# Patient Record
Sex: MALE | Race: BLACK OR AFRICAN AMERICAN | NOT HISPANIC OR LATINO | Employment: OTHER | URBAN - METROPOLITAN AREA
[De-identification: names, ages, dates, MRNs, and addresses within clinical notes are randomized per-mention and may not be internally consistent; named-entity substitution may affect disease eponyms.]

---

## 2017-05-24 ENCOUNTER — OFFICE VISIT (OUTPATIENT)
Dept: INTERNAL MEDICINE | Facility: CLINIC | Age: 32
End: 2017-05-24
Payer: MEDICARE

## 2017-05-24 ENCOUNTER — LAB VISIT (OUTPATIENT)
Dept: LAB | Facility: HOSPITAL | Age: 32
End: 2017-05-24
Attending: FAMILY MEDICINE
Payer: MEDICARE

## 2017-05-24 VITALS — DIASTOLIC BLOOD PRESSURE: 80 MMHG | SYSTOLIC BLOOD PRESSURE: 122 MMHG | HEART RATE: 70 BPM

## 2017-05-24 DIAGNOSIS — Z74.09 MOBILITY IMPAIRED: ICD-10-CM

## 2017-05-24 DIAGNOSIS — I10 HTN (HYPERTENSION), BENIGN: Primary | ICD-10-CM

## 2017-05-24 DIAGNOSIS — E78.5 DYSLIPIDEMIA: ICD-10-CM

## 2017-05-24 DIAGNOSIS — Q05.9 SPINA BIFIDA, UNSPECIFIED HYDROCEPHALUS PRESENCE, UNSPECIFIED SPINAL REGION: ICD-10-CM

## 2017-05-24 DIAGNOSIS — Z00.00 PREVENTATIVE HEALTH CARE: ICD-10-CM

## 2017-05-24 DIAGNOSIS — N32.81 OVERACTIVE BLADDER: ICD-10-CM

## 2017-05-24 LAB
ALBUMIN SERPL BCP-MCNC: 3.8 G/DL
ALP SERPL-CCNC: 108 U/L
ALT SERPL W/O P-5'-P-CCNC: 20 U/L
ANION GAP SERPL CALC-SCNC: 10 MMOL/L
AST SERPL-CCNC: 17 U/L
BASOPHILS # BLD AUTO: 0.02 K/UL
BASOPHILS NFR BLD: 0.3 %
BILIRUB SERPL-MCNC: 0.5 MG/DL
BUN SERPL-MCNC: 17 MG/DL
CALCIUM SERPL-MCNC: 9.2 MG/DL
CHLORIDE SERPL-SCNC: 104 MMOL/L
CHOLEST/HDLC SERPL: 5.2 {RATIO}
CO2 SERPL-SCNC: 25 MMOL/L
CREAT SERPL-MCNC: 0.9 MG/DL
DIFFERENTIAL METHOD: ABNORMAL
EOSINOPHIL # BLD AUTO: 0.2 K/UL
EOSINOPHIL NFR BLD: 4.2 %
ERYTHROCYTE [DISTWIDTH] IN BLOOD BY AUTOMATED COUNT: 15.1 %
EST. GFR  (AFRICAN AMERICAN): >60 ML/MIN/1.73 M^2
EST. GFR  (NON AFRICAN AMERICAN): >60 ML/MIN/1.73 M^2
GLUCOSE SERPL-MCNC: 83 MG/DL
HCT VFR BLD AUTO: 45.8 %
HDL/CHOLESTEROL RATIO: 19.1 %
HDLC SERPL-MCNC: 178 MG/DL
HDLC SERPL-MCNC: 34 MG/DL
HGB BLD-MCNC: 15.1 G/DL
LDLC SERPL CALC-MCNC: 130.2 MG/DL
LYMPHOCYTES # BLD AUTO: 2.2 K/UL
LYMPHOCYTES NFR BLD: 38.1 %
MCH RBC QN AUTO: 26 PG
MCHC RBC AUTO-ENTMCNC: 33 %
MCV RBC AUTO: 79 FL
MONOCYTES # BLD AUTO: 0.5 K/UL
MONOCYTES NFR BLD: 8.3 %
NEUTROPHILS # BLD AUTO: 2.8 K/UL
NEUTROPHILS NFR BLD: 48.9 %
NONHDLC SERPL-MCNC: 144 MG/DL
PLATELET # BLD AUTO: 243 K/UL
PMV BLD AUTO: 10.3 FL
POTASSIUM SERPL-SCNC: 3.9 MMOL/L
PROT SERPL-MCNC: 7.8 G/DL
RBC # BLD AUTO: 5.81 M/UL
SODIUM SERPL-SCNC: 139 MMOL/L
TRIGL SERPL-MCNC: 69 MG/DL
WBC # BLD AUTO: 5.77 K/UL

## 2017-05-24 PROCEDURE — 80061 LIPID PANEL: CPT

## 2017-05-24 PROCEDURE — 85025 COMPLETE CBC W/AUTO DIFF WBC: CPT

## 2017-05-24 PROCEDURE — 36415 COLL VENOUS BLD VENIPUNCTURE: CPT

## 2017-05-24 PROCEDURE — 99395 PREV VISIT EST AGE 18-39: CPT | Mod: S$PBB,,, | Performed by: FAMILY MEDICINE

## 2017-05-24 PROCEDURE — 80053 COMPREHEN METABOLIC PANEL: CPT

## 2017-05-24 PROCEDURE — 83036 HEMOGLOBIN GLYCOSYLATED A1C: CPT

## 2017-05-24 PROCEDURE — 99999 PR PBB SHADOW E&M-EST. PATIENT-LVL II: CPT | Mod: PBBFAC,,, | Performed by: FAMILY MEDICINE

## 2017-05-24 NOTE — PROGRESS NOTES
Subjective:       Patient ID: Dale Pantoja is a 32 y.o. male.    Chief Complaint: No chief complaint on file.  Dale Pantoja 32 y.o. male is here for office visit to review care and physical exam, reports doing well in general.  Does labs at [facility] most every day, will be playing league basketball.      HPI  Review of Systems   Constitutional: Negative for activity change, appetite change, fatigue, fever and unexpected weight change.   HENT: Negative for congestion, hearing loss, postnasal drip and rhinorrhea.    Eyes: Negative for pain, discharge and visual disturbance.   Respiratory: Negative for cough, choking and shortness of breath.    Cardiovascular: Negative for chest pain, palpitations and leg swelling.   Gastrointestinal: Negative for abdominal pain, diarrhea and vomiting.   Genitourinary: Negative for dysuria, flank pain, hematuria and urgency.   Musculoskeletal: Negative for arthralgias, back pain, joint swelling and neck pain.   Skin: Negative for color change and rash.   Neurological: Negative for dizziness, tremors, syncope, weakness, numbness and headaches.   Psychiatric/Behavioral: Negative for agitation and confusion. The patient is not hyperactive.        Objective:      Physical Exam   Constitutional: He is oriented to person, place, and time. He appears well-developed and well-nourished.   HENT:   Head: Normocephalic.   Eyes: EOM are normal. Pupils are equal, round, and reactive to light.   Neck: Normal range of motion. Neck supple. No thyromegaly present.   Cardiovascular: Normal rate.  Exam reveals no gallop and no friction rub.    No murmur heard.  Pulmonary/Chest: Effort normal. No respiratory distress. He has no wheezes.   Abdominal: Soft. Bowel sounds are normal. He exhibits no mass. There is no tenderness.   Musculoskeletal: He exhibits no edema or tenderness.   Lymphadenopathy:     He has no cervical adenopathy.   Neurological: He is alert and oriented to person, place, and time.  He has normal reflexes. No cranial nerve deficit.   Skin: Skin is warm. No rash noted.   Psychiatric: He has a normal mood and affect. His behavior is normal.       Assessment:       1. HTN (hypertension), benign    2. Dyslipidemia    3. Mobility impaired    4. Overactive bladder    5. Spina bifida, unspecified hydrocephalus presence, unspecified spinal region    6. Preventative health care        Plan:       associated with    Diagnoses and all orders for this visit:    HTN (hypertension), benign  -   Dyslipidemia  - review. Diet discussed  Mobility impaired  - discussed  Overactive bladder  - no issues  Spina bifida, unspecified hydrocephalus presence, unspecified spinal region  - Chart reviewed  Preventative health care  -     Comprehensive metabolic panel; Future  -     Lipid panel; Future  -     CBC auto differential; Future  -     Hemoglobin A1c; Future

## 2017-05-24 NOTE — PROGRESS NOTES
Subjective:       Patient ID: Dale Pantoja is a 32 y.o. male.    Chief Complaint: No chief complaint on file.  Dale Pantoja 32 y.o. male is here for office visit to review care and physical exam,  HPI  Review of Systems   Constitutional: Negative for activity change, appetite change, fatigue, fever and unexpected weight change.   HENT: Negative for congestion, hearing loss, postnasal drip and rhinorrhea.    Eyes: Negative for pain, discharge and visual disturbance.   Respiratory: Negative for cough, choking and shortness of breath.    Cardiovascular: Negative for chest pain, palpitations and leg swelling.   Gastrointestinal: Negative for abdominal pain, diarrhea and vomiting.   Genitourinary: Negative for dysuria, flank pain, hematuria and urgency.   Musculoskeletal: Negative for arthralgias, back pain, joint swelling and neck pain.   Skin: Negative for color change and rash.   Neurological: Negative for dizziness, tremors, syncope, weakness, numbness and headaches.   Psychiatric/Behavioral: Negative for agitation and confusion. The patient is not hyperactive.        Objective:      Physical Exam   Constitutional: He is oriented to person, place, and time. He appears well-developed and well-nourished.   HENT:   Head: Normocephalic.   Eyes: EOM are normal. Pupils are equal, round, and reactive to light.   Neck: Normal range of motion. Neck supple. No thyromegaly present.   Cardiovascular: Normal rate.  Exam reveals no gallop and no friction rub.    No murmur heard.  Pulmonary/Chest: Effort normal. No respiratory distress. He has no wheezes.   Abdominal: Soft. Bowel sounds are normal. He exhibits no mass. There is no tenderness.   Musculoskeletal: He exhibits no edema or tenderness.   Lymphadenopathy:     He has no cervical adenopathy.   Neurological: He is alert and oriented to person, place, and time. He has normal reflexes. No cranial nerve deficit.   Skin: Skin is warm. No rash noted.   Psychiatric: He has a  normal mood and affect. His behavior is normal.       Assessment:       No diagnosis found.    Plan:

## 2017-05-25 LAB
ESTIMATED AVG GLUCOSE: 120 MG/DL
HBA1C MFR BLD HPLC: 5.8 %

## 2017-06-09 ENCOUNTER — OFFICE VISIT (OUTPATIENT)
Dept: UROLOGY | Facility: CLINIC | Age: 32
End: 2017-06-09
Payer: MEDICARE

## 2017-06-09 VITALS
BODY MASS INDEX: 38.27 KG/M2 | HEART RATE: 80 BPM | SYSTOLIC BLOOD PRESSURE: 132 MMHG | HEIGHT: 63 IN | WEIGHT: 216 LBS | DIASTOLIC BLOOD PRESSURE: 74 MMHG

## 2017-06-09 DIAGNOSIS — N31.9 NEUROGENIC BLADDER: Primary | ICD-10-CM

## 2017-06-09 PROCEDURE — 99213 OFFICE O/P EST LOW 20 MIN: CPT | Mod: PBBFAC | Performed by: UROLOGY

## 2017-06-09 PROCEDURE — 99999 PR PBB SHADOW E&M-EST. PATIENT-LVL III: CPT | Mod: PBBFAC,,, | Performed by: UROLOGY

## 2017-06-09 PROCEDURE — 99213 OFFICE O/P EST LOW 20 MIN: CPT | Mod: S$PBB,,, | Performed by: UROLOGY

## 2017-06-09 NOTE — PROGRESS NOTES
Subjective:       Patient ID: Dale Pantoja is a 32 y.o. male.    Chief Complaint:  Follow-up      History of Present Illness  HPI  Patient is a 32 y.o. male who is established to our clinic and was initially referred by their PCP, Dr. Logan for evaluation of neurogenic bladder.  He was seen by Dr. Small last year for urodynamics.  He recommended ditropan XL daily.  Patient doing well.  No complaints.  cic performed 4-5x/day.  No leakage between catheterizations. No UTI's.  No stones.            Review of Systems  Review of Systems  All other systems reviewed and negative except pertinent positives noted in HPI.         Objective:     Physical Exam   Constitutional: He is oriented to person, place, and time. He appears well-developed and well-nourished. No distress.   HENT:   Head: Normocephalic and atraumatic.   Eyes: No scleral icterus.   Neck: No tracheal deviation present.   Pulmonary/Chest: Effort normal. No respiratory distress.   Neurological: He is alert and oriented to person, place, and time.   Psychiatric: He has a normal mood and affect. His behavior is normal. Judgment and thought content normal.          Lab Review  Lab Results   Component Value Date    COLORU LT. YELLOW 01/27/2016    SPECGRAV 1.020 01/27/2016    PHUR 5.0 01/27/2016    WBCUR 10 01/27/2016    NITRITE POS 01/27/2016    PROTEINUR 10 01/27/2016    GLUCOSEUR NEG 01/27/2016    KETONESU NEG 01/27/2016    UROBILINOGEN NORM 01/27/2016    BILIRUBINUR NEG 01/27/2016    RBCUR NEG 01/27/2016         Assessment:        1. Neurogenic bladder            Plan:     Neurogenic bladder  -     US Retroperitoneal Complete (Kidney and; Future; Expected date: 06/09/2017    f/u with renal us in 1 year.  Will call or message via patient portal with renal US results this year.   I spent 15 minutes with the patient of which more than half was spent in direct consultation with the patient in regards to our treatment and plan.

## 2017-06-14 ENCOUNTER — HOSPITAL ENCOUNTER (OUTPATIENT)
Dept: RADIOLOGY | Facility: HOSPITAL | Age: 32
Discharge: HOME | End: 2017-06-14
Attending: UROLOGY
Payer: MEDICARE

## 2017-06-14 DIAGNOSIS — N31.9 NEUROGENIC BLADDER: ICD-10-CM

## 2017-06-14 PROCEDURE — 76770 US EXAM ABDO BACK WALL COMP: CPT | Mod: 26,GC,, | Performed by: RADIOLOGY

## 2017-06-14 PROCEDURE — 76770 US EXAM ABDO BACK WALL COMP: CPT | Mod: TC

## 2017-10-04 ENCOUNTER — OFFICE VISIT (OUTPATIENT)
Dept: INTERNAL MEDICINE | Facility: CLINIC | Age: 32
End: 2017-10-04
Payer: MEDICARE

## 2017-10-04 VITALS — SYSTOLIC BLOOD PRESSURE: 128 MMHG | DIASTOLIC BLOOD PRESSURE: 80 MMHG | HEART RATE: 72 BPM

## 2017-10-04 DIAGNOSIS — G82.20 PARAPARESIS: ICD-10-CM

## 2017-10-04 DIAGNOSIS — Z74.09 MOBILITY IMPAIRED: ICD-10-CM

## 2017-10-04 DIAGNOSIS — E78.5 DYSLIPIDEMIA: ICD-10-CM

## 2017-10-04 DIAGNOSIS — Q05.9 SPINA BIFIDA, UNSPECIFIED HYDROCEPHALUS PRESENCE, UNSPECIFIED SPINAL REGION: ICD-10-CM

## 2017-10-04 DIAGNOSIS — N31.9 NEUROGENIC BLADDER: ICD-10-CM

## 2017-10-04 DIAGNOSIS — I10 HTN (HYPERTENSION), BENIGN: Primary | ICD-10-CM

## 2017-10-04 PROCEDURE — 99212 OFFICE O/P EST SF 10 MIN: CPT | Mod: PBBFAC | Performed by: FAMILY MEDICINE

## 2017-10-04 PROCEDURE — 99215 OFFICE O/P EST HI 40 MIN: CPT | Mod: S$PBB,,, | Performed by: FAMILY MEDICINE

## 2017-10-04 PROCEDURE — 99999 PR PBB SHADOW E&M-EST. PATIENT-LVL II: CPT | Mod: PBBFAC,,, | Performed by: FAMILY MEDICINE

## 2017-10-04 NOTE — PROGRESS NOTES
Subjective:       Patient ID: Dale Pantoja is a 32 y.o. male.    Chief Complaint: No chief complaint on file.  Dale Pantoja 32 y.o. male is here for office visit to review care and physical exam, here for regular care.  Has 90-L to fill-out.  Is very active.      HPI  Review of Systems   Constitutional: Negative for activity change, appetite change, fatigue, fever and unexpected weight change.   HENT: Negative for congestion, hearing loss, postnasal drip and rhinorrhea.    Eyes: Negative for pain, discharge and visual disturbance.   Respiratory: Negative for cough, choking and shortness of breath.    Cardiovascular: Negative for chest pain, palpitations and leg swelling.   Gastrointestinal: Negative for abdominal pain, diarrhea and vomiting.   Genitourinary: Negative for dysuria, flank pain, hematuria and urgency.   Musculoskeletal: Negative for arthralgias, back pain, joint swelling and neck pain.   Skin: Negative for color change and rash.   Neurological: Negative for dizziness, tremors, syncope, weakness, numbness and headaches.   Psychiatric/Behavioral: Negative for agitation and confusion. The patient is not hyperactive.        Objective:      Physical Exam   Constitutional: He is oriented to person, place, and time. He appears well-developed and well-nourished.   HENT:   Head: Normocephalic.   Eyes: EOM are normal. Pupils are equal, round, and reactive to light.   Neck: Normal range of motion. Neck supple. No thyromegaly present.   Cardiovascular: Normal rate.  Exam reveals no gallop and no friction rub.    No murmur heard.  Pulmonary/Chest: Effort normal. No respiratory distress. He has no wheezes.   Abdominal: Soft. Bowel sounds are normal. He exhibits no mass. There is no tenderness.   Musculoskeletal: He exhibits no edema or tenderness.   Lymphadenopathy:     He has no cervical adenopathy.   Neurological: He is alert and oriented to person, place, and time. He has normal reflexes. No cranial nerve  deficit.   Skin: Skin is warm. No rash noted.   Psychiatric: He has a normal mood and affect. His behavior is normal.       Assessment:       No diagnosis found.    Plan:       Diagnoses and all orders for this visit:    HTN (hypertension), benign  - Controled  Dyslipidemia  - Controled  Mobility impaired  - Discussed  Neurogenic bladder  - Chart reviewed  Paraparesis  - Chart reviewed  Spina bifida, unspecified hydrocephalus presence, unspecified spinal region  - Chart reviewed

## 2018-04-18 ENCOUNTER — LAB VISIT (OUTPATIENT)
Dept: LAB | Facility: HOSPITAL | Age: 33
End: 2018-04-18
Attending: FAMILY MEDICINE
Payer: MEDICARE

## 2018-04-18 ENCOUNTER — OFFICE VISIT (OUTPATIENT)
Dept: INTERNAL MEDICINE | Facility: CLINIC | Age: 33
End: 2018-04-18
Payer: MEDICARE

## 2018-04-18 DIAGNOSIS — Z00.00 PREVENTATIVE HEALTH CARE: ICD-10-CM

## 2018-04-18 DIAGNOSIS — I10 HTN (HYPERTENSION), BENIGN: Primary | ICD-10-CM

## 2018-04-18 DIAGNOSIS — E78.5 DYSLIPIDEMIA: ICD-10-CM

## 2018-04-18 DIAGNOSIS — Z83.3 FAMILY HISTORY OF DIABETES MELLITUS: ICD-10-CM

## 2018-04-18 DIAGNOSIS — I10 HTN (HYPERTENSION), BENIGN: ICD-10-CM

## 2018-04-18 LAB
ALBUMIN SERPL BCP-MCNC: 3.8 G/DL
ALP SERPL-CCNC: 111 U/L
ALT SERPL W/O P-5'-P-CCNC: 35 U/L
ANION GAP SERPL CALC-SCNC: 10 MMOL/L
AST SERPL-CCNC: 18 U/L
BASOPHILS # BLD AUTO: 0.03 K/UL
BASOPHILS NFR BLD: 0.5 %
BILIRUB SERPL-MCNC: 0.6 MG/DL
BUN SERPL-MCNC: 10 MG/DL
CALCIUM SERPL-MCNC: 9.2 MG/DL
CHLORIDE SERPL-SCNC: 105 MMOL/L
CHOLEST SERPL-MCNC: 186 MG/DL
CHOLEST/HDLC SERPL: 5 {RATIO}
CO2 SERPL-SCNC: 27 MMOL/L
CREAT SERPL-MCNC: 0.8 MG/DL
DIFFERENTIAL METHOD: ABNORMAL
EOSINOPHIL # BLD AUTO: 0.3 K/UL
EOSINOPHIL NFR BLD: 4.6 %
ERYTHROCYTE [DISTWIDTH] IN BLOOD BY AUTOMATED COUNT: 15.6 %
EST. GFR  (AFRICAN AMERICAN): >60 ML/MIN/1.73 M^2
EST. GFR  (NON AFRICAN AMERICAN): >60 ML/MIN/1.73 M^2
ESTIMATED AVG GLUCOSE: 140 MG/DL
GLUCOSE SERPL-MCNC: 109 MG/DL
HBA1C MFR BLD HPLC: 6.5 %
HCT VFR BLD AUTO: 48.4 %
HDLC SERPL-MCNC: 37 MG/DL
HDLC SERPL: 19.9 %
HGB BLD-MCNC: 15.7 G/DL
LDLC SERPL CALC-MCNC: 134.4 MG/DL
LYMPHOCYTES # BLD AUTO: 2.1 K/UL
LYMPHOCYTES NFR BLD: 33.8 %
MCH RBC QN AUTO: 25.7 PG
MCHC RBC AUTO-ENTMCNC: 32.4 G/DL
MCV RBC AUTO: 79 FL
MONOCYTES # BLD AUTO: 0.6 K/UL
MONOCYTES NFR BLD: 9.5 %
NEUTROPHILS # BLD AUTO: 3.2 K/UL
NEUTROPHILS NFR BLD: 51.4 %
NONHDLC SERPL-MCNC: 149 MG/DL
PLATELET # BLD AUTO: 258 K/UL
PMV BLD AUTO: 10.2 FL
POTASSIUM SERPL-SCNC: 3.9 MMOL/L
PROT SERPL-MCNC: 8 G/DL
RBC # BLD AUTO: 6.1 M/UL
SODIUM SERPL-SCNC: 142 MMOL/L
TRIGL SERPL-MCNC: 73 MG/DL
WBC # BLD AUTO: 6.13 K/UL

## 2018-04-18 PROCEDURE — 99211 OFF/OP EST MAY X REQ PHY/QHP: CPT | Mod: PBBFAC | Performed by: FAMILY MEDICINE

## 2018-04-18 PROCEDURE — 99999 PR PBB SHADOW E&M-EST. PATIENT-LVL I: CPT | Mod: PBBFAC,,, | Performed by: FAMILY MEDICINE

## 2018-04-18 PROCEDURE — 83036 HEMOGLOBIN GLYCOSYLATED A1C: CPT

## 2018-04-18 PROCEDURE — 80053 COMPREHEN METABOLIC PANEL: CPT

## 2018-04-18 PROCEDURE — 36415 COLL VENOUS BLD VENIPUNCTURE: CPT

## 2018-04-18 PROCEDURE — 99213 OFFICE O/P EST LOW 20 MIN: CPT | Mod: S$PBB,ICN,, | Performed by: FAMILY MEDICINE

## 2018-04-18 PROCEDURE — 85025 COMPLETE CBC W/AUTO DIFF WBC: CPT

## 2018-04-18 PROCEDURE — 80061 LIPID PANEL: CPT

## 2018-04-18 NOTE — PROGRESS NOTES
Subjective:       Patient ID: Dale Pantoja is a 33 y.o. male.    Chief Complaint: No chief complaint on file.  Dale Pantoja 33 y.o. male is here for office visit to review care and physical exam, reports feeling well in general  Some weight gain noted?  Tries to watch diet.  Was playing basketball but not lately, has new part time job.  ROS unremarkable.  HPI  Review of Systems   Constitutional: Negative for activity change, appetite change, fatigue, fever and unexpected weight change.   HENT: Negative for congestion, hearing loss, postnasal drip and rhinorrhea.    Eyes: Negative for pain, discharge and visual disturbance.   Respiratory: Negative for cough, choking and shortness of breath.    Cardiovascular: Negative for chest pain, palpitations and leg swelling.   Gastrointestinal: Negative for abdominal pain, diarrhea and vomiting.   Genitourinary: Negative for dysuria, flank pain, hematuria and urgency.   Musculoskeletal: Negative for arthralgias, back pain, joint swelling and neck pain.   Skin: Negative for color change and rash.   Neurological: Negative for dizziness, tremors, syncope, weakness, numbness and headaches.   Psychiatric/Behavioral: Negative for agitation and confusion. The patient is not hyperactive.        Objective:      Physical Exam   Constitutional: He is oriented to person, place, and time. He appears well-developed and well-nourished.   HENT:   Head: Normocephalic.   Eyes: EOM are normal. Pupils are equal, round, and reactive to light.   Neck: Normal range of motion. Neck supple. No thyromegaly present.   Cardiovascular: Normal rate.  Exam reveals no gallop and no friction rub.    No murmur heard.  Pulmonary/Chest: Effort normal. No respiratory distress. He has no wheezes.   Abdominal: Soft. Bowel sounds are normal. He exhibits no mass. There is no tenderness.   Musculoskeletal: He exhibits no edema or tenderness.   Lymphadenopathy:     He has no cervical adenopathy.   Neurological: He is  alert and oriented to person, place, and time. He has normal reflexes. No cranial nerve deficit.   Skin: Skin is warm. No rash noted.   Psychiatric: He has a normal mood and affect. His behavior is normal.       Assessment:       1. HTN (hypertension), benign    2. Dyslipidemia    3. Family history of diabetes mellitus    4. Preventative health care        Plan:       Diagnoses and all orders for this visit:    HTN (hypertension), benign  -     Comprehensive metabolic panel; Future    Dyslipidemia  -     Comprehensive metabolic panel; Future  -     Lipid panel; Future, diet discussed    Family history of diabetes mellitus  -     Hemoglobin A1c; Future    Preventative health care  -     Comprehensive metabolic panel; Future  -     CBC auto differential; Future  -     Lipid panel; Future  -     Hemoglobin A1c; Future

## 2018-04-19 DIAGNOSIS — N31.9 NEUROGENIC BLADDER: ICD-10-CM

## 2018-04-19 DIAGNOSIS — N32.81 OVERACTIVE BLADDER: ICD-10-CM

## 2018-04-19 RX ORDER — OXYBUTYNIN CHLORIDE 15 MG/1
15 TABLET, EXTENDED RELEASE ORAL DAILY
Qty: 30 TABLET | Refills: 2 | Status: SHIPPED | OUTPATIENT
Start: 2018-04-19 | End: 2018-11-29 | Stop reason: SDUPTHER

## 2018-04-22 DIAGNOSIS — N31.9 NEUROGENIC BLADDER: ICD-10-CM

## 2018-04-22 DIAGNOSIS — N32.81 OVERACTIVE BLADDER: ICD-10-CM

## 2018-04-23 RX ORDER — OXYBUTYNIN CHLORIDE 15 MG/1
15 TABLET, EXTENDED RELEASE ORAL DAILY
Qty: 30 TABLET | Refills: 0 | Status: SHIPPED | OUTPATIENT
Start: 2018-04-23 | End: 2018-10-23 | Stop reason: SDUPTHER

## 2018-05-03 ENCOUNTER — PATIENT MESSAGE (OUTPATIENT)
Dept: UROLOGY | Facility: CLINIC | Age: 33
End: 2018-05-03

## 2018-05-07 ENCOUNTER — TELEPHONE (OUTPATIENT)
Dept: INTERNAL MEDICINE | Facility: CLINIC | Age: 33
End: 2018-05-07

## 2018-05-07 NOTE — TELEPHONE ENCOUNTER
----- Message from Yana Palomino sent at 5/4/2018  4:42 PM CDT -----  Contact: Mother [phone]   Patient is currently in the hospital at [facility].     Please call and advise.    Thank You

## 2018-06-15 ENCOUNTER — LAB VISIT (OUTPATIENT)
Dept: LAB | Facility: HOSPITAL | Age: 33
End: 2018-06-15
Attending: FAMILY MEDICINE
Payer: MEDICARE

## 2018-06-15 ENCOUNTER — OFFICE VISIT (OUTPATIENT)
Dept: INTERNAL MEDICINE | Facility: CLINIC | Age: 33
End: 2018-06-15
Payer: MEDICARE

## 2018-06-15 VITALS — DIASTOLIC BLOOD PRESSURE: 82 MMHG | SYSTOLIC BLOOD PRESSURE: 128 MMHG | HEART RATE: 88 BPM | OXYGEN SATURATION: 98 %

## 2018-06-15 DIAGNOSIS — R73.01 IMPAIRED FASTING GLUCOSE: ICD-10-CM

## 2018-06-15 DIAGNOSIS — I10 HTN (HYPERTENSION), BENIGN: Primary | ICD-10-CM

## 2018-06-15 DIAGNOSIS — G82.20 PARAPLEGIA: ICD-10-CM

## 2018-06-15 DIAGNOSIS — Z74.09 MOBILITY IMPAIRED: ICD-10-CM

## 2018-06-15 DIAGNOSIS — E78.5 DYSLIPIDEMIA: ICD-10-CM

## 2018-06-15 LAB
ESTIMATED AVG GLUCOSE: 108 MG/DL
HBA1C MFR BLD HPLC: 5.4 %

## 2018-06-15 PROCEDURE — 99999 PR PBB SHADOW E&M-EST. PATIENT-LVL III: CPT | Mod: PBBFAC,,, | Performed by: FAMILY MEDICINE

## 2018-06-15 PROCEDURE — 36415 COLL VENOUS BLD VENIPUNCTURE: CPT

## 2018-06-15 PROCEDURE — 99213 OFFICE O/P EST LOW 20 MIN: CPT | Mod: PBBFAC | Performed by: FAMILY MEDICINE

## 2018-06-15 PROCEDURE — 99214 OFFICE O/P EST MOD 30 MIN: CPT | Mod: S$PBB,,, | Performed by: FAMILY MEDICINE

## 2018-06-15 PROCEDURE — 83036 HEMOGLOBIN GLYCOSYLATED A1C: CPT

## 2018-06-15 NOTE — PROGRESS NOTES
Subjective:       Patient ID: Dale Pantoja is a 33 y.o. male.    Chief Complaint: Follow-up  Dale Pantoja 33 y.o. male is here for office visit to review care and physical exam, reports doing well, watches diet.  Does exercise, both distance and resistance.  ROS unremarkable.  Does need physical foe drivers license.    HPI  Review of Systems   Constitutional: Negative for activity change, appetite change, fatigue, fever and unexpected weight change.   HENT: Negative for congestion, hearing loss, postnasal drip and rhinorrhea.    Eyes: Negative for pain, discharge and visual disturbance.   Respiratory: Negative for cough, choking and shortness of breath.    Cardiovascular: Negative for chest pain, palpitations and leg swelling.   Gastrointestinal: Negative for abdominal pain, diarrhea and vomiting.   Genitourinary: Negative for dysuria, flank pain, hematuria and urgency.   Musculoskeletal: Negative for arthralgias, back pain, joint swelling and neck pain.   Skin: Negative for color change and rash.   Neurological: Negative for dizziness, tremors, syncope, weakness, numbness and headaches.   Psychiatric/Behavioral: Negative for agitation and confusion. The patient is not hyperactive.        Objective:      Physical Exam   Constitutional: He is oriented to person, place, and time. He appears well-developed and well-nourished.   HENT:   Head: Normocephalic.   Eyes: EOM are normal. Pupils are equal, round, and reactive to light.   Neck: Normal range of motion. Neck supple. No thyromegaly present.   Cardiovascular: Normal rate.  Exam reveals no gallop and no friction rub.    No murmur heard.  Pulmonary/Chest: Effort normal. No respiratory distress. He has no wheezes.   Abdominal: Soft. Bowel sounds are normal. He exhibits no mass. There is no tenderness.   Musculoskeletal: He exhibits no edema or tenderness.   Lymphadenopathy:     He has no cervical adenopathy.   Neurological: He is alert and oriented to person, place,  and time. He has normal reflexes. No cranial nerve deficit.   Skin: Skin is warm. No rash noted.   Psychiatric: He has a normal mood and affect. His behavior is normal.       Assessment:       1. HTN (hypertension), benign    2. Dyslipidemia    3. Mobility impaired    4. Impaired fasting glucose    5. Paraplegia        Plan:       Dale was seen today for follow-up.    Diagnoses and all orders for this visit:    HTN (hypertension), benign  - controled  Dyslipidemia  - reviewed  Mobility impaired  - See [employer]R to assist in documentation for drivers license  Impaired fasting glucose  -     Hemoglobin A1c; Future    Paraplegia    - chart reviewed

## 2018-07-06 ENCOUNTER — TELEPHONE (OUTPATIENT)
Dept: PHYSICAL MEDICINE AND REHAB | Facility: CLINIC | Age: 33
End: 2018-07-06

## 2018-07-06 ENCOUNTER — TELEPHONE (OUTPATIENT)
Dept: INTERNAL MEDICINE | Facility: CLINIC | Age: 33
End: 2018-07-06

## 2018-07-06 NOTE — TELEPHONE ENCOUNTER
Mother is asking if they could be seen sooner than October to have his paperwork filled out.  The mother did state that she called the PCP and they told her Dr. HUSAIN have to fill it out

## 2018-07-06 NOTE — TELEPHONE ENCOUNTER
----- Message from Yanni Solitario sent at 7/6/2018  8:31 AM CDT -----  Contact: Dayana (mother) @ [phone]  Pt needs to have some paper work filled out so that he can get his license renewed and is not able to wait until 10-2-18 to be seen.  Pt was last seen on 4-6-16.  Pls call to discuss options.

## 2018-07-06 NOTE — TELEPHONE ENCOUNTER
----- Message from Zeinab Wing sent at 7/5/2018  4:53 PM CDT -----  Contact: Dr David Nettles/[phone]  Dr Nettles would like to speak with the doctor in regards to paperwork the pt is requesting in behalf of Dr Aleman.

## 2018-10-02 ENCOUNTER — OFFICE VISIT (OUTPATIENT)
Dept: PHYSICAL MEDICINE AND REHAB | Facility: CLINIC | Age: 33
End: 2018-10-02
Payer: MEDICARE

## 2018-10-02 VITALS
DIASTOLIC BLOOD PRESSURE: 87 MMHG | BODY MASS INDEX: 38.27 KG/M2 | HEART RATE: 83 BPM | HEIGHT: 63 IN | WEIGHT: 216 LBS | SYSTOLIC BLOOD PRESSURE: 134 MMHG

## 2018-10-02 DIAGNOSIS — Q05.9 SPINA BIFIDA, UNSPECIFIED HYDROCEPHALUS PRESENCE, UNSPECIFIED SPINAL REGION: ICD-10-CM

## 2018-10-02 DIAGNOSIS — N31.9 NEUROGENIC BLADDER: ICD-10-CM

## 2018-10-02 DIAGNOSIS — Z02.4 ENCOUNTER FOR EXAMINATION FOR DRIVING LICENSE: Primary | ICD-10-CM

## 2018-10-02 DIAGNOSIS — G82.20 PARAPLEGIA: ICD-10-CM

## 2018-10-02 DIAGNOSIS — G82.20 PARAPARESIS: ICD-10-CM

## 2018-10-02 PROCEDURE — 99214 OFFICE O/P EST MOD 30 MIN: CPT | Mod: S$PBB,,, | Performed by: PHYSICAL MEDICINE & REHABILITATION

## 2018-10-02 PROCEDURE — 99213 OFFICE O/P EST LOW 20 MIN: CPT | Mod: PBBFAC | Performed by: PHYSICAL MEDICINE & REHABILITATION

## 2018-10-02 PROCEDURE — 99999 PR PBB SHADOW E&M-EST. PATIENT-LVL III: CPT | Mod: PBBFAC,,, | Performed by: PHYSICAL MEDICINE & REHABILITATION

## 2018-10-02 NOTE — PROGRESS NOTES
Subjective:       Patient ID: Dale Pantoja is a 33 y.o. male.    Chief Complaint: driving eval ( )    HPI     MrDeo Pantoja is 34 y/o male, with spina bifida, who returns to clinic for driving evaluation.  He has been driving since age of 17, and was getting renewal of his driving licence w/o any problem. This year for the first time he was asked to bring   Medical clearance to drive from his doctor.  He appears very upset about this. That took his independence, he is not able to   Go places, to have his part time job, nor he can get to his sports activities.   He is involved in para olimpic, basket ball team, that plays in [place].  He has Spina bifida since birth, and he is one of twin brothers, that is highly functional and helps his twin brother.    He has hand controled vehicle- van and he is driving over the last 16 yrs, w/o any problems. He has paraparesis, but is able to to stand up to 10 muinutes, and walk ~ 15 ft using upper body strength, on crutches, and b/l AFO.   He is able to fold and get his manual WC in back of car, and walk along car to seat, where her operates hand commands van.   He has no seizure dz, nor any other medical condition that could affect his driving abilities.  I saw Dale 2.5 yrs ago, on 4/6/2016, and his functional status is unchanged compare to previous , nor his physical exam changed.   He is able to give all informations about himself, he graduated high school, driving a car, since he was 18 y/o, and is working part time at [employer].  Furthermore, he lives in handicap accessible [place], with his brother, and he is  Modified independent to Independent with all his functional transfers, mobility, and ADLs. Uses upper extremities, and upper body strength for transfers.  He states that he has some leg movement above knee, but no motor/sensory  feelings in legs bellow the knees.   He has some movement in both thighs, that allows him to stand with bended knees, and uses  "forearm cratches For short distance ambulation, can do ~ 10-15 ft. He reports no fall, no injury.  He denies any MSK or neuropathic pain. He is very active young male.  He is playing basketball for paraplegics, and has  "quickie" WC with better performance.  He lives with his twin brother who has spina bifida, in disable assesible house.   He drives a car, since he was 16 y/o.  He takes no medications, he uses no drugs, nor he takes alcohol.   Here for follow up, and medical clearance for driving.    Past Medical History:   Diagnosis Date    Overactive bladder     Spina bifida     Urinary tract infection        Past Surgical History:   Procedure Laterality Date    SPINE SURGERY      VENTRICULOPERITONEAL SHUNT         Family History   Problem Relation Age of Onset    Spina bifida Brother     Thyroid disease Neg Hx     Hypertension Neg Hx     Glaucoma Neg Hx     Prostate cancer Neg Hx     Kidney disease Neg Hx        Social History     Socioeconomic History    Marital status: Single     Spouse name: None    Number of children: None    Years of education: None    Highest education level: None   Social Needs    Financial resource strain: None    Food insecurity - worry: None    Food insecurity - inability: None    Transportation needs - medical: None    Transportation needs - non-medical: None   Occupational History    None   Tobacco Use    Smoking status: Never Smoker    Smokeless tobacco: Never Used   Substance and Sexual Activity    Alcohol use: No    Drug use: No    Sexual activity: No   Other Topics Concern    None   Social History Narrative    None       Current Outpatient Medications   Medication Sig Dispense Refill    oxybutynin (DITROPAN XL) 15 MG TR24 Take 1 tablet (15 mg total) by mouth once daily. 30 tablet 2    oxybutynin (DITROPAN XL) 15 MG TR24 TAKE 1 TABLET (15 MG TOTAL) BY MOUTH ONCE DAILY. 30 tablet 0    lactulose (CHRONULAC) 20 gram/30 mL Soln Take 30 mLs (20 g total) by " mouth every other day. 450 mL 6    psyllium husk, with sugar, 3.4 gram/7 gram Powd Take 1 Dose by mouth once daily. 822 g 11     No current facility-administered medications for this visit.        Review of patient's allergies indicates:   Allergen Reactions    Latex, natural rubber          Review of Systems   Constitutional: Negative for appetite change and fatigue.   Eyes: Negative for visual disturbance.   Respiratory: Negative for shortness of breath.    Cardiovascular: Negative for chest pain.   Gastrointestinal: Negative for constipation and diarrhea.   Genitourinary: Negative for dysuria, frequency and urgency.   Musculoskeletal: Positive for gait problem (able to walk with crutches 15 ft.,). Negative for arthralgias, back pain, joint swelling, myalgias, neck pain and neck stiffness.   Neurological: Negative for dizziness, tremors, weakness (both legs are weak. ), numbness and headaches.   Psychiatric/Behavioral: Negative for dysphoric mood.   All other systems reviewed and are negative.          Objective:      Physical Exam      Physical Exam    CONSTITUTIONAL: sitting in a manual chair, pleasant.  HEENT: Head is normocephalic, atraumatic.   Pupils equal, round and reactive to light and accommodation.   NECK: Supple. No thyroid enlargement.   CARDIOVASCULAR: S1, S2 normal. Regular rhythm and rate.  RESPIRATORY: Clear to auscultation bilaterally. No wheezing, no crackles.   ABDOMEN: Soft, nontender, nondistended. Bowel sounds positive.   EXTREMITIES: no pretibial edema.   MUSCULOSKELETAL:   Full range of motion,in both upper extremities, + some antigravity   movement in proximal thighs bilaterally   he is able to stand up with bended knees.  He has tight posterior hamstrings, b/l.  No movement in lower extremities, bellow knee level.  Right LE knee flex contracture, at 30-35 degrees.  Muscle strength 5/5 in bilateral upper extremities,   Both HF/HE 3-, KF/KE 0, ankle DF/PF 0/5.   NEUROLOGIC EXAM::    Cranial nerves II through XII intact. No visual field deficit, no neglect.  The patient is alert; oriented to person,and time.   Deep tendon reflexes, +2, in left patellar DTR, +1 right patellar DTR.  Bilateral Achilles DTR 0/ 2.   DTR in both upper extremities +2, t/o.   Flaccid muscle tone in both lower extremities.  Sensory is absent to light touch and pinprick bellow L4 dermatome.  SKIN: No skin breakdowns or rash.     Assessment:       1. Encounter for examination for driving license    2. Paraparesis    3. Paraplegia    4. Spina bifida, unspecified hydrocephalus presence, unspecified spinal region    5. Neurogenic bladder        Plan:       Mr. Kit Hunter is 32 y/o male, with Spina bifida,      1. Driving evaluation, and medical clearance for driving .   He is driving since 17 yrs old, hand controlled adjusted vehicle.   he has full strength in his BUE, and cognitively he is intact.  He  has preserved antigravity motor activity in both thighs, that allows his to do short distance walking using  Crutches, and b/l AFO (short ambulation,   ~10-15 ft, and able to stand ~ 10 minutes). That is sufficient to fold his manual WC, and place it to trunk, and come around car, sit and drive using hand controls.  Very active male, participates in paraplegic basketball tournament, in manual WC.  Therefore I believe that Dale, is able to get  licence w/o limitation.   I filled all forms necessary to renew his  licence.     RTC prn.     Total time spent face to face with patient was 25 minutes.   More than 50% of that time was spent in counseling on diagnosis , prognosis and treatment options.   I reviewed Primary care , and other specialty's notes to better coordinate patient's  care.   All questions were answered, and patient voiced understanding.

## 2018-10-02 NOTE — Clinical Note
October 7, 2018      MD Jayro Bansal [facility]  [address]  [facility] [address]  Phone: [phone]          Patient: Dale Pantoja   MR Number: [id number]   YOB: 1985   Date of Visit: 10/2/2018       Dear Dr. Charly Aleman:    Thank you for referring Dale Pantoja to me for evaluation. Attached you will find relevant portions of my assessment and plan of care.    If you have questions, please do not hesitate to call me. I look forward to following Dale Pantoja along with you.    Sincerely,    Samreen Larson MD    Enclosure  CC:  No Recipients    If you would like to receive this communication electronically, please contact [email or web address] or [phone] to request more information on [employer] Link access.    For providers and/or their staff who would like to refer a patient to [facility], please contact us through our one-stop-shop provider referral line, [facility], at [phone].    If you feel you have received this communication in error or would no longer like to receive these types of communications, please e-mail [email or web address]

## 2018-10-08 ENCOUNTER — TELEPHONE (OUTPATIENT)
Dept: PHYSICAL MEDICINE AND REHAB | Facility: CLINIC | Age: 33
End: 2018-10-08

## 2018-10-08 NOTE — TELEPHONE ENCOUNTER
Patient will bring in form with the missing information. Told the patient he will have to leave it and will get a call when it is complete.

## 2018-10-08 NOTE — TELEPHONE ENCOUNTER
----- Message from Eliseo Shelley sent at 10/8/2018  8:18 AM CDT -----  Needs Advice    Reason for call: Pt states paperwork for drivers license is not complete and pt is asking if he can come today to have the doctor complete it        Communication Preference: [phone]    Additional Information:

## 2018-10-23 ENCOUNTER — OFFICE VISIT (OUTPATIENT)
Dept: INTERNAL MEDICINE | Facility: CLINIC | Age: 33
End: 2018-10-23
Payer: MEDICARE

## 2018-10-23 VITALS
BODY MASS INDEX: 38.28 KG/M2 | DIASTOLIC BLOOD PRESSURE: 85 MMHG | HEIGHT: 63 IN | OXYGEN SATURATION: 97 % | HEART RATE: 75 BPM | SYSTOLIC BLOOD PRESSURE: 120 MMHG | WEIGHT: 216.06 LBS

## 2018-10-23 DIAGNOSIS — Z99.3 DEPENDENT ON WHEELCHAIR: ICD-10-CM

## 2018-10-23 DIAGNOSIS — K59.2 NEUROGENIC BOWEL: ICD-10-CM

## 2018-10-23 DIAGNOSIS — Q05.2 SPINA BIFIDA OF LUMBAR REGION WITH HYDROCEPHALUS: Primary | ICD-10-CM

## 2018-10-23 DIAGNOSIS — N31.9 NEUROGENIC BLADDER: ICD-10-CM

## 2018-10-23 PROBLEM — Z02.4 ENCOUNTER FOR EXAMINATION FOR DRIVING LICENSE: Status: RESOLVED | Noted: 2018-10-02 | Resolved: 2018-10-23

## 2018-10-23 PROCEDURE — 99213 OFFICE O/P EST LOW 20 MIN: CPT | Mod: PBBFAC | Performed by: STUDENT IN AN ORGANIZED HEALTH CARE EDUCATION/TRAINING PROGRAM

## 2018-10-23 PROCEDURE — 99999 PR PBB SHADOW E&M-EST. PATIENT-LVL III: CPT | Mod: PBBFAC,GC,, | Performed by: STUDENT IN AN ORGANIZED HEALTH CARE EDUCATION/TRAINING PROGRAM

## 2018-10-23 PROCEDURE — 99213 OFFICE O/P EST LOW 20 MIN: CPT | Mod: S$PBB,GC,, | Performed by: STUDENT IN AN ORGANIZED HEALTH CARE EDUCATION/TRAINING PROGRAM

## 2018-10-23 NOTE — PROGRESS NOTES
I have reviewed and concur with the resident's history, physical, assessment, and plan.  I have personally interviewed and examined the patient  Dale Pantoja at bedside. See below addendum for my evaluation and additional findings.    Pt here to have 90L form completed; no changes from prior.   Had recent driving evaluation with [facility] and was able to get his drivers license renewed.  Need to establish with new PCP -- his mother helps to arrange his appointments, he thought he was scheduled for an appt but none currently listed in Epic; he will check in on this and schedule. Reviewed last labs; he has been continuing to work on healthy diet and regular exercise.     Ulysses Serna MD

## 2018-10-23 NOTE — PROGRESS NOTES
Subjective:       Patient ID: Dale Pantoja is a 33 y.o. male.    Chief Complaint: Paper Work    Dale Pantoja is a 33 y.o. Gentleman with Spina bifida, Neurogenic bladder is here for a regular physical to fill out his 90-L form. He has no new symptoms or changes in his lifestyle.      Review of Systems   Constitutional: Negative for appetite change, fatigue and fever.   HENT: Negative for rhinorrhea and sore throat.    Respiratory: Negative for cough and shortness of breath.    Cardiovascular: Negative for chest pain and palpitations.   Gastrointestinal: Negative for abdominal distention, abdominal pain, constipation, diarrhea, nausea and vomiting.   Genitourinary: Negative for flank pain and hematuria.   Musculoskeletal: Negative for back pain.   Skin: Negative for rash and wound.   Neurological: Negative for dizziness and headaches.        Wheelchair bound   Hematological: Does not bruise/bleed easily.   Psychiatric/Behavioral: Negative for agitation and confusion.       Objective:      Physical Exam   Constitutional: He is oriented to person, place, and time. He appears well-nourished. No distress.   HENT:   Head: Normocephalic and atraumatic.   Eyes: EOM are normal. Pupils are equal, round, and reactive to light.   Neck: Normal range of motion. No JVD present. No thyromegaly present.   Cardiovascular: Normal rate, regular rhythm and normal heart sounds.   No murmur heard.  Pulmonary/Chest: Effort normal and breath sounds normal. He has no wheezes. He has no rales.   Abdominal: Soft. Bowel sounds are normal. He exhibits no distension. There is no tenderness.   Musculoskeletal: He exhibits no edema.   Neurological: He is alert and oriented to person, place, and time. A sensory deficit (Below knees no sensation) is present. No cranial nerve deficit.   Wheelchair bound  Strength UE - 5/5,   He is able to stand for few minutes,  No movement below knee level, able to lift thighs against gravity     Psychiatric: He  has a normal mood and affect. Judgment normal.       Assessment:       1. Spina bifida of lumbar region with hydrocephalus    2. Neurogenic bowel    3. Dependent on wheelchair    4. Neurogenic bladder        Plan:       Spina bifida of lumbar region with hydrocephalus  Neurogenic bowel, Neurogenic bladder  Dependent on wheelchair    Health Maintenance   Topic Date Due    Influenza Vaccine  08/01/2018    Lipid Panel  04/18/2023    TETANUS VACCINE  02/11/2026       He is able to carry many of his activities by himself but does require assistant at home for ADLs and will benefit from Home Aid.    To follow up with his PCP    Case discussed with Dr Daphne Leger  PGY -1   Internal Medicine

## 2018-11-29 ENCOUNTER — OFFICE VISIT (OUTPATIENT)
Dept: INTERNAL MEDICINE | Facility: CLINIC | Age: 33
End: 2018-11-29
Payer: MEDICARE

## 2018-11-29 ENCOUNTER — LAB VISIT (OUTPATIENT)
Dept: LAB | Facility: HOSPITAL | Age: 33
End: 2018-11-29
Attending: INTERNAL MEDICINE
Payer: MEDICARE

## 2018-11-29 ENCOUNTER — TELEPHONE (OUTPATIENT)
Dept: INTERNAL MEDICINE | Facility: CLINIC | Age: 33
End: 2018-11-29

## 2018-11-29 VITALS
BODY MASS INDEX: 38.27 KG/M2 | SYSTOLIC BLOOD PRESSURE: 124 MMHG | HEIGHT: 63 IN | OXYGEN SATURATION: 96 % | HEART RATE: 74 BPM | DIASTOLIC BLOOD PRESSURE: 86 MMHG

## 2018-11-29 DIAGNOSIS — R73.09 ELEVATED GLUCOSE: ICD-10-CM

## 2018-11-29 DIAGNOSIS — N32.81 OVERACTIVE BLADDER: ICD-10-CM

## 2018-11-29 DIAGNOSIS — Q05.2 SPINA BIFIDA OF LUMBAR REGION WITH HYDROCEPHALUS: ICD-10-CM

## 2018-11-29 DIAGNOSIS — Z99.3 DEPENDENT ON WHEELCHAIR: ICD-10-CM

## 2018-11-29 DIAGNOSIS — K59.2 NEUROGENIC BOWEL: ICD-10-CM

## 2018-11-29 DIAGNOSIS — E78.5 DYSLIPIDEMIA: ICD-10-CM

## 2018-11-29 DIAGNOSIS — N31.9 NEUROGENIC BLADDER: Primary | ICD-10-CM

## 2018-11-29 DIAGNOSIS — G82.20 PARAPLEGIA: ICD-10-CM

## 2018-11-29 LAB
ALBUMIN SERPL BCP-MCNC: 3.9 G/DL
ALP SERPL-CCNC: 111 U/L
ALT SERPL W/O P-5'-P-CCNC: 21 U/L
ANION GAP SERPL CALC-SCNC: 9 MMOL/L
AST SERPL-CCNC: 12 U/L
BILIRUB SERPL-MCNC: 0.8 MG/DL
BUN SERPL-MCNC: 13 MG/DL
CALCIUM SERPL-MCNC: 9.1 MG/DL
CHLORIDE SERPL-SCNC: 103 MMOL/L
CHOLEST SERPL-MCNC: 190 MG/DL
CHOLEST/HDLC SERPL: 4.8 {RATIO}
CO2 SERPL-SCNC: 29 MMOL/L
CREAT SERPL-MCNC: 0.9 MG/DL
EST. GFR  (AFRICAN AMERICAN): >60 ML/MIN/1.73 M^2
EST. GFR  (NON AFRICAN AMERICAN): >60 ML/MIN/1.73 M^2
ESTIMATED AVG GLUCOSE: 108 MG/DL
GLUCOSE SERPL-MCNC: 72 MG/DL
HBA1C MFR BLD HPLC: 5.4 %
HDLC SERPL-MCNC: 40 MG/DL
HDLC SERPL: 21.1 %
LDLC SERPL CALC-MCNC: 138.2 MG/DL
NONHDLC SERPL-MCNC: 150 MG/DL
POTASSIUM SERPL-SCNC: 3.4 MMOL/L
PROT SERPL-MCNC: 7.9 G/DL
SODIUM SERPL-SCNC: 141 MMOL/L
TRIGL SERPL-MCNC: 59 MG/DL

## 2018-11-29 PROCEDURE — 80061 LIPID PANEL: CPT

## 2018-11-29 PROCEDURE — 99214 OFFICE O/P EST MOD 30 MIN: CPT | Mod: PBBFAC | Performed by: INTERNAL MEDICINE

## 2018-11-29 PROCEDURE — 80053 COMPREHEN METABOLIC PANEL: CPT

## 2018-11-29 PROCEDURE — 83036 HEMOGLOBIN GLYCOSYLATED A1C: CPT

## 2018-11-29 PROCEDURE — 36415 COLL VENOUS BLD VENIPUNCTURE: CPT

## 2018-11-29 PROCEDURE — 99999 PR PBB SHADOW E&M-EST. PATIENT-LVL IV: CPT | Mod: PBBFAC,,, | Performed by: INTERNAL MEDICINE

## 2018-11-29 PROCEDURE — 99214 OFFICE O/P EST MOD 30 MIN: CPT | Mod: S$PBB,,, | Performed by: INTERNAL MEDICINE

## 2018-11-29 RX ORDER — OXYBUTYNIN CHLORIDE 15 MG/1
15 TABLET, EXTENDED RELEASE ORAL DAILY
Qty: 90 TABLET | Refills: 3 | Status: SHIPPED | OUTPATIENT
Start: 2018-11-29 | End: 2019-10-22 | Stop reason: SDUPTHER

## 2018-11-29 NOTE — TELEPHONE ENCOUNTER
----- Message from Brian Tomlinson sent at 11/29/2018  3:14 PM CST -----  Regarding: urine orders  We have a urine here for Dale Pantoja ([id number]) at  lab with no orders.   -Brian [id number]

## 2018-11-29 NOTE — PROGRESS NOTES
HISTORY OF PRESENT ILLNESS:  Dale Pantoja is a transfer from Dr. Aleman who has   left [facility], comes in today to establish care.  He, like his brother Humberto,   has spina bifida and is paraplegic and wheelchair bound.  He also has   hydrocephalus and had a  shunt placed in the distant past.  Looked at his last   note from Dr. Perea back in October 2015, at that time, they were unsure whether   the  shunt was working, maybe just wanted him to follow up again in 2016 at   [facility], but that was not done.  He has neurogenic bladder, has   catheterization four times a day.  He is on Ditropan for this.  He has   borderline diabetes or insulin resistance, but he has been dieting and working   on doing wheelchair basketball, so he is exercising, stays very active.  He also   has a little borderline hyperlipidemia and   hypertension, he says he has   been on hypertension medicine one time.  Blood pressure today is 124/86.    Currently, he is on a medication of Ditropan.    REVIEW OF SYSTEMS:  No chest pain, shortness of breath, palpitations, nausea,   vomiting, blurriness of vision.  No PND or orthopnea, says he is pretty happy.    PHYSICAL EXAMINATION:  GENERAL:  No acute distress.  He is a well-appearing gentleman.  NECK:  Supple.  He has no JVD.  Thyroid is not enlarged.  CARDIOVASCULAR:  S1 and S2, regular rate and rhythm without murmur, gallop or   rub.  ABDOMEN:  Soft, nontender.  LOWER EXTREMITIES:  Hypertrophy.  He is in a wheelchair today.  Mood seems good.    ASSESSMENT:  Neurogenic bladder, neurogenic bowel, paraplegia, spina bifida with   hydrocephalus, dependent on a wheelchair, dyslipidemia, overactive bladder and   elevated glucose.    PLAN:  We are going to refill his Ditropan.  I am going to check a lipid panel,   chemistry.  We discussed his blood pressure.  We will take blood pressure off   his list as far as medical problems, but we will monitor that.  Also, discussed   his borderline  diabetes.  He   has a paperwork for me, I understand and I told   him that is fine, just give me some time, I will be happy to help him out with   that.  I will follow him up again in six months.  We will see him back to   Neurosurgery.          /[email or web address] [id number] clifford(s)        MAGUI/SLIME  dd: 11/29/2018 13:49:38 (CST)  td: 11/30/2018 02:40:20 (CST)  Doc ID   #[id number]  Job ID #[id number]    CC:

## 2018-12-10 ENCOUNTER — TELEPHONE (OUTPATIENT)
Dept: INTERNAL MEDICINE | Facility: CLINIC | Age: 33
End: 2018-12-10

## 2018-12-10 DIAGNOSIS — Z98.2 S/P VP SHUNT: Primary | ICD-10-CM

## 2018-12-10 NOTE — TELEPHONE ENCOUNTER
----- Message from Jyoti Srinivasan sent at 12/10/2018  2:27 PM CST -----  Contact: Mother Dayana Tineo# [phone]  Patient would like to get a referral.  Does the patient already have the specialty clinic appointment scheduled:  No  If yes, what date is the appointment scheduled:   N/A  Referral to what specialty:  X-Ray  Reason (be specific):  Neuro surgeon office need information.  Did you confirm the insurance currently in Epic is correct (important for a referral):  Yes  Does the patient want the referral with a specific physician:  No  Is the specialist an [facility]:  Patient does not know.

## 2018-12-10 NOTE — TELEPHONE ENCOUNTER
Spoke with pt mother a referral was placed for Neurosurgery, they called and said they needed to have a X-Ray on pt before he comes. Mother says he hasn't seen a neurosurgeon in a while.

## 2018-12-11 ENCOUNTER — OFFICE VISIT (OUTPATIENT)
Dept: NEUROSURGERY | Facility: CLINIC | Age: 33
End: 2018-12-11
Payer: MEDICARE

## 2018-12-11 ENCOUNTER — HOSPITAL ENCOUNTER (OUTPATIENT)
Dept: RADIOLOGY | Facility: HOSPITAL | Age: 33
Discharge: HOME | End: 2018-12-11
Attending: INTERNAL MEDICINE
Payer: MEDICARE

## 2018-12-11 VITALS
TEMPERATURE: 98 F | DIASTOLIC BLOOD PRESSURE: 86 MMHG | BODY MASS INDEX: 38.27 KG/M2 | WEIGHT: 216 LBS | HEIGHT: 63 IN | SYSTOLIC BLOOD PRESSURE: 129 MMHG | HEART RATE: 73 BPM

## 2018-12-11 DIAGNOSIS — G82.20 PARAPLEGIA: ICD-10-CM

## 2018-12-11 DIAGNOSIS — Q05.2 SPINA BIFIDA OF LUMBAR REGION WITH HYDROCEPHALUS: Primary | ICD-10-CM

## 2018-12-11 DIAGNOSIS — Z98.2 S/P VP SHUNT: ICD-10-CM

## 2018-12-11 PROCEDURE — 70450 CT HEAD/BRAIN W/O DYE: CPT | Mod: 26,,, | Performed by: RADIOLOGY

## 2018-12-11 PROCEDURE — 99214 OFFICE O/P EST MOD 30 MIN: CPT | Mod: S$PBB,,, | Performed by: PHYSICIAN ASSISTANT

## 2018-12-11 PROCEDURE — 70450 CT HEAD/BRAIN W/O DYE: CPT | Mod: TC

## 2018-12-11 PROCEDURE — 99213 OFFICE O/P EST LOW 20 MIN: CPT | Mod: PBBFAC,25 | Performed by: PHYSICIAN ASSISTANT

## 2018-12-11 PROCEDURE — 99999 PR PBB SHADOW E&M-EST. PATIENT-LVL III: CPT | Mod: PBBFAC,,, | Performed by: PHYSICIAN ASSISTANT

## 2018-12-11 NOTE — LETTER
December 11, 2018      Juan Antonio Adorno Jr., MD  [address]  [facility] [address]           [facility] 7th Fl  [address]  [facility] [address]  Phone: [phone]          Patient: Dale Pantoja   MR Number: [id number]   YOB: 1985   Date of Visit: 12/11/2018       Dear Dr. Juan Antonio Adorno Jr.:    Thank you for referring Dale Pantoja to me for evaluation. Attached you will find relevant portions of my assessment and plan of care.    If you have questions, please do not hesitate to call me. I look forward to following Dale Pantoja along with you.    Sincerely,    Nadya Roy PA-C    Enclosure  CC:  No Recipients    If you would like to receive this communication electronically, please contact [email or web address] or [phone] to request more information on [employer] Link access.    For providers and/or their staff who would like to refer a patient to [facility], please contact us through our one-stop-shop provider referral line, [facility] , at [phone].    If you feel you have received this communication in error or would no longer like to receive these types of communications, please e-mail [email or web address]

## 2018-12-11 NOTE — PROGRESS NOTES
[facility]  Neurosurgery  History & Physical    Patient Name: Dale Pantoja  MRN: [id number]  Primary Care Provider: Juan Antonio Adorno MD    Patient information was obtained from patient and past medical records.     Subjective:     Chief Complaint/Reason for Admission: 1 year f/u hydrocephalus    History of Present Illness: Pt is 34 y/o male with hx of spina bifida and associated hydrocephalus s/p  shunt placement. Pt is established with Dr. Perea. Previous imaging of  shunt suggest shunt is no longer functioning, but CT head remained stable with no increasing enlargement of the ventricles. Pt presents to clinic today for annual follow up. He was last seen in clinic in January of 2016 and was doing well at that time. Today, pt reports he has been doing well. He denies headache, pain, vision changes, dizziness, or increased weakness.     Review of patient's allergies indicates:   Allergen Reactions    Latex, natural rubber        Past Medical History:   Diagnosis Date    Overactive bladder     Spina bifida     Urinary tract infection      Past Surgical History:   Procedure Laterality Date    SPINE SURGERY      VENTRICULOPERITONEAL SHUNT       Family History     Problem Relation (Age of Onset)    Spina bifida Brother        Tobacco Use    Smoking status: Never Smoker    Smokeless tobacco: Never Used   Substance and Sexual Activity    Alcohol use: No    Drug use: No    Sexual activity: No     Review of Systems   Constitutional: no fever, chills or night sweats. No changes in weight   Eyes: no visual changes   ENT: no nasal congestion or sore throat   Respiratory: no cough or shortness of breath   Cardiovascular: no chest pain or palpitations   Gastrointestinal: no nausea or vomiting   Genitourinary: no hematuria or dysuria   Integument/Breast: no rash or pruritis   Hematologic/Lymphatic: no easy bruising or lymphadenopathy   Musculoskeletal: no arthralgias or myalgias.   Neurological: no  seizures or tremors   Behavioral/Psych: no auditory or visual hallucinations   Endocrine: no heat or cold intolerance     Objective:     Weight: 98 kg (216 lb)  Body mass index is 38.26 kg/m².  Vital Signs (Most Recent):  Temp: 97.7 °F (36.5 °C) (12/11/18 0749)  Pulse: 73 (12/11/18 0749)  BP: 129/86 (12/11/18 0749) Vital Signs (24h Range):  [unfilled]         Neurosurgery Physical Exam  General: well developed, well nourished, no distress.   Head: normocephalic, atraumatic  Neurologic: Alert and oriented. Thought content appropriate.  GCS: Motor: 6/Verbal: 5/Eyes: 4 GCS Total: 15  Mental Status: Awake, Alert, Oriented x 4  Language: No aphasia  Speech: No dysarthria  Cranial nerves: face symmetric, tongue midline, CN II-XII grossly intact.   Eyes: pupils equal, round, reactive to light with accomodation, EOMI. Horizontal nystagmus bilaterally.  Pulmonary: normal respirations, no signs of respiratory distress  Abdomen: soft, non-distended, not tender to palpation  Sensory: intact to light touch throughout  Motor Strength: Wheelchair bound, paraplegia.  Full strength upper extremities. No abnormal movements seen.   Pronator Drift: no drift noted  Finger-to-nose: Intact bilaterally  Mcgraw: absent  Skin: Skin is warm, dry and intact.  Straight leg raise: negative  No ttp of shunt reservoir or catheter tubing      Significant Diagnostics:  CT head 12/11/18:   Impression       Stable course and positioning of right parietal ventriculostomy catheter with mild prominence of the lateral ventricles, stable from prior.       I have reviewed all pertinent imaging results/findings within the past 24 hours.    Assessment/Plan:     Pt is 32 y/o male with hx of spina bifida and associated hydrocephalus s/p  shunt placement who presents for yearly follow up. Shunt is non-functioning, but CTH today is stable with no significant enlargement of ventricles. Pt has been doing well and has no complaints today. Will see patient in 1  year with repeat CTH. Plan was discussed with pt, and he is agreeable to plan. Pt was advised to call our clinic if they have any questions, concerns, or new/worsening symptoms.       Nadya Roy PA-C  Neurosurgery  [facility] - Neurosurgery 7th Fl

## 2019-04-24 ENCOUNTER — OFFICE VISIT (OUTPATIENT)
Dept: INTERNAL MEDICINE | Facility: CLINIC | Age: 34
End: 2019-04-24
Payer: MEDICARE

## 2019-04-24 VITALS — OXYGEN SATURATION: 98 % | DIASTOLIC BLOOD PRESSURE: 86 MMHG | SYSTOLIC BLOOD PRESSURE: 132 MMHG | HEART RATE: 79 BPM

## 2019-04-24 DIAGNOSIS — E78.5 DYSLIPIDEMIA: ICD-10-CM

## 2019-04-24 DIAGNOSIS — Q05.9 SPINA BIFIDA, UNSPECIFIED HYDROCEPHALUS PRESENCE, UNSPECIFIED SPINAL REGION: ICD-10-CM

## 2019-04-24 DIAGNOSIS — R73.01 IMPAIRED FASTING GLUCOSE: ICD-10-CM

## 2019-04-24 DIAGNOSIS — N31.9 NEUROGENIC BLADDER: ICD-10-CM

## 2019-04-24 DIAGNOSIS — K59.2 NEUROGENIC BOWEL: ICD-10-CM

## 2019-04-24 DIAGNOSIS — G82.20 PARAPLEGIA: ICD-10-CM

## 2019-04-24 DIAGNOSIS — Z99.3 DEPENDENT ON WHEELCHAIR: Primary | ICD-10-CM

## 2019-04-24 PROCEDURE — 99213 OFFICE O/P EST LOW 20 MIN: CPT | Mod: PBBFAC | Performed by: INTERNAL MEDICINE

## 2019-04-24 PROCEDURE — 99999 PR PBB SHADOW E&M-EST. PATIENT-LVL III: CPT | Mod: PBBFAC,,, | Performed by: INTERNAL MEDICINE

## 2019-04-24 PROCEDURE — 99214 OFFICE O/P EST MOD 30 MIN: CPT | Mod: S$PBB,,, | Performed by: INTERNAL MEDICINE

## 2019-04-24 PROCEDURE — 99999 PR PBB SHADOW E&M-EST. PATIENT-LVL III: ICD-10-PCS | Mod: PBBFAC,,, | Performed by: INTERNAL MEDICINE

## 2019-04-24 PROCEDURE — 99214 PR OFFICE/OUTPT VISIT, EST, LEVL IV, 30-39 MIN: ICD-10-PCS | Mod: S$PBB,,, | Performed by: INTERNAL MEDICINE

## 2019-04-27 NOTE — PROGRESS NOTES
HISTORY OF PRESENT ILLNESS:  Mr. Pantoja is a 34-year-old gentleman coming in   today to follow up his ongoing medical problems.  I saw him last back in   November 2018.  He has history of spina bifida and he is paraplegic and he is   wheelchair bound.  He also has a history of hydrocephalus, had a  shunt placed   in the past, saw Dr. Perea last in 2015 and at that time, made sure whether the    shunt was working.  He is not having symptoms.  He has a neurogenic bladder,   which he catches up himself four times a day.  He also uses Ditropan.  He has   been having borderline diabetes or insulin resistance.  He has been working on   his diet.  His twin brother, who also has spina bifida and paraplegia, has been   in the hospital recently and he has been working a lot more taking care of him.    He does not have a history of hypertension; however, his blood pressure today   is elevated at 140/94 on first check and 132/86 on second check.  Otherwise, he   says he has been doing well.  He denies any chest pain, shortness of breath,   palpitations, nausea, vomiting, blurriness of vision.  His wheelchair is in good   shape.  He says he has all the supplies that he needs for his catheters in his   wheelchair.  Last labs he had back in November 2018.  At that time, hemoglobin   A1c was 5.4 and we had lipid panel at that time, LDL was elevated at 138.    PHYSICAL EXAMINATION:  GENERAL:  He is a well-appearing 34-year-old gentleman in a wheelchair.  NECK:  Supple.  He has no JVD.  Thyroid is not enlarged.  CARDIOVASCULAR:  S1 and S2, regular rate and rhythm without murmur, gallop or   rub.  ABDOMEN:  Soft, nontender, no hepatosplenomegaly.  EXTREMITIES:  His lower extremities are atrophied.  No wounds are noted.    ASSESSMENT:  Dependent wheelchair secondary to spina bifida, dyslipidemia and   impaired fasting glucose, neurogenic bladder, neurogenic bowel.    PLAN:  Refill his Ditropan.  If he has any trouble with his  supplies of the   catheters or needs any repairs on wheelchair, he is going to let me know.  We   will monitor.  The blood pressure seems little high today.  Also, he has a   little hyperlipidemia.  We will check that again in six months with hemoglobin   A1c.  He is going to try to get back Into playing wheelchair basketball, which   will help also.  If he has any problems before next visit, he can give me a   call.      MAGUI/IN  dd: 04/27/2019 10:25:13 (CDT)  td: 04/28/2019 01:27:32 (CDT)  Doc ID   #[id number]  Job ID #[id number]    CC:

## 2019-05-03 ENCOUNTER — OFFICE VISIT (OUTPATIENT)
Dept: URGENT CARE | Facility: CLINIC | Age: 34
End: 2019-05-03
Payer: MEDICARE

## 2019-05-03 VITALS
HEIGHT: 63 IN | SYSTOLIC BLOOD PRESSURE: 168 MMHG | TEMPERATURE: 98 F | RESPIRATION RATE: 16 BRPM | OXYGEN SATURATION: 99 % | DIASTOLIC BLOOD PRESSURE: 107 MMHG | BODY MASS INDEX: 38.27 KG/M2 | WEIGHT: 216 LBS | HEART RATE: 106 BPM

## 2019-05-03 DIAGNOSIS — R73.9 HYPERGLYCEMIA, UNSPECIFIED: ICD-10-CM

## 2019-05-03 DIAGNOSIS — I10 HYPERTENSION, UNSPECIFIED TYPE: ICD-10-CM

## 2019-05-03 DIAGNOSIS — E86.0 DEHYDRATION: Primary | ICD-10-CM

## 2019-05-03 LAB
GLUCOSE SERPL-MCNC: 209 MG/DL (ref 70–110)
GLUCOSE SERPL-MCNC: 293 MG/DL (ref 70–110)
POC ANION GAP: 18 MMOL/L (ref 10–20)
POC BUN: 13 MMOL/L (ref 8–26)
POC CHLORIDE: 94 MMOL/L (ref 98–109)
POC CREATININE: 0.9 MG/DL (ref 0.6–1.3)
POC HEMATOCRIT: 48 %PCV (ref 42–52)
POC HEMOGLOBIN: 16.3 G/DL (ref 13.5–18)
POC ICA: 1.11 MMOL/L (ref 1.12–1.32)
POC POTASSIUM: 3.5 MMOL/L (ref 3.5–4.9)
POC SODIUM: 135 MMOL/L (ref 138–146)
POC TCO2: 28 MMOL/L (ref 24–29)

## 2019-05-03 PROCEDURE — 99214 OFFICE O/P EST MOD 30 MIN: CPT | Mod: S$GLB,,, | Performed by: PHYSICIAN ASSISTANT

## 2019-05-03 PROCEDURE — 80047 BASIC METABLC PNL IONIZED CA: CPT | Mod: QW,S$GLB,, | Performed by: PHYSICIAN ASSISTANT

## 2019-05-03 PROCEDURE — 80047 POCT CHEMISTRY PANEL: ICD-10-PCS | Mod: QW,S$GLB,, | Performed by: PHYSICIAN ASSISTANT

## 2019-05-03 PROCEDURE — 99214 PR OFFICE/OUTPT VISIT, EST, LEVL IV, 30-39 MIN: ICD-10-PCS | Mod: S$GLB,,, | Performed by: PHYSICIAN ASSISTANT

## 2019-05-03 PROCEDURE — 82962 POCT GLUCOSE, HAND-HELD DEVICE: ICD-10-PCS | Mod: S$GLB,,, | Performed by: PHYSICIAN ASSISTANT

## 2019-05-03 PROCEDURE — 82962 GLUCOSE BLOOD TEST: CPT | Mod: S$GLB,,, | Performed by: PHYSICIAN ASSISTANT

## 2019-05-03 RX ORDER — SODIUM CHLORIDE 9 MG/ML
INJECTION, SOLUTION INTRAVENOUS
Status: DISCONTINUED | OUTPATIENT
Start: 2019-05-03 | End: 2020-05-04

## 2019-05-03 NOTE — PROGRESS NOTES
"Subjective:       Patient ID: Dale Pantoja is a 34 y.o. male.    Vitals:  height is 5' 3" (1.6 m) and weight is 98 kg (216 lb). His temperature is 98.1 °F (36.7 °C). His blood pressure is 168/107 (abnormal) and his pulse is 106. His respiration is 16 and oxygen saturation is 99%.     Chief Complaint: Diarrhea and Fatigue    Pt states feeling weak with sudden onset of diarrhea and bladder incontinence this morning in the car. States that he had to go home and put on a depends diaper. Patient does have a history of neurogenic bladder and bowel but states that this time he feels more dehydrated and weak. He states that he's been trying to rehydrate by drinking lots of water but doesn't feel like it was enough. Grandmother brought him in today because he was shaking earlier and felt like his heart was beating fast. Currently feels like it's slowed down back to normal. He states that the last time he ate was last night and he just had some french fries. Denies any changes in diet, HA, dizziness, N/V, chest pain/palpitations, SOB, abd pain.    Diarrhea    This is a new problem. The current episode started today. The problem occurs less than 2 times per day. Pertinent negatives include no chills, fever or vomiting. He has tried nothing for the symptoms.   Fatigue   This is a new problem. The current episode started today. Associated symptoms include fatigue. Pertinent negatives include no chest pain, chills, diaphoresis, fever, nausea, vertigo or vomiting. He has tried nothing for the symptoms.       Constitution: Positive for fatigue. Negative for appetite change, chills, sweating and fever.   Cardiovascular: Negative for chest pain, palpitations, sob on exertion and passing out.   Respiratory: Negative for chest tightness and shortness of breath.    Gastrointestinal: Positive for diarrhea. Negative for abdominal trauma, abdominal bloating, history of abdominal surgery, nausea, vomiting, constipation, dark colored stools " and heartburn.   Genitourinary: Positive for bladder incontinence. Negative for dysuria and pelvic pain.   Musculoskeletal: Negative for back pain.   Neurological: Positive for tremors. Negative for dizziness, history of vertigo, light-headedness, disorientation, altered mental status and loss of consciousness.        History of spina bifida with neurogenic bladder and bowel   Psychiatric/Behavioral: Negative for altered mental status and disorientation.       Objective:      Physical Exam   Constitutional: He is oriented to person, place, and time. He appears well-developed and well-nourished. He is cooperative.  Non-toxic appearance. He does not appear ill. No distress.   HENT:   Head: Normocephalic and atraumatic.   Right Ear: Hearing, tympanic membrane, external ear and ear canal normal.   Left Ear: Hearing, tympanic membrane, external ear and ear canal normal.   Nose: Nose normal. No mucosal edema, rhinorrhea or nasal deformity. No epistaxis. Right sinus exhibits no maxillary sinus tenderness and no frontal sinus tenderness. Left sinus exhibits no maxillary sinus tenderness and no frontal sinus tenderness.   Mouth/Throat: Uvula is midline and oropharynx is clear and moist. Mucous membranes are dry. No trismus in the jaw. Normal dentition. No uvula swelling. No posterior oropharyngeal erythema.   Eyes: Conjunctivae and lids are normal. Right eye exhibits no discharge. Left eye exhibits no discharge. No scleral icterus.   Sclera clear bilat   Neck: Trachea normal, normal range of motion, full passive range of motion without pain and phonation normal. Neck supple.   Cardiovascular: Regular rhythm, S1 normal, S2 normal, normal heart sounds, intact distal pulses and normal pulses. Tachycardia present.   Pulmonary/Chest: Effort normal and breath sounds normal. No respiratory distress. He has no decreased breath sounds. He has no wheezes. He has no rhonchi.   Abdominal: Soft. Normal appearance and bowel sounds are  normal. He exhibits no distension, no pulsatile midline mass and no mass. There is no tenderness. There is no rigidity, no rebound and no guarding.   Musculoskeletal: Normal range of motion. He exhibits no edema or deformity.   Neurological: He is alert and oriented to person, place, and time.   Paraplegic secondary to spina bifida dependent on wheelchair.   Skin: Skin is warm, dry and intact. Capillary refill takes 2 to 3 seconds. He is not diaphoretic. No pallor.   Psychiatric: He has a normal mood and affect. His speech is normal and behavior is normal. Judgment and thought content normal. Cognition and memory are normal.   Nursing note and vitals reviewed.       Recent Results (from the past 48 hour(s))   POCT Chemistry Panel    Collection Time: 05/03/19 11:03 AM   Result Value Ref Range    POC Sodium 135 (A) 138 - 146 MMOL/L    POC Potassium 3.5 3.5 - 4.9 MMOL/L    POC Chloride 94 (A) 98 - 109 MMOL/L    POC BUN 13 (A) 8 - 26 MMOL/L    POC Glucose 293 (A) 70 - 110 MG/DL    POC Creatinine 0.9 0.6 - 1.3 mg/dL    POC iCA 1.11 (A) 1.12 - 1.32 MMOL/L    POC TCO2 28 24 - 29 MMOL/L    POC Hematocrit 48 42 - 52 %PCV    POC Hemoglobin 16.3 13.5 - 18 g/dL    POC Anion Gap 18 10.0 - 20 MMOL/L   POCT Glucose, Hand-Held Device    Collection Time: 05/03/19 12:23 PM   Result Value Ref Range    POC Glucose 209 (A) 70 - 110 MG/DL   ]    Assessment:       1. Dehydration    2. Hyperglycemia, unspecified    3. Hypertension, unspecified type        Plan:         Dehydration  -     POCT Chemistry Panel  -     0.9%  NaCl infusion    Hyperglycemia, unspecified  -     POCT Glucose, Hand-Held Device    Hypertension, unspecified type    Patient with a history of spina bifida dependent on wheelchair and neurogenic bladder and bowel presenting with feelings of dehydration and fatigue. Patient states that he had an episode of bladder and bowel incontinence this morning. States that the problem is not new but feels like he hasn't been able to  keep up with rehydration.    Patient with no history of HTN with a BP of 166/85 and pulse 121bpm on arrival. Patient given water to drink.  23G IV placed in the right hand and pt received 1L 0.9 NaCl. Vitals rechecked after fluid administration. /107 and pulse 107. Patient asymptomatic at this time and states that he is feeling a lot better. The diarrhea has since stopped and he is voluntarily urinating.     Patient with no history of diabetes with a blood glucose of 293 and normal anion gap (18). Denies any LOC, lightheadedness/dizziness, N/V, abdominal pain, chest pain, SOB, changes in urine color or odor. Blood glucose rechecked after fluid administration was 209.    Patient Instructions       General Discharge Instructions   Please follow-up with your PCP in 1 week to recheck vitals, blood glucose levels, and hydration status.    You must understand that you've received an Urgent Care treatment only and that you may be released before all your medical problems are known or treated. You, the patient, will arrange for follow up care as instructed.  Follow up with your PCP or specialty clinic as directed in the next 1-2 weeks if not improved or as needed.  You can call [phone] to schedule an appointment with the appropriate provider.  If your condition worsens we recommend that you receive another evaluation at the emergency room immediately or contact your primary medical clinics after hours call service to discuss your concerns.  Please return here or go to the Emergency Department for any concerns or worsening of condition.      Dehydration (Adult)  Dehydration occurs when your body loses too much fluid. This may be the result of prolonged vomiting or diarrhea, excessive sweating, or a high fever. It may also happen if you dont drink enough fluid when youre sick or out in the heat. Misuse of diuretics (water pills) can also be a cause.  Symptoms include thirst and decreased urine output. You may  also feel dizzy, weak, fatigued, or very drowsy. The diet described below is usually enough to treat dehydration. In some cases, you may need medicine.  Home care  · Drink at least 12 8-ounce glasses of fluid every day to resolve the dehydration. Fluid may include water; orange juice; lemonade; apple, grape, or cranberry juice; clear fruit drinks; electrolyte replacement and sports drinks; and teas and coffee without caffeine. If you have been diagnosed with a kidney disease, ask your doctor how much and what types of fluids you should drink to prevent dehydration. If you have kidney disease, fluid can build up in the body. This can be dangerous to your health.  · If you have a fever, muscle aches, or a headache as a result of a cold or flu, you may take acetaminophen or ibuprofen, unless another medicine was prescribed. If you have chronic liver or kidney disease, or have ever had a stomach ulcer or gastrointestinal bleeding, talk with your health care provider before using these medicines. Don't take aspirin if you are younger than 18 and have a fever. Aspirin raises the chance for severe liver injury.  Follow-up care  Follow up with your health care provider, or as advised.  When to seek medical advice  Call your health care provider right away if any of these occur:  · Continued vomiting  · Frequent diarrhea (more than 5 times a day); blood (red or black color) or mucus in diarrhea  · Blood in vomit or stool  · Swollen abdomen or increasing abdominal pain  · Weakness, dizziness, or fainting  · Unusual drowsiness or confusion  · Reduced urine output or extreme thirst  · Fever of 100.4°F (34°C) or higher  Date Last Reviewed: 5/31/2015  © 5878-2972 [employer]. [address] [place] [address]. All rights reserved. This information is not intended as a substitute for professional medical care. Always follow your healthcare professional's instructions.      High Blood Sugar (Hyperglycemia)      When you have hyperglycemia, drink plenty of water or other sugar-free, caffeine-free liquids.   Too much glucose (sugar) in your blood is called hyperglycemia or high blood sugar. High blood sugar can lead to a dangerous condition called ketoacidosis. In severe cases, it can lead to dehydration and coma.  Possible causes of hyperglycemia  · Inadequate treatment plan for diabetes   · Being sick  · Being under stress  · Taking certain medicines, such as steroids  · Eating too much food, especially carbohydrates  · Being less active than usual  · Not taking enough diabetes medicine  Symptoms of hyperglycemia  Hyperglycemia may not cause symptoms. If you do have symptoms, they may include:  · Thirst  · Frequent need to urinate  · Feeling tired  · Nausea and vomiting  · Itchy, dry skin  · Blurry vision  · Fast breathing and breath that smells fruity   · Weakness  · Dizziness  · Wounds or skin infections that dont heal  · Unexplained weight loss if hyperglycemia lasts for more than a few days   What you should do  Make sure you do the following:  · Check your blood sugar.  · Drink plenty of sugar-free, caffeine-free liquids such as water. Dont drink fruit juice.  · Check your blood sugar again every 4 hours. If you take insulin or diabetes medicines, follow your sick-day plan for taking medicine. Call your healthcare provider if you are not able to eat.  · Check your blood or urine for ketones as directed.  · Call your healthcare provider if your blood sugar and ketones do not return to your target range.  Preventing high blood sugar  To help keep your blood sugar from getting too high:  · Control stress.  · When you're ill, follow your sick-day plan.   · Follow your meal plan. Eat only the amount of food on your meal plan  · Follow your exercise plan.  · Take your insulin or diabetes medicines as directed by your healthcare team. Also test your blood sugar as directed. If the plan is not working for you, discuss it  with your healthcare provider.  Other things to do  · Carry a medical ID card, a compact USB drive, or wear a medical alert bracelet or necklace. It should say that you have diabetes. It should also say what to do in case you pass out or go into a coma.  · Make sure family, friends, and coworkers know the signs of high blood sugar. Tell them what to do if your blood sugar gets very high and you cant help yourself.  · Talk to your healthcare team about other things you can do to prevent high blood sugar.   Special note: Drink plenty of sugar-free and caffeine-free liquids when you feel symptoms of hyperglycemia. Call your healthcare provider if you keep having episodes of hyperglycemia.   Date Last Reviewed: 5/1/2016 © 2000-2017 [employer]. [address] [place] [address], [place] [address]. All rights reserved. This information is not intended as a substitute for professional medical care. Always follow your healthcare professional's instructions.

## 2019-05-03 NOTE — PATIENT INSTRUCTIONS
General Discharge Instructions   Please follow-up with your PCP in 1 week to recheck vitals, blood glucose levels, and hydration status.    You must understand that you've received an Urgent Care treatment only and that you may be released before all your medical problems are known or treated. You, the patient, will arrange for follow up care as instructed.  Follow up with your PCP or specialty clinic as directed in the next 1-2 weeks if not improved or as needed.  You can call [phone] to schedule an appointment with the appropriate provider.  If your condition worsens we recommend that you receive another evaluation at the emergency room immediately or contact your primary medical clinics after hours call service to discuss your concerns.  Please return here or go to the Emergency Department for any concerns or worsening of condition.      Dehydration (Adult)  Dehydration occurs when your body loses too much fluid. This may be the result of prolonged vomiting or diarrhea, excessive sweating, or a high fever. It may also happen if you dont drink enough fluid when youre sick or out in the heat. Misuse of diuretics (water pills) can also be a cause.  Symptoms include thirst and decreased urine output. You may also feel dizzy, weak, fatigued, or very drowsy. The diet described below is usually enough to treat dehydration. In some cases, you may need medicine.  Home care  · Drink at least 12 8-ounce glasses of fluid every day to resolve the dehydration. Fluid may include water; orange juice; lemonade; apple, grape, or cranberry juice; clear fruit drinks; electrolyte replacement and sports drinks; and teas and coffee without caffeine. If you have been diagnosed with a kidney disease, ask your doctor how much and what types of fluids you should drink to prevent dehydration. If you have kidney disease, fluid can build up in the body. This can be dangerous to your health.  · If you have a fever, muscle aches, or  a headache as a result of a cold or flu, you may take acetaminophen or ibuprofen, unless another medicine was prescribed. If you have chronic liver or kidney disease, or have ever had a stomach ulcer or gastrointestinal bleeding, talk with your health care provider before using these medicines. Don't take aspirin if you are younger than 18 and have a fever. Aspirin raises the chance for severe liver injury.  Follow-up care  Follow up with your health care provider, or as advised.  When to seek medical advice  Call your health care provider right away if any of these occur:  · Continued vomiting  · Frequent diarrhea (more than 5 times a day); blood (red or black color) or mucus in diarrhea  · Blood in vomit or stool  · Swollen abdomen or increasing abdominal pain  · Weakness, dizziness, or fainting  · Unusual drowsiness or confusion  · Reduced urine output or extreme thirst  · Fever of 100.4°F (34°C) or higher  Date Last Reviewed: 5/31/2015 © 2000-2017 [employer]. [address] [place] [address], [place] [address]. All rights reserved. This information is not intended as a substitute for professional medical care. Always follow your healthcare professional's instructions.      High Blood Sugar (Hyperglycemia)     When you have hyperglycemia, drink plenty of water or other sugar-free, caffeine-free liquids.   Too much glucose (sugar) in your blood is called hyperglycemia or high blood sugar. High blood sugar can lead to a dangerous condition called ketoacidosis. In severe cases, it can lead to dehydration and coma.  Possible causes of hyperglycemia  · Inadequate treatment plan for diabetes   · Being sick  · Being under stress  · Taking certain medicines, such as steroids  · Eating too much food, especially carbohydrates  · Being less active than usual  · Not taking enough diabetes medicine  Symptoms of hyperglycemia  Hyperglycemia may not cause symptoms. If you do have symptoms, they may  include:  · Thirst  · Frequent need to urinate  · Feeling tired  · Nausea and vomiting  · Itchy, dry skin  · Blurry vision  · Fast breathing and breath that smells fruity   · Weakness  · Dizziness  · Wounds or skin infections that dont heal  · Unexplained weight loss if hyperglycemia lasts for more than a few days   What you should do  Make sure you do the following:  · Check your blood sugar.  · Drink plenty of sugar-free, caffeine-free liquids such as water. Dont drink fruit juice.  · Check your blood sugar again every 4 hours. If you take insulin or diabetes medicines, follow your sick-day plan for taking medicine. Call your healthcare provider if you are not able to eat.  · Check your blood or urine for ketones as directed.  · Call your healthcare provider if your blood sugar and ketones do not return to your target range.  Preventing high blood sugar  To help keep your blood sugar from getting too high:  · Control stress.  · When you're ill, follow your sick-day plan.   · Follow your meal plan. Eat only the amount of food on your meal plan  · Follow your exercise plan.  · Take your insulin or diabetes medicines as directed by your healthcare team. Also test your blood sugar as directed. If the plan is not working for you, discuss it with your healthcare provider.  Other things to do  · Carry a medical ID card, a compact USB drive, or wear a medical alert bracelet or necklace. It should say that you have diabetes. It should also say what to do in case you pass out or go into a coma.  · Make sure family, friends, and coworkers know the signs of high blood sugar. Tell them what to do if your blood sugar gets very high and you cant help yourself.  · Talk to your healthcare team about other things you can do to prevent high blood sugar.   Special note: Drink plenty of sugar-free and caffeine-free liquids when you feel symptoms of hyperglycemia. Call your healthcare provider if you keep having episodes of  hyperglycemia.   Date Last Reviewed: 5/1/2016  © 7704-0194 [employer], [employer]. [address] [place] [address], [place] [address]. All rights reserved. This information is not intended as a substitute for professional medical care. Always follow your healthcare professional's instructions.

## 2019-09-26 ENCOUNTER — PATIENT OUTREACH (OUTPATIENT)
Dept: ADMINISTRATIVE | Facility: HOSPITAL | Age: 34
End: 2019-09-26

## 2019-09-27 ENCOUNTER — IMMUNIZATION (OUTPATIENT)
Dept: INTERNAL MEDICINE | Facility: CLINIC | Age: 34
End: 2019-09-27
Payer: MEDICARE

## 2019-09-27 ENCOUNTER — OFFICE VISIT (OUTPATIENT)
Dept: INTERNAL MEDICINE | Facility: CLINIC | Age: 34
End: 2019-09-27
Attending: INTERNAL MEDICINE
Payer: MEDICARE

## 2019-09-27 VITALS — SYSTOLIC BLOOD PRESSURE: 136 MMHG | DIASTOLIC BLOOD PRESSURE: 82 MMHG | OXYGEN SATURATION: 98 % | HEART RATE: 74 BPM

## 2019-09-27 DIAGNOSIS — K59.2 NEUROGENIC BOWEL: Primary | ICD-10-CM

## 2019-09-27 DIAGNOSIS — G82.20 PARAPLEGIA: ICD-10-CM

## 2019-09-27 DIAGNOSIS — G82.20 PARAPARESIS: ICD-10-CM

## 2019-09-27 DIAGNOSIS — R73.01 IMPAIRED FASTING GLUCOSE: ICD-10-CM

## 2019-09-27 DIAGNOSIS — E78.5 DYSLIPIDEMIA: ICD-10-CM

## 2019-09-27 PROCEDURE — 90686 IIV4 VACC NO PRSV 0.5 ML IM: CPT | Mod: PBBFAC

## 2019-09-27 PROCEDURE — 99999 PR PBB SHADOW E&M-EST. PATIENT-LVL I: ICD-10-PCS | Mod: PBBFAC,,,

## 2019-09-27 PROCEDURE — 99214 OFFICE O/P EST MOD 30 MIN: CPT | Mod: S$PBB,,, | Performed by: INTERNAL MEDICINE

## 2019-09-27 PROCEDURE — 99999 PR PBB SHADOW E&M-EST. PATIENT-LVL III: CPT | Mod: PBBFAC,,, | Performed by: INTERNAL MEDICINE

## 2019-09-27 PROCEDURE — 99213 OFFICE O/P EST LOW 20 MIN: CPT | Mod: PBBFAC,25 | Performed by: INTERNAL MEDICINE

## 2019-09-27 PROCEDURE — 99999 PR PBB SHADOW E&M-EST. PATIENT-LVL III: ICD-10-PCS | Mod: PBBFAC,,, | Performed by: INTERNAL MEDICINE

## 2019-09-27 PROCEDURE — 99999 PR PBB SHADOW E&M-EST. PATIENT-LVL I: CPT | Mod: PBBFAC,,,

## 2019-09-27 PROCEDURE — 99211 OFF/OP EST MAY X REQ PHY/QHP: CPT | Mod: PBBFAC,25,27

## 2019-09-27 PROCEDURE — 99214 PR OFFICE/OUTPT VISIT, EST, LEVL IV, 30-39 MIN: ICD-10-PCS | Mod: S$PBB,,, | Performed by: INTERNAL MEDICINE

## 2019-09-27 NOTE — PROGRESS NOTES
HISTORY OF PRESENT ILLNESS:  Mr. Pantoja is a 34-year-old gentleman coming in   today to follow up his ongoing medical problems.  I saw him last back in   April 2019.  He has history of spina bifida and he is paraplegic and he is   wheelchair bound.  He is traveling to [place] to try out for [employer] basketball soon.     He also has a history of hydrocephalus, had a  shunt placed   in the past, saw Dr. Perea last in 2015 and at that time, made sure whether the    shunt was working.  He is not having symptoms.      He has a neurogenic bladder,   which he catches up himself four times a day. He is not having any trouble getting his catheters.  He also uses Ditropan.        He has been having borderline diabetes or insulin resistance.  He has been working on   his diet.      His twin brother, who also has spina bifida and paraplegia, has been   in the hospital recently and he has been working a lot more taking care of him.    He does not have a history of hypertension;  his blood pressure today   is elevated at 136/82 - was higher last visit.    ROS : Gen - no fatigue or significant weight change  Eyes - no eye pain or visual changes  ENT - no hoarseness or sore throat  CV - No chest pain or SOB.  NO palpitations.  Pulm - no cough or wheezing  GI - no N/V/D   no dysuria or incontinence  MS - no joint pain or muscle pain  Skin - no rash, or c/o of skin lesions  Neuro - no HA, dizziness--- memory is doing well.   Heme - no abnormal bleeding or bruising  Endo - no polydipsia, or temperature changes  Psych - no anxiety or depression       PHYSICAL EXAMINATION: /82 (BP Location: Left arm, Patient Position: Sitting, BP Method: Large (Manual))   Pulse 74   SpO2 98%     GENERAL:  He is a well-appearing 34-year-old gentleman in a wheelchair.  NECK:  Supple.  He has no JVD.  Thyroid is not enlarged.  CARDIOVASCULAR:  S1 and S2, regular rate and rhythm without murmur, gallop or   Rubs.  CHest: clear bilaterally.     ABDOMEN:  Soft, nontender, no hepatosplenomegaly.  EXTREMITIES:  His lower extremities are atrophied.  No wounds are noted.     ASSESSMENT:  Dependent wheelchair secondary to spina bifida, dyslipidemia and   impaired fasting glucose, neurogenic bladder, neurogenic bowel. Diet controled htn -      PLAN:  Refill his Ditropan. RX for wheelchair repair done--  .  We will monitor.  The blood pressure monitor    Will check labs-    Good luck on basketball tryout.

## 2019-10-22 ENCOUNTER — OFFICE VISIT (OUTPATIENT)
Dept: UROLOGY | Facility: CLINIC | Age: 34
End: 2019-10-22
Payer: MEDICARE

## 2019-10-22 ENCOUNTER — TELEPHONE (OUTPATIENT)
Dept: UROLOGY | Facility: CLINIC | Age: 34
End: 2019-10-22

## 2019-10-22 VITALS — DIASTOLIC BLOOD PRESSURE: 99 MMHG | SYSTOLIC BLOOD PRESSURE: 153 MMHG | HEART RATE: 81 BPM

## 2019-10-22 DIAGNOSIS — N32.81 OVERACTIVE BLADDER: ICD-10-CM

## 2019-10-22 DIAGNOSIS — N31.9 NEUROGENIC BLADDER: Primary | ICD-10-CM

## 2019-10-22 PROCEDURE — 99999 PR PBB SHADOW E&M-EST. PATIENT-LVL III: ICD-10-PCS | Mod: PBBFAC,,, | Performed by: UROLOGY

## 2019-10-22 PROCEDURE — 99213 PR OFFICE/OUTPT VISIT, EST, LEVL III, 20-29 MIN: ICD-10-PCS | Mod: S$PBB,,, | Performed by: UROLOGY

## 2019-10-22 PROCEDURE — 99213 OFFICE O/P EST LOW 20 MIN: CPT | Mod: PBBFAC | Performed by: UROLOGY

## 2019-10-22 PROCEDURE — 99213 OFFICE O/P EST LOW 20 MIN: CPT | Mod: S$PBB,,, | Performed by: UROLOGY

## 2019-10-22 PROCEDURE — 99999 PR PBB SHADOW E&M-EST. PATIENT-LVL III: CPT | Mod: PBBFAC,,, | Performed by: UROLOGY

## 2019-10-22 RX ORDER — POLYETHYLENE GLYCOL 3350 17 G/17G
17 POWDER, FOR SOLUTION ORAL DAILY
Qty: 30 PACKET | Refills: 12 | Status: SHIPPED | OUTPATIENT
Start: 2019-10-22 | End: 2020-05-04 | Stop reason: SDUPTHER

## 2019-10-22 RX ORDER — OXYBUTYNIN CHLORIDE 15 MG/1
15 TABLET, EXTENDED RELEASE ORAL DAILY
Qty: 90 TABLET | Refills: 3 | Status: SHIPPED | OUTPATIENT
Start: 2019-10-22 | End: 2019-10-25

## 2019-10-22 NOTE — PROGRESS NOTES
Subjective:       Patient ID: Dale Pantoja is a 34 y.o. male.    Chief Complaint:  F/u neurogenic bladder      History of Present Illness  HPI  Patient is a 34 y.o. male who is established to our clinic and was initially referred by their PCP, Dr. Logan for evaluation of neurogenic bladder.  He was seen by Dr. Small last year for urodynamics.  He recommended ditropan XL daily---which he is taking (15mg daily).  Patient doing well.  No complaints.  cic performed 4x/day.  No leakage between catheterizations. No UTI's.  No stones.     Performs enemas 2x/week and uses miralax for neurogenic bowel.           Review of Systems  Review of Systems  All other systems reviewed and negative except pertinent positives noted in HPI.         Objective:     Physical Exam   Constitutional: He is oriented to person, place, and time. He appears well-developed and well-nourished. No distress.   HENT:   Head: Normocephalic and atraumatic.   Eyes: No scleral icterus.   Neck: No tracheal deviation present.   Pulmonary/Chest: Effort normal. No respiratory distress.   Neurological: He is alert and oriented to person, place, and time.   Psychiatric: He has a normal mood and affect. His behavior is normal. Judgment and thought content normal.          Lab Review  Lab Results   Component Value Date    COLORU LT. YELLOW 01/27/2016    SPECGRAV 1.020 01/27/2016    PHUR 5.0 01/27/2016    WBCUR 10 01/27/2016    NITRITE POS 01/27/2016    PROTEINUR 10 01/27/2016    GLUCOSEUR NEG 01/27/2016    KETONESU NEG 01/27/2016    UROBILINOGEN NORM 01/27/2016    BILIRUBINUR NEG 01/27/2016    RBCUR NEG 01/27/2016         Assessment:        1. Neurogenic bladder    2. Overactive bladder            Plan:     Neurogenic bladder    Overactive bladder         -renal US for upper tract surveillance  -miralax and ditropan sent to pharmacy.  -refer to Dr. Abad for evaluation/continued management of neurogenic bladder.  I explained to the patient that  typically I do not do urodynamics and the optimal strategy for surveillance of the bladder is outside of my typical practice/specialty.  That said, the patient appears relatively stable from a bladder standpoint.  He is not leaking.  He is managed with anticholinergics.  He did have relatively recent urodynamics.  Will defer to some extent the urologic f/u schedule to Dr. Abad.     I spent 15 minutes with the patient of which more than half was spent in direct consultation with the patient in regards to our treatment and plan.

## 2019-10-22 NOTE — TELEPHONE ENCOUNTER
----- Message from Jens Abad MD sent at 10/22/2019 11:23 AM CDT -----  I agree, he looks very stable. Probably just needs annual follow up with renal U/S but I will see him back in 6 months just to make sure he is doing well.     With no incontinence or UTI and stable upper tract hopefully I just need to make sure he has an updated catheter order and antimuscarinic Rx.     Thank you for sending him over.     Jens Gonzalez,  Can you please schedule him to see me in 6 months for continued follow-up.       ----- Message -----  From: Garrison Samano MD  Sent: 10/22/2019  11:11 AM CDT  To: Jens Abad MD

## 2019-10-22 NOTE — LETTER
October 22, 2019      Juan Antonio Adorno Jr., MD  [address]  [place] [address]           [facility]  [address]  [place] [address]  Phone: [phone]          Patient: Dale Pantoja   MR Number: [id number]   YOB: 1985   Date of Visit: 10/22/2019       Dear Dr. Juan Antonio Adorno Jr.:    Thank you for referring Dale Pantoja to me for evaluation. Attached you will find relevant portions of my assessment and plan of care.    If you have questions, please do not hesitate to call me. I look forward to following Dale Pantoja along with you.    Sincerely,    Garrison Samano MD    Enclosure  CC:  No Recipients    If you would like to receive this communication electronically, please contact [email or web address] or [phone] to request more information on [facility] access.    For providers and/or their staff who would like to refer a patient to [facility], please contact us through our one-stop-shop provider referral line, [facility], at [phone].    If you feel you have received this communication in error or would no longer like to receive these types of communications, please e-mail [email or web address]          yes

## 2019-10-23 ENCOUNTER — TELEPHONE (OUTPATIENT)
Dept: UROLOGY | Facility: CLINIC | Age: 34
End: 2019-10-23

## 2019-10-25 ENCOUNTER — TELEPHONE (OUTPATIENT)
Dept: UROLOGY | Facility: CLINIC | Age: 34
End: 2019-10-25

## 2019-10-25 ENCOUNTER — TELEPHONE (OUTPATIENT)
Dept: UROLOGY | Facility: HOSPITAL | Age: 34
End: 2019-10-25

## 2019-10-25 ENCOUNTER — HOSPITAL ENCOUNTER (OUTPATIENT)
Dept: RADIOLOGY | Facility: HOSPITAL | Age: 34
Discharge: HOME | End: 2019-10-25
Attending: UROLOGY
Payer: MEDICARE

## 2019-10-25 DIAGNOSIS — N31.9 NEUROGENIC BLADDER: ICD-10-CM

## 2019-10-25 PROCEDURE — 76770 US EXAM ABDO BACK WALL COMP: CPT | Mod: 26,,, | Performed by: RADIOLOGY

## 2019-10-25 PROCEDURE — 76770 US EXAM ABDO BACK WALL COMP: CPT | Mod: TC

## 2019-10-25 PROCEDURE — 76770 US RETROPERITONEAL COMPLETE: ICD-10-PCS | Mod: 26,,, | Performed by: RADIOLOGY

## 2019-10-25 RX ORDER — OXYBUTYNIN CHLORIDE 5 MG/1
5 TABLET ORAL 3 TIMES DAILY
Qty: 90 TABLET | Refills: 12 | Status: SHIPPED | OUTPATIENT
Start: 2019-10-25 | End: 2020-08-31

## 2019-10-25 NOTE — TELEPHONE ENCOUNTER
----- Message from Kevin Vail LPN sent at 10/25/2019  2:23 PM CDT -----  Contact: pt       ----- Message -----  From: Ban Vo  Sent: 10/25/2019   2:13 PM CDT  To: Kevin Vail LPN    Pt is calling because the medicine that Dr. Samano prescribe his insurance [employer] not cover it.  Needs to know if  can prescribe something different.

## 2020-03-11 ENCOUNTER — PATIENT MESSAGE (OUTPATIENT)
Dept: INTERNAL MEDICINE | Facility: CLINIC | Age: 35
End: 2020-03-11

## 2020-03-11 ENCOUNTER — PATIENT MESSAGE (OUTPATIENT)
Dept: UROLOGY | Facility: CLINIC | Age: 35
End: 2020-03-11

## 2020-05-04 ENCOUNTER — LAB VISIT (OUTPATIENT)
Dept: LAB | Facility: HOSPITAL | Age: 35
End: 2020-05-04
Attending: INTERNAL MEDICINE
Payer: MEDICARE

## 2020-05-04 ENCOUNTER — OFFICE VISIT (OUTPATIENT)
Dept: INTERNAL MEDICINE | Facility: CLINIC | Age: 35
End: 2020-05-04
Payer: MEDICARE

## 2020-05-04 VITALS — HEART RATE: 81 BPM | SYSTOLIC BLOOD PRESSURE: 150 MMHG | DIASTOLIC BLOOD PRESSURE: 100 MMHG | OXYGEN SATURATION: 97 %

## 2020-05-04 DIAGNOSIS — I10 ESSENTIAL HYPERTENSION: ICD-10-CM

## 2020-05-04 DIAGNOSIS — R73.01 IMPAIRED FASTING GLUCOSE: ICD-10-CM

## 2020-05-04 DIAGNOSIS — G82.20 PARAPLEGIA: ICD-10-CM

## 2020-05-04 DIAGNOSIS — E78.5 DYSLIPIDEMIA: ICD-10-CM

## 2020-05-04 DIAGNOSIS — Z11.4 ENCOUNTER FOR SCREENING FOR HIV: ICD-10-CM

## 2020-05-04 DIAGNOSIS — Z00.00 ANNUAL PHYSICAL EXAM: ICD-10-CM

## 2020-05-04 DIAGNOSIS — Q05.9 SPINA BIFIDA, UNSPECIFIED HYDROCEPHALUS PRESENCE, UNSPECIFIED SPINAL REGION: ICD-10-CM

## 2020-05-04 DIAGNOSIS — Z00.00 ANNUAL PHYSICAL EXAM: Primary | ICD-10-CM

## 2020-05-04 DIAGNOSIS — G82.20 PARAPARESIS: ICD-10-CM

## 2020-05-04 DIAGNOSIS — I10 HTN (HYPERTENSION), BENIGN: ICD-10-CM

## 2020-05-04 DIAGNOSIS — K59.01 SLOW TRANSIT CONSTIPATION: ICD-10-CM

## 2020-05-04 LAB
ALBUMIN SERPL BCP-MCNC: 4 G/DL (ref 3.5–5.2)
ALP SERPL-CCNC: 119 U/L (ref 55–135)
ALT SERPL W/O P-5'-P-CCNC: 44 U/L (ref 10–44)
ANION GAP SERPL CALC-SCNC: 10 MMOL/L (ref 8–16)
AST SERPL-CCNC: 31 U/L (ref 10–40)
BILIRUB SERPL-MCNC: 0.6 MG/DL (ref 0.1–1)
BUN SERPL-MCNC: 10 MG/DL (ref 6–20)
CALCIUM SERPL-MCNC: 8.7 MG/DL (ref 8.7–10.5)
CHLORIDE SERPL-SCNC: 103 MMOL/L (ref 95–110)
CHOLEST SERPL-MCNC: 193 MG/DL (ref 120–199)
CHOLEST/HDLC SERPL: 4.5 {RATIO} (ref 2–5)
CO2 SERPL-SCNC: 27 MMOL/L (ref 23–29)
CREAT SERPL-MCNC: 0.8 MG/DL (ref 0.5–1.4)
EST. GFR  (AFRICAN AMERICAN): >60 ML/MIN/1.73 M^2
EST. GFR  (NON AFRICAN AMERICAN): >60 ML/MIN/1.73 M^2
ESTIMATED AVG GLUCOSE: 126 MG/DL (ref 68–131)
GLUCOSE SERPL-MCNC: 83 MG/DL (ref 70–110)
HBA1C MFR BLD HPLC: 6 % (ref 4–5.6)
HDLC SERPL-MCNC: 43 MG/DL (ref 40–75)
HDLC SERPL: 22.3 % (ref 20–50)
LDLC SERPL CALC-MCNC: 138.2 MG/DL (ref 63–159)
NONHDLC SERPL-MCNC: 150 MG/DL
POTASSIUM SERPL-SCNC: 3.7 MMOL/L (ref 3.5–5.1)
PROT SERPL-MCNC: 7.9 G/DL (ref 6–8.4)
SODIUM SERPL-SCNC: 140 MMOL/L (ref 136–145)
TRIGL SERPL-MCNC: 59 MG/DL (ref 30–150)

## 2020-05-04 PROCEDURE — 99999 PR PBB SHADOW E&M-EST. PATIENT-LVL III: ICD-10-PCS | Mod: PBBFAC,,, | Performed by: INTERNAL MEDICINE

## 2020-05-04 PROCEDURE — 80053 COMPREHEN METABOLIC PANEL: CPT

## 2020-05-04 PROCEDURE — 99213 OFFICE O/P EST LOW 20 MIN: CPT | Mod: PBBFAC | Performed by: INTERNAL MEDICINE

## 2020-05-04 PROCEDURE — 86703 HIV-1/HIV-2 1 RESULT ANTBDY: CPT

## 2020-05-04 PROCEDURE — 99395 PR PREVENTIVE VISIT,EST,18-39: ICD-10-PCS | Mod: S$PBB,,, | Performed by: INTERNAL MEDICINE

## 2020-05-04 PROCEDURE — 99395 PREV VISIT EST AGE 18-39: CPT | Mod: S$PBB,,, | Performed by: INTERNAL MEDICINE

## 2020-05-04 PROCEDURE — 36415 COLL VENOUS BLD VENIPUNCTURE: CPT

## 2020-05-04 PROCEDURE — 83036 HEMOGLOBIN GLYCOSYLATED A1C: CPT

## 2020-05-04 PROCEDURE — 99999 PR PBB SHADOW E&M-EST. PATIENT-LVL III: CPT | Mod: PBBFAC,,, | Performed by: INTERNAL MEDICINE

## 2020-05-04 PROCEDURE — 80061 LIPID PANEL: CPT

## 2020-05-04 RX ORDER — AMLODIPINE BESYLATE 5 MG/1
5 TABLET ORAL DAILY
Qty: 30 TABLET | Refills: 11 | Status: SHIPPED | OUTPATIENT
Start: 2020-05-04 | End: 2020-06-04

## 2020-05-04 RX ORDER — POLYETHYLENE GLYCOL 3350 17 G/17G
17 POWDER, FOR SOLUTION ORAL DAILY
Qty: 30 PACKET | Refills: 12 | Status: SHIPPED | OUTPATIENT
Start: 2020-05-04 | End: 2021-09-17 | Stop reason: SDUPTHER

## 2020-05-04 NOTE — PROGRESS NOTES
I have personally taken the history and examined this patient and agree with the resident's note as stated above with the following thoughts:  BP (!) 150/100 (BP Location: Left arm, BP Method: Large (Manual))   Pulse 81   SpO2 97%     We are going to set him up for the digital hypertension clinic.  Will also since some amlodipine 5 mg a day inform.  If he continues check his blood pressure remains elevated he can start that.  Also I would like to have him come back again in a month to recheck his blood pressure.  We discussed low-salt diet and I asked him start exercising again.  He has been relatively sedentary since the COVID pandemic.

## 2020-05-04 NOTE — PROGRESS NOTES
Subjective:       Patient ID: Dale Pantoja is a 35 y.o. male.    Chief Complaint: Follow-up    Mr. Pantoja is a 35 year old male with spina bifida c/b paraplegia (wheelchair-bound) and neurogenic bladder, HLD, impaired fasting glucose that presents for annual exam.    Spina bifida - wheelchair-bound  On oxybutynin for neurogenic bladder  Takes miralax for slow-transit constipation  Able to lift weights but has not been exercising outdoors (usually wheels around neighborhood) 2/2 COVID    HLD - previously elevated LDL  Not currently on medication  Most recent lipid panel WNL    Previously elevated BPs in past  Not currently on medications  BP elevated today    Health Maintenance:  - Diet = only cooked food with frequent vegetables  - Exercise = weights, typically wheels around neighborhood but limited 2/2 COVID  - Flu vaccine = UTD  - Tetanus vaccine = UTD  - Lipid panel = UTD  - HIV screening = ordered today    Review of Systems   Constitutional: Negative for chills and fever.   HENT: Negative for congestion, sore throat and trouble swallowing.    Eyes: Negative for visual disturbance.   Respiratory: Negative for cough, shortness of breath and wheezing.    Cardiovascular: Negative for chest pain, palpitations and leg swelling.   Gastrointestinal: Negative for abdominal pain, blood in stool, constipation, diarrhea, nausea and vomiting.   Genitourinary: Negative for dysuria and hematuria.   Musculoskeletal: Positive for gait problem (Wheel-chair bound). Negative for arthralgias and myalgias.   Skin: Negative for rash.   Neurological: Negative for dizziness, light-headedness and headaches.        Paraplegic   Psychiatric/Behavioral: Negative for agitation and confusion.       Objective:      Physical Exam   Constitutional: He is oriented to person, place, and time. He appears well-developed and well-nourished. No distress.   HENT:   Head: Normocephalic and atraumatic.   Mouth/Throat: Oropharynx is clear and moist. No  oropharyngeal exudate.   Eyes: Pupils are equal, round, and reactive to light. Conjunctivae and EOM are normal.   Neck: Normal range of motion. Neck supple.   Cardiovascular: Normal rate, regular rhythm and normal heart sounds.   No murmur heard.  Pulmonary/Chest: Effort normal and breath sounds normal. No respiratory distress. He has no wheezes. He has no rales.   Abdominal: Soft. Bowel sounds are normal. He exhibits no distension. There is no tenderness. There is no guarding.   Musculoskeletal: He exhibits no edema or tenderness.   Neurological: He is alert and oriented to person, place, and time. He displays normal reflexes. He exhibits normal muscle tone.   Paraplegic, in wheelchair   Skin: Skin is warm and dry. No rash noted.   Psychiatric: He has a normal mood and affect. His behavior is normal.       Assessment/Plan:       Annual physical exam  Labs today  Encouraged to exercise early morning/late evening when neighborhood was not crowded    Slow transit constipation  Continue miralax    Impaired fasting glucose  HbA1c today    Dyslipidemia  Lipid panel today    Encounter for screening for HIV  HIV 1/2 Ag/Ab (4th Gen) today    Essential hypertension  HTN (hypertension), benign  Enrolled in Digital HTN program  F/u in 1 month for BP check  Rx amlodipine 5mg, instructed patient to  Rx if BP elevated at home    Paraparesis  Paraplegia  Spina bifida, unspecified hydrocephalus presence, unspecified spinal region  Stable, no acute issues    Follow-up in 1 month for BP check    Binh Yun MD  Medical Resident  [facility]  Pager: [phone]

## 2020-05-05 LAB — HIV 1+2 AB+HIV1 P24 AG SERPL QL IA: NEGATIVE

## 2020-05-11 ENCOUNTER — TELEPHONE (OUTPATIENT)
Dept: INTERNAL MEDICINE | Facility: CLINIC | Age: 35
End: 2020-05-11

## 2020-05-12 NOTE — TELEPHONE ENCOUNTER
Called and spoke with pt's mother Dayana. Explained   that pt labs better and Covid antibody test was negative.   Pt mother verbalized understanding.      Nicolasa

## 2020-05-28 ENCOUNTER — OFFICE VISIT (OUTPATIENT)
Dept: UROLOGY | Facility: CLINIC | Age: 35
End: 2020-05-28
Payer: MEDICARE

## 2020-05-28 VITALS — SYSTOLIC BLOOD PRESSURE: 145 MMHG | HEART RATE: 94 BPM | DIASTOLIC BLOOD PRESSURE: 89 MMHG

## 2020-05-28 DIAGNOSIS — N31.9 NEUROGENIC BLADDER: Primary | ICD-10-CM

## 2020-05-28 PROCEDURE — 99214 PR OFFICE/OUTPT VISIT, EST, LEVL IV, 30-39 MIN: ICD-10-PCS | Mod: S$PBB,,, | Performed by: UROLOGY

## 2020-05-28 PROCEDURE — 99999 PR PBB SHADOW E&M-EST. PATIENT-LVL III: CPT | Mod: PBBFAC,,, | Performed by: UROLOGY

## 2020-05-28 PROCEDURE — 99999 PR PBB SHADOW E&M-EST. PATIENT-LVL III: ICD-10-PCS | Mod: PBBFAC,,, | Performed by: UROLOGY

## 2020-05-28 PROCEDURE — 99214 OFFICE O/P EST MOD 30 MIN: CPT | Mod: S$PBB,,, | Performed by: UROLOGY

## 2020-05-28 PROCEDURE — 99213 OFFICE O/P EST LOW 20 MIN: CPT | Mod: PBBFAC | Performed by: UROLOGY

## 2020-05-28 NOTE — PROGRESS NOTES
[facility]      New Neurogenic Bladder Note    5/28/2020    Referred by:  No ref. provider found    HPI: Dale Pantoja is a very pleasant 35 y.o. male referred for evaluation of neurogenic bladder with chronic urinary retention attributable to spina bifida. [facility] within the last 3 years.  He currently performs clean intermittent catheterization 4-5x per day.  He reports no incontinence between catheterizations since beginning Ditropan XL 15 mg daily in 2016. He had urodynamic evaluation by Dr. Small at that time which was reviewed today. Low compliance was noted, though final filling pressure was not. He reports no frequent lower urinary tract infections. His most recent U/S in October 2019 demonstrated no hydronephrosis on independent review today.     A review of 10+ systems was conducted with pertinent positive and negative findings documented in HPI with all other systems reviewed and negative.    Past medical, family, surgical and social history reviewed as documented in chart with pertinent positive medical, family, surgical and social history detailed in HPI.    Exam Findings:    Const: no acute distress, conversant and alert  Eyes: anicteric, extraocular muscles intact  ENMT: normocephalic, Nl oral membranes  Cardio: no cyanosis, nl cap refill  Pulm: no tachypnea; no resp distress  Abd: soft, no tenderness  Musc: no laceration, no tenderness  Neuro: alert; oriented x 3  Skin: warm, dry; no petichiae  Psych: no anxiety; normal speech          Assessment/Plan:    Neurogenic Bladder with Chronic Urinary Retention (new, addt'l workup): The patient has evidence of chronic urinary retention requiring catheterization.  He appears stable over the last 4 years since his last UDS study with no infections, frequent catheterizations, incontinence or abnormal renal studies.  The need for catheterization was assessed and the patient is  likely to benefit from a regimen of clean intermittent catheterization. The patient has previously performed intermittent catheterization in the past and is comfortable performing this alone. The etiology for urinary retention in this patient is thought to be neurogenic bladder (spina bifida). The anticipated duration of need is estimated to be indefinite. The patient will not require insertion supplies. The patient will not require a coude-tip catheter. The patient will need to catheterize 5x daily and will require a total of 150 catheters per month. .    1. Repeat Renal U/S in October   2. Return to Clinic in October for review of U/S       Clinical tests reviewed/ordered today: urinalysis (05/28/2020)   Radiology ordered/reviewed: renal U/S   Medical tests ordered/reviewed: BMP; Urodynamic evaluation   Radiology independently reviewed: renal U/S (date Octobar 2019)   Previous records: reviewed today and showing, in summary - Patient with history of stable neurogenic bladder as summarized in HPI

## 2020-06-04 ENCOUNTER — OFFICE VISIT (OUTPATIENT)
Dept: INTERNAL MEDICINE | Facility: CLINIC | Age: 35
End: 2020-06-04
Payer: MEDICARE

## 2020-06-04 VITALS — SYSTOLIC BLOOD PRESSURE: 150 MMHG | OXYGEN SATURATION: 97 % | HEART RATE: 75 BPM | DIASTOLIC BLOOD PRESSURE: 98 MMHG

## 2020-06-04 DIAGNOSIS — I10 HTN (HYPERTENSION), BENIGN: Primary | ICD-10-CM

## 2020-06-04 DIAGNOSIS — I10 ESSENTIAL HYPERTENSION: ICD-10-CM

## 2020-06-04 PROCEDURE — 99213 OFFICE O/P EST LOW 20 MIN: CPT | Mod: S$PBB,,, | Performed by: INTERNAL MEDICINE

## 2020-06-04 PROCEDURE — 99999 PR PBB SHADOW E&M-EST. PATIENT-LVL III: CPT | Mod: PBBFAC,,, | Performed by: INTERNAL MEDICINE

## 2020-06-04 PROCEDURE — 99213 OFFICE O/P EST LOW 20 MIN: CPT | Mod: PBBFAC | Performed by: INTERNAL MEDICINE

## 2020-06-04 PROCEDURE — 99999 PR PBB SHADOW E&M-EST. PATIENT-LVL III: ICD-10-PCS | Mod: PBBFAC,,, | Performed by: INTERNAL MEDICINE

## 2020-06-04 PROCEDURE — 99213 PR OFFICE/OUTPT VISIT, EST, LEVL III, 20-29 MIN: ICD-10-PCS | Mod: S$PBB,,, | Performed by: INTERNAL MEDICINE

## 2020-06-04 RX ORDER — AMLODIPINE BESYLATE 10 MG/1
10 TABLET ORAL DAILY
Qty: 90 TABLET | Refills: 2 | Status: SHIPPED | OUTPATIENT
Start: 2020-06-04 | End: 2021-09-17 | Stop reason: SDUPTHER

## 2020-06-04 NOTE — PROGRESS NOTES
He is a 35-year-old gentleman coming to see me today for hypertension follow-up.  Last time of some we started him on amlodipine 5 mg a day.  Blood pressure today is 158/98.  Is a little bit lower at home but still remaining elevated.  He is tolerating it well.  He started exercising is using his wheelchair to exercise regularly including trying to work his way up to 10 miles of going around the neighborhood.  He has been having can chest pain or shortness of breath.  He has past medical history paraplegia, spina bifida, neurogenic bowel and bladder, hypertension, dyslipidemia and wheel chair dependence    No chest pain, shortness of breath, nausea, or vomiting.  His wheelchair supplies a holding up well.    BP (!) 150/98 (BP Location: Right arm, Patient Position: Sitting, BP Method: Large (Manual))   Pulse 75   SpO2 97%   He is a well-appearing 35-year-old  gentleman.  His appearance is similar to his twin brother who I also see.  He acute distress.  Seems to be in a good mood.  Neck is supple.  Chest is clear.  Abdomen soft.    Assessment plan:  Hypertension.  Still not well try controlled.  Were going to increase his amlodipine to 10 mg a day and follow him up again in another month.

## 2020-06-29 ENCOUNTER — PATIENT OUTREACH (OUTPATIENT)
Dept: ADMINISTRATIVE | Facility: HOSPITAL | Age: 35
End: 2020-06-29

## 2020-07-13 ENCOUNTER — OFFICE VISIT (OUTPATIENT)
Dept: INTERNAL MEDICINE | Facility: CLINIC | Age: 35
End: 2020-07-13
Payer: MEDICARE

## 2020-07-13 VITALS — HEART RATE: 92 BPM | SYSTOLIC BLOOD PRESSURE: 132 MMHG | DIASTOLIC BLOOD PRESSURE: 70 MMHG | OXYGEN SATURATION: 98 %

## 2020-07-13 DIAGNOSIS — R73.09 ELEVATED GLUCOSE: ICD-10-CM

## 2020-07-13 DIAGNOSIS — E78.5 DYSLIPIDEMIA: Primary | ICD-10-CM

## 2020-07-13 DIAGNOSIS — I10 HTN (HYPERTENSION), BENIGN: ICD-10-CM

## 2020-07-13 PROCEDURE — 99999 PR PBB SHADOW E&M-EST. PATIENT-LVL III: ICD-10-PCS | Mod: PBBFAC,,, | Performed by: INTERNAL MEDICINE

## 2020-07-13 PROCEDURE — 99213 OFFICE O/P EST LOW 20 MIN: CPT | Mod: S$PBB,,, | Performed by: INTERNAL MEDICINE

## 2020-07-13 PROCEDURE — 99999 PR PBB SHADOW E&M-EST. PATIENT-LVL III: CPT | Mod: PBBFAC,,, | Performed by: INTERNAL MEDICINE

## 2020-07-13 PROCEDURE — 99213 PR OFFICE/OUTPT VISIT, EST, LEVL III, 20-29 MIN: ICD-10-PCS | Mod: S$PBB,,, | Performed by: INTERNAL MEDICINE

## 2020-07-13 PROCEDURE — 99213 OFFICE O/P EST LOW 20 MIN: CPT | Mod: PBBFAC | Performed by: INTERNAL MEDICINE

## 2020-07-13 NOTE — PROGRESS NOTES
He is a 35-year-old gentleman coming to see me today for hypertension follow-up.   him about a month ago.  We crease his amlodipine from 5 mg to 10 mg a day.    Blood pressure today is 132/70.    pressures at home have but also been controlled.   He is tolerating it well.  He started exercising is using his wheelchair but that is slow down little bit due to the excessive heat we been having.  He has past medical history paraplegia, spina bifida, neurogenic bowel and bladder, hypertension, dyslipidemia and wheel chair dependence     No chest pain, shortness of breath, nausea, or vomiting.  His wheelchair supplies a holding up well.   /70   Pulse 92   SpO2 98%     He is a well-appearing 35-year-old  gentleman.  His appearance is similar to his twin brother who I also see.  He acute distress.  Seems to be in a good mood.  Neck is supple.  Chest is clear.  Abdomen soft.  He has atrophy of lower extremities.  He sitting in a wheelchair today.     Assessment plan:  Hypertension.   much better controlled.  Continue the amlodipine 10 mg a day.  In low-salt diet I will follow him up again in 6 months.

## 2020-07-15 DIAGNOSIS — Z71.89 COMPLEX CARE COORDINATION: ICD-10-CM

## 2020-08-31 ENCOUNTER — OFFICE VISIT (OUTPATIENT)
Dept: INTERNAL MEDICINE | Facility: CLINIC | Age: 35
End: 2020-08-31
Payer: MEDICARE

## 2020-08-31 VITALS
HEIGHT: 63 IN | OXYGEN SATURATION: 99 % | DIASTOLIC BLOOD PRESSURE: 80 MMHG | BODY MASS INDEX: 38.27 KG/M2 | HEART RATE: 77 BPM | SYSTOLIC BLOOD PRESSURE: 134 MMHG | WEIGHT: 216 LBS

## 2020-08-31 DIAGNOSIS — I10 HTN (HYPERTENSION), BENIGN: Primary | ICD-10-CM

## 2020-08-31 PROCEDURE — 99212 OFFICE O/P EST SF 10 MIN: CPT | Mod: S$PBB,,, | Performed by: INTERNAL MEDICINE

## 2020-08-31 PROCEDURE — 99212 PR OFFICE/OUTPT VISIT, EST, LEVL II, 10-19 MIN: ICD-10-PCS | Mod: S$PBB,,, | Performed by: INTERNAL MEDICINE

## 2020-08-31 PROCEDURE — 99999 PR PBB SHADOW E&M-EST. PATIENT-LVL III: ICD-10-PCS | Mod: PBBFAC,,, | Performed by: INTERNAL MEDICINE

## 2020-08-31 PROCEDURE — 99213 OFFICE O/P EST LOW 20 MIN: CPT | Mod: PBBFAC | Performed by: INTERNAL MEDICINE

## 2020-08-31 PROCEDURE — 99999 PR PBB SHADOW E&M-EST. PATIENT-LVL III: CPT | Mod: PBBFAC,,, | Performed by: INTERNAL MEDICINE

## 2020-08-31 NOTE — PROGRESS NOTES
"     He is a 35-year-old gentleman coming to see me today for hypertension follow-up.  I last saw him about a month ago..  We in crease his amlodipineto 10 mg a day.    Blood pressure today is 140/ 90 on the 1st check.  His    pressures at home have but also been controlled.   He is tolerating it well.  He started exercising is using his wheelchair but that is slow down little bit due to the excessive heat we been having.  he is not having any CT chest pain or shortness of breath.  He no PND orthopnea.  He has past medical history paraplegia, spina bifida, neurogenic bowel and bladder, hypertension, dyslipidemia and wheel chair dependence.  Recheck his blood pressure comes and 134/80.     /80   Pulse 77   Ht 5' 3" (1.6 m)   Wt 98 kg (216 lb)   SpO2 99%   BMI 38.26 kg/m²        He is a well-appearing 35-year-old  gentleman.  His appearance is similar to his twin brother who I also see.  He acute distress.  Seems to be in a good mood.  Neck is supple.  Chest is clear.  Abdomen soft.  He has atrophy of lower extremities.  He sitting in a wheelchair today.     Assessment plan:  Hypertension.   much better controlled.  Continue the amlodipine 10 mg a day.  In low-salt diet I will follow him up again in 6 months.  "

## 2020-09-29 ENCOUNTER — PATIENT MESSAGE (OUTPATIENT)
Dept: OTHER | Facility: OTHER | Age: 35
End: 2020-09-29

## 2020-10-12 ENCOUNTER — HOSPITAL ENCOUNTER (OUTPATIENT)
Dept: RADIOLOGY | Facility: HOSPITAL | Age: 35
Discharge: HOME | End: 2020-10-12
Attending: UROLOGY
Payer: MEDICARE

## 2020-10-12 DIAGNOSIS — N31.9 NEUROGENIC BLADDER: ICD-10-CM

## 2020-10-12 PROCEDURE — 76770 US EXAM ABDO BACK WALL COMP: CPT | Mod: 26,,, | Performed by: RADIOLOGY

## 2020-10-12 PROCEDURE — 76770 US EXAM ABDO BACK WALL COMP: CPT | Mod: TC

## 2020-10-12 PROCEDURE — 76770 US RETROPERITONEAL COMPLETE: ICD-10-PCS | Mod: 26,,, | Performed by: RADIOLOGY

## 2020-10-17 ENCOUNTER — PATIENT OUTREACH (OUTPATIENT)
Dept: ADMINISTRATIVE | Facility: OTHER | Age: 35
End: 2020-10-17

## 2020-10-19 ENCOUNTER — OFFICE VISIT (OUTPATIENT)
Dept: UROLOGY | Facility: CLINIC | Age: 35
End: 2020-10-19
Payer: MEDICARE

## 2020-10-19 VITALS — DIASTOLIC BLOOD PRESSURE: 97 MMHG | SYSTOLIC BLOOD PRESSURE: 179 MMHG | HEART RATE: 90 BPM

## 2020-10-19 DIAGNOSIS — N31.9 NEUROGENIC BLADDER: Primary | ICD-10-CM

## 2020-10-19 PROCEDURE — 99213 PR OFFICE/OUTPT VISIT, EST, LEVL III, 20-29 MIN: ICD-10-PCS | Mod: S$PBB,,, | Performed by: UROLOGY

## 2020-10-19 PROCEDURE — 99999 PR PBB SHADOW E&M-EST. PATIENT-LVL III: CPT | Mod: PBBFAC,,, | Performed by: UROLOGY

## 2020-10-19 PROCEDURE — 99213 OFFICE O/P EST LOW 20 MIN: CPT | Mod: S$PBB,,, | Performed by: UROLOGY

## 2020-10-19 PROCEDURE — 99999 PR PBB SHADOW E&M-EST. PATIENT-LVL III: ICD-10-PCS | Mod: PBBFAC,,, | Performed by: UROLOGY

## 2020-10-19 PROCEDURE — 99213 OFFICE O/P EST LOW 20 MIN: CPT | Mod: PBBFAC | Performed by: UROLOGY

## 2020-10-19 RX ORDER — OXYBUTYNIN CHLORIDE 15 MG/1
15 TABLET, EXTENDED RELEASE ORAL DAILY
Qty: 30 TABLET | Refills: 11 | Status: SHIPPED | OUTPATIENT
Start: 2020-10-19 | End: 2021-09-17 | Stop reason: SDUPTHER

## 2020-10-19 NOTE — PROGRESS NOTES
[facility]         Neurogenic Bladder Return Note     10/19/20     Referred by:  No ref. provider found     HPI: Dale Pantoja is a very pleasant 35 y.o. male referred for evaluation of neurogenic bladder with chronic urinary retention attributable to spina bifida. He currently performs clean intermittent catheterization 4-5x per day.  He reports no incontinence between catheterizations since beginning Ditropan XL 15 mg daily in 2016. He had urodynamic evaluation by Dr. Small at that time which was reviewed today. Low compliance was noted, though final filling pressure was not. He reports no frequent lower urinary tract infections. His most recent U/S in October 2020 demonstrated no hydronephrosis on independent review today.      A review of 10+ systems was conducted with pertinent positive and negative findings documented in HPI with all other systems reviewed and negative.     Past medical, family, surgical and social history reviewed as documented in chart with pertinent positive medical, family, surgical and social history detailed in HPI.     Exam Findings:     Const: no acute distress, conversant and alert  Eyes: anicteric, extraocular muscles intact  ENMT: normocephalic, Nl oral membranes  Cardio: no cyanosis, nl cap refill  Pulm: no tachypnea; no resp distress  Abd: soft, no tenderness  Musc: no laceration, no tenderness  Neuro: alert; oriented x 3  Skin: warm, dry; no petichiae  Psych: no anxiety; normal speech           Assessment/Plan:     Neurogenic Bladder with Chronic Urinary Retention (established,stable): The patient has evidence of chronic urinary retention requiring catheterization.  He appears stable over the last 5 years since his last UDS study with no infections, frequent catheterizations, incontinence or abnormal renal studies.  The need for catheterization was assessed and the patient is likely to benefit from a regimen of clean intermittent catheterization. The patient has  previously performed intermittent catheterization in the past and is comfortable performing this alone. The etiology for urinary retention in this patient is thought to be neurogenic bladder (spina bifida). The anticipated duration of need is estimated to be indefinite. The patient will not require insertion supplies. The patient will not require a coude-tip catheter. The patient will need to catheterize 5x daily and will require a total of 150 catheters per month. .     1. Repeat Renal U/S in October   2. Return to Clinic in October for review of U/S

## 2020-12-11 ENCOUNTER — PATIENT MESSAGE (OUTPATIENT)
Dept: OTHER | Facility: OTHER | Age: 35
End: 2020-12-11

## 2020-12-28 ENCOUNTER — TELEPHONE (OUTPATIENT)
Dept: INTERNAL MEDICINE | Facility: CLINIC | Age: 35
End: 2020-12-28

## 2020-12-28 NOTE — TELEPHONE ENCOUNTER
----- Message from Karrie Robles sent at 12/28/2020 12:37 PM CST -----  Contact: PT mom [phone]--  Needs Advice    Reason for call:--Forms for driving--         Additional Information:Mom calling to speak with the nurse to see if the forms that she drop off on 12/21 are ready for  because pt needs them by before 1/1/21. Please call to advise.

## 2021-01-11 ENCOUNTER — TELEPHONE (OUTPATIENT)
Dept: INTERNAL MEDICINE | Facility: CLINIC | Age: 36
End: 2021-01-11

## 2021-02-09 ENCOUNTER — PES CALL (OUTPATIENT)
Dept: ADMINISTRATIVE | Facility: CLINIC | Age: 36
End: 2021-02-09

## 2021-03-03 NOTE — TELEPHONE ENCOUNTER
Spoke to mother will relay message to change medication as prescribed.  rocky cohen lpn  
mild impairment

## 2021-03-04 ENCOUNTER — TELEPHONE (OUTPATIENT)
Dept: UROLOGY | Facility: CLINIC | Age: 36
End: 2021-03-04

## 2021-03-05 ENCOUNTER — PATIENT MESSAGE (OUTPATIENT)
Dept: INTERNAL MEDICINE | Facility: CLINIC | Age: 36
End: 2021-03-05

## 2021-03-22 ENCOUNTER — IMMUNIZATION (OUTPATIENT)
Dept: OBSTETRICS AND GYNECOLOGY | Facility: CLINIC | Age: 36
End: 2021-03-22
Payer: MEDICARE

## 2021-03-22 DIAGNOSIS — Z23 NEED FOR VACCINATION: Primary | ICD-10-CM

## 2021-03-22 PROCEDURE — 91300 COVID-19, MRNA, LNP-S, PF, 30 MCG/0.3 ML DOSE VACCINE: CPT | Mod: PBBFAC | Performed by: FAMILY MEDICINE

## 2021-03-31 ENCOUNTER — EXTERNAL CHRONIC CARE MANAGEMENT (OUTPATIENT)
Dept: PRIMARY CARE CLINIC | Facility: CLINIC | Age: 36
End: 2021-03-31
Payer: MEDICARE

## 2021-03-31 PROCEDURE — 99490 CHRNC CARE MGMT STAFF 1ST 20: CPT | Mod: PBBFAC | Performed by: INTERNAL MEDICINE

## 2021-03-31 PROCEDURE — 99439 CHRNC CARE MGMT STAF EA ADDL: CPT | Mod: PBBFAC | Performed by: INTERNAL MEDICINE

## 2021-03-31 PROCEDURE — 99490 CHRNC CARE MGMT STAFF 1ST 20: CPT | Mod: S$PBB,,, | Performed by: INTERNAL MEDICINE

## 2021-03-31 PROCEDURE — 99439 PR CHRONIC CARE MGMT, EA ADDTL 20 MIN: ICD-10-PCS | Mod: S$PBB,,, | Performed by: INTERNAL MEDICINE

## 2021-03-31 PROCEDURE — 99490 PR CHRONIC CARE MGMT, 1ST 20 MIN: ICD-10-PCS | Mod: S$PBB,,, | Performed by: INTERNAL MEDICINE

## 2021-03-31 PROCEDURE — 99439 CHRNC CARE MGMT STAF EA ADDL: CPT | Mod: S$PBB,,, | Performed by: INTERNAL MEDICINE

## 2021-04-12 ENCOUNTER — IMMUNIZATION (OUTPATIENT)
Dept: OBSTETRICS AND GYNECOLOGY | Facility: CLINIC | Age: 36
End: 2021-04-12

## 2021-04-12 DIAGNOSIS — Z23 NEED FOR VACCINATION: Primary | ICD-10-CM

## 2021-04-12 PROCEDURE — 91300 COVID-19, MRNA, LNP-S, PF, 30 MCG/0.3 ML DOSE VACCINE: CPT | Mod: S$GLB,,, | Performed by: FAMILY MEDICINE

## 2021-04-12 PROCEDURE — 91300 COVID-19, MRNA, LNP-S, PF, 30 MCG/0.3 ML DOSE VACCINE: ICD-10-PCS | Mod: S$GLB,,, | Performed by: FAMILY MEDICINE

## 2021-04-12 PROCEDURE — 0002A COVID-19, MRNA, LNP-S, PF, 30 MCG/0.3 ML DOSE VACCINE: ICD-10-PCS | Mod: CV19,S$GLB,, | Performed by: FAMILY MEDICINE

## 2021-04-12 PROCEDURE — 0002A COVID-19, MRNA, LNP-S, PF, 30 MCG/0.3 ML DOSE VACCINE: CPT | Mod: CV19,S$GLB,, | Performed by: FAMILY MEDICINE

## 2021-04-30 ENCOUNTER — EXTERNAL CHRONIC CARE MANAGEMENT (OUTPATIENT)
Dept: PRIMARY CARE CLINIC | Facility: CLINIC | Age: 36
End: 2021-04-30
Payer: MEDICARE

## 2021-04-30 PROCEDURE — 99490 CHRNC CARE MGMT STAFF 1ST 20: CPT | Mod: PBBFAC | Performed by: INTERNAL MEDICINE

## 2021-04-30 PROCEDURE — 99490 CHRNC CARE MGMT STAFF 1ST 20: CPT | Mod: S$PBB,,, | Performed by: INTERNAL MEDICINE

## 2021-04-30 PROCEDURE — 99490 PR CHRONIC CARE MGMT, 1ST 20 MIN: ICD-10-PCS | Mod: S$PBB,,, | Performed by: INTERNAL MEDICINE

## 2021-05-31 ENCOUNTER — EXTERNAL CHRONIC CARE MANAGEMENT (OUTPATIENT)
Dept: PRIMARY CARE CLINIC | Facility: CLINIC | Age: 36
End: 2021-05-31
Payer: MEDICARE

## 2021-05-31 PROCEDURE — 99490 PR CHRONIC CARE MGMT, 1ST 20 MIN: ICD-10-PCS | Mod: S$PBB,,, | Performed by: INTERNAL MEDICINE

## 2021-05-31 PROCEDURE — 99490 CHRNC CARE MGMT STAFF 1ST 20: CPT | Mod: S$PBB,,, | Performed by: INTERNAL MEDICINE

## 2021-05-31 PROCEDURE — 99490 CHRNC CARE MGMT STAFF 1ST 20: CPT | Mod: PBBFAC | Performed by: INTERNAL MEDICINE

## 2021-06-01 ENCOUNTER — PES CALL (OUTPATIENT)
Dept: ADMINISTRATIVE | Facility: CLINIC | Age: 36
End: 2021-06-01

## 2021-06-30 ENCOUNTER — EXTERNAL CHRONIC CARE MANAGEMENT (OUTPATIENT)
Dept: PRIMARY CARE CLINIC | Facility: CLINIC | Age: 36
End: 2021-06-30
Payer: MEDICARE

## 2021-06-30 PROCEDURE — 99490 CHRNC CARE MGMT STAFF 1ST 20: CPT | Mod: S$PBB,,, | Performed by: INTERNAL MEDICINE

## 2021-06-30 PROCEDURE — 99490 CHRNC CARE MGMT STAFF 1ST 20: CPT | Mod: PBBFAC | Performed by: INTERNAL MEDICINE

## 2021-06-30 PROCEDURE — 99490 PR CHRONIC CARE MGMT, 1ST 20 MIN: ICD-10-PCS | Mod: S$PBB,,, | Performed by: INTERNAL MEDICINE

## 2021-07-31 ENCOUNTER — EXTERNAL CHRONIC CARE MANAGEMENT (OUTPATIENT)
Dept: PRIMARY CARE CLINIC | Facility: CLINIC | Age: 36
End: 2021-07-31
Payer: MEDICARE

## 2021-07-31 PROCEDURE — 99490 CHRNC CARE MGMT STAFF 1ST 20: CPT | Mod: S$PBB,,, | Performed by: INTERNAL MEDICINE

## 2021-07-31 PROCEDURE — 99490 PR CHRONIC CARE MGMT, 1ST 20 MIN: ICD-10-PCS | Mod: S$PBB,,, | Performed by: INTERNAL MEDICINE

## 2021-07-31 PROCEDURE — 99490 CHRNC CARE MGMT STAFF 1ST 20: CPT | Mod: PBBFAC | Performed by: INTERNAL MEDICINE

## 2021-08-31 ENCOUNTER — EXTERNAL CHRONIC CARE MANAGEMENT (OUTPATIENT)
Dept: PRIMARY CARE CLINIC | Facility: CLINIC | Age: 36
End: 2021-08-31
Payer: MEDICARE

## 2021-08-31 PROCEDURE — 99490 CHRNC CARE MGMT STAFF 1ST 20: CPT | Mod: S$PBB,,, | Performed by: INTERNAL MEDICINE

## 2021-08-31 PROCEDURE — 99490 CHRNC CARE MGMT STAFF 1ST 20: CPT | Mod: PBBFAC | Performed by: INTERNAL MEDICINE

## 2021-08-31 PROCEDURE — 99490 PR CHRONIC CARE MGMT, 1ST 20 MIN: ICD-10-PCS | Mod: S$PBB,,, | Performed by: INTERNAL MEDICINE

## 2021-09-17 ENCOUNTER — OFFICE VISIT (OUTPATIENT)
Dept: INTERNAL MEDICINE | Facility: CLINIC | Age: 36
End: 2021-09-17
Payer: MEDICARE

## 2021-09-17 ENCOUNTER — PATIENT MESSAGE (OUTPATIENT)
Dept: ADMINISTRATIVE | Facility: OTHER | Age: 36
End: 2021-09-17

## 2021-09-17 ENCOUNTER — LAB VISIT (OUTPATIENT)
Dept: LAB | Facility: HOSPITAL | Age: 36
End: 2021-09-17
Attending: INTERNAL MEDICINE
Payer: MEDICARE

## 2021-09-17 VITALS — HEART RATE: 96 BPM | OXYGEN SATURATION: 98 % | SYSTOLIC BLOOD PRESSURE: 138 MMHG | DIASTOLIC BLOOD PRESSURE: 89 MMHG

## 2021-09-17 DIAGNOSIS — I10 ESSENTIAL HYPERTENSION: ICD-10-CM

## 2021-09-17 DIAGNOSIS — R73.09 ELEVATED GLUCOSE: ICD-10-CM

## 2021-09-17 DIAGNOSIS — I10 HTN (HYPERTENSION), BENIGN: ICD-10-CM

## 2021-09-17 DIAGNOSIS — G82.20 PARAPARESIS: ICD-10-CM

## 2021-09-17 DIAGNOSIS — Q05.9 SPINA BIFIDA, UNSPECIFIED HYDROCEPHALUS PRESENCE, UNSPECIFIED SPINAL REGION: ICD-10-CM

## 2021-09-17 DIAGNOSIS — N31.9 NEUROGENIC BLADDER: ICD-10-CM

## 2021-09-17 DIAGNOSIS — G82.20 PARAPLEGIA: ICD-10-CM

## 2021-09-17 DIAGNOSIS — K59.2 NEUROGENIC BOWEL: Primary | ICD-10-CM

## 2021-09-17 DIAGNOSIS — K59.01 SLOW TRANSIT CONSTIPATION: ICD-10-CM

## 2021-09-17 DIAGNOSIS — Z99.3 DEPENDENT ON WHEELCHAIR: ICD-10-CM

## 2021-09-17 LAB
ALBUMIN SERPL BCP-MCNC: 3.7 G/DL (ref 3.5–5.2)
ALP SERPL-CCNC: 109 U/L (ref 55–135)
ALT SERPL W/O P-5'-P-CCNC: 26 U/L (ref 10–44)
ANION GAP SERPL CALC-SCNC: 7 MMOL/L (ref 8–16)
AST SERPL-CCNC: 16 U/L (ref 10–40)
BILIRUB SERPL-MCNC: 0.4 MG/DL (ref 0.1–1)
BUN SERPL-MCNC: 9 MG/DL (ref 6–20)
CALCIUM SERPL-MCNC: 9.1 MG/DL (ref 8.7–10.5)
CHLORIDE SERPL-SCNC: 99 MMOL/L (ref 95–110)
CHOLEST SERPL-MCNC: 178 MG/DL (ref 120–199)
CHOLEST/HDLC SERPL: 5.2 {RATIO} (ref 2–5)
CO2 SERPL-SCNC: 28 MMOL/L (ref 23–29)
CREAT SERPL-MCNC: 0.9 MG/DL (ref 0.5–1.4)
EST. GFR  (AFRICAN AMERICAN): >60 ML/MIN/1.73 M^2
EST. GFR  (NON AFRICAN AMERICAN): >60 ML/MIN/1.73 M^2
GLUCOSE SERPL-MCNC: 230 MG/DL (ref 70–110)
HDLC SERPL-MCNC: 34 MG/DL (ref 40–75)
HDLC SERPL: 19.1 % (ref 20–50)
LDLC SERPL CALC-MCNC: 128.6 MG/DL (ref 63–159)
NONHDLC SERPL-MCNC: 144 MG/DL
POTASSIUM SERPL-SCNC: 3.9 MMOL/L (ref 3.5–5.1)
PROT SERPL-MCNC: 8 G/DL (ref 6–8.4)
SODIUM SERPL-SCNC: 134 MMOL/L (ref 136–145)
TRIGL SERPL-MCNC: 77 MG/DL (ref 30–150)

## 2021-09-17 PROCEDURE — 83036 HEMOGLOBIN GLYCOSYLATED A1C: CPT | Performed by: INTERNAL MEDICINE

## 2021-09-17 PROCEDURE — 99214 OFFICE O/P EST MOD 30 MIN: CPT | Mod: S$PBB,,, | Performed by: INTERNAL MEDICINE

## 2021-09-17 PROCEDURE — 99213 OFFICE O/P EST LOW 20 MIN: CPT | Mod: PBBFAC | Performed by: INTERNAL MEDICINE

## 2021-09-17 PROCEDURE — 80053 COMPREHEN METABOLIC PANEL: CPT | Performed by: INTERNAL MEDICINE

## 2021-09-17 PROCEDURE — 80061 LIPID PANEL: CPT | Performed by: INTERNAL MEDICINE

## 2021-09-17 PROCEDURE — 36415 COLL VENOUS BLD VENIPUNCTURE: CPT | Performed by: INTERNAL MEDICINE

## 2021-09-17 PROCEDURE — 99214 PR OFFICE/OUTPT VISIT, EST, LEVL IV, 30-39 MIN: ICD-10-PCS | Mod: S$PBB,,, | Performed by: INTERNAL MEDICINE

## 2021-09-17 PROCEDURE — 99999 PR PBB SHADOW E&M-EST. PATIENT-LVL III: ICD-10-PCS | Mod: PBBFAC,,, | Performed by: INTERNAL MEDICINE

## 2021-09-17 PROCEDURE — 99999 PR PBB SHADOW E&M-EST. PATIENT-LVL III: CPT | Mod: PBBFAC,,, | Performed by: INTERNAL MEDICINE

## 2021-09-17 RX ORDER — OXYBUTYNIN CHLORIDE 15 MG/1
15 TABLET, EXTENDED RELEASE ORAL DAILY
Qty: 90 TABLET | Refills: 3 | Status: SHIPPED | OUTPATIENT
Start: 2021-09-17 | End: 2021-09-17 | Stop reason: SDUPTHER

## 2021-09-17 RX ORDER — OXYBUTYNIN CHLORIDE 15 MG/1
15 TABLET, EXTENDED RELEASE ORAL DAILY
Qty: 90 TABLET | Refills: 3 | Status: SHIPPED | OUTPATIENT
Start: 2021-09-17 | End: 2021-10-14 | Stop reason: SDUPTHER

## 2021-09-17 RX ORDER — POLYETHYLENE GLYCOL 3350 17 G/17G
17 POWDER, FOR SOLUTION ORAL DAILY
Qty: 90 PACKET | Refills: 3 | Status: SHIPPED | OUTPATIENT
Start: 2021-09-17

## 2021-09-17 RX ORDER — AMLODIPINE BESYLATE 10 MG/1
10 TABLET ORAL DAILY
Qty: 90 TABLET | Refills: 2 | Status: SHIPPED | OUTPATIENT
Start: 2021-09-17 | End: 2021-09-17 | Stop reason: SDUPTHER

## 2021-09-17 RX ORDER — AMLODIPINE BESYLATE 10 MG/1
10 TABLET ORAL DAILY
Qty: 90 TABLET | Refills: 2 | Status: SHIPPED | OUTPATIENT
Start: 2021-09-17 | End: 2022-02-14 | Stop reason: SDUPTHER

## 2021-09-17 RX ORDER — POLYETHYLENE GLYCOL 3350 17 G/17G
17 POWDER, FOR SOLUTION ORAL DAILY
Qty: 90 PACKET | Refills: 3 | Status: SHIPPED | OUTPATIENT
Start: 2021-09-17 | End: 2021-09-17 | Stop reason: SDUPTHER

## 2021-09-18 LAB
ESTIMATED AVG GLUCOSE: 303 MG/DL (ref 68–131)
HBA1C MFR BLD: 12.2 % (ref 4–5.6)

## 2021-09-20 ENCOUNTER — TELEPHONE (OUTPATIENT)
Dept: PHARMACY | Facility: CLINIC | Age: 36
End: 2021-09-20

## 2021-09-20 ENCOUNTER — TELEPHONE (OUTPATIENT)
Dept: DIABETES | Facility: CLINIC | Age: 36
End: 2021-09-20

## 2021-09-20 ENCOUNTER — TELEPHONE (OUTPATIENT)
Dept: INTERNAL MEDICINE | Facility: CLINIC | Age: 36
End: 2021-09-20

## 2021-09-20 DIAGNOSIS — E11.9 DIABETES MELLITUS WITHOUT COMPLICATION: Primary | ICD-10-CM

## 2021-09-20 RX ORDER — METFORMIN HYDROCHLORIDE 500 MG/1
500 TABLET ORAL 2 TIMES DAILY WITH MEALS
Qty: 180 TABLET | Refills: 3 | Status: SHIPPED | OUTPATIENT
Start: 2021-09-20 | End: 2021-10-18 | Stop reason: SDUPTHER

## 2021-09-22 ENCOUNTER — TELEPHONE (OUTPATIENT)
Dept: INTERNAL MEDICINE | Facility: CLINIC | Age: 36
End: 2021-09-22

## 2021-09-23 ENCOUNTER — PATIENT MESSAGE (OUTPATIENT)
Dept: INTERNAL MEDICINE | Facility: CLINIC | Age: 36
End: 2021-09-23

## 2021-09-24 DIAGNOSIS — Q07.01 ARNOLD-CHIARI SYNDROME WITH SPINA BIFIDA: Primary | ICD-10-CM

## 2021-09-30 ENCOUNTER — EXTERNAL CHRONIC CARE MANAGEMENT (OUTPATIENT)
Dept: PRIMARY CARE CLINIC | Facility: CLINIC | Age: 36
End: 2021-09-30
Payer: MEDICARE

## 2021-09-30 PROCEDURE — 99490 PR CHRONIC CARE MGMT, 1ST 20 MIN: ICD-10-PCS | Mod: S$PBB,,, | Performed by: INTERNAL MEDICINE

## 2021-09-30 PROCEDURE — 99490 CHRNC CARE MGMT STAFF 1ST 20: CPT | Mod: S$PBB,,, | Performed by: INTERNAL MEDICINE

## 2021-09-30 PROCEDURE — 99490 CHRNC CARE MGMT STAFF 1ST 20: CPT | Mod: PBBFAC | Performed by: INTERNAL MEDICINE

## 2021-10-07 ENCOUNTER — HOSPITAL ENCOUNTER (OUTPATIENT)
Dept: RADIOLOGY | Facility: HOSPITAL | Age: 36
Discharge: HOME | End: 2021-10-07
Attending: UROLOGY
Payer: MEDICARE

## 2021-10-07 DIAGNOSIS — N31.9 NEUROGENIC BLADDER: ICD-10-CM

## 2021-10-07 PROCEDURE — 76770 US RETROPERITONEAL COMPLETE: ICD-10-PCS | Mod: 26,,, | Performed by: RADIOLOGY

## 2021-10-07 PROCEDURE — 76770 US EXAM ABDO BACK WALL COMP: CPT | Mod: 26,,, | Performed by: RADIOLOGY

## 2021-10-07 PROCEDURE — 76770 US EXAM ABDO BACK WALL COMP: CPT | Mod: TC

## 2021-10-12 ENCOUNTER — TELEPHONE (OUTPATIENT)
Dept: ADMINISTRATIVE | Facility: HOSPITAL | Age: 36
End: 2021-10-12

## 2021-10-14 ENCOUNTER — OFFICE VISIT (OUTPATIENT)
Dept: UROLOGY | Facility: CLINIC | Age: 36
End: 2021-10-14
Payer: MEDICARE

## 2021-10-14 ENCOUNTER — LAB VISIT (OUTPATIENT)
Dept: LAB | Facility: HOSPITAL | Age: 36
End: 2021-10-14
Attending: INTERNAL MEDICINE
Payer: MEDICARE

## 2021-10-14 DIAGNOSIS — N31.9 NEUROGENIC BLADDER: Primary | ICD-10-CM

## 2021-10-14 DIAGNOSIS — E11.9 DIABETES MELLITUS WITHOUT COMPLICATION: ICD-10-CM

## 2021-10-14 LAB
ANION GAP SERPL CALC-SCNC: 11 MMOL/L (ref 8–16)
BUN SERPL-MCNC: 15 MG/DL (ref 6–20)
CALCIUM SERPL-MCNC: 9 MG/DL (ref 8.7–10.5)
CHLORIDE SERPL-SCNC: 99 MMOL/L (ref 95–110)
CO2 SERPL-SCNC: 26 MMOL/L (ref 23–29)
CREAT SERPL-MCNC: 0.8 MG/DL (ref 0.5–1.4)
EST. GFR  (AFRICAN AMERICAN): >60 ML/MIN/1.73 M^2
EST. GFR  (NON AFRICAN AMERICAN): >60 ML/MIN/1.73 M^2
ESTIMATED AVG GLUCOSE: 223 MG/DL (ref 68–131)
GLUCOSE SERPL-MCNC: 108 MG/DL (ref 70–110)
HBA1C MFR BLD: 9.4 % (ref 4–5.6)
POTASSIUM SERPL-SCNC: 3.2 MMOL/L (ref 3.5–5.1)
SODIUM SERPL-SCNC: 136 MMOL/L (ref 136–145)

## 2021-10-14 PROCEDURE — 99213 OFFICE O/P EST LOW 20 MIN: CPT | Mod: PBBFAC | Performed by: UROLOGY

## 2021-10-14 PROCEDURE — 36415 COLL VENOUS BLD VENIPUNCTURE: CPT | Performed by: INTERNAL MEDICINE

## 2021-10-14 PROCEDURE — 99214 PR OFFICE/OUTPT VISIT, EST, LEVL IV, 30-39 MIN: ICD-10-PCS | Mod: S$PBB,,, | Performed by: UROLOGY

## 2021-10-14 PROCEDURE — 99214 OFFICE O/P EST MOD 30 MIN: CPT | Mod: S$PBB,,, | Performed by: UROLOGY

## 2021-10-14 PROCEDURE — 99999 PR PBB SHADOW E&M-EST. PATIENT-LVL III: CPT | Mod: PBBFAC,,, | Performed by: UROLOGY

## 2021-10-14 PROCEDURE — 99999 PR PBB SHADOW E&M-EST. PATIENT-LVL III: ICD-10-PCS | Mod: PBBFAC,,, | Performed by: UROLOGY

## 2021-10-14 PROCEDURE — 83036 HEMOGLOBIN GLYCOSYLATED A1C: CPT | Performed by: INTERNAL MEDICINE

## 2021-10-14 PROCEDURE — 80048 BASIC METABOLIC PNL TOTAL CA: CPT | Performed by: INTERNAL MEDICINE

## 2021-10-14 RX ORDER — OXYBUTYNIN CHLORIDE 15 MG/1
15 TABLET, EXTENDED RELEASE ORAL DAILY
Qty: 90 TABLET | Refills: 3 | Status: SHIPPED | OUTPATIENT
Start: 2021-10-14 | End: 2022-02-14 | Stop reason: SDUPTHER

## 2021-10-15 ENCOUNTER — CLINICAL SUPPORT (OUTPATIENT)
Dept: REHABILITATION | Facility: HOSPITAL | Age: 36
End: 2021-10-15
Attending: INTERNAL MEDICINE
Payer: MEDICARE

## 2021-10-15 DIAGNOSIS — Q05.2 SPINA BIFIDA OF LUMBAR REGION WITH HYDROCEPHALUS: ICD-10-CM

## 2021-10-15 DIAGNOSIS — G82.20 PARAPLEGIA: Primary | ICD-10-CM

## 2021-10-15 PROCEDURE — 97161 PT EVAL LOW COMPLEX 20 MIN: CPT | Mod: PN | Performed by: PHYSICAL THERAPIST

## 2021-10-18 ENCOUNTER — OFFICE VISIT (OUTPATIENT)
Dept: INTERNAL MEDICINE | Facility: CLINIC | Age: 36
End: 2021-10-18
Payer: MEDICARE

## 2021-10-18 VITALS
DIASTOLIC BLOOD PRESSURE: 90 MMHG | HEART RATE: 85 BPM | SYSTOLIC BLOOD PRESSURE: 141 MMHG | BODY MASS INDEX: 38.26 KG/M2 | HEIGHT: 63 IN | OXYGEN SATURATION: 98 %

## 2021-10-18 DIAGNOSIS — Z11.59 ENCOUNTER FOR HEPATITIS C SCREENING TEST FOR LOW RISK PATIENT: ICD-10-CM

## 2021-10-18 DIAGNOSIS — I10 HTN (HYPERTENSION), BENIGN: Primary | ICD-10-CM

## 2021-10-18 DIAGNOSIS — E11.9 DIABETES MELLITUS WITHOUT COMPLICATION: ICD-10-CM

## 2021-10-18 PROCEDURE — 99999 PR PBB SHADOW E&M-EST. PATIENT-LVL III: CPT | Mod: PBBFAC,,, | Performed by: INTERNAL MEDICINE

## 2021-10-18 PROCEDURE — 99999 PR PBB SHADOW E&M-EST. PATIENT-LVL III: ICD-10-PCS | Mod: PBBFAC,,, | Performed by: INTERNAL MEDICINE

## 2021-10-18 PROCEDURE — 99214 OFFICE O/P EST MOD 30 MIN: CPT | Mod: S$PBB,,, | Performed by: INTERNAL MEDICINE

## 2021-10-18 PROCEDURE — 99214 PR OFFICE/OUTPT VISIT, EST, LEVL IV, 30-39 MIN: ICD-10-PCS | Mod: S$PBB,,, | Performed by: INTERNAL MEDICINE

## 2021-10-18 PROCEDURE — 99213 OFFICE O/P EST LOW 20 MIN: CPT | Mod: PBBFAC | Performed by: INTERNAL MEDICINE

## 2021-10-18 RX ORDER — VALSARTAN 80 MG/1
80 TABLET ORAL DAILY
Qty: 90 TABLET | Refills: 3 | Status: SHIPPED | OUTPATIENT
Start: 2021-10-18 | End: 2022-02-14 | Stop reason: SDUPTHER

## 2021-10-18 RX ORDER — METFORMIN HYDROCHLORIDE 500 MG/1
500 TABLET ORAL 2 TIMES DAILY WITH MEALS
Qty: 180 TABLET | Refills: 3 | Status: SHIPPED | OUTPATIENT
Start: 2021-10-18 | End: 2021-10-22

## 2021-10-20 DIAGNOSIS — E11.9 TYPE 2 DIABETES MELLITUS WITHOUT COMPLICATION, UNSPECIFIED WHETHER LONG TERM INSULIN USE: ICD-10-CM

## 2021-10-21 PROBLEM — E11.9 TYPE 2 DIABETES MELLITUS, WITHOUT LONG-TERM CURRENT USE OF INSULIN: Status: ACTIVE | Noted: 2021-10-21

## 2021-10-22 ENCOUNTER — OFFICE VISIT (OUTPATIENT)
Dept: INTERNAL MEDICINE | Facility: CLINIC | Age: 36
End: 2021-10-22
Payer: MEDICARE

## 2021-10-22 VITALS
OXYGEN SATURATION: 100 % | BODY MASS INDEX: 38.28 KG/M2 | HEIGHT: 63 IN | DIASTOLIC BLOOD PRESSURE: 85 MMHG | HEART RATE: 93 BPM | WEIGHT: 216.06 LBS | SYSTOLIC BLOOD PRESSURE: 131 MMHG

## 2021-10-22 DIAGNOSIS — G82.20 PARAPLEGIA: ICD-10-CM

## 2021-10-22 DIAGNOSIS — I10 HTN (HYPERTENSION), BENIGN: ICD-10-CM

## 2021-10-22 DIAGNOSIS — E78.5 DYSLIPIDEMIA: ICD-10-CM

## 2021-10-22 DIAGNOSIS — H55.01 CONGENITAL NYSTAGMUS: ICD-10-CM

## 2021-10-22 DIAGNOSIS — E11.65 TYPE 2 DIABETES MELLITUS WITH HYPERGLYCEMIA, WITHOUT LONG-TERM CURRENT USE OF INSULIN: Primary | ICD-10-CM

## 2021-10-22 DIAGNOSIS — Z99.3 DEPENDENT ON WHEELCHAIR: ICD-10-CM

## 2021-10-22 PROCEDURE — 99214 OFFICE O/P EST MOD 30 MIN: CPT | Mod: S$PBB,,, | Performed by: NURSE PRACTITIONER

## 2021-10-22 PROCEDURE — 99213 OFFICE O/P EST LOW 20 MIN: CPT | Mod: PBBFAC | Performed by: NURSE PRACTITIONER

## 2021-10-22 PROCEDURE — 99999 PR PBB SHADOW E&M-EST. PATIENT-LVL III: CPT | Mod: PBBFAC,,, | Performed by: NURSE PRACTITIONER

## 2021-10-22 PROCEDURE — 99214 PR OFFICE/OUTPT VISIT, EST, LEVL IV, 30-39 MIN: ICD-10-PCS | Mod: S$PBB,,, | Performed by: NURSE PRACTITIONER

## 2021-10-22 PROCEDURE — 99999 PR PBB SHADOW E&M-EST. PATIENT-LVL III: ICD-10-PCS | Mod: PBBFAC,,, | Performed by: NURSE PRACTITIONER

## 2021-10-22 RX ORDER — METFORMIN HYDROCHLORIDE 500 MG/1
500 TABLET ORAL
Qty: 180 TABLET | Refills: 3
Start: 2021-10-22 | End: 2021-10-22

## 2021-10-22 RX ORDER — METFORMIN HYDROCHLORIDE 500 MG/1
500 TABLET ORAL
Qty: 270 TABLET | Refills: 3
Start: 2021-10-22 | End: 2022-02-14 | Stop reason: SDUPTHER

## 2021-10-25 ENCOUNTER — TELEPHONE (OUTPATIENT)
Dept: DIABETES | Facility: CLINIC | Age: 36
End: 2021-10-25
Payer: MEDICARE

## 2021-10-31 ENCOUNTER — EXTERNAL CHRONIC CARE MANAGEMENT (OUTPATIENT)
Dept: PRIMARY CARE CLINIC | Facility: CLINIC | Age: 36
End: 2021-10-31
Payer: MEDICARE

## 2021-10-31 PROCEDURE — 99490 CHRNC CARE MGMT STAFF 1ST 20: CPT | Mod: S$PBB,,, | Performed by: INTERNAL MEDICINE

## 2021-10-31 PROCEDURE — 99490 CHRNC CARE MGMT STAFF 1ST 20: CPT | Mod: PBBFAC | Performed by: INTERNAL MEDICINE

## 2021-10-31 PROCEDURE — 99490 PR CHRONIC CARE MGMT, 1ST 20 MIN: ICD-10-PCS | Mod: S$PBB,,, | Performed by: INTERNAL MEDICINE

## 2021-11-10 ENCOUNTER — TELEPHONE (OUTPATIENT)
Dept: ADMINISTRATIVE | Facility: HOSPITAL | Age: 36
End: 2021-11-10
Payer: MEDICARE

## 2021-11-30 ENCOUNTER — EXTERNAL CHRONIC CARE MANAGEMENT (OUTPATIENT)
Dept: PRIMARY CARE CLINIC | Facility: CLINIC | Age: 36
End: 2021-11-30
Payer: MEDICARE

## 2021-11-30 PROCEDURE — 99490 CHRNC CARE MGMT STAFF 1ST 20: CPT | Mod: PBBFAC | Performed by: INTERNAL MEDICINE

## 2021-11-30 PROCEDURE — 99490 PR CHRONIC CARE MGMT, 1ST 20 MIN: ICD-10-PCS | Mod: S$PBB,,, | Performed by: INTERNAL MEDICINE

## 2021-11-30 PROCEDURE — 99490 CHRNC CARE MGMT STAFF 1ST 20: CPT | Mod: S$PBB,,, | Performed by: INTERNAL MEDICINE

## 2021-12-03 ENCOUNTER — LAB VISIT (OUTPATIENT)
Dept: LAB | Facility: HOSPITAL | Age: 36
End: 2021-12-03
Payer: MEDICARE

## 2021-12-03 DIAGNOSIS — E11.65 TYPE 2 DIABETES MELLITUS WITH HYPERGLYCEMIA, WITHOUT LONG-TERM CURRENT USE OF INSULIN: ICD-10-CM

## 2021-12-03 LAB
ESTIMATED AVG GLUCOSE: 194 MG/DL (ref 68–131)
HBA1C MFR BLD: 8.4 % (ref 4–5.6)

## 2021-12-03 PROCEDURE — 36415 COLL VENOUS BLD VENIPUNCTURE: CPT | Performed by: NURSE PRACTITIONER

## 2021-12-03 PROCEDURE — 83036 HEMOGLOBIN GLYCOSYLATED A1C: CPT | Performed by: NURSE PRACTITIONER

## 2021-12-09 ENCOUNTER — PATIENT OUTREACH (OUTPATIENT)
Dept: ADMINISTRATIVE | Facility: HOSPITAL | Age: 36
End: 2021-12-09
Payer: MEDICARE

## 2021-12-22 DIAGNOSIS — E11.9 TYPE 2 DIABETES MELLITUS WITHOUT COMPLICATION: ICD-10-CM

## 2021-12-31 ENCOUNTER — EXTERNAL CHRONIC CARE MANAGEMENT (OUTPATIENT)
Dept: PRIMARY CARE CLINIC | Facility: CLINIC | Age: 36
End: 2021-12-31
Payer: MEDICARE

## 2021-12-31 PROCEDURE — 99490 CHRNC CARE MGMT STAFF 1ST 20: CPT | Mod: PBBFAC | Performed by: INTERNAL MEDICINE

## 2021-12-31 PROCEDURE — 99490 PR CHRONIC CARE MGMT, 1ST 20 MIN: ICD-10-PCS | Mod: S$PBB,,, | Performed by: INTERNAL MEDICINE

## 2021-12-31 PROCEDURE — 99490 CHRNC CARE MGMT STAFF 1ST 20: CPT | Mod: S$PBB,,, | Performed by: INTERNAL MEDICINE

## 2022-01-03 ENCOUNTER — HOSPITAL ENCOUNTER (EMERGENCY)
Facility: HOSPITAL | Age: 37
Discharge: HOME | End: 2022-01-03
Attending: EMERGENCY MEDICINE
Payer: MEDICARE

## 2022-01-03 VITALS
HEART RATE: 87 BPM | OXYGEN SATURATION: 100 % | WEIGHT: 165 LBS | DIASTOLIC BLOOD PRESSURE: 99 MMHG | TEMPERATURE: 99 F | RESPIRATION RATE: 18 BRPM | BODY MASS INDEX: 29.23 KG/M2 | SYSTOLIC BLOOD PRESSURE: 164 MMHG

## 2022-01-03 DIAGNOSIS — S92.901A FOOT FRACTURE, RIGHT, CLOSED, INITIAL ENCOUNTER: ICD-10-CM

## 2022-01-03 DIAGNOSIS — S99.921A INJURY OF RIGHT TOE: ICD-10-CM

## 2022-01-03 DIAGNOSIS — I10 HYPERTENSION, UNSPECIFIED TYPE: Primary | ICD-10-CM

## 2022-01-03 PROCEDURE — 99283 EMERGENCY DEPT VISIT LOW MDM: CPT | Mod: 25,ER

## 2022-01-03 PROCEDURE — 12001 RPR S/N/AX/GEN/TRNK 2.5CM/<: CPT | Mod: RT,ER

## 2022-01-03 RX ORDER — AMOXICILLIN AND CLAVULANATE POTASSIUM 875; 125 MG/1; MG/1
1 TABLET, FILM COATED ORAL 2 TIMES DAILY
Qty: 20 TABLET | Refills: 0 | Status: SHIPPED | OUTPATIENT
Start: 2022-01-03 | End: 2022-02-14

## 2022-01-03 NOTE — FIRST PROVIDER EVALUATION
Emergency Department TeleTriage Encounter Note      CHIEF COMPLAINT    Chief Complaint   Patient presents with    Toe Injury     STATES HE RAN HIS TOE INTO THE WALL AT 1500 TODAY; NOW WITH BLEEDING TO HIS RIGHT 5TH TOES; UNABLE TO SEE IN TRIAGE DUE TO DRESSING        VITAL SIGNS   Initial Vitals [01/03/22 1605]   BP Pulse Resp Temp SpO2   (S) (!) 176/115 103 18 98.8 °F (37.1 °C) 100 %      MAP       --            ALLERGIES    Review of patient's allergies indicates:   Allergen Reactions    Latex, natural rubber        PROVIDER TRIAGE NOTE   Patient presents to the ER with complaint of right 5th toe pain with injury at 3 pm today.    Will order xray pending ED provider evaluation.      Initial orders will be placed and care will be transferred to an alternate provider when patient is roomed for a full evaluation. Any additional orders and the final disposition will be determined by that provider.         ORDERS  Labs Reviewed - No data to display    ED Orders (720h ago, onward)    None            Virtual Visit Note: The provider triage portion of this emergency department evaluation and documentation was performed via [employer], a HIPAA-compliant telemedicine application, in concert with a tele-presenter in the room. A face to face patient evaluation with one of my colleagues will occur once the patient is placed in an emergency department room.      DISCLAIMER: This note was prepared with [employer] voice recognition transcription software. Garbled syntax, mangled pronouns, and other bizarre constructions may be attributed to that software system.

## 2022-01-04 NOTE — DISCHARGE INSTRUCTIONS
Thank you for coming to our Emergency Department today. It is important to remember that some problems are difficult to diagnose and may not be found during your Emergency Department visit. Be sure to follow up with your primary care doctor and review all labs/imaging/tests that were performed during this visit with them. Some labs/tests may be outside of the normal range and require non-emergent follow-up and further investigation to help diagnose/exclude/prevent complications or other medical conditions.    If you do not have a primary care doctor, you may contact the one listed on your discharge paperwork or you may also call the [facility] Appointment Desk at [phone] to schedule an appointment and establish care with one. It is important to your health that you have a primary care doctor.    Please take all medications as directed. All medications may potentially have side-effects and it is impossible to predict which medications may give you side-effects or what side-effects (if any) they will give you.. If you feel that you are having a negative effect or side-effect of any medication you should immediately stop taking them and seek medical attention. If you feel that you are having a life-threatening reaction call 911.    Return to the ER with any questions/concerns, new/concerning symptoms, worsening or failure to improve.     Do not drive, swim, climb to height, take a bath or make any important decisions for 24 hours if you have received any pain medications, sedatives or mood altering drugs during your ER visit.        BELOW THIS LINE ONLY APPLIES IF YOU HAVE A COVID TEST PENDING OR IF YOU HAVE BEEN DIAGNOSED WITH COVID:  Please access [facility] to review the results of your test. Until the results of your COVID test return, you should isolate yourself so as not to potentially spread illness to others.   If your COVID test returns positive, you should isolate yourself so as not to spread  illness to others. After five full days, if you are feeling better and you have not had fever for 24 hours, you can return to your typical daily activities, but you must wear a mask around others for an additional 5 days.   If your COVID test returns negative and you are either unvaccinated or more than six months out from your two-dose vaccine and are not yet boosted, you should still quarantine for 5 full days followed by strict mask use for an additional 5 full days.   If your COVID test returns negative and you have received your 2-dose initial vaccine as well as a booster, you should continue strict mask use for 10 full days after the exposure.  For all those exposed, best practice includes a test at day 5 after the exposure. This can be a home test or a test through one of the many testing centers throughout our community.   Masking is always advised to limit the spread of COVID. Cdc.gov is an excellent site to obtain the latest up to date recommendations regarding COVID and COVID testing.     CDC Testing and Quarantine Guidelines for patients with exposure to a known-positive COVID-19 person:  A close exposure is defined as anyone who has had an exposure (masked or unmasked) to a known COVID -19 positive person within 6 feet of someone for a cumulative total of 15 minutes or more over a 24-hour period.   Vaccinated and/or if you recently had a positive covid test within 90 days do NOT need to quarantine after contact with someone who had COVID-19 unless you develop symptoms.   Fully vaccinated people who have not had a positive test within 90 days, should get tested 3-5 days after their exposure, even if they don't have symptoms and wear a mask indoors in public for 14 days following exposure or until their test result is negative.      Unvaccinated and/or NOT had a positive test within 90 days and meet close exposure  You are required by CDC guidelines to quarantine for at least 5 days from time of  exposure followed by 5 days of strict masking. It is recommended, but not required to test after 5 days, unless you develop symptoms, in which case you should test at that time.  If you get tested after 5 days and your test is positive, your 5 day period of isolation starts the day of the positive test.    If your exposure does not meet the above definition, you can return to your normal daily activities to include social distancing, wearing a mask and frequent handwashing.      Here is a link to guidance from the CDC:  https://www.cdc.gov/media/releases/2021/s1227-isolation-quarantine-guidance.html      [facility]  Health Testing Sites:  https://[place]h.[place].gov/page/3934      [facility] website with testing locations and guidance:  [email or web address]

## 2022-01-04 NOTE — ED PROVIDER NOTES
Encounter Date: 1/3/2022       History     Chief Complaint   Patient presents with    Toe Injury     STATES HE RAN HIS TOE INTO THE WALL AT 1500 TODAY; NOW WITH BLEEDING TO HIS RIGHT 5TH TOES; UNABLE TO SEE IN TRIAGE DUE TO DRESSING      36 y.o. male Past Medical History:  No date: Overactive bladder  No date: Spina bifida  10/21/2021: Type 2 diabetes mellitus, without long-term current use   of insulin  No date: Urinary tract infection     Presents for evaluation of R foot wound, pt notes that he has been paralyzed for years with spina bifida, notes that he accidentally hit his R foot against something earlier today, but did not feel it due to his underlying LE paralysis. Tetanus up to date.        Review of patient's allergies indicates:   Allergen Reactions    Latex, natural rubber      Past Medical History:   Diagnosis Date    Overactive bladder     Spina bifida     Type 2 diabetes mellitus, without long-term current use of insulin 10/21/2021    Urinary tract infection      Past Surgical History:   Procedure Laterality Date    SPINE SURGERY      VENTRICULOPERITONEAL SHUNT       Family History   Problem Relation Age of Onset    Spina bifida Brother     Thyroid disease Neg Hx     Hypertension Neg Hx     Glaucoma Neg Hx     Prostate cancer Neg Hx     Kidney disease Neg Hx      Social History     Tobacco Use    Smoking status: Never Smoker    Smokeless tobacco: Never Used   Substance Use Topics    Alcohol use: No    Drug use: No     Review of Systems   Constitutional: Negative for fever.   HENT: Negative for sore throat.    Respiratory: Negative for shortness of breath.    Cardiovascular: Negative for chest pain.   Gastrointestinal: Negative for nausea.   Genitourinary: Negative for dysuria.   Musculoskeletal: Negative for back pain.   Skin: Negative for rash.   Neurological: Negative for weakness.   Hematological: Does not bruise/bleed easily.   All other systems reviewed and are  negative.      Physical Exam     Initial Vitals [01/03/22 1605]   BP Pulse Resp Temp SpO2   (S) (!) 176/115 103 18 98.8 °F (37.1 °C) 100 %      MAP       --         Physical Exam    Nursing note and vitals reviewed.  Constitutional: He appears well-developed and well-nourished.   HENT:   Head: Normocephalic and atraumatic.   Eyes: EOM are normal. Pupils are equal, round, and reactive to light.   Cardiovascular: Normal rate and regular rhythm.   Pulmonary/Chest: Effort normal.   Abdominal: He exhibits no distension.   Musculoskeletal:         General: No tenderness or edema.     Neurological: He is alert and oriented to person, place, and time.   Skin: Skin is warm and dry.   Psychiatric: He has a normal mood and affect.     0.5 cm lac to lateral inferior aspect of R 5th toe  Does not probe to bone  ED Course   Lac Repair    Date/Time: 1/3/2022 6:09 PM  Performed by: Opal Babin MD  Authorized by: Opal Babin MD     Consent:     Consent obtained:  Verbal    Consent given by:  Patient    Risks, benefits, and alternatives were discussed: yes      Risks discussed:  Infection, pain and poor cosmetic result  Universal protocol:     Procedure explained and questions answered to patient or proxy's satisfaction: yes      Imaging studies available: yes      Site/side marked: yes      Immediately prior to procedure, a time out was called: yes      Patient identity confirmed:  Verbally with patient  Anesthesia:     Anesthesia method:  None  Laceration details:     Location:  Foot    Foot location:  Sole of R foot    Length (cm):  0.5    Depth (mm):  3  Pre-procedure details:     Preparation:  Patient was prepped and draped in usual sterile fashion  Exploration:     Limited defect created (wound extended): no      Hemostasis achieved with:  Direct pressure    Imaging outcome: foreign body not noted      Wound exploration: wound explored through full range of motion and entire depth of wound visualized       Contaminated: no    Treatment:     Area cleansed with:  Chlorhexidine    Amount of cleaning:  Standard    Debridement:  None    Undermining:  None  Skin repair:     Repair method:  Tissue adhesive  Approximation:     Approximation:  Close  Repair type:     Repair type:  Simple  Post-procedure details:     Dressing:  Non-adherent dressing    Procedure completion:  Tolerated well, no immediate complications      Labs Reviewed - No data to display       Imaging Results          X-Ray Foot Complete Right (Final result)  Result time 01/03/22 16:59:49    Final result by Aldo Schreiber MD (01/03/22 16:59:49)                 Impression:      Right foot fractures as described.      Electronically signed by: Aldo Schreiber  Date:    01/03/2022  Time:    16:59             Narrative:    EXAMINATION:  XR FOOT COMPLETE 3 VIEW RIGHT    CLINICAL HISTORY:  . Unspecified injury of right foot, initial encounter    TECHNIQUE:  AP, lateral, and oblique views of the right foot were performed.    COMPARISON:  None    FINDINGS:  Essentially nondisplaced fractures noted involving the distal diametaphyseal regions of the 2nd, 4th and 5th metatarsals to the fibula side, the proximal diametaphyseal regions of the proximal phalanges of the 3rd, 4th and 5th toes, and the distal diametaphyseal regions of the proximal phalanges of the 3rd and 4th toes.  No other acute fractures.  Hammertoe deformities 3rd, 4th, 5th, and less so 2nd toes.  Soft tissue swelling dorsally at the level of the metatarsals, less so midfoot and MTP regions.                                 Medications - No data to display                   multiple fractures in foot in paralyzed man.   I have repaired superficial lac which does not probe to bone. Will refer to ortho. Flat sole shoe.    Given hx of diabetes with open wound have started on abx empirically.  Clinical Impression:   Final diagnoses:  [S99.921A] Injury of right toe  [I10] Hypertension, unspecified type  (Primary)  [O09.933M] Foot fracture, right, closed, initial encounter          ED Disposition Condition    Discharge Stable        ED Prescriptions     None        Follow-up Information     Follow up With Specialties Details Why Contact Info    Juan Antonio Adorno Jr., MD Internal Medicine   [address] [place] [id number]  [phone]             Opal Babin MD  01/03/22 1806       Opal Babin MD  01/03/22 1808       Opal Babin MD  01/03/22 181

## 2022-01-07 ENCOUNTER — OFFICE VISIT (OUTPATIENT)
Dept: ORTHOPEDICS | Facility: CLINIC | Age: 37
End: 2022-01-07
Payer: MEDICARE

## 2022-01-07 VITALS — BODY MASS INDEX: 29.21 KG/M2 | WEIGHT: 164.88 LBS | HEIGHT: 63 IN

## 2022-01-07 DIAGNOSIS — S92.901A FOOT FRACTURE, RIGHT, CLOSED, INITIAL ENCOUNTER: ICD-10-CM

## 2022-01-07 PROCEDURE — 99203 OFFICE O/P NEW LOW 30 MIN: CPT | Mod: S$PBB,,, | Performed by: ORTHOPAEDIC SURGERY

## 2022-01-07 PROCEDURE — 99999 PR PBB SHADOW E&M-EST. PATIENT-LVL III: ICD-10-PCS | Mod: PBBFAC,,, | Performed by: ORTHOPAEDIC SURGERY

## 2022-01-07 PROCEDURE — 99213 OFFICE O/P EST LOW 20 MIN: CPT | Mod: PBBFAC | Performed by: ORTHOPAEDIC SURGERY

## 2022-01-07 PROCEDURE — 99999 PR PBB SHADOW E&M-EST. PATIENT-LVL III: CPT | Mod: PBBFAC,,, | Performed by: ORTHOPAEDIC SURGERY

## 2022-01-07 PROCEDURE — 99203 PR OFFICE/OUTPT VISIT, NEW, LEVL III, 30-44 MIN: ICD-10-PCS | Mod: S$PBB,,, | Performed by: ORTHOPAEDIC SURGERY

## 2022-01-07 NOTE — PROGRESS NOTES
DATE: 1/7/2022  PATIENT: Dale Pantoja    CHIEF COMPLAINT: R foot fractures    HISTORY:  Dale Pantoja is a 37 y.o. male with spina bifida (paraplegic) here for initial evaluation of R foot and toe fractures after colliding with a wall while playing wheelchair basketball. 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 Pt has insensate lower extremities, so he has not had any pain. He presented to the ED after the injury and was found to have a small wound to the 5th toe, which was repaired. No other complaints.      PAST MEDICAL/SURGICAL HISTORY:  Past Medical History:   Diagnosis Date    Overactive bladder     Spina bifida     Type 2 diabetes mellitus, without long-term current use of insulin 10/21/2021    Urinary tract infection      Past Surgical History:   Procedure Laterality Date    SPINE SURGERY      VENTRICULOPERITONEAL SHUNT         Current Medications:   Current Outpatient Medications:     amLODIPine (NORVASC) 10 MG tablet, Take 1 tablet (10 mg total) by mouth once daily., Disp: 90 tablet, Rfl: 2    amoxicillin-clavulanate 875-125mg (AUGMENTIN) 875-125 mg per tablet, Take 1 tablet by mouth 2 (two) times daily., Disp: 20 tablet, Rfl: 0    metFORMIN (GLUCOPHAGE) 500 MG tablet, Take 1 tablet (500 mg total) by mouth 3 (three) times daily with meals., Disp: 270 tablet, Rfl: 3    oxybutynin (DITROPAN XL) 15 MG TR24, Take 1 tablet (15 mg total) by mouth once daily., Disp: 90 tablet, Rfl: 3    polyethylene glycol (GLYCOLAX) 17 gram PwPk, Take 17 g by mouth once daily., Disp: 90 packet, Rfl: 3    valsartan (DIOVAN) 80 MG tablet, Take 1 tablet (80 mg total) by mouth once daily., Disp: 90 tablet, Rfl: 3    Social History:   Social History     Socioeconomic History    Marital status: Single   Tobacco Use    Smoking status: Never Smoker    Smokeless tobacco: Never Used   Substance and Sexual Activity    Alcohol use: No    Drug use: No    Sexual activity: Never       REVIEW OF  "SYSTEMS:  Constitution: Negative. Negative for chills, fever and night sweats.   Cardiovascular: Negative for chest pain and syncope.   Respiratory: Negative for cough and shortness of breath.   Gastrointestinal: See HPI. Negative for nausea/vomiting. Negative for abdominal pain.  Genitourinary: Negative for discoloration or dysuria.  Skin: Negative for dry skin, itching and rash.   Hematologic/Lymphatic: Negative for bleeding problem. Does not bruise/bleed easily.   Musculoskeletal: Negative for falls and muscle weakness.   Neurological: See HPI. No seizures.   Endocrine: Negative for polydipsia, polyphagia and polyuria.   Allergic/Immunologic: Negative for hives and persistent infections.    PHYSICAL EXAMINATION:    Ht 5' 3" (1.6 m)   Wt 74.8 kg (164 lb 14.5 oz)   BMI 29.21 kg/m²     General: The patient is a paraplegic 37 y.o. male in no apparent distress, the patient is orientatied to person, place and time.   Psych: Normal mood and affect  HEENT:  NCAT, sclera nonicteric  Lungs:  Respirations are equal and unlabored.  CV:  2+ bilateral upper and lower extremity pulses.  Skin:  Intact throughout.  Musculoskeletal: Insensate BLE. No motor function BLE.    BLE:  Dressing c/d/i to R foot  Wound to 4th/5th web space of R foot, possibly chronic appearing  Insensate  No motor function  WWP extremities        IMAGING:     Radiographs of the R foot were personally reviewed with the patient today.  Minimally displaced fractures noted involving the distal 2nd, 4th and 5th metatarsals, proximal aspect of proximal phalanges of the 3rd, 4th and 5th toes, and distal aspect of the proximal phalanges of the 3rd and 4th toes.      ASSESSMENT/PLAN:    Dale Pantoja is a very pleasant 37 y.o. male with spina bifida (paraplegic) here for initial evaluation of R foot and toe fractures after colliding with a wall while playing wheelchair basketball. He has minimally displaced fractures involving the distal 2nd, 4th and 5th " metatarsals and 3rd, 4th and 5th proximal phalanges. He does not have sensation to BLE, so he is not in pain. Small wound to R 5th toe, possibly chronic. Discussed diagnosis and management with patient. Given that he is NWB and these fractures are well aligned, no need for surgical fixation as these will heal with conservative management. Pt given instructions to ask for assistance with keeping clean dressing on R 5th toe to prevent wound infection. Pt understands and agrees with plan. Follow up in 6 weeks for repeat xrays.      Dale was seen today for pain.    Diagnoses and all orders for this visit:    Foot fracture, right, closed, initial encounter  -     Ambulatory referral/consult to Orthopedics      I have personally taken the history and examined this patient and agree with the residents note as stated above.  Multiple nondisplaced fractures in an insensate foot which do not require any intervention.  Should heal with time.    Follow-up in 6 weeks with repeat x-ray nonweightbearing right foot

## 2022-01-18 ENCOUNTER — PATIENT MESSAGE (OUTPATIENT)
Dept: ADMINISTRATIVE | Facility: HOSPITAL | Age: 37
End: 2022-01-18
Payer: MEDICARE

## 2022-01-31 ENCOUNTER — EXTERNAL CHRONIC CARE MANAGEMENT (OUTPATIENT)
Dept: PRIMARY CARE CLINIC | Facility: CLINIC | Age: 37
End: 2022-01-31
Payer: MEDICARE

## 2022-01-31 PROCEDURE — 99490 CHRNC CARE MGMT STAFF 1ST 20: CPT | Mod: PBBFAC | Performed by: INTERNAL MEDICINE

## 2022-01-31 PROCEDURE — 99490 CHRNC CARE MGMT STAFF 1ST 20: CPT | Mod: S$PBB,,, | Performed by: INTERNAL MEDICINE

## 2022-01-31 PROCEDURE — 99490 PR CHRONIC CARE MGMT, 1ST 20 MIN: ICD-10-PCS | Mod: S$PBB,,, | Performed by: INTERNAL MEDICINE

## 2022-02-10 ENCOUNTER — PES CALL (OUTPATIENT)
Dept: ADMINISTRATIVE | Facility: CLINIC | Age: 37
End: 2022-02-10
Payer: MEDICARE

## 2022-02-14 ENCOUNTER — OFFICE VISIT (OUTPATIENT)
Dept: INTERNAL MEDICINE | Facility: CLINIC | Age: 37
End: 2022-02-14
Payer: MEDICARE

## 2022-02-14 ENCOUNTER — LAB VISIT (OUTPATIENT)
Dept: LAB | Facility: HOSPITAL | Age: 37
End: 2022-02-14
Attending: INTERNAL MEDICINE
Payer: MEDICARE

## 2022-02-14 VITALS
HEART RATE: 105 BPM | SYSTOLIC BLOOD PRESSURE: 139 MMHG | HEIGHT: 63 IN | OXYGEN SATURATION: 99 % | BODY MASS INDEX: 29.21 KG/M2 | DIASTOLIC BLOOD PRESSURE: 88 MMHG

## 2022-02-14 DIAGNOSIS — I10 ESSENTIAL HYPERTENSION: ICD-10-CM

## 2022-02-14 DIAGNOSIS — Q05.2 SPINA BIFIDA OF LUMBAR REGION WITH HYDROCEPHALUS: ICD-10-CM

## 2022-02-14 DIAGNOSIS — Z74.09 MOBILITY IMPAIRED: ICD-10-CM

## 2022-02-14 DIAGNOSIS — E11.9 DIABETES MELLITUS WITHOUT COMPLICATION: ICD-10-CM

## 2022-02-14 DIAGNOSIS — E78.5 DYSLIPIDEMIA: ICD-10-CM

## 2022-02-14 DIAGNOSIS — N31.9 NEUROGENIC BLADDER: ICD-10-CM

## 2022-02-14 DIAGNOSIS — I10 HTN (HYPERTENSION), BENIGN: ICD-10-CM

## 2022-02-14 DIAGNOSIS — G82.20 PARAPLEGIA: ICD-10-CM

## 2022-02-14 DIAGNOSIS — E11.65 TYPE 2 DIABETES MELLITUS WITH HYPERGLYCEMIA, WITHOUT LONG-TERM CURRENT USE OF INSULIN: ICD-10-CM

## 2022-02-14 DIAGNOSIS — E11.65 TYPE 2 DIABETES MELLITUS WITH HYPERGLYCEMIA, WITHOUT LONG-TERM CURRENT USE OF INSULIN: Primary | ICD-10-CM

## 2022-02-14 DIAGNOSIS — G82.20 PARAPARESIS: ICD-10-CM

## 2022-02-14 DIAGNOSIS — Z11.59 ENCOUNTER FOR HEPATITIS C SCREENING TEST FOR LOW RISK PATIENT: ICD-10-CM

## 2022-02-14 LAB
ALBUMIN SERPL BCP-MCNC: 3.7 G/DL (ref 3.5–5.2)
ALP SERPL-CCNC: 140 U/L (ref 55–135)
ALT SERPL W/O P-5'-P-CCNC: 20 U/L (ref 10–44)
ANION GAP SERPL CALC-SCNC: 10 MMOL/L (ref 8–16)
ANION GAP SERPL CALC-SCNC: 10 MMOL/L (ref 8–16)
AST SERPL-CCNC: 12 U/L (ref 10–40)
BILIRUB SERPL-MCNC: 0.7 MG/DL (ref 0.1–1)
BUN SERPL-MCNC: 12 MG/DL (ref 6–20)
BUN SERPL-MCNC: 12 MG/DL (ref 6–20)
CALCIUM SERPL-MCNC: 9.2 MG/DL (ref 8.7–10.5)
CALCIUM SERPL-MCNC: 9.2 MG/DL (ref 8.7–10.5)
CHLORIDE SERPL-SCNC: 96 MMOL/L (ref 95–110)
CHLORIDE SERPL-SCNC: 96 MMOL/L (ref 95–110)
CHOLEST SERPL-MCNC: 184 MG/DL (ref 120–199)
CHOLEST/HDLC SERPL: 5.3 {RATIO} (ref 2–5)
CO2 SERPL-SCNC: 29 MMOL/L (ref 23–29)
CO2 SERPL-SCNC: 29 MMOL/L (ref 23–29)
CREAT SERPL-MCNC: 0.9 MG/DL (ref 0.5–1.4)
CREAT SERPL-MCNC: 0.9 MG/DL (ref 0.5–1.4)
EST. GFR  (AFRICAN AMERICAN): >60 ML/MIN/1.73 M^2
EST. GFR  (AFRICAN AMERICAN): >60 ML/MIN/1.73 M^2
EST. GFR  (NON AFRICAN AMERICAN): >60 ML/MIN/1.73 M^2
EST. GFR  (NON AFRICAN AMERICAN): >60 ML/MIN/1.73 M^2
ESTIMATED AVG GLUCOSE: 263 MG/DL (ref 68–131)
ESTIMATED AVG GLUCOSE: 263 MG/DL (ref 68–131)
GLUCOSE SERPL-MCNC: 256 MG/DL (ref 70–110)
GLUCOSE SERPL-MCNC: 256 MG/DL (ref 70–110)
HBA1C MFR BLD: 10.8 % (ref 4–5.6)
HBA1C MFR BLD: 10.8 % (ref 4–5.6)
HDLC SERPL-MCNC: 35 MG/DL (ref 40–75)
HDLC SERPL: 19 % (ref 20–50)
LDLC SERPL CALC-MCNC: 132.8 MG/DL (ref 63–159)
NONHDLC SERPL-MCNC: 149 MG/DL
POTASSIUM SERPL-SCNC: 3.4 MMOL/L (ref 3.5–5.1)
POTASSIUM SERPL-SCNC: 3.4 MMOL/L (ref 3.5–5.1)
PROT SERPL-MCNC: 8.8 G/DL (ref 6–8.4)
SODIUM SERPL-SCNC: 135 MMOL/L (ref 136–145)
SODIUM SERPL-SCNC: 135 MMOL/L (ref 136–145)
TRIGL SERPL-MCNC: 81 MG/DL (ref 30–150)

## 2022-02-14 PROCEDURE — 36415 COLL VENOUS BLD VENIPUNCTURE: CPT | Performed by: INTERNAL MEDICINE

## 2022-02-14 PROCEDURE — 99999 PR PBB SHADOW E&M-EST. PATIENT-LVL III: CPT | Mod: PBBFAC,,, | Performed by: INTERNAL MEDICINE

## 2022-02-14 PROCEDURE — 80061 LIPID PANEL: CPT | Performed by: INTERNAL MEDICINE

## 2022-02-14 PROCEDURE — 99213 OFFICE O/P EST LOW 20 MIN: CPT | Mod: PBBFAC | Performed by: INTERNAL MEDICINE

## 2022-02-14 PROCEDURE — 99999 PR PBB SHADOW E&M-EST. PATIENT-LVL III: ICD-10-PCS | Mod: PBBFAC,,, | Performed by: INTERNAL MEDICINE

## 2022-02-14 PROCEDURE — 99214 OFFICE O/P EST MOD 30 MIN: CPT | Mod: S$PBB,,, | Performed by: INTERNAL MEDICINE

## 2022-02-14 PROCEDURE — 83036 HEMOGLOBIN GLYCOSYLATED A1C: CPT | Performed by: INTERNAL MEDICINE

## 2022-02-14 PROCEDURE — 80053 COMPREHEN METABOLIC PANEL: CPT | Performed by: INTERNAL MEDICINE

## 2022-02-14 PROCEDURE — 99214 PR OFFICE/OUTPT VISIT, EST, LEVL IV, 30-39 MIN: ICD-10-PCS | Mod: S$PBB,,, | Performed by: INTERNAL MEDICINE

## 2022-02-14 PROCEDURE — 86803 HEPATITIS C AB TEST: CPT | Performed by: INTERNAL MEDICINE

## 2022-02-14 RX ORDER — METFORMIN HYDROCHLORIDE 500 MG/1
500 TABLET ORAL
Qty: 270 TABLET | Refills: 3
Start: 2022-02-14 | End: 2022-08-18 | Stop reason: SDUPTHER

## 2022-02-14 RX ORDER — AMLODIPINE BESYLATE 10 MG/1
10 TABLET ORAL DAILY
Qty: 90 TABLET | Refills: 2 | Status: SHIPPED | OUTPATIENT
Start: 2022-02-14 | End: 2022-08-18 | Stop reason: SDUPTHER

## 2022-02-14 RX ORDER — ATORVASTATIN CALCIUM 40 MG/1
40 TABLET, FILM COATED ORAL DAILY
Qty: 90 TABLET | Refills: 3 | Status: SHIPPED | OUTPATIENT
Start: 2022-02-14 | End: 2022-08-18 | Stop reason: SDUPTHER

## 2022-02-14 RX ORDER — VALSARTAN 80 MG/1
80 TABLET ORAL DAILY
Qty: 90 TABLET | Refills: 3 | Status: SHIPPED | OUTPATIENT
Start: 2022-02-14 | End: 2022-08-18 | Stop reason: SDUPTHER

## 2022-02-14 RX ORDER — OXYBUTYNIN CHLORIDE 15 MG/1
15 TABLET, EXTENDED RELEASE ORAL DAILY
Qty: 90 TABLET | Refills: 3 | Status: SHIPPED | OUTPATIENT
Start: 2022-02-14 | End: 2023-07-18

## 2022-02-14 NOTE — PROGRESS NOTES
Subjective:       Patient ID: Dale Pantoja is a 37  y.o. male. Who comes in to follow up.  Last seen 10/18/2021.  SInce last visit he has fracture his right foot and is following up with orthopedics.       Chief Complaint: Follow-up     Dale Pantoja is a 37 y.o. male with HTN, paraplegia, and neurogenic bladder who presents to clinic for follow-up for his recently diagnosed T2DM. He has ordered his new wheelchair and received his new home BP monitor.     HTN: BP slightly elevated in clinic today 139/88. Patient reports normal average readings at home. Remains on amlodipine.  Last visit he was started on valsartan but is ourt due to his pharmacy closing.       DM: Pt reports that he has  begun taking Metformin, but has increased his physical activity and improved his diet from the last visit. His A1c is 9.4, down from 12.2previously.   He has labs tomorrow and follow up with Mrs Herman next week.       Neurogenic bladder: Saw urology 2 weeks ago for routine bladder US. Continuing with intermittent self-catheterizations. No problems with incontinence while on Ditropan.      Review of Systems   Constitutional: Negative for activity change, appetite change, fatigue and fever.   Eyes: Negative for redness and itching.   Respiratory: Negative for cough, chest tightness and shortness of breath.    Cardiovascular: Negative for chest pain, palpitations and leg swelling.   Gastrointestinal: Negative for blood in stool, change in bowel habit, nausea, reflux and change in bowel habit.   Genitourinary: Negative for bladder incontinence, flank pain and urgency.   Musculoskeletal: Negative for arthralgias and myalgias.   Neurological: Negative for dizziness, syncope, speech difficulty and headaches.   Psychiatric/Behavioral: Negative.           Objective:   Physical Exam  Constitutional:       General: He is not in acute distress.     Appearance: Normal appearance.      Comments: Sitting comfortably in wheelchair   HENT:       Head: Normocephalic and atraumatic.   Eyes:      Pupils: Pupils are equal, round, and reactive to light.   Neck:      Comments:  shunt in place on right front neck under skin  Cardiovascular:      Rate and Rhythm: Normal rate and regular rhythm.      Pulses: Normal pulses.      Heart sounds: Normal heart sounds.   Pulmonary:      Effort: Pulmonary effort is normal. No respiratory distress.      Breath sounds: Normal breath sounds. No wheezing or rhonchi.   Abdominal:      General: Abdomen is flat. Bowel sounds are normal.      Palpations: Abdomen is soft.   Skin:     General: Skin is warm and dry.      Capillary Refill: Capillary refill takes less than 2 seconds.   Neurological:      Mental Status: He is alert and oriented to person, place, and time. Mental status is at baseline.   Psychiatric:         Mood and Affect: Mood normal.         Behavior: Behavior normal.   Right foot in boot.           Assessment:     Dale Pantoja is a 37 y.o. male with HTN, paraplegia, and neurogenic bladder who presents to clinic for follow-up for his recently diagnosed T2DM.    Plan:       Type 2 diabetes mellitus with hyperglycemia, without long-term current use of insulin    Spina bifida of lumbar region with hydrocephalus    Paraplegia    Paraparesis    Neurogenic bladder    Mobility impaired    HTN (hypertension), benign    Dyslipidemia    Essential hypertension  -     amLODIPine (NORVASC) 10 MG tablet; Take 1 tablet (10 mg total) by mouth once daily.  Dispense: 90 tablet; Refill: 2    Other orders  -     metFORMIN (GLUCOPHAGE) 500 MG tablet; Take 1 tablet (500 mg total) by mouth 3 (three) times daily with meals.  Dispense: 270 tablet; Refill: 3  -     oxybutynin (DITROPAN XL) 15 MG TR24; Take 1 tablet (15 mg total) by mouth once daily.  Dispense: 90 tablet; Refill: 3  -     valsartan (DIOVAN) 80 MG tablet; Take 1 tablet (80 mg total) by mouth once daily.  Dispense: 90 tablet; Refill: 3       Wheel chair doing ok.  90L  filled out last time.  Will refill meds-- get labs today.  Sees Mrs Tan 2/22/2022-- iof bp is still high- will increase valsartan.  See Optho in May.  Will start stain -- follow upin 4 month.  Monitor other conditions.

## 2022-02-15 LAB — HCV AB SERPL QL IA: NEGATIVE

## 2022-02-18 NOTE — PROGRESS NOTES
CHIEF COMPLAINT: Type 2 Diabetes     HPI: Mr. Dale Pantoja is a 37 y.o. male who was diagnosed with Type 2 DM in 9/17/21, new onset.  DE done in 2021   Has made a turn around with a1c in 1 month, 12.2% to 9.4%  Had some setbacks with fractures to metatarsals of (R) foot.   Not exercising in the past few weeks.  Out of medications for 4-6 weeks.  Recently got clearance to exercise from podiatry  Has special shoe, wrap.   Podiatry f/u q 3-4 months    Previous Exercise:     [id number] [employer]-- 11+ miles    5-10 miles 2-3 times a week  Basketball 1 x a week  Football 1x a week     Being seen by me for the second time.  Hesitant to add more medications  Will like a1c rechecked.  a1c went up from 8.4% to 10.8%       Not doing fingersticks    No h/o pancreatitis or thyroid ca  Lab Results   Component Value Date    HGBA1C 10.8 (H) 02/14/2022    HGBA1C 10.8 (H) 02/14/2022       PREVIOUS DIABETES MEDICATIONS TRIED  Metformin     CURRENT DIABETIC MEDS: metformin 500 mg tid     Dietary habits: varies    More salads, watching portions   Meat (baked)   Breakfast: cereal, banana  Water, cranberry juice (100%)-1-2x a week  Brown rice, wheat bread    Diabetes Management Status    Statin: Not taking  ACE/ARB: Taking    Screening or Prevention Patient's value Goal Complete/Controlled?   HgA1C Testing and Control   Lab Results   Component Value Date    HGBA1C 10.8 (H) 02/14/2022    HGBA1C 10.8 (H) 02/14/2022      Annually/Less than 8% No   Lipid profile : 02/14/2022 Annually Yes   LDL control Lab Results   Component Value Date    LDLCALC 132.8 02/14/2022    Annually/Less than 100 mg/dl  No   Nephropathy screening No results found for: LABMICR  Lab Results   Component Value Date    PROTEINUA 1+ (A) 04/23/2015    Annually No   Blood pressure BP Readings from Last 1 Encounters:   02/14/22 139/88    Less than 140/90 No   Dilated retinal exam : 05/24/2016 Annually Yes   Foot exam   : 10/22/2021 Annually No     REVIEW OF  "SYSTEMS  General: no weakness, fatigue, weight stable.   Eyes: no double or blurred vision, eye pain, or redness  Cardiovascular: no chest pain, palpitations, edema, or murmurs.   Respiratory: no cough or dyspnea.   GI/: no heartburn, nausea, or changes in bowel patterns; good appetite. +neurogenic bladder   Skin: no rashes, dryness, itching, or reactions at insulin injection sites.  Neuro: + numbness, tingling, tremors, or vertigo. +paraplegia   Endocrine: no polyuria, polydipsia, polyphagia, heat or cold intolerance.     Vital Signs  BP (!) 142/80 (BP Location: Right arm, Patient Position: Sitting, BP Method: Large (Manual))   Pulse 100   Temp 98.5 °F (36.9 °C) (Oral)   Ht 5' 3" (1.6 m)   SpO2 98%   BMI 29.21 kg/m²     Hemoglobin A1C   Date Value Ref Range Status   02/14/2022 10.8 (H) 4.0 - 5.6 % Final     Comment:     ADA Screening Guidelines:  5.7-6.4%  Consistent with prediabetes  >or=6.5%  Consistent with diabetes    High levels of fetal hemoglobin interfere with the HbA1C  assay. Heterozygous hemoglobin variants (HbS, HgC, etc)do  not significantly interfere with this assay.   However, presence of multiple variants may affect accuracy.     02/14/2022 10.8 (H) 4.0 - 5.6 % Final     Comment:     ADA Screening Guidelines:  5.7-6.4%  Consistent with prediabetes  >or=6.5%  Consistent with diabetes    High levels of fetal hemoglobin interfere with the HbA1C  assay. Heterozygous hemoglobin variants (HbS, HgC, etc)do  not significantly interfere with this assay.   However, presence of multiple variants may affect accuracy.     12/03/2021 8.4 (H) 4.0 - 5.6 % Final     Comment:     ADA Screening Guidelines:  5.7-6.4%  Consistent with prediabetes  >or=6.5%  Consistent with diabetes    High levels of fetal hemoglobin interfere with the HbA1C  assay. Heterozygous hemoglobin variants (HbS, HgC, etc)do  not significantly interfere with this assay.   However, presence of multiple variants may affect accuracy.          " Chemistry        Component Value Date/Time     (L) 02/14/2022 0909     (L) 02/14/2022 0909    K 3.4 (L) 02/14/2022 0909    K 3.4 (L) 02/14/2022 0909    CL 96 02/14/2022 0909    CL 96 02/14/2022 0909    CO2 29 02/14/2022 0909    CO2 29 02/14/2022 0909    BUN 12 02/14/2022 0909    BUN 12 02/14/2022 0909    CREATININE 0.9 02/14/2022 0909    CREATININE 0.9 02/14/2022 0909     (H) 02/14/2022 0909     (H) 02/14/2022 0909        Component Value Date/Time    CALCIUM 9.2 02/14/2022 0909    CALCIUM 9.2 02/14/2022 0909    ALKPHOS 140 (H) 02/14/2022 0909    AST 12 02/14/2022 0909    ALT 20 02/14/2022 0909    BILITOT 0.7 02/14/2022 0909    ESTGFRAFRICA >60.0 02/14/2022 0909    ESTGFRAFRICA >60.0 02/14/2022 0909    EGFRNONAA >60.0 02/14/2022 0909    EGFRNONAA >60.0 02/14/2022 0909           No results found for: TSH   Lab Results   Component Value Date    CHOL 184 02/14/2022    CHOL 178 09/17/2021    CHOL 193 05/04/2020     Lab Results   Component Value Date    HDL 35 (L) 02/14/2022    HDL 34 (L) 09/17/2021    HDL 43 05/04/2020     Lab Results   Component Value Date    LDLCALC 132.8 02/14/2022    LDLCALC 128.6 09/17/2021    LDLCALC 138.2 05/04/2020     Lab Results   Component Value Date    TRIG 81 02/14/2022    TRIG 77 09/17/2021    TRIG 59 05/04/2020     Lab Results   Component Value Date    CHOLHDL 19.0 (L) 02/14/2022    CHOLHDL 19.1 (L) 09/17/2021    CHOLHDL 22.3 05/04/2020         PHYSICAL EXAMINATION  Constitutional: Appears well, no distress. Reviewed vitals above.  Eyes: conjunctivae & lids intact; PERRLA, EOMs intact; optic discs   Neck: Supple, trachea midline.   Respiratory: No wheezes, even and unlabored  Cardiovascular: RRR;  no edema or varicosities  Lymph: deferred   Skin: warm and dry; no injection site reactions, no acanthosis nigracans observed.  Neuro:patient alert and cooperative, normal affect; steady gait.  Psychiatric: judgement & insight intact, orientation of time, place & person  intact, memory; mood & affect wnl     Diabetes Foot Exam:   Deferred       Assessment/Plan    1. Type 2 diabetes mellitus with hyperglycemia, without long-term current use of insulin  Hemoglobin A1C    Microalbumin/Creatinine Ratio, Urine    Ambulatory referral/consult to Optometry    Hemoglobin A1C   2. HTN (hypertension), benign     3. Dyslipidemia     4. Dependent on wheelchair     5. Neurogenic bladder     6. Paraplegia       1. Follow up in 4 months w/ me   Continue metformin 500 mg tid   a1c urine mac prior (1-7 days before appt)  a1c goal less than 7%   Optometry 2022   Discussed trulicity- will defer until 6 weeks w/ a1c repeat   2. Continue med(s)  Elevated  3.   Lab Results   Component Value Date    LDLCALC 132.8 02/14/2022     Above goal   4. Very active  5. Not a candidate for sglt2i   6. See above    FOLLOW UP  Follow up in about 4 months (around 6/22/2022).

## 2022-02-21 ENCOUNTER — HOSPITAL ENCOUNTER (OUTPATIENT)
Dept: RADIOLOGY | Facility: HOSPITAL | Age: 37
Discharge: HOME | End: 2022-02-21
Attending: ORTHOPAEDIC SURGERY
Payer: MEDICARE

## 2022-02-21 ENCOUNTER — OFFICE VISIT (OUTPATIENT)
Dept: ORTHOPEDICS | Facility: CLINIC | Age: 37
End: 2022-02-21
Payer: MEDICARE

## 2022-02-21 VITALS — HEIGHT: 63 IN | BODY MASS INDEX: 29.21 KG/M2 | WEIGHT: 164.88 LBS

## 2022-02-21 DIAGNOSIS — S92.901D CLOSED FRACTURE OF RIGHT FOOT WITH ROUTINE HEALING, SUBSEQUENT ENCOUNTER: Primary | ICD-10-CM

## 2022-02-21 DIAGNOSIS — S92.901D CLOSED FRACTURE OF RIGHT FOOT WITH ROUTINE HEALING, SUBSEQUENT ENCOUNTER: ICD-10-CM

## 2022-02-21 PROCEDURE — 73630 X-RAY EXAM OF FOOT: CPT | Mod: 26,RT,, | Performed by: RADIOLOGY

## 2022-02-21 PROCEDURE — 99213 OFFICE O/P EST LOW 20 MIN: CPT | Mod: PBBFAC | Performed by: ORTHOPAEDIC SURGERY

## 2022-02-21 PROCEDURE — 99999 PR PBB SHADOW E&M-EST. PATIENT-LVL III: CPT | Mod: PBBFAC,,, | Performed by: ORTHOPAEDIC SURGERY

## 2022-02-21 PROCEDURE — 99212 PR OFFICE/OUTPT VISIT, EST, LEVL II, 10-19 MIN: ICD-10-PCS | Mod: S$PBB,,, | Performed by: ORTHOPAEDIC SURGERY

## 2022-02-21 PROCEDURE — 99212 OFFICE O/P EST SF 10 MIN: CPT | Mod: S$PBB,,, | Performed by: ORTHOPAEDIC SURGERY

## 2022-02-21 PROCEDURE — 73630 X-RAY EXAM OF FOOT: CPT | Mod: TC,RT

## 2022-02-21 PROCEDURE — 99999 PR PBB SHADOW E&M-EST. PATIENT-LVL III: ICD-10-PCS | Mod: PBBFAC,,, | Performed by: ORTHOPAEDIC SURGERY

## 2022-02-21 PROCEDURE — 73630 XR FOOT COMPLETE 3 VIEW RIGHT: ICD-10-PCS | Mod: 26,RT,, | Performed by: RADIOLOGY

## 2022-02-21 NOTE — PROGRESS NOTES
Dale Pantoja  Returns today for follow-up.  This is a 37-year-old male who has paraplegia due to spina bifida and sustained multiple fractures of his right foot playing wheelchair basketball.  He comes in today for follow-up and repeat x-rays.  He reports no pain or swelling.    Examination:  The right foot reveals no significant swelling.    Imaging:  I ordered and reviewed nonweightbearing x-rays of his right foot today.  There has been no displacement of any of the fractures.  There appears to be some bony healing.  .  Impression:  Multiple fractures right foot healing    Recommendation:  I would allow him to return wheelchair basketball.  He states he has a protective footplate on his chair which should protect him.  If he has any unusual swelling or any other concerns he will call let us know.

## 2022-02-22 ENCOUNTER — OFFICE VISIT (OUTPATIENT)
Dept: INTERNAL MEDICINE | Facility: CLINIC | Age: 37
End: 2022-02-22
Payer: MEDICARE

## 2022-02-22 VITALS
HEIGHT: 63 IN | SYSTOLIC BLOOD PRESSURE: 142 MMHG | DIASTOLIC BLOOD PRESSURE: 88 MMHG | OXYGEN SATURATION: 98 % | TEMPERATURE: 99 F | HEART RATE: 100 BPM | BODY MASS INDEX: 29.21 KG/M2

## 2022-02-22 DIAGNOSIS — Z99.3 DEPENDENT ON WHEELCHAIR: ICD-10-CM

## 2022-02-22 DIAGNOSIS — I10 HTN (HYPERTENSION), BENIGN: ICD-10-CM

## 2022-02-22 DIAGNOSIS — E78.5 DYSLIPIDEMIA: ICD-10-CM

## 2022-02-22 DIAGNOSIS — G82.20 PARAPLEGIA: ICD-10-CM

## 2022-02-22 DIAGNOSIS — E11.65 TYPE 2 DIABETES MELLITUS WITH HYPERGLYCEMIA, WITHOUT LONG-TERM CURRENT USE OF INSULIN: Primary | ICD-10-CM

## 2022-02-22 DIAGNOSIS — N31.9 NEUROGENIC BLADDER: ICD-10-CM

## 2022-02-22 PROCEDURE — 99214 OFFICE O/P EST MOD 30 MIN: CPT | Mod: PBBFAC | Performed by: NURSE PRACTITIONER

## 2022-02-22 PROCEDURE — 99214 PR OFFICE/OUTPT VISIT, EST, LEVL IV, 30-39 MIN: ICD-10-PCS | Mod: S$PBB,,, | Performed by: NURSE PRACTITIONER

## 2022-02-22 PROCEDURE — 99214 OFFICE O/P EST MOD 30 MIN: CPT | Mod: S$PBB,,, | Performed by: NURSE PRACTITIONER

## 2022-02-22 PROCEDURE — 99999 PR PBB SHADOW E&M-EST. PATIENT-LVL IV: CPT | Mod: PBBFAC,,, | Performed by: NURSE PRACTITIONER

## 2022-02-22 PROCEDURE — 99999 PR PBB SHADOW E&M-EST. PATIENT-LVL IV: ICD-10-PCS | Mod: PBBFAC,,, | Performed by: NURSE PRACTITIONER

## 2022-02-22 NOTE — PATIENT INSTRUCTIONS
A1c urine mac prior (1-7 days before appt)   A1c today   Follow up in 4 months w/Iritristen     Metformin 500 mg 3 x a day     Lab Results   Component Value Date    HGBA1C 10.8 (H) 02/14/2022    HGBA1C 10.8 (H) 02/14/2022     Repeat in 6 weeks---a1c     Discussed trulicity   [email or web address]     [email or web address]  Eat fit giorgio   [email or web address]  Myfitnesspal giorgio     Goal  no higher than 180

## 2022-02-28 ENCOUNTER — EXTERNAL CHRONIC CARE MANAGEMENT (OUTPATIENT)
Dept: PRIMARY CARE CLINIC | Facility: CLINIC | Age: 37
End: 2022-02-28
Payer: MEDICARE

## 2022-02-28 PROCEDURE — 99490 CHRNC CARE MGMT STAFF 1ST 20: CPT | Mod: PBBFAC | Performed by: INTERNAL MEDICINE

## 2022-02-28 PROCEDURE — 99490 PR CHRONIC CARE MGMT, 1ST 20 MIN: ICD-10-PCS | Mod: S$PBB,,, | Performed by: INTERNAL MEDICINE

## 2022-02-28 PROCEDURE — 99490 CHRNC CARE MGMT STAFF 1ST 20: CPT | Mod: S$PBB,,, | Performed by: INTERNAL MEDICINE

## 2022-03-07 ENCOUNTER — PATIENT OUTREACH (OUTPATIENT)
Dept: ADMINISTRATIVE | Facility: HOSPITAL | Age: 37
End: 2022-03-07
Payer: MEDICARE

## 2022-03-07 NOTE — PROGRESS NOTES
[facility] CMS chart audits. Chart review completed for HM test overdue (mammograms, Colonoscopies, pap smears, DM labs, and/or EYE EXAMs)       Care Everywhere and media, updates requested and reviewed.             No Flu vaccine noted for the 2021 measurement year.

## 2022-03-10 ENCOUNTER — TELEPHONE (OUTPATIENT)
Dept: INTERNAL MEDICINE | Facility: CLINIC | Age: 37
End: 2022-03-10
Payer: MEDICARE

## 2022-03-10 NOTE — TELEPHONE ENCOUNTER
See message below:    MRN:  [id number]     Patient: Dale Pantoja     Phone: [phone]       Good Afternoon;   This pt see's Jakub Tan as PCP.  Pt. mother asked if pt. could get a BG monitor and BP machine for home use.   If needed, you may reach me via [employer] or at the number listed below.   Thank you   Faina Verduzco LPN Care Coordinator   [phone] ext [phone]

## 2022-03-16 ENCOUNTER — PATIENT MESSAGE (OUTPATIENT)
Dept: ADMINISTRATIVE | Facility: HOSPITAL | Age: 37
End: 2022-03-16
Payer: MEDICARE

## 2022-03-22 ENCOUNTER — PATIENT MESSAGE (OUTPATIENT)
Dept: INTERNAL MEDICINE | Facility: CLINIC | Age: 37
End: 2022-03-22
Payer: MEDICARE

## 2022-03-22 ENCOUNTER — LAB VISIT (OUTPATIENT)
Dept: LAB | Facility: HOSPITAL | Age: 37
End: 2022-03-22
Payer: MEDICARE

## 2022-03-22 DIAGNOSIS — E11.65 TYPE 2 DIABETES MELLITUS WITH HYPERGLYCEMIA, WITHOUT LONG-TERM CURRENT USE OF INSULIN: ICD-10-CM

## 2022-03-22 LAB
ESTIMATED AVG GLUCOSE: 321 MG/DL (ref 68–131)
HBA1C MFR BLD: 12.8 % (ref 4–5.6)

## 2022-03-22 PROCEDURE — 83036 HEMOGLOBIN GLYCOSYLATED A1C: CPT | Performed by: NURSE PRACTITIONER

## 2022-03-22 PROCEDURE — 36415 COLL VENOUS BLD VENIPUNCTURE: CPT | Performed by: NURSE PRACTITIONER

## 2022-03-31 ENCOUNTER — EXTERNAL CHRONIC CARE MANAGEMENT (OUTPATIENT)
Dept: PRIMARY CARE CLINIC | Facility: CLINIC | Age: 37
End: 2022-03-31
Payer: MEDICARE

## 2022-03-31 PROCEDURE — 99490 PR CHRONIC CARE MGMT, 1ST 20 MIN: ICD-10-PCS | Mod: S$PBB,,, | Performed by: INTERNAL MEDICINE

## 2022-03-31 PROCEDURE — 99490 CHRNC CARE MGMT STAFF 1ST 20: CPT | Mod: PBBFAC | Performed by: INTERNAL MEDICINE

## 2022-03-31 PROCEDURE — 99490 CHRNC CARE MGMT STAFF 1ST 20: CPT | Mod: S$PBB,,, | Performed by: INTERNAL MEDICINE

## 2022-04-30 ENCOUNTER — EXTERNAL CHRONIC CARE MANAGEMENT (OUTPATIENT)
Dept: PRIMARY CARE CLINIC | Facility: CLINIC | Age: 37
End: 2022-04-30
Payer: MEDICARE

## 2022-04-30 PROCEDURE — 99490 CHRNC CARE MGMT STAFF 1ST 20: CPT | Mod: PBBFAC | Performed by: INTERNAL MEDICINE

## 2022-04-30 PROCEDURE — 99490 PR CHRONIC CARE MGMT, 1ST 20 MIN: ICD-10-PCS | Mod: S$PBB,,, | Performed by: INTERNAL MEDICINE

## 2022-04-30 PROCEDURE — 99490 CHRNC CARE MGMT STAFF 1ST 20: CPT | Mod: S$PBB,,, | Performed by: INTERNAL MEDICINE

## 2022-05-24 ENCOUNTER — OFFICE VISIT (OUTPATIENT)
Dept: OPTOMETRY | Facility: CLINIC | Age: 37
End: 2022-05-24
Payer: MEDICARE

## 2022-05-24 DIAGNOSIS — E11.9 DIABETIC EYE EXAM: ICD-10-CM

## 2022-05-24 DIAGNOSIS — H52.13 MYOPIA OF BOTH EYES WITH REGULAR ASTIGMATISM: ICD-10-CM

## 2022-05-24 DIAGNOSIS — H53.10 SUBJECTIVE VISUAL DISTURBANCE: ICD-10-CM

## 2022-05-24 DIAGNOSIS — E11.65 TYPE 2 DIABETES MELLITUS WITH HYPERGLYCEMIA, WITHOUT LONG-TERM CURRENT USE OF INSULIN: ICD-10-CM

## 2022-05-24 DIAGNOSIS — Z01.00 DIABETIC EYE EXAM: ICD-10-CM

## 2022-05-24 DIAGNOSIS — H52.223 MYOPIA OF BOTH EYES WITH REGULAR ASTIGMATISM: ICD-10-CM

## 2022-05-24 DIAGNOSIS — E11.9 TYPE 2 DIABETES MELLITUS WITHOUT RETINOPATHY: Primary | ICD-10-CM

## 2022-05-24 PROCEDURE — 99213 OFFICE O/P EST LOW 20 MIN: CPT | Mod: PBBFAC | Performed by: OPTOMETRIST

## 2022-05-24 PROCEDURE — 99999 PR PBB SHADOW E&M-EST. PATIENT-LVL III: ICD-10-PCS | Mod: PBBFAC,,, | Performed by: OPTOMETRIST

## 2022-05-24 PROCEDURE — 99999 PR PBB SHADOW E&M-EST. PATIENT-LVL III: CPT | Mod: PBBFAC,,, | Performed by: OPTOMETRIST

## 2022-05-24 PROCEDURE — 92015 PR REFRACTION: ICD-10-PCS | Mod: ,,, | Performed by: OPTOMETRIST

## 2022-05-24 PROCEDURE — 92004 COMPRE OPH EXAM NEW PT 1/>: CPT | Mod: S$PBB,,, | Performed by: OPTOMETRIST

## 2022-05-24 PROCEDURE — 92015 DETERMINE REFRACTIVE STATE: CPT | Mod: ,,, | Performed by: OPTOMETRIST

## 2022-05-24 PROCEDURE — 92004 PR EYE EXAM, NEW PATIENT,COMPREHESV: ICD-10-PCS | Mod: S$PBB,,, | Performed by: OPTOMETRIST

## 2022-05-24 NOTE — PROGRESS NOTES
HPI     Pt is a 37 y.o here today for a diabetic eye exam no complaints or   concerns   Denies pain, headaches, flases, floaters   Sx hx-none   Family hx-none   Gtts-none    Last edited by Eliza Cai on 5/24/2022  1:09 PM. (History)        ROS     Positive for: Endocrine, Cardiovascular    Negative for: Constitutional, Gastrointestinal, Neurological, Skin,   Genitourinary, Musculoskeletal, HENT, Eyes, Respiratory, Psychiatric,   Allergic/Imm, Heme/Lymph    Last edited by Cece Giron, OD on 5/24/2022  1:45 PM. (History)        Assessment /Plan     For exam results, see Encounter Report.    Type 2 diabetes mellitus without retinopathy    Type 2 diabetes mellitus with hyperglycemia, without long-term current use of insulin  -     Ambulatory referral/consult to Optometry    Diabetic eye exam    Myopia of both eyes with regular astigmatism    Subjective visual disturbance      MONITOR. ED PT ON ALL EXAM FINDINGS  RX FINAL SPECS   RTC 1 YR//PRN FOR REE/DFE    TYPE 2 DM W/O RETINOPATHY OU; CONTINUE WITH PCP FOR GLYCEMIC CONTROL

## 2022-05-31 ENCOUNTER — EXTERNAL CHRONIC CARE MANAGEMENT (OUTPATIENT)
Dept: PRIMARY CARE CLINIC | Facility: CLINIC | Age: 37
End: 2022-05-31
Payer: MEDICARE

## 2022-05-31 PROCEDURE — 99490 CHRNC CARE MGMT STAFF 1ST 20: CPT | Mod: S$PBB,,, | Performed by: INTERNAL MEDICINE

## 2022-05-31 PROCEDURE — 99490 CHRNC CARE MGMT STAFF 1ST 20: CPT | Mod: PBBFAC | Performed by: INTERNAL MEDICINE

## 2022-05-31 PROCEDURE — 99490 PR CHRONIC CARE MGMT, 1ST 20 MIN: ICD-10-PCS | Mod: S$PBB,,, | Performed by: INTERNAL MEDICINE

## 2022-06-21 ENCOUNTER — PES CALL (OUTPATIENT)
Dept: ADMINISTRATIVE | Facility: CLINIC | Age: 37
End: 2022-06-21
Payer: MEDICARE

## 2022-06-30 ENCOUNTER — PATIENT OUTREACH (OUTPATIENT)
Dept: ADMINISTRATIVE | Facility: HOSPITAL | Age: 37
End: 2022-06-30
Payer: MEDICARE

## 2022-06-30 NOTE — PROGRESS NOTES
Health Maintenance Due   Topic Date Due    Diabetes Urine Screening  Never done    Pneumococcal Vaccines (Age 0-64) (1 - PCV) Never done    COVID-19 Vaccine (3 - Booster for Pfizer series) 09/12/2021    Hemoglobin A1c  06/22/2022     Triggered LINKS; query failed. Updated Care Everywhere. Checked for outside lab results in [facility]; no results found. Chart review completed.

## 2022-07-12 ENCOUNTER — PATIENT MESSAGE (OUTPATIENT)
Dept: ADMINISTRATIVE | Facility: HOSPITAL | Age: 37
End: 2022-07-12
Payer: MEDICARE

## 2022-07-24 ENCOUNTER — HOSPITAL ENCOUNTER (INPATIENT)
Facility: HOSPITAL | Age: 37
LOS: 5 days | Discharge: HOME HEALTH | DRG: 853 | End: 2022-07-29
Attending: EMERGENCY MEDICINE | Admitting: STUDENT IN AN ORGANIZED HEALTH CARE EDUCATION/TRAINING PROGRAM
Payer: MEDICARE

## 2022-07-24 DIAGNOSIS — T14.8XXA WOUND INFECTION: Primary | ICD-10-CM

## 2022-07-24 DIAGNOSIS — Q05.2 SPINA BIFIDA OF LUMBAR REGION WITH HYDROCEPHALUS: ICD-10-CM

## 2022-07-24 DIAGNOSIS — L08.9 WOUND INFECTION: Primary | ICD-10-CM

## 2022-07-24 DIAGNOSIS — L89.90 DECUBITUS ULCER: ICD-10-CM

## 2022-07-24 DIAGNOSIS — L89.94 PRESSURE INJURY, STAGE 4, WITH INFECTION: ICD-10-CM

## 2022-07-24 DIAGNOSIS — R07.9 CHEST PAIN: ICD-10-CM

## 2022-07-24 DIAGNOSIS — L08.9 PRESSURE INJURY, STAGE 4, WITH INFECTION: ICD-10-CM

## 2022-07-24 PROBLEM — E87.6 HYPOKALEMIA: Status: ACTIVE | Noted: 2022-07-24

## 2022-07-24 LAB
ALBUMIN SERPL-MCNC: 3.2 G/DL (ref 3.3–5.5)
ALP SERPL-CCNC: 110 U/L (ref 42–141)
BILIRUB SERPL-MCNC: 0.6 MG/DL (ref 0.2–1.6)
BILIRUBIN, POC UA: NEGATIVE
BLOOD, POC UA: ABNORMAL
BUN SERPL-MCNC: 13 MG/DL (ref 7–22)
CALCIUM SERPL-MCNC: 9.9 MG/DL (ref 8–10.3)
CHLORIDE SERPL-SCNC: 94 MMOL/L (ref 98–108)
CK SERPL-CCNC: 57 U/L (ref 20–200)
CLARITY, POC UA: CLEAR
COLOR, POC UA: YELLOW
CREAT SERPL-MCNC: 0.7 MG/DL (ref 0.6–1.2)
CRP SERPL-MCNC: 144.6 MG/L (ref 0–8.2)
CTP QC/QA: YES
GLUCOSE SERPL-MCNC: 86 MG/DL (ref 73–118)
GLUCOSE, POC UA: NEGATIVE
KETONES, POC UA: ABNORMAL
LEUKOCYTE EST, POC UA: ABNORMAL
NITRITE, POC UA: NEGATIVE
PH UR STRIP: 5.5 [PH]
POC ALT (SGPT): 18 U/L (ref 10–47)
POC AST (SGOT): 20 U/L (ref 11–38)
POC TCO2: 34 MMOL/L (ref 18–33)
POCT GLUCOSE: 168 MG/DL (ref 70–110)
POCT GLUCOSE: 82 MG/DL (ref 70–110)
POTASSIUM BLD-SCNC: 3.3 MMOL/L (ref 3.6–5.1)
PROTEIN, POC UA: ABNORMAL
PROTEIN, POC: 9.1 G/DL (ref 6.4–8.1)
SARS-COV-2 RDRP RESP QL NAA+PROBE: NEGATIVE
SODIUM BLD-SCNC: 142 MMOL/L (ref 128–145)
SPECIFIC GRAVITY, POC UA: 1.02
UROBILINOGEN, POC UA: 2 E.U./DL

## 2022-07-24 PROCEDURE — 99285 EMERGENCY DEPT VISIT HI MDM: CPT | Mod: 25,ER

## 2022-07-24 PROCEDURE — U0002 COVID-19 LAB TEST NON-CDC: HCPCS | Mod: ER | Performed by: EMERGENCY MEDICINE

## 2022-07-24 PROCEDURE — 96361 HYDRATE IV INFUSION ADD-ON: CPT | Mod: ER

## 2022-07-24 PROCEDURE — 25500020 PHARM REV CODE 255: Mod: ER | Performed by: EMERGENCY MEDICINE

## 2022-07-24 PROCEDURE — 21400001 HC TELEMETRY ROOM

## 2022-07-24 PROCEDURE — 82962 GLUCOSE BLOOD TEST: CPT | Mod: ER

## 2022-07-24 PROCEDURE — 36415 COLL VENOUS BLD VENIPUNCTURE: CPT | Performed by: INTERNAL MEDICINE

## 2022-07-24 PROCEDURE — 85652 RBC SED RATE AUTOMATED: CPT | Performed by: INTERNAL MEDICINE

## 2022-07-24 PROCEDURE — 85025 COMPLETE CBC W/AUTO DIFF WBC: CPT | Mod: ER

## 2022-07-24 PROCEDURE — 85730 THROMBOPLASTIN TIME PARTIAL: CPT | Performed by: INTERNAL MEDICINE

## 2022-07-24 PROCEDURE — 63600175 PHARM REV CODE 636 W HCPCS: Mod: ER | Performed by: EMERGENCY MEDICINE

## 2022-07-24 PROCEDURE — 63600175 PHARM REV CODE 636 W HCPCS: Mod: ER | Performed by: INTERNAL MEDICINE

## 2022-07-24 PROCEDURE — 86140 C-REACTIVE PROTEIN: CPT | Performed by: INTERNAL MEDICINE

## 2022-07-24 PROCEDURE — 83735 ASSAY OF MAGNESIUM: CPT | Performed by: INTERNAL MEDICINE

## 2022-07-24 PROCEDURE — 83036 HEMOGLOBIN GLYCOSYLATED A1C: CPT | Performed by: INTERNAL MEDICINE

## 2022-07-24 PROCEDURE — 87186 SC STD MICRODIL/AGAR DIL: CPT | Performed by: EMERGENCY MEDICINE

## 2022-07-24 PROCEDURE — 87088 URINE BACTERIA CULTURE: CPT | Performed by: EMERGENCY MEDICINE

## 2022-07-24 PROCEDURE — 87086 URINE CULTURE/COLONY COUNT: CPT | Performed by: EMERGENCY MEDICINE

## 2022-07-24 PROCEDURE — 85610 PROTHROMBIN TIME: CPT | Performed by: INTERNAL MEDICINE

## 2022-07-24 PROCEDURE — 87077 CULTURE AEROBIC IDENTIFY: CPT | Mod: 59 | Performed by: EMERGENCY MEDICINE

## 2022-07-24 PROCEDURE — 80053 COMPREHEN METABOLIC PANEL: CPT | Mod: ER

## 2022-07-24 PROCEDURE — 84145 PROCALCITONIN (PCT): CPT | Performed by: INTERNAL MEDICINE

## 2022-07-24 PROCEDURE — 87070 CULTURE OTHR SPECIMN AEROBIC: CPT | Performed by: EMERGENCY MEDICINE

## 2022-07-24 PROCEDURE — 87040 BLOOD CULTURE FOR BACTERIA: CPT | Performed by: EMERGENCY MEDICINE

## 2022-07-24 PROCEDURE — 82550 ASSAY OF CK (CPK): CPT | Performed by: INTERNAL MEDICINE

## 2022-07-24 PROCEDURE — 96360 HYDRATION IV INFUSION INIT: CPT | Mod: ER

## 2022-07-24 RX ORDER — SODIUM CHLORIDE 0.9 % (FLUSH) 0.9 %
10 SYRINGE (ML) INJECTION EVERY 12 HOURS PRN
Status: DISCONTINUED | OUTPATIENT
Start: 2022-07-24 | End: 2022-07-29 | Stop reason: HOSPADM

## 2022-07-24 RX ORDER — MAG HYDROX/ALUMINUM HYD/SIMETH 200-200-20
30 SUSPENSION, ORAL (FINAL DOSE FORM) ORAL 4 TIMES DAILY PRN
Status: DISCONTINUED | OUTPATIENT
Start: 2022-07-24 | End: 2022-07-29 | Stop reason: HOSPADM

## 2022-07-24 RX ORDER — ATORVASTATIN CALCIUM 40 MG/1
40 TABLET, FILM COATED ORAL DAILY
Status: DISCONTINUED | OUTPATIENT
Start: 2022-07-25 | End: 2022-07-29 | Stop reason: HOSPADM

## 2022-07-24 RX ORDER — SODIUM CHLORIDE 9 MG/ML
1000 INJECTION, SOLUTION INTRAVENOUS
Status: COMPLETED | OUTPATIENT
Start: 2022-07-24 | End: 2022-07-24

## 2022-07-24 RX ORDER — OXYCODONE HYDROCHLORIDE 5 MG/1
5 TABLET ORAL EVERY 6 HOURS PRN
Status: DISCONTINUED | OUTPATIENT
Start: 2022-07-24 | End: 2022-07-29 | Stop reason: HOSPADM

## 2022-07-24 RX ORDER — ACETAMINOPHEN 325 MG/1
650 TABLET ORAL EVERY 4 HOURS PRN
Status: DISCONTINUED | OUTPATIENT
Start: 2022-07-24 | End: 2022-07-29 | Stop reason: HOSPADM

## 2022-07-24 RX ORDER — GLUCAGON 1 MG
1 KIT INJECTION
Status: DISCONTINUED | OUTPATIENT
Start: 2022-07-24 | End: 2022-07-29 | Stop reason: HOSPADM

## 2022-07-24 RX ORDER — VANCOMYCIN HYDROCHLORIDE 1.25 G/25ML
1.5 INJECTION, POWDER, LYOPHILIZED, FOR SOLUTION INTRAVENOUS ONCE
Status: COMPLETED | OUTPATIENT
Start: 2022-07-24 | End: 2022-07-24

## 2022-07-24 RX ORDER — ENOXAPARIN SODIUM 100 MG/ML
40 INJECTION SUBCUTANEOUS EVERY 24 HOURS
Status: DISCONTINUED | OUTPATIENT
Start: 2022-07-25 | End: 2022-07-29 | Stop reason: HOSPADM

## 2022-07-24 RX ORDER — POLYETHYLENE GLYCOL 3350 17 G/17G
17 POWDER, FOR SOLUTION ORAL DAILY
Status: DISCONTINUED | OUTPATIENT
Start: 2022-07-25 | End: 2022-07-29 | Stop reason: HOSPADM

## 2022-07-24 RX ORDER — TALC
6 POWDER (GRAM) TOPICAL NIGHTLY PRN
Status: DISCONTINUED | OUTPATIENT
Start: 2022-07-24 | End: 2022-07-29 | Stop reason: HOSPADM

## 2022-07-24 RX ORDER — AMLODIPINE BESYLATE 5 MG/1
10 TABLET ORAL DAILY
Status: DISCONTINUED | OUTPATIENT
Start: 2022-07-25 | End: 2022-07-29 | Stop reason: HOSPADM

## 2022-07-24 RX ORDER — NALOXONE HCL 0.4 MG/ML
0.02 VIAL (ML) INJECTION
Status: DISCONTINUED | OUTPATIENT
Start: 2022-07-24 | End: 2022-07-29 | Stop reason: HOSPADM

## 2022-07-24 RX ORDER — SODIUM CHLORIDE AND POTASSIUM CHLORIDE 150; 900 MG/100ML; MG/100ML
INJECTION, SOLUTION INTRAVENOUS CONTINUOUS
Status: DISCONTINUED | OUTPATIENT
Start: 2022-07-25 | End: 2022-07-29 | Stop reason: HOSPADM

## 2022-07-24 RX ORDER — ONDANSETRON 2 MG/ML
4 INJECTION INTRAMUSCULAR; INTRAVENOUS EVERY 8 HOURS PRN
Status: DISCONTINUED | OUTPATIENT
Start: 2022-07-24 | End: 2022-07-29 | Stop reason: HOSPADM

## 2022-07-24 RX ORDER — VANCOMYCIN HYDROCHLORIDE 1.25 G/25ML
1250 INJECTION, POWDER, LYOPHILIZED, FOR SOLUTION INTRAVENOUS ONCE
Status: DISCONTINUED | OUTPATIENT
Start: 2022-07-24 | End: 2022-07-24

## 2022-07-24 RX ORDER — OXYBUTYNIN CHLORIDE 5 MG/1
15 TABLET, EXTENDED RELEASE ORAL DAILY
Status: DISCONTINUED | OUTPATIENT
Start: 2022-07-25 | End: 2022-07-29 | Stop reason: HOSPADM

## 2022-07-24 RX ORDER — CEFEPIME HYDROCHLORIDE 1 G/50ML
1 INJECTION, SOLUTION INTRAVENOUS
Status: DISCONTINUED | OUTPATIENT
Start: 2022-07-25 | End: 2022-07-28

## 2022-07-24 RX ORDER — AMOXICILLIN 250 MG
1 CAPSULE ORAL 2 TIMES DAILY PRN
Status: DISCONTINUED | OUTPATIENT
Start: 2022-07-24 | End: 2022-07-29 | Stop reason: HOSPADM

## 2022-07-24 RX ORDER — VALSARTAN 80 MG/1
80 TABLET ORAL DAILY
Status: DISCONTINUED | OUTPATIENT
Start: 2022-07-25 | End: 2022-07-29 | Stop reason: HOSPADM

## 2022-07-24 RX ORDER — IPRATROPIUM BROMIDE AND ALBUTEROL SULFATE 2.5; .5 MG/3ML; MG/3ML
3 SOLUTION RESPIRATORY (INHALATION) EVERY 4 HOURS PRN
Status: DISCONTINUED | OUTPATIENT
Start: 2022-07-24 | End: 2022-07-29 | Stop reason: HOSPADM

## 2022-07-24 RX ORDER — SIMETHICONE 80 MG
1 TABLET,CHEWABLE ORAL 4 TIMES DAILY PRN
Status: DISCONTINUED | OUTPATIENT
Start: 2022-07-24 | End: 2022-07-29 | Stop reason: HOSPADM

## 2022-07-24 RX ORDER — PROCHLORPERAZINE EDISYLATE 5 MG/ML
5 INJECTION INTRAMUSCULAR; INTRAVENOUS EVERY 6 HOURS PRN
Status: DISCONTINUED | OUTPATIENT
Start: 2022-07-24 | End: 2022-07-29 | Stop reason: HOSPADM

## 2022-07-24 RX ORDER — MORPHINE SULFATE 4 MG/ML
4 INJECTION, SOLUTION INTRAMUSCULAR; INTRAVENOUS EVERY 4 HOURS PRN
Status: DISCONTINUED | OUTPATIENT
Start: 2022-07-24 | End: 2022-07-29 | Stop reason: HOSPADM

## 2022-07-24 RX ORDER — INSULIN ASPART 100 [IU]/ML
0-5 INJECTION, SOLUTION INTRAVENOUS; SUBCUTANEOUS EVERY 6 HOURS PRN
Status: DISCONTINUED | OUTPATIENT
Start: 2022-07-24 | End: 2022-07-29 | Stop reason: HOSPADM

## 2022-07-24 RX ADMIN — IOHEXOL 100 ML: 350 INJECTION, SOLUTION INTRAVENOUS at 04:07

## 2022-07-24 RX ADMIN — VANCOMYCIN HYDROCHLORIDE 1500 MG: 1.5 INJECTION, POWDER, LYOPHILIZED, FOR SOLUTION INTRAVENOUS at 07:07

## 2022-07-24 RX ADMIN — POTASSIUM BICARBONATE 20 MEQ: 391 TABLET, EFFERVESCENT ORAL at 11:07

## 2022-07-24 RX ADMIN — SODIUM CHLORIDE 1000 ML: 9 INJECTION, SOLUTION INTRAVENOUS at 06:07

## 2022-07-24 RX ADMIN — CEFEPIME 1 G: 1 INJECTION, POWDER, FOR SOLUTION INTRAMUSCULAR; INTRAVENOUS at 11:07

## 2022-07-24 RX ADMIN — CEFTRIAXONE 2 G: 2 INJECTION, SOLUTION INTRAVENOUS at 05:07

## 2022-07-24 RX ADMIN — SODIUM CHLORIDE AND POTASSIUM CHLORIDE: 9; 1.49 INJECTION, SOLUTION INTRAVENOUS at 11:07

## 2022-07-24 NOTE — ED PROVIDER NOTES
Encounter Date: 7/24/2022       History     Chief Complaint   Patient presents with    General Illness     Pt reports having a decubitus, he has not had one before and has not had to go to wound care. Pt is wheelchair bound.      38yo male with history of diabetes, spina bifida, paraplegia presents to emergency department for evaluation of decubitus ulcer to the right buttocks.  Also was found this morning when patient noticed some drainage when he removed his diaper.  He denies fever, myalgias, gluteal pain.  No treatments attempted prior to arrival.  No exacerbating or  relieving factors identified.        Review of patient's allergies indicates:   Allergen Reactions    Latex, natural rubber      Past Medical History:   Diagnosis Date    Overactive bladder     Spina bifida     Type 2 diabetes mellitus, without long-term current use of insulin 10/21/2021    Urinary tract infection      Past Surgical History:   Procedure Laterality Date    SPINE SURGERY      VENTRICULOPERITONEAL SHUNT       Family History   Problem Relation Age of Onset    Spina bifida Brother     Thyroid disease Neg Hx     Hypertension Neg Hx     Glaucoma Neg Hx     Prostate cancer Neg Hx     Kidney disease Neg Hx      Social History     Tobacco Use    Smoking status: Never Smoker    Smokeless tobacco: Never Used   Substance Use Topics    Alcohol use: No    Drug use: No     Review of Systems   Constitutional: Negative for diaphoresis and fever.   HENT: Negative for sore throat.    Eyes: Negative for pain.   Respiratory: Negative for shortness of breath.    Cardiovascular: Negative for chest pain.   Gastrointestinal: Negative for abdominal pain and nausea.   Genitourinary: Negative for dysuria.   Musculoskeletal: Negative for back pain.   Skin: Positive for wound (Right gluteal ulcer). Negative for rash.   Neurological: Negative for weakness.       Physical Exam     Initial Vitals [07/24/22 1117]   BP Pulse Resp Temp SpO2   128/78  100 20 98.7 °F (37.1 °C) 98 %      MAP       --         Physical Exam    Nursing note and vitals reviewed.  Constitutional: He is not diaphoretic. No distress.   HENT:   Head: Normocephalic and atraumatic.   Mouth/Throat: Oropharynx is clear and moist.   Eyes: Conjunctivae and EOM are normal. No scleral icterus.   Neck: Neck supple. No tracheal deviation present.   Normal range of motion.  Cardiovascular: Normal rate, regular rhythm and intact distal pulses.   Pulmonary/Chest: Breath sounds normal. No stridor. No respiratory distress.   Abdominal: Abdomen is soft. He exhibits no distension. There is no abdominal tenderness.   Musculoskeletal:         General: No tenderness or edema.      Cervical back: Normal range of motion and neck supple.      Comments: + right gluteal decubitus ulcer with foul-smelling drainage  No crepitation     Neurological: He is alert. No cranial nerve deficit or sensory deficit. GCS score is 15. GCS eye subscore is 4. GCS verbal subscore is 5. GCS motor subscore is 6.   Paraplegia   Skin: Skin is warm and dry.   Psychiatric: He has a normal mood and affect.                 ED Course   Procedures  Labs Reviewed   POCT CMP - Abnormal; Notable for the following components:       Result Value    POC Chloride 94 (*)     POC Potassium 3.3 (*)     POC TCO2 34 (*)     Protein, POC 9.1 (*)     All other components within normal limits   CULTURE, AEROBIC  (SPECIFY SOURCE)   CULTURE, BLOOD   CULTURE, BLOOD   POCT CBC   SARS-COV-2 RDRP GENE    Narrative:     This test utilizes isothermal nucleic acid amplification   technology to detect the SARS-CoV-2 RdRp nucleic acid segment.   The analytical sensitivity (limit of detection) is 125 genome   equivalents/mL.   A POSITIVE result implies infection with the SARS-CoV-2 virus;   the patient is presumed to be contagious.     A NEGATIVE result means that SARS-CoV-2 nucleic acids are not   present above the limit of detection. A NEGATIVE result should be    treated as presumptive. It does not rule out the possibility of   COVID-19 and should not be the sole basis for treatment decisions.   If COVID-19 is strongly suspected based on clinical and exposure   history, re-testing using an alternate molecular assay should be   considered.   This test is only for use under the Food and Drug   Administration s Emergency Use Authorization (EUA).   Commercial kits are provided by [facility] [employer] [facility].   Performance characteristics of the EUA have been independently   verified by [facility] Department of   Pathology and Laboratory Medicine.   _________________________________________________________________   The authorized Fact Sheet for Healthcare Providers and the authorized Fact   Sheet for Patients of the ID NOW COVID-19 are available on the FDA   website:     https://www.fda.gov/media/521821/download  https://www.fda.gov/media/092113/download          POCT GLUCOSE, HAND-HELD DEVICE   POCT CMP   POCT GLUCOSE          Imaging Results          CT Pelvis With Contrast (Final result)  Result time 07/24/22 16:45:41    Final result by Leo Munson MD (07/24/22 16:45:41)                 Impression:      Decubitus ulcer overlying the right ischial tuberosity.  3.9 cm ill-defined collection of fluid and air in the adjacent soft tissues, concerning for phlegmon or early abscess.  Mild cortical irregularity of the right ischial tuberosity, equivocal for acute osteomyelitis.    Right inguinal and external iliac lymphadenopathy, likely reactive.    Small focus of air within the bladder.  Correlate with history of instrumentation.    Other chronic changes as described.      Electronically signed by: Leo Munson  Date:    07/24/2022  Time:    16:45             Narrative:    EXAMINATION:  CT PELVIS WITH  CONTRAST    CLINICAL HISTORY:  Osteomyelitis suspected, pelvis, xray done;    TECHNIQUE:  CT of the pelvis, without intravenous contrast.    COMPARISON:  CT abdomen pelvis  06/30/2015; pelvic radiographs 07/24/2022    FINDINGS:  Discontinuity of the posterior arch at L5 and in the upper sacrum, which could be congenital or postoperative.  Bilateral L5 pars defects.  Angular deformity at the sacrococcygeal junction.    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  There is subchondral sclerosis, subchondral cystic change, and marginal osteophytes at the pseudoarticulations, compatible with degenerative changes.    No acute fractures.  Mild cortical irregularity of the right ischial tuberosity compared to the left.  No regional osteopenia or periostitis.    Anterosuperior bladder diverticulum, containing a small focus of air.  Right inguinal and external iliac lymphadenopathy measuring up to 1.6 cm.  Partially imaged postoperative changes in the right abdominal wall.    There is a 2.9 cm decubitus ulcer overlying the right ischial tuberosity, with adjacent skin thickening and subcutaneous edema.  There is a 3.9 x 2.5 cm ill-defined collection of fluid and air in the adjacent soft tissues (2:100).  Diffuse muscle atrophy.  No bursal collection.                               X-Ray Pelvis Routine AP (Final result)  Result time 07/24/22 14:49:41    Final result by Duane Sandoval MD (07/24/22 14:49:41)                 Impression:      1. Lucency involving the left aspect of the symphysis, correlation with any focal tenderness recommended, further evaluation recommended.  2. Chronic deformity of the acetabulae and femoral heads/necks, noting progressive femoroacetabular degenerative changes.      Electronically signed by: Duane Sandoval MD  Date:    07/24/2022  Time:    14:49             Narrative:    EXAMINATION:  XR PELVIS ROUTINE AP    CLINICAL HISTORY:  Pressure ulcer of unspecified site, unspecified stage    TECHNIQUE:  AP view of the pelvis was performed.    COMPARISON:  Abdominal radiograph 05/04/2015    FINDINGS:  Single-view  pelvis.    The bilateral sacroiliac joints are intact.  The pubic symphysis is intact.  Chronic deformity noted of the left and right acetabula noting progressive degenerative changes of the femoroacetabular joints.  There is chronic deformity of the bilateral femoral heads and neck.  The pubic symphysis is intact.  There is cortical irregularity involving the left aspect of the symphysis extending to the obturator ring.  There is some degree of cortication, may reflect remote injury although not confirmed.                                 Medications   vancomycin - pharmacy to dose (has no administration in time range)   cefTRIAXone (ROCEPHIN) 2 g/50 mL D5W IVPB (2 g Intravenous New Bag 7/24/22 8756)   vancomycin SolR 1,250 mg (has no administration in time range)   sodium chloride 0.9% bolus 1,000 mL (1,000 mLs Intravenous Bolus from Bag 7/24/22 4287)   iohexoL (OMNIPAQUE 350) injection 100 mL (100 mLs Intravenous Given 7/24/22 1616)     Medical Decision Making:   History:   Old Medical Records: I decided to obtain old medical records.  Differential Diagnosis:   Includes not limited to:  She was ulcer, wound infection, cellulitis, abscess, osteomyelitis  Clinical Tests:   Lab Tests: Ordered and Reviewed  Radiological Study: Ordered and Reviewed  ED Management:  Not acceptable ranges.  CT scan with findings concerning for osteomyelitis as well as developing phlegmon or abscess.  Wound culture and blood culture sent.  Patient started on IV antibiotics.  Patient admitted to Hospital Medicine for further management.  This chart was completed using dictation software, as a result there may be some transcription errors.                       Clinical Impression:   Final diagnoses:  [L89.90] Decubitus ulcer  [T14.8XXA, L08.9] Wound infection (Primary)          ED Disposition Condition    Admit               Eduardo Espinoza MD  07/24/22 2434

## 2022-07-24 NOTE — ED TRIAGE NOTES
Dale Pantoja, a 37 y.o. male presents to the ED via PV with CC of wound to the R buttocks that he noticed about 3 days ago. Pt is parapalegic from the waist down and can not feel it. Pt states ulcer is deep and oozing green fluid that is foul smelling. Pt has a pmhx of spinal bifida and  shunt that mom states has not been working for a while. Pt denies fever, n/v/d

## 2022-07-24 NOTE — SUBJECTIVE & OBJECTIVE
Past Medical History:   Diagnosis Date    Overactive bladder     Spina bifida     Type 2 diabetes mellitus, without long-term current use of insulin 10/21/2021    Urinary tract infection        Past Surgical History:   Procedure Laterality Date    SPINE SURGERY      VENTRICULOPERITONEAL SHUNT         Review of patient's allergies indicates:   Allergen Reactions    Latex, natural rubber        No current facility-administered medications on file prior to encounter.     Current Outpatient Medications on File Prior to Encounter   Medication Sig    amLODIPine (NORVASC) 10 MG tablet Take 1 tablet (10 mg total) by mouth once daily.    atorvastatin (LIPITOR) 40 MG tablet Take 1 tablet (40 mg total) by mouth once daily.    metFORMIN (GLUCOPHAGE) 500 MG tablet Take 1 tablet (500 mg total) by mouth 3 (three) times daily with meals.    oxybutynin (DITROPAN XL) 15 MG TR24 Take 1 tablet (15 mg total) by mouth once daily.    polyethylene glycol (GLYCOLAX) 17 gram PwPk Take 17 g by mouth once daily.    valsartan (DIOVAN) 80 MG tablet Take 1 tablet (80 mg total) by mouth once daily.     Family History       Problem Relation (Age of Onset)    Spina bifida Brother          Tobacco Use    Smoking status: Never Smoker    Smokeless tobacco: Never Used   Substance and Sexual Activity    Alcohol use: No    Drug use: No    Sexual activity: Never     Review of Systems   Constitutional:  Positive for appetite change. Negative for chills and fever.   HENT:  Negative for congestion.    Eyes:  Negative for photophobia.   Respiratory:  Negative for chest tightness and shortness of breath.    Cardiovascular:  Negative for chest pain.   Gastrointestinal:  Positive for constipation. Negative for nausea and vomiting.   Endocrine: Negative for heat intolerance.   Genitourinary:  Positive for difficulty urinating.   Musculoskeletal:  Negative for myalgias.   Skin:  Positive for color change and wound.   Allergic/Immunologic: Negative for  immunocompromised state.   Neurological:  Negative for headaches.   Hematological:  Does not bruise/bleed easily.   Psychiatric/Behavioral:  Negative for dysphoric mood.    All other systems reviewed and are negative.  Objective:     Vital Signs (Most Recent):  Temp: 98.7 °F (37.1 °C) (07/24/22 1117)  Pulse: 107 (07/24/22 1813)  Resp: 20 (07/24/22 1117)  BP: 132/66 (07/24/22 1813)  SpO2: 98 % (07/24/22 1813) Vital Signs (24h Range):  Temp:  [98.7 °F (37.1 °C)] 98.7 °F (37.1 °C)  Pulse:  [100-108] 107  Resp:  [20] 20  SpO2:  [98 %-100 %] 98 %  BP: (128-143)/(66-83) 132/66     Weight: 72.6 kg (160 lb)  Body mass index is 28.34 kg/m².    Physical Exam  Constitutional:       General: He is not in acute distress.     Appearance: Normal appearance. He is not ill-appearing, toxic-appearing or diaphoretic.   HENT:      Head: Normocephalic and atraumatic.      Comments:  shunt  Eyes:      Conjunctiva/sclera:      Right eye: Right conjunctiva is not injected.      Left eye: Left conjunctiva is not injected.   Neck:      Thyroid: No thyromegaly.   Cardiovascular:      Rate and Rhythm: Regular rhythm. Tachycardia present.      Heart sounds: Murmur heard.   Systolic murmur is present with a grade of 2/6.   Pulmonary:      Effort: Pulmonary effort is normal.      Breath sounds: No decreased breath sounds, wheezing, rhonchi or rales.   Chest:      Chest wall: No swelling or tenderness.   Abdominal:      General: Abdomen is protuberant. Bowel sounds are normal. There is no distension.      Palpations: Abdomen is soft.      Tenderness: There is no abdominal tenderness.   Genitourinary:     Comments: No longo in place (he self-caths)  Musculoskeletal:      Cervical back: Neck supple.   Lymphadenopathy:      Comments: No peripheral edema   Skin:     General: Skin is warm and dry.   Neurological:      Mental Status: He is alert and oriented to person, place, and time.      GCS: GCS eye subscore is 4. GCS verbal subscore is 5. GCS  motor subscore is 6.      Comments: Bilateral leg weakness 2/5 strength with decreased sensation.  Muscle atrophy to legs.   Psychiatric:         Attention and Perception: Attention and perception normal.         Mood and Affect: Mood and affect normal.         Behavior: Behavior is cooperative.         Cognition and Memory: Cognition and memory normal.                 Significant Labs: All pertinent labs within the past 24 hours have been reviewed.  Recent Results (from the past 24 hour(s))   POCT glucose    Collection Time: 07/24/22  2:21 PM   Result Value Ref Range    POCT Glucose 82 70 - 110 mg/dL   POCT CMP    Collection Time: 07/24/22  2:52 PM   Result Value Ref Range    Albumin, POC 3.2 3.3 - 5.5 g/dL    Alkaline Phosphatase,  42 - 141 U/L    ALT (SGPT), POC 18 10 - 47 U/L    AST (SGOT), POC 20 11 - 38 U/L    POC BUN 13 7 - 22 mg/dL    Calcium, POC 9.9 8.0 - 10.3 mg/dL    POC Chloride 94 (L) 98 - 108 mmol/L    POC Creatinine 0.7 0.6 - 1.2 mg/dL    POC Glucose 86 73 - 118 mg/dL    POC Potassium 3.3 (L) 3.6 - 5.1 mmol/L    POC Sodium 142 128 - 145 mmol/L    Bilirubin, POC 0.6 0.2 - 1.6 mg/dL    POC TCO2 34 (H) 18 - 33 mmol/L    Protein, POC 9.1 (H) 6.4 - 8.1 g/dL   POCT COVID-19 Rapid Screening    Collection Time: 07/24/22  3:13 PM   Result Value Ref Range    POC Rapid COVID Negative Negative     Acceptable Yes          Significant Imaging: I have reviewed all pertinent imaging results/findings within the past 24 hours.

## 2022-07-24 NOTE — ED NOTES
Report given to CHUY Velez. Awaiting [facility] transport, ETA [phone]

## 2022-07-24 NOTE — HPI
"Mr. Pantoja is a 38yo man with spina bifida, DM2, overactive bladder, and recurrent UTI's.   He self-catheterizes PRN at home due to urinary retention.    He states he now comes to the ED due to a worsening right ischial decubitus ulcer.  He had been keeping its development to himself, hoping it would resolved.  Unfortunately it has deepened, and over the last two days he developed a purulent, foul-smelling drainage.  He denies myalgias, fever, or chills.  He cannot feel the area, so he has no pain.  He has no hot sweats or feelings of systemic infection per him.  He has no N/V/D.    In the ED his VS's were /66   Pulse 107   Temp 98.7 °F (37.1 °C) (Oral)   Resp 20   Ht 5' 3" (1.6 m)   Wt 72.6 kg (160 lb)   SpO2 98% RA  BMI 28.34 kg/m².  Labs showed COVID NEGATIVE, K 3.3, Cr 0.7, otherwise normal.  CBC showed WBC 7.7, Hg 12.8, .    Xray of the pelvis showed a lucency involving the left aspect of the symphysis and a chronic deformity of the acetabulae and femoral heads/necks, noting progressive femoroacetabular degenerative changes.  CT pelvis showed a decubitus ulcer overlying the right ischial tuberosity with a 3.9 cm ill-defined collection of fluid and air in the adjacent soft tissues, concerning for phlegmon or early abscess.  There was mild cortical irregularity of the right ischial tuberosity, equivocal for acute osteomyelitis.  There was right inguinal and external iliac lymphadenopathy, likely reactive.  There was a small focus of air within the bladder.        "

## 2022-07-25 LAB
ALBUMIN SERPL BCP-MCNC: 2.4 G/DL (ref 3.5–5.2)
ALP SERPL-CCNC: 84 U/L (ref 55–135)
ALT SERPL W/O P-5'-P-CCNC: 10 U/L (ref 10–44)
ANION GAP SERPL CALC-SCNC: 10 MMOL/L (ref 8–16)
APTT BLDCRRT: 26.8 SEC (ref 21–32)
AST SERPL-CCNC: 12 U/L (ref 10–40)
BASOPHILS # BLD AUTO: 0.04 K/UL (ref 0–0.2)
BASOPHILS NFR BLD: 0.6 % (ref 0–1.9)
BILIRUB SERPL-MCNC: 0.4 MG/DL (ref 0.1–1)
BUN SERPL-MCNC: 9 MG/DL (ref 6–20)
CALCIUM SERPL-MCNC: 8.4 MG/DL (ref 8.7–10.5)
CHLORIDE SERPL-SCNC: 101 MMOL/L (ref 95–110)
CO2 SERPL-SCNC: 27 MMOL/L (ref 23–29)
CREAT SERPL-MCNC: 0.7 MG/DL (ref 0.5–1.4)
DIFFERENTIAL METHOD: ABNORMAL
EOSINOPHIL # BLD AUTO: 0.2 K/UL (ref 0–0.5)
EOSINOPHIL NFR BLD: 3.6 % (ref 0–8)
ERYTHROCYTE [DISTWIDTH] IN BLOOD BY AUTOMATED COUNT: 14.3 % (ref 11.5–14.5)
ERYTHROCYTE [SEDIMENTATION RATE] IN BLOOD BY WESTERGREN METHOD: 72 MM/HR (ref 0–10)
EST. GFR  (AFRICAN AMERICAN): >60 ML/MIN/1.73 M^2
EST. GFR  (NON AFRICAN AMERICAN): >60 ML/MIN/1.73 M^2
ESTIMATED AVG GLUCOSE: 258 MG/DL (ref 68–131)
GLUCOSE SERPL-MCNC: 122 MG/DL (ref 70–110)
GRAM STN SPEC: NORMAL
HBA1C MFR BLD: 10.6 % (ref 4–5.6)
HCT VFR BLD AUTO: 34.9 % (ref 40–54)
HGB BLD-MCNC: 10.8 G/DL (ref 14–18)
IMM GRANULOCYTES # BLD AUTO: 0.02 K/UL (ref 0–0.04)
IMM GRANULOCYTES NFR BLD AUTO: 0.3 % (ref 0–0.5)
INR PPP: 1.1 (ref 0.8–1.2)
LYMPHOCYTES # BLD AUTO: 1.5 K/UL (ref 1–4.8)
LYMPHOCYTES NFR BLD: 23.6 % (ref 18–48)
MAGNESIUM SERPL-MCNC: 1.8 MG/DL (ref 1.6–2.6)
MAGNESIUM SERPL-MCNC: 1.8 MG/DL (ref 1.6–2.6)
MCH RBC QN AUTO: 22.6 PG (ref 27–31)
MCHC RBC AUTO-ENTMCNC: 30.9 G/DL (ref 32–36)
MCV RBC AUTO: 73 FL (ref 82–98)
MONOCYTES # BLD AUTO: 0.6 K/UL (ref 0.3–1)
MONOCYTES NFR BLD: 9.2 % (ref 4–15)
NEUTROPHILS # BLD AUTO: 4 K/UL (ref 1.8–7.7)
NEUTROPHILS NFR BLD: 62.7 % (ref 38–73)
NRBC BLD-RTO: 0 /100 WBC
PHOSPHATE SERPL-MCNC: 2.6 MG/DL (ref 2.7–4.5)
PLATELET # BLD AUTO: 432 K/UL (ref 150–450)
PMV BLD AUTO: 9.2 FL (ref 9.2–12.9)
POCT GLUCOSE: 111 MG/DL (ref 70–110)
POCT GLUCOSE: 139 MG/DL (ref 70–110)
POCT GLUCOSE: 145 MG/DL (ref 70–110)
POTASSIUM SERPL-SCNC: 3.2 MMOL/L (ref 3.5–5.1)
PROCALCITONIN SERPL IA-MCNC: 0.05 NG/ML
PROT SERPL-MCNC: 7.1 G/DL (ref 6–8.4)
PROTHROMBIN TIME: 11.3 SEC (ref 9–12.5)
RBC # BLD AUTO: 4.77 M/UL (ref 4.6–6.2)
SODIUM SERPL-SCNC: 138 MMOL/L (ref 136–145)
WBC # BLD AUTO: 6.32 K/UL (ref 3.9–12.7)

## 2022-07-25 PROCEDURE — 80053 COMPREHEN METABOLIC PANEL: CPT | Performed by: INTERNAL MEDICINE

## 2022-07-25 PROCEDURE — A9585 GADOBUTROL INJECTION: HCPCS | Performed by: STUDENT IN AN ORGANIZED HEALTH CARE EDUCATION/TRAINING PROGRAM

## 2022-07-25 PROCEDURE — 97161 PT EVAL LOW COMPLEX 20 MIN: CPT

## 2022-07-25 PROCEDURE — 36415 COLL VENOUS BLD VENIPUNCTURE: CPT | Performed by: INTERNAL MEDICINE

## 2022-07-25 PROCEDURE — 84100 ASSAY OF PHOSPHORUS: CPT | Performed by: INTERNAL MEDICINE

## 2022-07-25 PROCEDURE — 63600175 PHARM REV CODE 636 W HCPCS: Performed by: STUDENT IN AN ORGANIZED HEALTH CARE EDUCATION/TRAINING PROGRAM

## 2022-07-25 PROCEDURE — 25500020 PHARM REV CODE 255: Performed by: STUDENT IN AN ORGANIZED HEALTH CARE EDUCATION/TRAINING PROGRAM

## 2022-07-25 PROCEDURE — 97165 OT EVAL LOW COMPLEX 30 MIN: CPT

## 2022-07-25 PROCEDURE — 87070 CULTURE OTHR SPECIMN AEROBIC: CPT | Performed by: STUDENT IN AN ORGANIZED HEALTH CARE EDUCATION/TRAINING PROGRAM

## 2022-07-25 PROCEDURE — 99223 1ST HOSP IP/OBS HIGH 75: CPT | Mod: GC,,, | Performed by: SURGERY

## 2022-07-25 PROCEDURE — 99223 PR INITIAL HOSPITAL CARE,LEVL III: ICD-10-PCS | Mod: GC,,, | Performed by: SURGERY

## 2022-07-25 PROCEDURE — 97110 THERAPEUTIC EXERCISES: CPT

## 2022-07-25 PROCEDURE — 83735 ASSAY OF MAGNESIUM: CPT | Performed by: INTERNAL MEDICINE

## 2022-07-25 PROCEDURE — 87076 CULTURE ANAEROBE IDENT EACH: CPT | Performed by: STUDENT IN AN ORGANIZED HEALTH CARE EDUCATION/TRAINING PROGRAM

## 2022-07-25 PROCEDURE — 87205 SMEAR GRAM STAIN: CPT | Performed by: STUDENT IN AN ORGANIZED HEALTH CARE EDUCATION/TRAINING PROGRAM

## 2022-07-25 PROCEDURE — 85025 COMPLETE CBC W/AUTO DIFF WBC: CPT | Performed by: INTERNAL MEDICINE

## 2022-07-25 PROCEDURE — 25000003 PHARM REV CODE 250: Performed by: STUDENT IN AN ORGANIZED HEALTH CARE EDUCATION/TRAINING PROGRAM

## 2022-07-25 PROCEDURE — 63600175 PHARM REV CODE 636 W HCPCS: Performed by: INTERNAL MEDICINE

## 2022-07-25 PROCEDURE — 87075 CULTR BACTERIA EXCEPT BLOOD: CPT | Performed by: STUDENT IN AN ORGANIZED HEALTH CARE EDUCATION/TRAINING PROGRAM

## 2022-07-25 PROCEDURE — 21400001 HC TELEMETRY ROOM

## 2022-07-25 PROCEDURE — 87077 CULTURE AEROBIC IDENTIFY: CPT | Performed by: STUDENT IN AN ORGANIZED HEALTH CARE EDUCATION/TRAINING PROGRAM

## 2022-07-25 PROCEDURE — 87186 SC STD MICRODIL/AGAR DIL: CPT | Performed by: STUDENT IN AN ORGANIZED HEALTH CARE EDUCATION/TRAINING PROGRAM

## 2022-07-25 PROCEDURE — 25000003 PHARM REV CODE 250: Performed by: INTERNAL MEDICINE

## 2022-07-25 PROCEDURE — 97542 WHEELCHAIR MNGMENT TRAINING: CPT

## 2022-07-25 RX ORDER — GADOBUTROL 604.72 MG/ML
8 INJECTION INTRAVENOUS
Status: COMPLETED | OUTPATIENT
Start: 2022-07-25 | End: 2022-07-25

## 2022-07-25 RX ADMIN — OXYBUTYNIN CHLORIDE 15 MG: 5 TABLET, EXTENDED RELEASE ORAL at 09:07

## 2022-07-25 RX ADMIN — VANCOMYCIN HYDROCHLORIDE 1250 MG: 1.25 INJECTION, POWDER, LYOPHILIZED, FOR SOLUTION INTRAVENOUS at 08:07

## 2022-07-25 RX ADMIN — POLYETHYLENE GLYCOL 3350 17 G: 17 POWDER, FOR SOLUTION ORAL at 09:07

## 2022-07-25 RX ADMIN — VANCOMYCIN HYDROCHLORIDE 1250 MG: 1.25 INJECTION, POWDER, LYOPHILIZED, FOR SOLUTION INTRAVENOUS at 06:07

## 2022-07-25 RX ADMIN — ENOXAPARIN SODIUM 40 MG: 100 INJECTION SUBCUTANEOUS at 06:07

## 2022-07-25 RX ADMIN — AMLODIPINE BESYLATE 10 MG: 5 TABLET ORAL at 09:07

## 2022-07-25 RX ADMIN — CEFEPIME 1 G: 1 INJECTION, POWDER, FOR SOLUTION INTRAMUSCULAR; INTRAVENOUS at 01:07

## 2022-07-25 RX ADMIN — ATORVASTATIN CALCIUM 40 MG: 40 TABLET, FILM COATED ORAL at 09:07

## 2022-07-25 RX ADMIN — VALSARTAN 80 MG: 80 TABLET, FILM COATED ORAL at 09:07

## 2022-07-25 RX ADMIN — CEFEPIME 1 G: 1 INJECTION, POWDER, FOR SOLUTION INTRAMUSCULAR; INTRAVENOUS at 11:07

## 2022-07-25 RX ADMIN — SODIUM CHLORIDE AND POTASSIUM CHLORIDE: 9; 1.49 INJECTION, SOLUTION INTRAVENOUS at 10:07

## 2022-07-25 RX ADMIN — GADOBUTROL 8 ML: 604.72 INJECTION INTRAVENOUS at 05:07

## 2022-07-25 NOTE — ASSESSMENT & PLAN NOTE
Patient's FSGs are controlled on current medication regimen.  Last A1c reviewed-   Lab Results   Component Value Date    HGBA1C 10.6 (H) 07/24/2022     Most recent fingerstick glucose reviewed-   Recent Labs   Lab 07/24/22  1421 07/24/22  2341 07/25/22  0642 07/25/22  1119   POCTGLUCOSE 82 168* 139* 111*     Current correctional scale  Low  Decrease anti-hyperglycemic dose as follows-   Antihyperglycemics (From admission, onward)            Start     Stop Route Frequency Ordered    07/24/22 2004  insulin aspart U-100 pen 0-5 Units         -- SubQ Every 6 hours PRN 07/24/22 1904        Hold Oral hypoglycemics while patient is in the hospital.

## 2022-07-25 NOTE — CONSULTS
[facility]  General Surgery  Consult Note    Patient Name: Dale Pantoja  MRN: [id number]  Code Status: Full Code  Admission Date: 7/24/2022  Hospital Length of Stay: 1 days  Attending Physician: Carlos Eduardo Rodriguez MD  Primary Care Provider: Juan Antonio Adorno MD    Patient information was obtained from patient, past medical records and ER records.     Inpatient consult to General Surgery  Consult performed by: Mara Pringle MD  Consult ordered by: Carlos Eduardo Rodriguez MD        Subjective:     Principal Problem: Pressure injury, stage 4, with infection    History of Present Illness: Dale Pantoja is a 37 y.o. male with a history of HTN, DLD, spina bifida, and paraplegia (from umbilicus down) who presented to the  ED last night due to a worsening right ischial decubitus ulcer. He has only noticed this over the last week and family reports he did not tell them about it until recently after he realized it wasn't improving. He has noticed a foul-smell with purulent drainage. No fevers, chills, nausea, or vomiting. He is wheelchair bound and suffers from recurrent UTI due to neurogenic bladder requiring self catheterization. However, he is overall quite functional and is able to move himself around.    In the ED he was tachycardic with HR in the 100s but was AF and HDS. Labs demonstrated a normal WBC without left shift, but he did have an elevated ESR and CRP. CMP showed some electrolyte abnormalities with mild hypokalemia and hypophosphatemia. Imaging with CT abd/pelvis showed a 2.9 cm decubitus ulcer overlying the right ischial tuberosity with adjacent skin thickening and subcutaneous edema. There was a 3.9 x 2.5 cm ill defined air/fluid collection in the adjacent soft tissue. General surgery was then consulted.       No current facility-administered medications on file prior to encounter.     Current Outpatient Medications on File Prior to Encounter   Medication Sig    amLODIPine (NORVASC) 10 MG tablet Take 1 tablet  (10 mg total) by mouth once daily.    atorvastatin (LIPITOR) 40 MG tablet Take 1 tablet (40 mg total) by mouth once daily.    metFORMIN (GLUCOPHAGE) 500 MG tablet Take 1 tablet (500 mg total) by mouth 3 (three) times daily with meals.    oxybutynin (DITROPAN XL) 15 MG TR24 Take 1 tablet (15 mg total) by mouth once daily.    polyethylene glycol (GLYCOLAX) 17 gram PwPk Take 17 g by mouth once daily.    valsartan (DIOVAN) 80 MG tablet Take 1 tablet (80 mg total) by mouth once daily.       Review of patient's allergies indicates:   Allergen Reactions    Latex, natural rubber        Past Medical History:   Diagnosis Date    Overactive bladder     Spina bifida     Type 2 diabetes mellitus, without long-term current use of insulin 10/21/2021    Urinary tract infection      Past Surgical History:   Procedure Laterality Date    SPINE SURGERY      VENTRICULOPERITONEAL SHUNT       Family History       Problem Relation (Age of Onset)    Spina bifida Brother          Tobacco Use    Smoking status: Never Smoker    Smokeless tobacco: Never Used   Substance and Sexual Activity    Alcohol use: No    Drug use: No    Sexual activity: Never     Review of Systems   Constitutional:  Negative for chills and fever.   HENT:  Negative for rhinorrhea and sore throat.    Eyes:  Negative for redness and visual disturbance.   Respiratory:  Negative for cough and shortness of breath.    Cardiovascular:  Negative for chest pain and palpitations.   Gastrointestinal:  Negative for abdominal pain, diarrhea, nausea and vomiting.   Musculoskeletal:  Positive for gait problem. Negative for joint swelling.   Skin:  Positive for wound. Negative for rash.   Neurological:  Negative for seizures and syncope.   Psychiatric/Behavioral:  Negative for confusion and hallucinations.    Objective:     Vital Signs (Most Recent):  Temp: 97.9 °F (36.6 °C) (07/25/22 0732)  Pulse: 91 (07/25/22 0732)  Resp: 16 (07/25/22 0732)  BP: (!) 152/69 (07/25/22  0732)  SpO2: 100 % (07/25/22 0732)   Vital Signs (24h Range):  Temp:  [97.9 °F (36.6 °C)-98.9 °F (37.2 °C)] 97.9 °F (36.6 °C)  Pulse:  [] 91  Resp:  [14-20] 16  SpO2:  [98 %-100 %] 100 %  BP: (128-154)/(66-97) 152/69     Weight: 71.5 kg (157 lb 10.1 oz)  Body mass index is 27.92 kg/m².    Physical Exam  Vitals and nursing note reviewed.   Constitutional:       General: He is not in acute distress.     Appearance: He is not ill-appearing, toxic-appearing or diaphoretic.   HENT:      Head: Normocephalic and atraumatic.   Eyes:      General: No scleral icterus.     Extraocular Movements: Extraocular movements intact.   Cardiovascular:      Rate and Rhythm: Normal rate and regular rhythm.   Pulmonary:      Effort: Pulmonary effort is normal. No respiratory distress.   Abdominal:      General: There is no distension.      Palpations: Abdomen is soft.      Tenderness: There is no abdominal tenderness.   Genitourinary:     Comments: Scar at the top of the gluteal cleft  Right ischial pressure ulcer with foul smelling purulent drainage  Wound edges appear overall healthy, but does have some fibrinous tissue at the base  Wound tracks deep and medially  Musculoskeletal:         General: Deformity present.   Skin:     General: Skin is warm and dry.      Coloration: Skin is not jaundiced.   Neurological:      Mental Status: He is alert. Mental status is at baseline.      Cranial Nerves: No cranial nerve deficit.      Comments: Paraplegic from the umbilicus down  BUE move appropriately   Psychiatric:         Mood and Affect: Mood normal.         Behavior: Behavior normal.       Significant Labs:  I have reviewed all pertinent lab results within the past 24 hours.  CBC:   Recent Labs   Lab 07/25/22  0351   WBC 6.32   RBC 4.77   HGB 10.8*   HCT 34.9*      MCV 73*   MCH 22.6*   MCHC 30.9*     CMP:   Recent Labs   Lab 07/25/22  0351   *   CALCIUM 8.4*   ALBUMIN 2.4*   PROT 7.1      K 3.2*   CO2 27       BUN 9   CREATININE 0.7   ALKPHOS 84   ALT 10   AST 12   BILITOT 0.4     Microbiology Results (last 7 days)       Procedure Component Value Units Date/Time    Urine culture **CANNOT BE ORDERED STAT** [[id number]]  (Abnormal) Collected: 07/24/22 1849    Order Status: Completed Specimen: Urine, Catheterized Updated: 07/25/22 0806     Urine Culture, Routine GRAM NEGATIVE YURIY, NON-LACTOSE   >100,000 cfu/ml  Identification and susceptibility pending      Narrative:      Indicated criteria for high risk culture:->Other  Other (specify):->self caths    Aerobic culture (Specify Source) **CANNOT BE ORDERED AS STAT** [[id number]] Collected: 07/24/22 1712    Order Status: Completed Specimen: Wound from Buttocks, Right Updated: 07/25/22 0758     Aerobic Bacterial Culture Insufficient incubation, culture in progress    Blood Culture #2 **CANNOT BE ORDERED STAT** [[id number]] Collected: 07/24/22 1729    Order Status: Completed Specimen: Blood from Peripheral, Hand, Left Updated: 07/25/22 0312     Blood Culture, Routine No Growth to date    Blood Culture #1 **CANNOT BE ORDERED STAT** [[id number]] Collected: 07/24/22 1716    Order Status: Completed Specimen: Blood from Peripheral, Upper Arm, Right Updated: 07/25/22 0312     Blood Culture, Routine No Growth to date            Significant Diagnostics:  I have reviewed all pertinent imaging results/findings within the past 24 hours.    CT Abd/Pelvis 7/24/22  Discontinuity of the posterior arch at L5 and in the upper sacrum, which could be congenital or postoperative.  Bilateral L5 pars defects.  Angular deformity at the sacrococcygeal junction.     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  There is subchondral sclerosis, subchondral cystic change, and marginal osteophytes at the pseudoarticulations, compatible with degenerative changes.     No acute fractures.  Mild cortical irregularity of the right ischial tuberosity  compared to the left.  No regional osteopenia or periostitis.     Anterosuperior bladder diverticulum, containing a small focus of air.  Right inguinal and external iliac lymphadenopathy measuring up to 1.6 cm.  Partially imaged postoperative changes in the right abdominal wall.     There is a 2.9 cm decubitus ulcer overlying the right ischial tuberosity, with adjacent skin thickening and subcutaneous edema.  There is a 3.9 x 2.5 cm ill-defined collection of fluid and air in the adjacent soft tissues (2:100).  Diffuse muscle atrophy.  No bursal collection.     Impression:     Decubitus ulcer overlying the right ischial tuberosity.  3.9 cm ill-defined collection of fluid and air in the adjacent soft tissues, concerning for phlegmon or early abscess.  Mild cortical irregularity of the right ischial tuberosity, equivocal for acute osteomyelitis.     Right inguinal and external iliac lymphadenopathy, likely reactive.     Small focus of air within the bladder.  Correlate with history of instrumentation.     Other chronic changes as described.        Assessment/Plan:     * Pressure injury, stage 4, with infection  Dale Pantoja is a 37 y.o. male with a history of spina bifida and paraplegia who presents with a stage 4 pressure injury of the right ischium    - Patient seen and examined. Labs and imaging reviewed.  - The wound requires some exploration and debridement of the fibrinous tissue at the wound base. He is insensate and is minimal risk for bleeding. Will plan to do this at bedside  - Discussed the risks, benefits, and alternatives to debridement including but not limited to bleeding, infection, damage to surrounding structures, and need for further debridement. All questions and concerns were addressed to the patient's satisfaction. Informed consent obtained and placed in the chart.    - Sharp debridement performed at the bedside today, please see procedure note below  - Wound care: pack wound with wet to dry  vashe soaked gauze daily.  - Remainder of care per primary. Please call with questions.         Procedure Note  After obtaining informed consent, the wound was explored and then sharply debrided to the level of the muscle. The wound did not probe to bone. It was debrided to healthy bleeding tissue and all loculations were broken up. In particular it was noted to track inferolateral consistent with the previous imaging. Purulence was expressed from the inferolateral cavity. Cultures of the wound were taken. The wound was then packed with gauze. The patient tolerated the procedure well.       VTE Risk Mitigation (From admission, onward)         Ordered     enoxaparin injection 40 mg  Daily         07/24/22 1907     Place sequential compression device  Until discontinued         07/24/22 1907     Place ELIJAH hose  Until discontinued         07/24/22 1907                Thank you for your consult. I will follow-up with patient. Please contact us if you have any additional questions.    Mara Pringle MD  General Surgery  [facility] [place] [facility]

## 2022-07-25 NOTE — ASSESSMENT & PLAN NOTE
CT concerning for abscess and osteomyelitis: Decubitus ulcer overlying the right ischial tuberosity.  3.9 cm ill-defined collection of fluid and air in the adjacent soft tissues, concerning for phlegmon or early abscess.  Mild cortical irregularity of the right ischial tuberosity, equivocal for acute osteomyelitis.    Consult Ortho and wound care  NPO at midnight  MRI with and without emely of pelvis  Vanco + Cefepime  No SIRS criteria currently         [START ON 7/25/2022] cefepime in dextrose 5 % 1 gram/50 mL IVPB 1 g, 1 g, Intravenous, Q12H      Pharmacy to dose Vancomycin consult, , , Once **AND** vancomycin - pharmacy to dose, , Intravenous, pharmacy to manage frequency     [START ON 7/25/2022] vancomycin 1.25 g in dextrose 5% 250 mL IVPB (ready to mix), 1,250 mg, Intravenous, Q12H

## 2022-07-25 NOTE — PT/OT/SLP EVAL
Physical Therapy Evaluation    Patient Name:  Dale Pantoja   MRN:  [id number]    Recommendations:     Discharge Recommendations:  home   Discharge Equipment Recommendations: none   Barriers to discharge: None    Assessment:     Dale Pantoja is a 37 y.o. male admitted with a medical diagnosis of Pressure injury, stage 4, with infection.  He presents with the following impairments/functional limitations:  impaired skin, impaired functional mobility .    At baseline with mobility however his WC is at home. He will have his mother bring his WC tomorrow. Today he propelled ~ 1 city block in [place]. Pt encouraged to s/w wound care provider regarding specific sitting activity restrictions/recommendations. Reminded pt of importance of performing pressure relief q30 minutes. Strongly advised pt to call his WC vendor Jorge with [employer] regarding his wound, as he may qualfity for specialized Roho cushion now that he has a stage IV R ischial wound.     Rehab Prognosis: Good; patient would benefit from acute skilled PT services to address these deficits and reach maximum level of function.    Recent Surgery: * No surgery found *      Plan:     During this hospitalization, patient to be seen  (f/u on 7/26) to address the identified rehab impairments via therapeutic activities, wheelchair management/training, therapeutic exercises and progress toward the following goals:    · Plan of Care Expires:  08/08/22    Subjective     Chief Complaint: none  Patient/Family Comments/goals: agreed to propel WC in [place]  Pain/Comfort:  · Pain Rating 1: 0/10    Patients cultural, spiritual, Episcopal conflicts given the current situation: no    Living Environment:  Home with brother who also has spinal bifida (twins). Has mother and god mother.   Prior to admission, patients level of function was ind with mobility from WC level.  Equipment used at home: wheelchair.  DME owned (not currently used): none.  Upon discharge, patient will have  assistance from n/a.    Objective:     Communicated with nurse prior to session.  Patient found left sidelying with peripheral IV  upon PT entry to room.    General Precautions: Standard, fall   Orthopedic Precautions:N/A   Braces: N/A  Respiratory Status: Room air    Exams:  · god mother present  · Cognitive Exam:  Patient is oriented to Person, Place, Time and Situation  · Gross Motor Coordination:  WFL  · Skin Integrity/Edema:      · -       Skin integrity: right ischial wound dressing falling off/ reinforced with tape.   · RLE ROM: n/a  · RLE Strength: Deficits: 0/5  · LLE ROM: n/a  · LLE Strength: Deficits: 0/5    Functional Mobility:  · Bed Mobility:     · Supine to Sit: modified independence  · Sit to Supine: modified independence  · Transfers: bed <> WC with supervision  · Balance: good  · Wheelchair Propulsion:  Pt propelled Standard wheelchair x 500 feet on Level tile with  Bilateral upper extremity with Modified Independent.       AM-PAC 6 CLICK MOBILITY  Total Score:17     Patient left left sidelying with call button in reach.    GOALS:   Multidisciplinary Problems     Physical Therapy Goals        Problem: Physical Therapy    Goal Priority Disciplines Outcome Goal Variances Interventions   Physical Therapy Goal     PT, PT/OT Ongoing, Progressing     Description: Goals to be met by:      Patient will increase functional independence with mobility by performin. Bed to and from WC transfer with Modified [place]   2. Wheelchair propulsion x500 feet with Modified [place] using bilateral uppper extremities                     History:     Past Medical History:   Diagnosis Date    Overactive bladder     Spina bifida     Type 2 diabetes mellitus, without long-term current use of insulin 10/21/2021    Urinary tract infection        Past Surgical History:   Procedure Laterality Date    SPINE SURGERY      VENTRICULOPERITONEAL SHUNT         Time Tracking:     PT Received On: 22  PT  Start Time: 1034     PT Stop Time: 1058  PT Total Time (min): 24 min     Billable Minutes: Evaluation 15 and Train/Wheelchair Management 9      07/25/2022

## 2022-07-25 NOTE — PLAN OF CARE
Problem: Adult Inpatient Plan of Care  Goal: Optimal Comfort and Wellbeing  Outcome: Ongoing, Progressing     Problem: Infection  Goal: Absence of Infection Signs and Symptoms  Outcome: Ongoing, Progressing     Problem: Diabetes Comorbidity  Goal: Blood Glucose Level Within Targeted Range  Outcome: Ongoing, Progressing     Problem: Impaired Wound Healing  Goal: Optimal Wound Healing  Outcome: Ongoing, Progressing

## 2022-07-25 NOTE — ASSESSMENT & PLAN NOTE
Continue home meds:    [START ON 7/25/2022] atorvastatin tablet 40 mg, 40 mg, Oral, Daily

## 2022-07-25 NOTE — HPI
Dale Pantoja is a 37 y.o. male with a history of HTN, DLD, spina bifida, and paraplegia (from umbilicus down) who presented to the  ED last night due to a worsening right ischial decubitus ulcer. He has only noticed this over the last week and family reports he did not tell them about it until recently after he realized it wasn't improving. He has noticed a foul-smell with purulent drainage. No fevers, chills, nausea, or vomiting. He is wheelchair bound and suffers from recurrent UTI due to neurogenic bladder requiring self catheterization. However, he is overall quite functional and is able to move himself around.    In the ED he was tachycardic with HR in the 100s but was AF and HDS. Labs demonstrated a normal WBC without left shift, but he did have an elevated ESR and CRP. CMP showed some electrolyte abnormalities with mild hypokalemia and hypophosphatemia. Imaging with CT abd/pelvis showed a 2.9 cm decubitus ulcer overlying the right ischial tuberosity with adjacent skin thickening and subcutaneous edema. There was a 3.9 x 2.5 cm ill defined air/fluid collection in the adjacent soft tissue. General surgery was then consulted.

## 2022-07-25 NOTE — SUBJECTIVE & OBJECTIVE
Interval History: No new issues, doing well. Had bedside debridement and cultures taken.     Review of Systems   Constitutional:  Positive for appetite change. Negative for chills and fever.   HENT:  Negative for congestion.    Eyes:  Negative for photophobia.   Respiratory:  Negative for chest tightness and shortness of breath.    Cardiovascular:  Negative for chest pain.   Gastrointestinal:  Positive for constipation. Negative for nausea and vomiting.   Endocrine: Negative for heat intolerance.   Genitourinary:  Positive for difficulty urinating.   Musculoskeletal:  Negative for myalgias.   Skin:  Positive for color change and wound.   Allergic/Immunologic: Negative for immunocompromised state.   Neurological:  Negative for headaches.   Hematological:  Does not bruise/bleed easily.   Psychiatric/Behavioral:  Negative for dysphoric mood.    All other systems reviewed and are negative.  Objective:     Vital Signs (Most Recent):  Temp: 98.1 °F (36.7 °C) (07/25/22 1121)  Pulse: 89 (07/25/22 1121)  Resp: 18 (07/25/22 1121)  BP: (!) 140/85 (07/25/22 1121)  SpO2: 100 % (07/25/22 1121)   Vital Signs (24h Range):  Temp:  [97.9 °F (36.6 °C)-98.9 °F (37.2 °C)] 98.1 °F (36.7 °C)  Pulse:  [] 89  Resp:  [14-20] 18  SpO2:  [98 %-100 %] 100 %  BP: (131-154)/(66-97) 140/85     Weight: 71.5 kg (157 lb 10.1 oz)  Body mass index is 27.92 kg/m².    Intake/Output Summary (Last 24 hours) at 7/25/2022 1204  Last data filed at 7/25/2022 0603  Gross per 24 hour   Intake 2206.04 ml   Output 1280 ml   Net 926.04 ml      Physical Exam  Constitutional:       General: He is not in acute distress.     Appearance: Normal appearance. He is not ill-appearing, toxic-appearing or diaphoretic.   HENT:      Head: Normocephalic and atraumatic.      Comments:  shunt  Eyes:      Conjunctiva/sclera:      Right eye: Right conjunctiva is not injected.      Left eye: Left conjunctiva is not injected.   Neck:      Thyroid: No thyromegaly.    Cardiovascular:      Rate and Rhythm: Normal rate and regular rhythm.      Heart sounds: Murmur heard.   Systolic murmur is present with a grade of 2/6.   Pulmonary:      Effort: Pulmonary effort is normal.      Breath sounds: No decreased breath sounds, wheezing, rhonchi or rales.   Chest:      Chest wall: No swelling or tenderness.   Abdominal:      General: Abdomen is protuberant. Bowel sounds are normal. There is no distension.      Palpations: Abdomen is soft.      Tenderness: There is no abdominal tenderness.   Genitourinary:     Comments: No longo in place (he self-caths)  Musculoskeletal:      Cervical back: Neck supple.      Comments: Wound was not checked - surgery evaluated previously   Lymphadenopathy:      Comments: No peripheral edema   Skin:     General: Skin is warm and dry.   Neurological:      Mental Status: He is alert and oriented to person, place, and time.      GCS: GCS eye subscore is 4. GCS verbal subscore is 5. GCS motor subscore is 6.      Comments: Bilateral leg weakness 2/5 strength with decreased sensation.  Muscle atrophy to legs. This is patient's baseline   Psychiatric:         Attention and Perception: Attention and perception normal.         Mood and Affect: Mood and affect normal.         Behavior: Behavior is cooperative.         Cognition and Memory: Cognition and memory normal.       Significant Labs: All pertinent labs within the past 24 hours have been reviewed.    Significant Imaging: I have reviewed all pertinent imaging results/findings within the past 24 hours.

## 2022-07-25 NOTE — PROGRESS NOTES
Pharmacokinetic Initial Assessment: IV Vancomycin    Assessment/Plan:    Initiate intravenous vancomycin with loading dose of 1500 mg once followed by a maintenance dose of vancomycin 1250 mg IV every 12 hours  Desired empiric serum trough concentration is 10 to 20 mcg/mL  Draw vancomycin trough level 60 min prior to fourth dose on 7/26/22 at approximately 0630  Pharmacy will continue to follow and monitor vancomycin.      Please contact pharmacy at extension [phone] with any questions regarding this assessment.     Thank you for the consult,   Ravinder Chin       Patient brief summary:  Dale Pantoja is a 37 y.o. male initiated on antimicrobial therapy with IV Vancomycin for treatment of suspected bone/joint infection    Drug Allergies:   Review of patient's allergies indicates:   Allergen Reactions    Latex, natural rubber        Actual Body Weight:   72.6 kg    Renal Function:   CrCl cannot be calculated (Patient's most recent lab result is older than the maximum 7 days allowed.).,     Dialysis Method (if applicable):  N/A    CBC (last 72 hours):  No results for input(s): WHITE BLOOD CELL COUNT, HEMOGLOBIN, HEMATOCRIT, PLATELETS, GRAN%, LYMPH%, MONO%, EOSINOPHIL%, BASOPHIL%, DIFFERENTIAL METHOD in the last 72 hours.    Metabolic Panel (last 72 hours):  No results for input(s): SODIUM, POTASSIUM, CHLORIDE, CO2, GLUCOSE, BUN BLD, CREATININE, ALBUMIN, BILIRUBIN TOTAL, ALK PHOS, AST, ALT, MAGNESIUM, PHOSPHORUS in the last 72 hours.    Drug levels (last 3 results):  No results for input(s): VANCOMYCINRA, VANCORANDOM, VANCOMYCINPE, VANCOPEAK, VANCOMYCINTR, VANCOTROUGH in the last 72 hours.    Microbiologic Results:  Microbiology Results (last 7 days)       Procedure Component Value Units Date/Time    Urine culture **CANNOT BE ORDERED STAT** [[id number]] Collected: 07/24/22 9571    Order Status: Sent Specimen: Urine, Catheterized Updated: 07/24/22 1902    Aerobic culture (Specify Source) **CANNOT BE ORDERED AS  STAT** [[id number]] Collected: 07/24/22 1712    Order Status: Sent Specimen: Wound from Buttocks, Right Updated: 07/24/22 1830    Blood Culture #2 **CANNOT BE ORDERED STAT** [[id number]] Collected: 07/24/22 1729    Order Status: Sent Specimen: Blood from Peripheral, Hand, Left Updated: 07/24/22 1821    Blood Culture #1 **CANNOT BE ORDERED STAT** [[id number]] Collected: 07/24/22 1716    Order Status: Sent Specimen: Blood from Peripheral, Upper Arm, Right Updated: 07/24/22 1821

## 2022-07-25 NOTE — ASSESSMENT & PLAN NOTE
Dale Pantoja is a 37 y.o. male with a history of spina bifida and paraplegia who presents with a stage 4 pressure injury of the right ischium    - Patient seen and examined. Labs and imaging reviewed.  - The wound requires some exploration and debridement of the fibrinous tissue at the wound base. He is insensate and is minimal risk for bleeding. Will plan to do this at bedside  - Discussed the risks, benefits, and alternatives to debridement including but not limited to bleeding, infection, damage to surrounding structures, and need for further debridement. All questions and concerns were addressed to the patient's satisfaction. Informed consent obtained and placed in the chart.    - Sharp debridement performed at the bedside today, please see procedure note below  - Wound care: pack wound with wet to dry vashe soaked gauze daily.  - Remainder of care per primary. Please call with questions.

## 2022-07-25 NOTE — ED NOTES
EMS arrived in ED; report given to ems in sbar format with opportunity for questions; paperwork given to ems included: emtala/transfer form, ed record, and face sheet

## 2022-07-25 NOTE — ASSESSMENT & PLAN NOTE
PRN self-catheterization         [START ON 7/25/2022] oxybutynin 24 hr tablet 15 mg, 15 mg, Oral, Daily

## 2022-07-25 NOTE — PLAN OF CARE
Problem: Skin Injury Risk Increased  Goal: Skin Health and Integrity  Outcome: Ongoing, Progressing     Problem: Adult Inpatient Plan of Care  Goal: Plan of Care Review  Outcome: Ongoing, Progressing  Goal: Patient-Specific Goal (Individualized)  Outcome: Ongoing, Progressing  Goal: Absence of Hospital-Acquired Illness or Injury  Outcome: Ongoing, Progressing  Goal: Optimal Comfort and Wellbeing  Outcome: Ongoing, Progressing  Goal: Readiness for Transition of Care  Outcome: Ongoing, Progressing     Problem: Infection  Goal: Absence of Infection Signs and Symptoms  Outcome: Ongoing, Progressing     Problem: Impaired Wound Healing  Goal: Optimal Wound Healing  Outcome: Ongoing, Progressing    Explained plan of care, verbalized understanding. Remained NPO at midnight  Educated pt .on new medicine . No injury during shift, Side rails up x 2, call light by bedside.

## 2022-07-25 NOTE — SUBJECTIVE & OBJECTIVE
No current facility-administered medications on file prior to encounter.     Current Outpatient Medications on File Prior to Encounter   Medication Sig    amLODIPine (NORVASC) 10 MG tablet Take 1 tablet (10 mg total) by mouth once daily.    atorvastatin (LIPITOR) 40 MG tablet Take 1 tablet (40 mg total) by mouth once daily.    metFORMIN (GLUCOPHAGE) 500 MG tablet Take 1 tablet (500 mg total) by mouth 3 (three) times daily with meals.    oxybutynin (DITROPAN XL) 15 MG TR24 Take 1 tablet (15 mg total) by mouth once daily.    polyethylene glycol (GLYCOLAX) 17 gram PwPk Take 17 g by mouth once daily.    valsartan (DIOVAN) 80 MG tablet Take 1 tablet (80 mg total) by mouth once daily.       Review of patient's allergies indicates:   Allergen Reactions    Latex, natural rubber        Past Medical History:   Diagnosis Date    Overactive bladder     Spina bifida     Type 2 diabetes mellitus, without long-term current use of insulin 10/21/2021    Urinary tract infection      Past Surgical History:   Procedure Laterality Date    SPINE SURGERY      VENTRICULOPERITONEAL SHUNT       Family History       Problem Relation (Age of Onset)    Spina bifida Brother          Tobacco Use    Smoking status: Never Smoker    Smokeless tobacco: Never Used   Substance and Sexual Activity    Alcohol use: No    Drug use: No    Sexual activity: Never     Review of Systems   Constitutional:  Negative for chills and fever.   HENT:  Negative for rhinorrhea and sore throat.    Eyes:  Negative for redness and visual disturbance.   Respiratory:  Negative for cough and shortness of breath.    Cardiovascular:  Negative for chest pain and palpitations.   Gastrointestinal:  Negative for abdominal pain, diarrhea, nausea and vomiting.   Musculoskeletal:  Positive for gait problem. Negative for joint swelling.   Skin:  Positive for wound. Negative for rash.   Neurological:  Negative for seizures and syncope.   Psychiatric/Behavioral:  Negative for confusion  and hallucinations.    Objective:     Vital Signs (Most Recent):  Temp: 97.9 °F (36.6 °C) (07/25/22 0732)  Pulse: 91 (07/25/22 0732)  Resp: 16 (07/25/22 0732)  BP: (!) 152/69 (07/25/22 0732)  SpO2: 100 % (07/25/22 0732)   Vital Signs (24h Range):  Temp:  [97.9 °F (36.6 °C)-98.9 °F (37.2 °C)] 97.9 °F (36.6 °C)  Pulse:  [] 91  Resp:  [14-20] 16  SpO2:  [98 %-100 %] 100 %  BP: (128-154)/(66-97) 152/69     Weight: 71.5 kg (157 lb 10.1 oz)  Body mass index is 27.92 kg/m².    Physical Exam  Vitals and nursing note reviewed.   Constitutional:       General: He is not in acute distress.     Appearance: He is not ill-appearing, toxic-appearing or diaphoretic.   HENT:      Head: Normocephalic and atraumatic.   Eyes:      General: No scleral icterus.     Extraocular Movements: Extraocular movements intact.   Cardiovascular:      Rate and Rhythm: Normal rate and regular rhythm.   Pulmonary:      Effort: Pulmonary effort is normal. No respiratory distress.   Abdominal:      General: There is no distension.      Palpations: Abdomen is soft.      Tenderness: There is no abdominal tenderness.   Genitourinary:     Comments: Scar at the top of the gluteal cleft  Right ischial pressure ulcer with foul smelling purulent drainage  Wound edges appear overall healthy, but does have some fibrinous tissue at the base  Wound tracks deep and medially  Musculoskeletal:         General: Deformity present.   Skin:     General: Skin is warm and dry.      Coloration: Skin is not jaundiced.   Neurological:      Mental Status: He is alert. Mental status is at baseline.      Cranial Nerves: No cranial nerve deficit.      Comments: Paraplegic from the umbilicus down  BUE move appropriately   Psychiatric:         Mood and Affect: Mood normal.         Behavior: Behavior normal.       Significant Labs:  I have reviewed all pertinent lab results within the past 24 hours.  CBC:   Recent Labs   Lab 07/25/22  0351   WBC 6.32   RBC 4.77   HGB 10.8*    HCT 34.9*      MCV 73*   MCH 22.6*   MCHC 30.9*     CMP:   Recent Labs   Lab 07/25/22  0351   *   CALCIUM 8.4*   ALBUMIN 2.4*   PROT 7.1      K 3.2*   CO2 27      BUN 9   CREATININE 0.7   ALKPHOS 84   ALT 10   AST 12   BILITOT 0.4     Microbiology Results (last 7 days)       Procedure Component Value Units Date/Time    Urine culture **CANNOT BE ORDERED STAT** [[id number]]  (Abnormal) Collected: 07/24/22 1849    Order Status: Completed Specimen: Urine, Catheterized Updated: 07/25/22 0806     Urine Culture, Routine GRAM NEGATIVE YURIY, NON-LACTOSE   >100,000 cfu/ml  Identification and susceptibility pending      Narrative:      Indicated criteria for high risk culture:->Other  Other (specify):->self caths    Aerobic culture (Specify Source) **CANNOT BE ORDERED AS STAT** [[id number]] Collected: 07/24/22 1712    Order Status: Completed Specimen: Wound from Buttocks, Right Updated: 07/25/22 0758     Aerobic Bacterial Culture Insufficient incubation, culture in progress    Blood Culture #2 **CANNOT BE ORDERED STAT** [[id number]] Collected: 07/24/22 1729    Order Status: Completed Specimen: Blood from Peripheral, Hand, Left Updated: 07/25/22 0312     Blood Culture, Routine No Growth to date    Blood Culture #1 **CANNOT BE ORDERED STAT** [[id number]] Collected: 07/24/22 1716    Order Status: Completed Specimen: Blood from Peripheral, Upper Arm, Right Updated: 07/25/22 0312     Blood Culture, Routine No Growth to date            Significant Diagnostics:  I have reviewed all pertinent imaging results/findings within the past 24 hours.    CT Abd/Pelvis 7/24/22  Discontinuity of the posterior arch at L5 and in the upper sacrum, which could be congenital or postoperative.  Bilateral L5 pars defects.  Angular deformity at the sacrococcygeal junction.     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  There is subchondral sclerosis, subchondral cystic  change, and marginal osteophytes at the pseudoarticulations, compatible with degenerative changes.     No acute fractures.  Mild cortical irregularity of the right ischial tuberosity compared to the left.  No regional osteopenia or periostitis.     Anterosuperior bladder diverticulum, containing a small focus of air.  Right inguinal and external iliac lymphadenopathy measuring up to 1.6 cm.  Partially imaged postoperative changes in the right abdominal wall.     There is a 2.9 cm decubitus ulcer overlying the right ischial tuberosity, with adjacent skin thickening and subcutaneous edema.  There is a 3.9 x 2.5 cm ill-defined collection of fluid and air in the adjacent soft tissues (2:100).  Diffuse muscle atrophy.  No bursal collection.     Impression:     Decubitus ulcer overlying the right ischial tuberosity.  3.9 cm ill-defined collection of fluid and air in the adjacent soft tissues, concerning for phlegmon or early abscess.  Mild cortical irregularity of the right ischial tuberosity, equivocal for acute osteomyelitis.     Right inguinal and external iliac lymphadenopathy, likely reactive.     Small focus of air within the bladder.  Correlate with history of instrumentation.     Other chronic changes as described.

## 2022-07-25 NOTE — PLAN OF CARE
[facility] - [place] - Telemetry  Initial Discharge Assessment       Primary Care Provider: Juan Antonio Adorno MD    Admission Diagnosis: Decubitus ulcer [L89.90]  Wound infection [T14.8XXA, L08.9]  Chest pain [R07.9]    Admission Date: 7/24/2022  Expected Discharge Date:     Discharge Barriers Identified: None    Payor: MEDICARE / Plan: MEDICARE PART A & B / Product Type: Government /     Extended Emergency Contact Information  Primary Emergency Contact: Dayana Pantoja  Address: [address] [place] [address] [facility]  Home Phone: [phone]  Mobile Phone: [phone]  Relation: Mother    Discharge Plan A: Home with family  Discharge Plan B: Home with family, Other (TBD)      [id number] - [place] - [address]  [address]  [place] [address]  Phone: [phone] Fax: [phone]    [id number] - [place] - [address]  [address]  [place] [address]  Phone: [phone] Fax: [phone]      Initial Assessment (most recent)       Adult Discharge Assessment - 07/25/22 1503          Discharge Assessment    Assessment Type Discharge Planning Assessment     Confirmed/corrected address, phone number and insurance No     Reason Other (see comment)   Patient provided SW with address.    Source of Information patient     If unable to respond/provide information was family/caregiver contacted? No Contact Information Available     When was your last doctors appointment? --   Patient stated his last PCP was 6 months ago.    Communicated ROSS with patient/caregiver Date not available/Unable to determine     Reason For Admission Decubitus ulcer     Lives With sibling(s)     Facility Arrived From: Home     Do you expect to return to your current living situation? Yes     Do you have help at home or someone to help you manage your care at home? Yes     Who are your caregiver(s) and their phone number(s)? [facility]     Prior to hospitilization cognitive  status: Alert/Oriented     Current cognitive status: Alert/Oriented     Equipment Currently Used at Home wheelchair     Readmission within 30 days? No     Patient currently being followed by outpatient case management? No     Do you currently have service(s) that help you manage your care at home? Yes     How Many hours does patient receive services 35     Name and Contact number of agency [facility]     Is the pt/caregiver preference to resume services with current agency Yes     Do you take prescription medications? Yes     Do you have prescription coverage? Yes     Coverage Medicare A/B and Medicaid     Do you have any problems affording any of your prescribed medications? No     Is the patient taking medications as prescribed? yes     Who is going to help you get home at discharge? Dayana Pantoja (Mother)   [phone]     How do you get to doctors appointments? family or friend will provide     Are you on dialysis? No     Do you take coumadin? No     Discharge Plan A Home with family     Discharge Plan B Home with family;Other   TBD    DME Needed Upon Discharge  other (see comments)   TBD    Discharge Plan discussed with: Patient     Discharge Barriers Identified None        Relationship/Environment    Name(s) of Who Lives With Patient Dayana Pantoja (Mother)   [phone]                      SW met with patient at bedside. Patient stated he lives with his twin brother and has a PCA ([facility]) that assist him with ADLs. Patient stated he gets 35 hours a week from PCA agency ([facility]). Patient's address: [address] [place] [id number].

## 2022-07-25 NOTE — ASSESSMENT & PLAN NOTE
CT concerning for abscess and osteomyelitis: Decubitus ulcer overlying the right ischial tuberosity.  3.9 cm ill-defined collection of fluid and air in the adjacent soft tissues, concerning for phlegmon or early abscess.  Mild cortical irregularity of the right ischial tuberosity, equivocal for acute osteomyelitis.  - General surgery consulted  - Pending MRI  - cultures and bedside debridment done - f/u culture data

## 2022-07-25 NOTE — PLAN OF CARE
Problem: Physical Therapy  Goal: Physical Therapy Goal  Description: Goals to be met by:      Patient will increase functional independence with mobility by performin. Bed to and from WC transfer with Modified [place]   2. Wheelchair propulsion x500 feet with Modified [place] using bilateral uppper extremities    Outcome: Ongoing, Progressing

## 2022-07-25 NOTE — PT/OT/SLP EVAL
Occupational Therapy   Evaluation and Discharge Note    Name: Dale Pantoja  MRN: [id number]  Admitting Diagnosis:  Pressure injury, stage 4, with infection   Recent Surgery: * No surgery found *      Recommendations:     Discharge Recommendations: home (with family support)  Discharge Equipment Recommendations:  none  Barriers to discharge:  None    Assessment:     Dale Pantoja is a 37 y.o. male with a medical diagnosis of Pressure injury, stage 4, with infection. Pt functioning MOD I with bed mobility and supervision for squat pivot w/c. OT provided BUE HEP with blue theraband which pt completed x15 reps each.     Plan:     During this hospitalization, patient does not require further acute OT services.  Please re-consult if situation changes.    · Plan of Care Reviewed with: patient (godmother)    Subjective     Chief Complaint: none reported   Patient/Family Comments/goals: agreeable to session; staying sidelying for pressure relief     Occupational Profile:  Living Environment: Pt lives with his twin brother (who also in a w/c d/t spina bifida) in a 2 story townhouse with chair lift and ramp at entry and all of pt's needs on the first floor.   Previous level of function: MOD I with w/c transfers and ADLs.   Roles and Routines: pt plays on basketball league, does sprints and track via w/c   Equipment Used at home:  wheelchair, grab bar (handicap accessible walk-in shower; chair lift to enter home)  Assistance upon Discharge: brother, mother; godmother     Pain/Comfort:  Pain Rating 1: 0/10    Patients cultural, spiritual, Taoist conflicts given the current situation: no    Objective:     Communicated with: nurseOleksandr, prior to session.  Patient found HOB elevated L sidelying  with bed alarm, peripheral IV upon OT entry to room.    General Precautions: Standard, fall   Orthopedic Precautions:N/A   Braces: N/A  Respiratory Status: Room air     Occupational Performance:    Bed Mobility:    · Patient completed  Scooting with modified independence  · Patient completed Supine to Sit with modified independence  · Patient completed Sit to Supine with modified independence    Functional Mobility/Transfers:  · Patient completed Bed <> Chair Transfer using Squat Pivot technique with supervision with wheelchair  · Functional Mobility: pt completed greater than household distance functional mobility via w/c propelling with BUE and navigating through obstacles within the hallway MOD I while seated on green air cushion.    Activities of Daily Living:  · Lower Body Dressing: modified independence doffing/donning sock at bed leve    Cognitive/Visual Perceptual:  Cognitive/Psychosocial Skills:     -       Follows Commands/attention:follows simple commands  -       Communication: clear/fluent  -       Memory: No Deficits noted  -       Safety awareness/insight to disability: intact   -       Mood/Affect/Coping skills/emotional control: Cooperative and Pleasant  Visual/Perceptual:      -Intact  R/L discrimination      Physical Exam:  Balance:    -       seated: SUP   Postural examination/scapula alignment:    -       muscle atrophy to BLE  Skin integrity: wound with packing and reinforced dressing with tape to R buttocks  Edema:  no BUE edema noted  Sensation:    -       Intact  light/touch BUE  Upper Extremity Range of Motion:     -       Right Upper Extremity: WFL  -       Left Upper Extremity: WFL  Upper Extremity Strength:    -       Right Upper Extremity: WFL  -       Left Upper Extremity: WFL   Strength:    -       Right Upper Extremity: WFL  -       Left Upper Extremity: WFL  Fine Motor Coordination:    -       Intact  Left hand, manipulation of objects and Right hand, manipulation of objects  Gross motor coordination:   WFL    AMPAC 6 Click ADL:  AMPAC Total Score: 23    Treatment & Education:  · Pt educated on OT role/end of POC.   · Importance of OOB activity with staff assistance.  · Safety during functional t/f and  mobility   · Pt reports that he completes pressure relief in the bed 3x/day at home and does w/c push ups and other exercises during the day for pressure relief. Encouraged more time for pressure relief during the day- encouraged pt to coordinate with his brother for daily compliance   · Edu on importance of offloading heels from the bed - OT placed pillows to demo  Pt completed 1 x 15 BUE HEP with blue theraband in HOB elevated L sidelying position:  Shoulder horizontal ab/dduction  External rotation  Shoulder flexion/extension with ab/dduction  Elbow extension  Elbow flexion   Scapular protraction/retraction  Handout and theraband placed within reach of pt   Pt encouraged to complete 2-3x10-15 reps a day   · Self-care tasks/functional mobility completed- assistance level noted above   · All questions/concerns answered within OT scope of practice     Education:    Patient left HOB elevated L sidelying with B/L heels offloaded with all lines intact, call button in reach, bed alarm on, nurse, Oleksandr, notified and all needs met/within reach     GOALS:   Multidisciplinary Problems     Occupational Therapy Goals        Problem: Occupational Therapy    Goal Priority Disciplines Outcome Interventions   Occupational Therapy Goal     OT, PT/OT Adequate for Care Transition                    History:     Past Medical History:   Diagnosis Date    Overactive bladder     Spina bifida     Type 2 diabetes mellitus, without long-term current use of insulin 10/21/2021    Urinary tract infection          Past Surgical History:   Procedure Laterality Date    SPINE SURGERY      VENTRICULOPERITONEAL SHUNT         Time Tracking:     OT Date of Treatment: 07/25/22  OT Start Time: 1034  OT Stop Time: 1107  OT Total Time (min): 33 min    Billable Minutes:Evaluation 15 min  Therapeutic Exercise 15 min  Total Time 33 min (co-eval with PT)    7/25/2022

## 2022-07-25 NOTE — HOSPITAL COURSE
Admitted with sepsis secondary to ischial tuberosity ulcer with possible abscess. Started on IVF and broad-spectrum IV antibiotics. General Surgery consulted and did bedside debridement/cultures. MRI concerning for ischial osteomyelitis.  Cultures from the debridement grew Enterococcus faecalis and his urine culture grew pansensitive E coli.  He completed treatment for the E coli in house.  Antibiotic was channeled to Unasyn.  ID was consulted and they recommended 6 weeks of IV Unasyn.  Patient had PICC line placed and he was discharged home on IV antibiotic and advised to follow-up with ID outpatient.

## 2022-07-25 NOTE — ASSESSMENT & PLAN NOTE
Continue home meds:    [START ON 7/25/2022] amLODIPine tablet 10 mg, 10 mg, Oral, Daily     [START ON 7/25/2022] valsartan tablet 80 mg, 80 mg, Oral, Daily

## 2022-07-25 NOTE — ASSESSMENT & PLAN NOTE
Patient's FSGs are controlled on current medication regimen.  Last A1c reviewed-   Lab Results   Component Value Date    HGBA1C 12.8 (H) 03/22/2022     Most recent fingerstick glucose reviewed-   Recent Labs   Lab 07/24/22  1421   POCTGLUCOSE 82     Current correctional scale  Low  Decrease anti-hyperglycemic dose as follows-   Antihyperglycemics (From admission, onward)            Start     Stop Route Frequency Ordered    07/24/22 2004  insulin aspart U-100 pen 0-5 Units         -- SubQ Every 6 hours PRN 07/24/22 1904        Hold Oral hypoglycemics while patient is in the hospital.

## 2022-07-25 NOTE — H&P
"[facility] Medicine  History & Physical    Patient Name: Dale Pantoja  MRN: [id number]  Patient Class: IP- Inpatient  Admission Date: 7/24/2022  Attending Physician: Carlos Eduardo Rodriguez MD   Primary Care Provider: Juan Antonio Adorno MD         Patient information was obtained from patient, spouse/SO, past medical records and ER records.     Subjective:     Principal Problem:Pressure injury, stage 4, with infection    Chief Complaint:   Chief Complaint   Patient presents with    General Illness     Pt reports having a decubitus, he has not had one before and has not had to go to wound care. Pt is wheelchair bound.         HPI: Mr. Pantoja is a 36yo man with spina bifida, DM2, overactive bladder, and recurrent UTI's.   He self-catheterizes PRN at home due to urinary retention.    He states he now comes to the ED due to a worsening right ischial decubitus ulcer.  He had been keeping its development to himself, hoping it would resolved.  Unfortunately it has deepened, and over the last two days he developed a purulent, foul-smelling drainage.  He denies myalgias, fever, or chills.  He cannot feel the area, so he has no pain.  He has no hot sweats or feelings of systemic infection per him.  He has no N/V/D.    In the ED his VS's were /66   Pulse 107   Temp 98.7 °F (37.1 °C) (Oral)   Resp 20   Ht 5' 3" (1.6 m)   Wt 72.6 kg (160 lb)   SpO2 98% RA  BMI 28.34 kg/m².  Labs showed COVID NEGATIVE, K 3.3, Cr 0.7, otherwise normal.  CBC showed WBC 7.7, Hg 12.8, .    Xray of the pelvis showed a lucency involving the left aspect of the symphysis and a chronic deformity of the acetabulae and femoral heads/necks, noting progressive femoroacetabular degenerative changes.  CT pelvis showed a decubitus ulcer overlying the right ischial tuberosity with a 3.9 cm ill-defined collection of fluid and air in the adjacent soft tissues, concerning for phlegmon or early abscess.  There was mild cortical " irregularity of the right ischial tuberosity, equivocal for acute osteomyelitis.  There was right inguinal and external iliac lymphadenopathy, likely reactive.  There was a small focus of air within the bladder.            Past Medical History:   Diagnosis Date    Overactive bladder     Spina bifida     Type 2 diabetes mellitus, without long-term current use of insulin 10/21/2021    Urinary tract infection        Past Surgical History:   Procedure Laterality Date    SPINE SURGERY      VENTRICULOPERITONEAL SHUNT         Review of patient's allergies indicates:   Allergen Reactions    Latex, natural rubber        No current facility-administered medications on file prior to encounter.     Current Outpatient Medications on File Prior to Encounter   Medication Sig    amLODIPine (NORVASC) 10 MG tablet Take 1 tablet (10 mg total) by mouth once daily.    atorvastatin (LIPITOR) 40 MG tablet Take 1 tablet (40 mg total) by mouth once daily.    metFORMIN (GLUCOPHAGE) 500 MG tablet Take 1 tablet (500 mg total) by mouth 3 (three) times daily with meals.    oxybutynin (DITROPAN XL) 15 MG TR24 Take 1 tablet (15 mg total) by mouth once daily.    polyethylene glycol (GLYCOLAX) 17 gram PwPk Take 17 g by mouth once daily.    valsartan (DIOVAN) 80 MG tablet Take 1 tablet (80 mg total) by mouth once daily.     Family History       Problem Relation (Age of Onset)    Spina bifida Brother          Tobacco Use    Smoking status: Never Smoker    Smokeless tobacco: Never Used   Substance and Sexual Activity    Alcohol use: No    Drug use: No    Sexual activity: Never     Review of Systems   Constitutional:  Positive for appetite change. Negative for chills and fever.   HENT:  Negative for congestion.    Eyes:  Negative for photophobia.   Respiratory:  Negative for chest tightness and shortness of breath.    Cardiovascular:  Negative for chest pain.   Gastrointestinal:  Positive for constipation. Negative for nausea and  vomiting.   Endocrine: Negative for heat intolerance.   Genitourinary:  Positive for difficulty urinating.   Musculoskeletal:  Negative for myalgias.   Skin:  Positive for color change and wound.   Allergic/Immunologic: Negative for immunocompromised state.   Neurological:  Negative for headaches.   Hematological:  Does not bruise/bleed easily.   Psychiatric/Behavioral:  Negative for dysphoric mood.    All other systems reviewed and are negative.  Objective:     Vital Signs (Most Recent):  Temp: 98.7 °F (37.1 °C) (07/24/22 1117)  Pulse: 107 (07/24/22 1813)  Resp: 20 (07/24/22 1117)  BP: 132/66 (07/24/22 1813)  SpO2: 98 % (07/24/22 1813) Vital Signs (24h Range):  Temp:  [98.7 °F (37.1 °C)] 98.7 °F (37.1 °C)  Pulse:  [100-108] 107  Resp:  [20] 20  SpO2:  [98 %-100 %] 98 %  BP: (128-143)/(66-83) 132/66     Weight: 72.6 kg (160 lb)  Body mass index is 28.34 kg/m².    Physical Exam  Constitutional:       General: He is not in acute distress.     Appearance: Normal appearance. He is not ill-appearing, toxic-appearing or diaphoretic.   HENT:      Head: Normocephalic and atraumatic.      Comments:  shunt  Eyes:      Conjunctiva/sclera:      Right eye: Right conjunctiva is not injected.      Left eye: Left conjunctiva is not injected.   Neck:      Thyroid: No thyromegaly.   Cardiovascular:      Rate and Rhythm: Regular rhythm. Tachycardia present.      Heart sounds: Murmur heard.   Systolic murmur is present with a grade of 2/6.   Pulmonary:      Effort: Pulmonary effort is normal.      Breath sounds: No decreased breath sounds, wheezing, rhonchi or rales.   Chest:      Chest wall: No swelling or tenderness.   Abdominal:      General: Abdomen is protuberant. Bowel sounds are normal. There is no distension.      Palpations: Abdomen is soft.      Tenderness: There is no abdominal tenderness.   Genitourinary:     Comments: No longo in place (he self-caths)  Musculoskeletal:      Cervical back: Neck supple.   Lymphadenopathy:       Comments: No peripheral edema   Skin:     General: Skin is warm and dry.   Neurological:      Mental Status: He is alert and oriented to person, place, and time.      GCS: GCS eye subscore is 4. GCS verbal subscore is 5. GCS motor subscore is 6.      Comments: Bilateral leg weakness 2/5 strength with decreased sensation.  Muscle atrophy to legs.   Psychiatric:         Attention and Perception: Attention and perception normal.         Mood and Affect: Mood and affect normal.         Behavior: Behavior is cooperative.         Cognition and Memory: Cognition and memory normal.                 Significant Labs: All pertinent labs within the past 24 hours have been reviewed.  Recent Results (from the past 24 hour(s))   POCT glucose    Collection Time: 07/24/22  2:21 PM   Result Value Ref Range    POCT Glucose 82 70 - 110 mg/dL   POCT CMP    Collection Time: 07/24/22  2:52 PM   Result Value Ref Range    Albumin, POC 3.2 3.3 - 5.5 g/dL    Alkaline Phosphatase,  42 - 141 U/L    ALT (SGPT), POC 18 10 - 47 U/L    AST (SGOT), POC 20 11 - 38 U/L    POC BUN 13 7 - 22 mg/dL    Calcium, POC 9.9 8.0 - 10.3 mg/dL    POC Chloride 94 (L) 98 - 108 mmol/L    POC Creatinine 0.7 0.6 - 1.2 mg/dL    POC Glucose 86 73 - 118 mg/dL    POC Potassium 3.3 (L) 3.6 - 5.1 mmol/L    POC Sodium 142 128 - 145 mmol/L    Bilirubin, POC 0.6 0.2 - 1.6 mg/dL    POC TCO2 34 (H) 18 - 33 mmol/L    Protein, POC 9.1 (H) 6.4 - 8.1 g/dL   POCT COVID-19 Rapid Screening    Collection Time: 07/24/22  3:13 PM   Result Value Ref Range    POC Rapid COVID Negative Negative     Acceptable Yes          Significant Imaging: I have reviewed all pertinent imaging results/findings within the past 24 hours.    Assessment/Plan:     * Pressure injury, stage 4, with infection  CT concerning for abscess and osteomyelitis: Decubitus ulcer overlying the right ischial tuberosity.  3.9 cm ill-defined collection of fluid and air in the adjacent soft tissues,  concerning for phlegmon or early abscess.  Mild cortical irregularity of the right ischial tuberosity, equivocal for acute osteomyelitis.    Consult Ortho and wound care  NPO at midnight  MRI with and without emely of pelvis  Vanco + Cefepime  No SIRS criteria currently         [START ON 7/25/2022] cefepime in dextrose 5 % 1 gram/50 mL IVPB 1 g, 1 g, Intravenous, Q12H      Pharmacy to dose Vancomycin consult, , , Once **AND** vancomycin - pharmacy to dose, , Intravenous, pharmacy to manage frequency     [START ON 7/25/2022] vancomycin 1.25 g in dextrose 5% 250 mL IVPB (ready to mix), 1,250 mg, Intravenous, Q12H         Type 2 diabetes mellitus, without long-term current use of insulin  Patient's FSGs are controlled on current medication regimen.  Last A1c reviewed-   Lab Results   Component Value Date    HGBA1C 12.8 (H) 03/22/2022     Most recent fingerstick glucose reviewed-   Recent Labs   Lab 07/24/22  1421   POCTGLUCOSE 82     Current correctional scale  Low  Decrease anti-hyperglycemic dose as follows-   Antihyperglycemics (From admission, onward)            Start     Stop Route Frequency Ordered    07/24/22 2004  insulin aspart U-100 pen 0-5 Units         -- SubQ Every 6 hours PRN 07/24/22 1904        Hold Oral hypoglycemics while patient is in the hospital.    HTN (hypertension), benign  Continue home meds:    [START ON 7/25/2022] amLODIPine tablet 10 mg, 10 mg, Oral, Daily     [START ON 7/25/2022] valsartan tablet 80 mg, 80 mg, Oral, Daily         Spina bifida  DVT prophylaxis  PT and OT consults      Neurogenic bladder  PRN self-catheterization         [START ON 7/25/2022] oxybutynin 24 hr tablet 15 mg, 15 mg, Oral, Daily         Paraparesis  PT and OT  Poor sensation too      Dyslipidemia  Continue home meds:    [START ON 7/25/2022] atorvastatin tablet 40 mg, 40 mg, Oral, Daily            Hypokalemia  K-lyte 20 meq po x 1  NS with 20 meq KCl at 100 cc/hr  Check Mg        VTE Risk Mitigation (From  admission, onward)         Ordered     enoxaparin injection 40 mg  Daily         07/24/22 1907     Place sequential compression device  Until discontinued         07/24/22 1907     Place ELIJAH hose  Until discontinued         07/24/22 1907                   VINCE Turner MD  Department of Hospital Medicine   [facility] - [place] - Telemetry

## 2022-07-25 NOTE — PLAN OF CARE
Problem: Occupational Therapy  Goal: Occupational Therapy Goal  Outcome: Adequate for Care Transition     Initial OT eval completed. Pt functioning MOD I with bed mobility and supervision for squat pivot w/c. OT provided BUE HEP with blue theraband which pt completed x15 reps each. OT rec home with family support. OT to sign off. Thank you.

## 2022-07-25 NOTE — PROGRESS NOTES
"[facility] Medicine  Progress Note    Patient Name: Dale Pantoja  MRN: [id number]  Patient Class: IP- Inpatient   Admission Date: 7/24/2022  Length of Stay: 1 days  Attending Physician: Carlos Eduardo Rodriguez MD  Primary Care Provider: Juan Antonio Adorno MD        Subjective:     Principal Problem:Pressure injury, stage 4, with infection        HPI:  Mr. Pantoja is a 38yo man with spina bifida, DM2, overactive bladder, and recurrent UTI's.   He self-catheterizes PRN at home due to urinary retention.    He states he now comes to the ED due to a worsening right ischial decubitus ulcer.  He had been keeping its development to himself, hoping it would resolved.  Unfortunately it has deepened, and over the last two days he developed a purulent, foul-smelling drainage.  He denies myalgias, fever, or chills.  He cannot feel the area, so he has no pain.  He has no hot sweats or feelings of systemic infection per him.  He has no N/V/D.    In the ED his VS's were /66   Pulse 107   Temp 98.7 °F (37.1 °C) (Oral)   Resp 20   Ht 5' 3" (1.6 m)   Wt 72.6 kg (160 lb)   SpO2 98% RA  BMI 28.34 kg/m².  Labs showed COVID NEGATIVE, K 3.3, Cr 0.7, otherwise normal.  CBC showed WBC 7.7, Hg 12.8, .    Xray of the pelvis showed a lucency involving the left aspect of the symphysis and a chronic deformity of the acetabulae and femoral heads/necks, noting progressive femoroacetabular degenerative changes.  CT pelvis showed a decubitus ulcer overlying the right ischial tuberosity with a 3.9 cm ill-defined collection of fluid and air in the adjacent soft tissues, concerning for phlegmon or early abscess.  There was mild cortical irregularity of the right ischial tuberosity, equivocal for acute osteomyelitis.  There was right inguinal and external iliac lymphadenopathy, likely reactive.  There was a small focus of air within the bladder.            Overview/Hospital Course:  Admitted with sepsis secondary to ischial " tuberosity ulcer with possible abscess. Started on IVF and IV antibiotics. General Surgery consulted and did bedside debridement/cultures. MRI pending.       Interval History: No new issues, doing well. Had bedside debridement and cultures taken.     Review of Systems   Constitutional:  Positive for appetite change. Negative for chills and fever.   HENT:  Negative for congestion.    Eyes:  Negative for photophobia.   Respiratory:  Negative for chest tightness and shortness of breath.    Cardiovascular:  Negative for chest pain.   Gastrointestinal:  Positive for constipation. Negative for nausea and vomiting.   Endocrine: Negative for heat intolerance.   Genitourinary:  Positive for difficulty urinating.   Musculoskeletal:  Negative for myalgias.   Skin:  Positive for color change and wound.   Allergic/Immunologic: Negative for immunocompromised state.   Neurological:  Negative for headaches.   Hematological:  Does not bruise/bleed easily.   Psychiatric/Behavioral:  Negative for dysphoric mood.    All other systems reviewed and are negative.  Objective:     Vital Signs (Most Recent):  Temp: 98.1 °F (36.7 °C) (07/25/22 1121)  Pulse: 89 (07/25/22 1121)  Resp: 18 (07/25/22 1121)  BP: (!) 140/85 (07/25/22 1121)  SpO2: 100 % (07/25/22 1121)   Vital Signs (24h Range):  Temp:  [97.9 °F (36.6 °C)-98.9 °F (37.2 °C)] 98.1 °F (36.7 °C)  Pulse:  [] 89  Resp:  [14-20] 18  SpO2:  [98 %-100 %] 100 %  BP: (131-154)/(66-97) 140/85     Weight: 71.5 kg (157 lb 10.1 oz)  Body mass index is 27.92 kg/m².    Intake/Output Summary (Last 24 hours) at 7/25/2022 1204  Last data filed at 7/25/2022 0603  Gross per 24 hour   Intake 2206.04 ml   Output 1280 ml   Net 926.04 ml      Physical Exam  Constitutional:       General: He is not in acute distress.     Appearance: Normal appearance. He is not ill-appearing, toxic-appearing or diaphoretic.   HENT:      Head: Normocephalic and atraumatic.      Comments:  shunt  Eyes:       Conjunctiva/sclera:      Right eye: Right conjunctiva is not injected.      Left eye: Left conjunctiva is not injected.   Neck:      Thyroid: No thyromegaly.   Cardiovascular:      Rate and Rhythm: Normal rate and regular rhythm.      Heart sounds: Murmur heard.   Systolic murmur is present with a grade of 2/6.   Pulmonary:      Effort: Pulmonary effort is normal.      Breath sounds: No decreased breath sounds, wheezing, rhonchi or rales.   Chest:      Chest wall: No swelling or tenderness.   Abdominal:      General: Abdomen is protuberant. Bowel sounds are normal. There is no distension.      Palpations: Abdomen is soft.      Tenderness: There is no abdominal tenderness.   Genitourinary:     Comments: No longo in place (he self-caths)  Musculoskeletal:      Cervical back: Neck supple.      Comments: Wound was not checked - surgery evaluated previously   Lymphadenopathy:      Comments: No peripheral edema   Skin:     General: Skin is warm and dry.   Neurological:      Mental Status: He is alert and oriented to person, place, and time.      GCS: GCS eye subscore is 4. GCS verbal subscore is 5. GCS motor subscore is 6.      Comments: Bilateral leg weakness 2/5 strength with decreased sensation.  Muscle atrophy to legs. This is patient's baseline   Psychiatric:         Attention and Perception: Attention and perception normal.         Mood and Affect: Mood and affect normal.         Behavior: Behavior is cooperative.         Cognition and Memory: Cognition and memory normal.       Significant Labs: All pertinent labs within the past 24 hours have been reviewed.    Significant Imaging: I have reviewed all pertinent imaging results/findings within the past 24 hours.      Assessment/Plan:      * Pressure injury, stage 4, with infection  CT concerning for abscess and osteomyelitis: Decubitus ulcer overlying the right ischial tuberosity.  3.9 cm ill-defined collection of fluid and air in the adjacent soft tissues,  concerning for phlegmon or early abscess.  Mild cortical irregularity of the right ischial tuberosity, equivocal for acute osteomyelitis.  - General surgery consulted  - Pending MRI  - cultures and bedside debridment done - f/u culture data        Hypokalemia  K-lyte 20 meq po x 1  NS with 20 meq KCl at 100 cc/hr  Check Mg  - start on supplement      Type 2 diabetes mellitus, without long-term current use of insulin  Patient's FSGs are controlled on current medication regimen.  Last A1c reviewed-   Lab Results   Component Value Date    HGBA1C 10.6 (H) 07/24/2022     Most recent fingerstick glucose reviewed-   Recent Labs   Lab 07/24/22  1421 07/24/22  2341 07/25/22  0642 07/25/22  1119   POCTGLUCOSE 82 168* 139* 111*     Current correctional scale  Low  Decrease anti-hyperglycemic dose as follows-   Antihyperglycemics (From admission, onward)            Start     Stop Route Frequency Ordered    07/24/22 2004  insulin aspart U-100 pen 0-5 Units         -- SubQ Every 6 hours PRN 07/24/22 1904        Hold Oral hypoglycemics while patient is in the hospital.    Spina bifida  DVT prophylaxis  PT and OT consults      Neurogenic bladder  PRN self-catheterization             Paraparesis  PT and OT  Poor sensation too but at baseline      HTN (hypertension), benign  Continue home meds        Dyslipidemia  Continue home meds      VTE Risk Mitigation (From admission, onward)         Ordered     enoxaparin injection 40 mg  Daily         07/24/22 1907     Place sequential compression device  Until discontinued         07/24/22 1907     Place ELIJAH hose  Until discontinued         07/24/22 1907                Discharge Planning   ROSS:      Code Status: Full Code   Is the patient medically ready for discharge?:     Reason for patient still in hospital (select all that apply): Treatment                     Carlos Eduardo Rodriguez MD  Department of Hospital Medicine   [facility] - [place] - Telemetry

## 2022-07-26 PROBLEM — D64.9 ANEMIA: Status: ACTIVE | Noted: 2022-07-26

## 2022-07-26 LAB
ALBUMIN SERPL BCP-MCNC: 2.2 G/DL (ref 3.5–5.2)
ALP SERPL-CCNC: 80 U/L (ref 55–135)
ALT SERPL W/O P-5'-P-CCNC: 9 U/L (ref 10–44)
ANION GAP SERPL CALC-SCNC: 7 MMOL/L (ref 8–16)
AST SERPL-CCNC: 9 U/L (ref 10–40)
BACTERIA UR CULT: ABNORMAL
BASOPHILS # BLD AUTO: 0.04 K/UL (ref 0–0.2)
BASOPHILS NFR BLD: 0.7 % (ref 0–1.9)
BILIRUB SERPL-MCNC: 0.3 MG/DL (ref 0.1–1)
BUN SERPL-MCNC: 9 MG/DL (ref 6–20)
CALCIUM SERPL-MCNC: 8.4 MG/DL (ref 8.7–10.5)
CHLORIDE SERPL-SCNC: 105 MMOL/L (ref 95–110)
CO2 SERPL-SCNC: 29 MMOL/L (ref 23–29)
CREAT SERPL-MCNC: 0.8 MG/DL (ref 0.5–1.4)
DIFFERENTIAL METHOD: ABNORMAL
EOSINOPHIL # BLD AUTO: 0.2 K/UL (ref 0–0.5)
EOSINOPHIL NFR BLD: 4.2 % (ref 0–8)
ERYTHROCYTE [DISTWIDTH] IN BLOOD BY AUTOMATED COUNT: 14.6 % (ref 11.5–14.5)
EST. GFR  (AFRICAN AMERICAN): >60 ML/MIN/1.73 M^2
EST. GFR  (NON AFRICAN AMERICAN): >60 ML/MIN/1.73 M^2
GLUCOSE SERPL-MCNC: 119 MG/DL (ref 70–110)
HCT VFR BLD AUTO: 34 % (ref 40–54)
HGB BLD-MCNC: 10.5 G/DL (ref 14–18)
IMM GRANULOCYTES # BLD AUTO: 0.03 K/UL (ref 0–0.04)
IMM GRANULOCYTES NFR BLD AUTO: 0.5 % (ref 0–0.5)
LYMPHOCYTES # BLD AUTO: 1.5 K/UL (ref 1–4.8)
LYMPHOCYTES NFR BLD: 26.5 % (ref 18–48)
MAGNESIUM SERPL-MCNC: 1.8 MG/DL (ref 1.6–2.6)
MCH RBC QN AUTO: 23 PG (ref 27–31)
MCHC RBC AUTO-ENTMCNC: 30.9 G/DL (ref 32–36)
MCV RBC AUTO: 75 FL (ref 82–98)
MONOCYTES # BLD AUTO: 0.5 K/UL (ref 0.3–1)
MONOCYTES NFR BLD: 8.9 % (ref 4–15)
NEUTROPHILS # BLD AUTO: 3.3 K/UL (ref 1.8–7.7)
NEUTROPHILS NFR BLD: 59.2 % (ref 38–73)
NRBC BLD-RTO: 0 /100 WBC
PHOSPHATE SERPL-MCNC: 2.6 MG/DL (ref 2.7–4.5)
PLATELET # BLD AUTO: 395 K/UL (ref 150–450)
PMV BLD AUTO: 9.1 FL (ref 9.2–12.9)
POCT GLUCOSE: 106 MG/DL (ref 70–110)
POCT GLUCOSE: 117 MG/DL (ref 70–110)
POCT GLUCOSE: 124 MG/DL (ref 70–110)
POCT GLUCOSE: 177 MG/DL (ref 70–110)
POTASSIUM SERPL-SCNC: 3.6 MMOL/L (ref 3.5–5.1)
PROT SERPL-MCNC: 6.9 G/DL (ref 6–8.4)
RBC # BLD AUTO: 4.56 M/UL (ref 4.6–6.2)
SODIUM SERPL-SCNC: 141 MMOL/L (ref 136–145)
VANCOMYCIN TROUGH SERPL-MCNC: 12 UG/ML (ref 10–22)
WBC # BLD AUTO: 5.5 K/UL (ref 3.9–12.7)

## 2022-07-26 PROCEDURE — 25000003 PHARM REV CODE 250: Performed by: STUDENT IN AN ORGANIZED HEALTH CARE EDUCATION/TRAINING PROGRAM

## 2022-07-26 PROCEDURE — 83735 ASSAY OF MAGNESIUM: CPT | Performed by: INTERNAL MEDICINE

## 2022-07-26 PROCEDURE — 36415 COLL VENOUS BLD VENIPUNCTURE: CPT | Performed by: INTERNAL MEDICINE

## 2022-07-26 PROCEDURE — 84100 ASSAY OF PHOSPHORUS: CPT | Performed by: INTERNAL MEDICINE

## 2022-07-26 PROCEDURE — 85025 COMPLETE CBC W/AUTO DIFF WBC: CPT | Performed by: INTERNAL MEDICINE

## 2022-07-26 PROCEDURE — 99232 SBSQ HOSP IP/OBS MODERATE 35: CPT | Mod: ,,, | Performed by: SURGERY

## 2022-07-26 PROCEDURE — 63600175 PHARM REV CODE 636 W HCPCS: Performed by: INTERNAL MEDICINE

## 2022-07-26 PROCEDURE — 63600175 PHARM REV CODE 636 W HCPCS: Performed by: STUDENT IN AN ORGANIZED HEALTH CARE EDUCATION/TRAINING PROGRAM

## 2022-07-26 PROCEDURE — 25000003 PHARM REV CODE 250: Performed by: INTERNAL MEDICINE

## 2022-07-26 PROCEDURE — 80202 ASSAY OF VANCOMYCIN: CPT | Performed by: STUDENT IN AN ORGANIZED HEALTH CARE EDUCATION/TRAINING PROGRAM

## 2022-07-26 PROCEDURE — 97542 WHEELCHAIR MNGMENT TRAINING: CPT | Mod: CQ

## 2022-07-26 PROCEDURE — 99232 PR SUBSEQUENT HOSPITAL CARE,LEVL II: ICD-10-PCS | Mod: ,,, | Performed by: SURGERY

## 2022-07-26 PROCEDURE — 80053 COMPREHEN METABOLIC PANEL: CPT | Performed by: INTERNAL MEDICINE

## 2022-07-26 PROCEDURE — 36415 COLL VENOUS BLD VENIPUNCTURE: CPT | Performed by: STUDENT IN AN ORGANIZED HEALTH CARE EDUCATION/TRAINING PROGRAM

## 2022-07-26 PROCEDURE — 21400001 HC TELEMETRY ROOM

## 2022-07-26 RX ADMIN — OXYBUTYNIN CHLORIDE 15 MG: 5 TABLET, EXTENDED RELEASE ORAL at 09:07

## 2022-07-26 RX ADMIN — VANCOMYCIN HYDROCHLORIDE 1250 MG: 1.25 INJECTION, POWDER, LYOPHILIZED, FOR SOLUTION INTRAVENOUS at 08:07

## 2022-07-26 RX ADMIN — VALSARTAN 80 MG: 80 TABLET, FILM COATED ORAL at 09:07

## 2022-07-26 RX ADMIN — CEFEPIME 1 G: 1 INJECTION, POWDER, FOR SOLUTION INTRAMUSCULAR; INTRAVENOUS at 01:07

## 2022-07-26 RX ADMIN — AMLODIPINE BESYLATE 10 MG: 5 TABLET ORAL at 09:07

## 2022-07-26 RX ADMIN — POLYETHYLENE GLYCOL 3350 17 G: 17 POWDER, FOR SOLUTION ORAL at 09:07

## 2022-07-26 RX ADMIN — SODIUM CHLORIDE AND POTASSIUM CHLORIDE: 9; 1.49 INJECTION, SOLUTION INTRAVENOUS at 01:07

## 2022-07-26 RX ADMIN — ENOXAPARIN SODIUM 40 MG: 100 INJECTION SUBCUTANEOUS at 05:07

## 2022-07-26 RX ADMIN — ATORVASTATIN CALCIUM 40 MG: 40 TABLET, FILM COATED ORAL at 09:07

## 2022-07-26 RX ADMIN — SODIUM CHLORIDE AND POTASSIUM CHLORIDE: 9; 1.49 INJECTION, SOLUTION INTRAVENOUS at 05:07

## 2022-07-26 RX ADMIN — VANCOMYCIN HYDROCHLORIDE 1250 MG: 1.25 INJECTION, POWDER, LYOPHILIZED, FOR SOLUTION INTRAVENOUS at 06:07

## 2022-07-26 NOTE — PT/OT/SLP PROGRESS
"Physical Therapy Treatment    Patient Name:  Dale Pantoja   MRN:  [id number]    Recommendations:     Discharge Recommendations:  home   Discharge Equipment Recommendations: none   Barriers to discharge: None    Assessment:     Dale Pantoja is a 37 y.o. male admitted with a medical diagnosis of Pressure injury, stage 4, with infection.  He presents with the following impairments/functional limitations:  weakness, impaired endurance.    Rehab Prognosis: Good; patient would benefit from acute skilled PT services to address these deficits and reach maximum level of function.    Recent Surgery: * No surgery found *      Plan:     During this hospitalization, patient to be seen  (f/u on 7/26) to address the identified rehab impairments via therapeutic activities, wheelchair management/training, therapeutic exercises and progress toward the following goals:    · Plan of Care Expires:  08/08/22    Subjective     Chief Complaint: none  Patient/Family Comments/goals: "I don't need help. My upper body is strong and I have good balance."  Pain/Comfort:  · Pain Rating 1: 0/10  · Pain Rating Post-Intervention 1: 0/10      Objective:     Communicated with nurse Sage prior to session.  Patient found HOB elevated with telemetry, peripheral IV (green cushion) upon PT entry to room.     General Precautions: Standard, fall   Orthopedic Precautions:N/A   Braces:    Respiratory Status: Room air     Functional Mobility:  · Bed Mobility:     · Scooting: modified independence  · Supine to Sit: modified independence  · Transfers:     · Sit to Stand:  modified independence with no AD  · Bed to Chair: modified independence with  no AD  using  Stand Pivot  · Balance: good sitting  · Wheelchair Propulsion:  Pt propelled Standard wheelchair x 500 ft x 2 with INDEP on Level tile with  Both upper extremity with Independent.       AM-PAC 6 CLICK MOBILITY  Turning over in bed (including adjusting bedclothes, sheets and blankets)?: 4  Sitting down on " and standing up from a chair with arms (e.g., wheelchair, bedside commode, etc.): 4  Moving from lying on back to sitting on the side of the bed?: 4  Moving to and from a bed to a chair (including a wheelchair)?: 4  Need to walk in hospital room?: 1  Climbing 3-5 steps with a railing?: 1  Basic Mobility Total Score: 18       Therapeutic Activities and Exercises:       Patient left up in wc with all lines intact, call button in reach and nurse present..    GOALS:   Multidisciplinary Problems     Physical Therapy Goals        Problem: Physical Therapy    Goal Priority Disciplines Outcome Goal Variances Interventions   Physical Therapy Goal     PT, PT/OT Ongoing, Progressing     Description: Goals to be met by:      Patient will increase functional independence with mobility by performin. Bed to and from WC transfer with Modified [place]   2. Wheelchair propulsion x500 feet with Modified [place] using bilateral uppper extremities                     Time Tracking:     PT Received On: 22  PT Start Time: 1302     PT Stop Time: 1310  PT Total Time (min): 8 min     Billable Minutes: Train/Wheelchair Management 8    Treatment Type: Treatment  PT/PTA: PTA     PTA Visit Number: 1     2022

## 2022-07-26 NOTE — SUBJECTIVE & OBJECTIVE
Interval History: patient seen and examined. He denies any new complaints    Review of Systems  Objective:     Vital Signs (Most Recent):  Temp: 98.3 °F (36.8 °C) (07/26/22 1127)  Pulse: 94 (07/26/22 1127)  Resp: 20 (07/26/22 1127)  BP: (!) 148/97 (07/26/22 1127)  SpO2: 95 % (07/26/22 1127)   Vital Signs (24h Range):  Temp:  [97.7 °F (36.5 °C)-98.8 °F (37.1 °C)] 98.3 °F (36.8 °C)  Pulse:  [85-99] 94  Resp:  [18-20] 20  SpO2:  [95 %-99 %] 95 %  BP: (100-148)/(68-97) 148/97     Weight: 71.5 kg (157 lb 10.1 oz)  Body mass index is 27.92 kg/m².    Intake/Output Summary (Last 24 hours) at 7/26/2022 1247  Last data filed at 7/25/2022 2022  Gross per 24 hour   Intake --   Output 750 ml   Net -750 ml      Physical Exam  Constitutional:       Comments: AA male laying in bed comfortably talking to mum at bedside in NAD   HENT:      Head: Normocephalic and atraumatic.   Eyes:      Extraocular Movements: Extraocular movements intact.      Conjunctiva/sclera: Conjunctivae normal.      Pupils: Pupils are equal, round, and reactive to light.   Cardiovascular:      Rate and Rhythm: Normal rate and regular rhythm.      Pulses: Normal pulses.      Heart sounds: Normal heart sounds.   Pulmonary:      Effort: Pulmonary effort is normal. No respiratory distress.      Breath sounds: Normal breath sounds. No stridor. No wheezing or rhonchi.   Abdominal:      General: There is no distension.      Palpations: Abdomen is soft. There is no mass.      Tenderness: There is no abdominal tenderness. There is no guarding.   Musculoskeletal:      Comments: Paralyzed from waist downward from spinal bifida, extremities consistent with history of spina bifida   Skin:     Capillary Refill: Capillary refill takes less than 2 seconds.   Neurological:      Mental Status: He is oriented to person, place, and time.   Psychiatric:         Mood and Affect: Mood normal.         Behavior: Behavior normal.         Thought Content: Thought content normal.          Judgment: Judgment normal.       Significant Labs: All pertinent labs within the past 24 hours have been reviewed.    Significant Imaging: I have reviewed all pertinent imaging results/findings within the past 24 hours.

## 2022-07-26 NOTE — ASSESSMENT & PLAN NOTE
Patient's FSGs are controlled on current medication regimen.  Last A1c reviewed-   Lab Results   Component Value Date    HGBA1C 10.6 (H) 07/24/2022     Most recent fingerstick glucose reviewed-   Recent Labs   Lab 07/25/22  1825 07/26/22  0752 07/26/22  1127   POCTGLUCOSE 145* 177* 124*     Current correctional scale  Low  Decrease anti-hyperglycemic dose as follows-   Antihyperglycemics (From admission, onward)            Start     Stop Route Frequency Ordered    07/24/22 2004  insulin aspart U-100 pen 0-5 Units         -- SubQ Every 6 hours PRN 07/24/22 1904        Hold Oral hypoglycemics while patient is in the hospital.

## 2022-07-26 NOTE — NURSING
Report received from night nurse CHUY Coles. Visualized patient and assessed patient's overall condition and appearance. No acute distress noted. Will continue to monitor

## 2022-07-26 NOTE — PROGRESS NOTES
[facility]  General Surgery  Progress Note    Subjective:     History of Present Illness:  Dale Pantoja is a 37 y.o. male with a history of HTN, DLD, spina bifida, and paraplegia (from umbilicus down) who presented to the  ED last night due to a worsening right ischial decubitus ulcer. He has only noticed this over the last week and family reports he did not tell them about it until recently after he realized it wasn't improving. He has noticed a foul-smell with purulent drainage. No fevers, chills, nausea, or vomiting. He is wheelchair bound and suffers from recurrent UTI due to neurogenic bladder requiring self catheterization. However, he is overall quite functional and is able to move himself around.    In the ED he was tachycardic with HR in the 100s but was AF and HDS. Labs demonstrated a normal WBC without left shift, but he did have an elevated ESR and CRP. CMP showed some electrolyte abnormalities with mild hypokalemia and hypophosphatemia. Imaging with CT abd/pelvis showed a 2.9 cm decubitus ulcer overlying the right ischial tuberosity with adjacent skin thickening and subcutaneous edema. There was a 3.9 x 2.5 cm ill defined air/fluid collection in the adjacent soft tissue. General surgery was then consulted.       Post-Op Info:  * No surgery found *         Interval History: AF, HDS. No acute events. Mri yesterday with small fluid collection. Wound explored at bedside, pocket entered with small amount of purulence expressed.     Medications:  Continuous Infusions:   0/9% NACL & POTASSIUM CHLORIDE 20 MEQ/L 100 mL/hr at 07/26/22 0115     Scheduled Meds:   amLODIPine  10 mg Oral Daily    atorvastatin  40 mg Oral Daily    ceFEPime (MAXIPIME) IVPB  1 g Intravenous Q12H    enoxaparin  40 mg Subcutaneous Daily    oxybutynin  15 mg Oral Daily    polyethylene glycol  17 g Oral Daily    valsartan  80 mg Oral Daily    vancomycin (VANCOCIN) IVPB  1,250 mg Intravenous Q12H     PRN  Meds:acetaminophen, albuterol-ipratropium, aluminum-magnesium hydroxide-simethicone, dextrose 50%, glucagon (human recombinant), insulin aspart U-100, melatonin, morphine, naloxone, ondansetron, oxyCODONE, prochlorperazine, senna-docusate 8.6-50 mg, simethicone, sodium chloride 0.9%, Pharmacy to dose Vancomycin consult **AND** vancomycin - pharmacy to dose     Review of patient's allergies indicates:   Allergen Reactions    Latex, natural rubber      Objective:     Vital Signs (Most Recent):  Temp: 98.3 °F (36.8 °C) (07/26/22 1127)  Pulse: 94 (07/26/22 1127)  Resp: 20 (07/26/22 1127)  BP: (!) 148/97 (07/26/22 1127)  SpO2: 95 % (07/26/22 1127)   Vital Signs (24h Range):  Temp:  [97.7 °F (36.5 °C)-98.8 °F (37.1 °C)] 98.3 °F (36.8 °C)  Pulse:  [85-99] 94  Resp:  [18-20] 20  SpO2:  [95 %-99 %] 95 %  BP: (100-148)/(68-97) 148/97     Weight: 71.5 kg (157 lb 10.1 oz)  Body mass index is 27.92 kg/m².    Intake/Output - Last 3 Shifts         07/24 0700 07/25 0659 07/25 0700 07/26 0659 07/26 0700 07/27 0659    I.V. (mL/kg) 1113.9 (15.6)      IV Piggyback 1092.1      Total Intake(mL/kg) 2206 (30.9)      Urine (mL/kg/hr) 1280 750 (0.4)     Total Output 1280 750     Net +926 -750            Urine Occurrence 2 x      Stool Occurrence   1 x            Physical Exam  Vitals and nursing note reviewed.   Constitutional:       General: He is not in acute distress.     Appearance: He is not ill-appearing, toxic-appearing or diaphoretic.   HENT:      Head: Normocephalic and atraumatic.   Eyes:      General: No scleral icterus.     Extraocular Movements: Extraocular movements intact.   Cardiovascular:      Rate and Rhythm: Normal rate and regular rhythm.   Pulmonary:      Effort: Pulmonary effort is normal. No respiratory distress.   Abdominal:      General: There is no distension.      Palpations: Abdomen is soft.      Tenderness: There is no abdominal tenderness.   Genitourinary:     Comments: Scar at the top of the gluteal  cleft  Right ischial pressure ulcer with foul smelling purulent drainage  Wound edges appear overall healthy, but does have some fibrinous tissue at the base  Wound tracks deep and medially  Musculoskeletal:         General: Deformity present.   Skin:     General: Skin is warm and dry.      Coloration: Skin is not jaundiced.   Neurological:      Mental Status: He is alert. Mental status is at baseline.      Cranial Nerves: No cranial nerve deficit.      Comments: Paraplegic from the umbilicus down  BUE move appropriately   Psychiatric:         Mood and Affect: Mood normal.         Behavior: Behavior normal.       Significant Labs:  I have reviewed all pertinent lab results within the past 24 hours.  CBC:   Recent Labs   Lab 07/26/22 0319   WBC 5.50   RBC 4.56*   HGB 10.5*   HCT 34.0*      MCV 75*   MCH 23.0*   MCHC 30.9*     CMP:   Recent Labs   Lab 07/26/22 0319   *   CALCIUM 8.4*   ALBUMIN 2.2*   PROT 6.9      K 3.6   CO2 29      BUN 9   CREATININE 0.8   ALKPHOS 80   ALT 9*   AST 9*   BILITOT 0.3       Significant Diagnostics:  I have reviewed all pertinent imaging results/findings within the past 24 hours.    Assessment/Plan:     * Pressure injury, stage 4, with infection  Dale Pantoja is a 37 y.o. male with a history of spina bifida and paraplegia who presents with a stage 4 pressure injury of the right ischium    - s/p bedside debridement 7/25/22  - MRI with findings of acute osteomyelitis. Agree with antibiotics.   - Wound care: pack wound with wet to dry vashe soaked gauze daily.  - Remainder of care per primary. Please call with questions.         Maximo Maciel MD  General Surgery  [facility] - Telemetry

## 2022-07-26 NOTE — ASSESSMENT & PLAN NOTE
Dale Pantoja is a 37 y.o. male with a history of spina bifida and paraplegia who presents with a stage 4 pressure injury of the right ischium    - s/p bedside debridement 7/25/22  - MRI with findings of acute osteomyelitis. Agree with antibiotics.   - Wound care: pack wound with wet to dry vashe soaked gauze daily.  - Remainder of care per primary. Please call with questions.

## 2022-07-26 NOTE — PROGRESS NOTES
"[facility] Medicine  Progress Note    Patient Name: Dale Pantoja  MRN: [id number]  Patient Class: IP- Inpatient   Admission Date: 7/24/2022  Length of Stay: 2 days  Attending Physician: Hiren Preston MD  Primary Care Provider: Juan Antonio Adorno MD        Subjective:     Principal Problem:Pressure injury, stage 4, with infection        HPI:  Mr. Pantoja is a 38yo man with spina bifida, DM2, overactive bladder, and recurrent UTI's.   He self-catheterizes PRN at home due to urinary retention.    He states he now comes to the ED due to a worsening right ischial decubitus ulcer.  He had been keeping its development to himself, hoping it would resolved.  Unfortunately it has deepened, and over the last two days he developed a purulent, foul-smelling drainage.  He denies myalgias, fever, or chills.  He cannot feel the area, so he has no pain.  He has no hot sweats or feelings of systemic infection per him.  He has no N/V/D.    In the ED his VS's were /66   Pulse 107   Temp 98.7 °F (37.1 °C) (Oral)   Resp 20   Ht 5' 3" (1.6 m)   Wt 72.6 kg (160 lb)   SpO2 98% RA  BMI 28.34 kg/m².  Labs showed COVID NEGATIVE, K 3.3, Cr 0.7, otherwise normal.  CBC showed WBC 7.7, Hg 12.8, .    Xray of the pelvis showed a lucency involving the left aspect of the symphysis and a chronic deformity of the acetabulae and femoral heads/necks, noting progressive femoroacetabular degenerative changes.  CT pelvis showed a decubitus ulcer overlying the right ischial tuberosity with a 3.9 cm ill-defined collection of fluid and air in the adjacent soft tissues, concerning for phlegmon or early abscess.  There was mild cortical irregularity of the right ischial tuberosity, equivocal for acute osteomyelitis.  There was right inguinal and external iliac lymphadenopathy, likely reactive.  There was a small focus of air within the bladder.            Overview/Hospital Course:  Admitted with sepsis secondary to " ischial tuberosity ulcer with possible abscess. Started on IVF and IV antibiotics. General Surgery consulted and did bedside debridement/cultures. MRI pending.       Interval History: patient seen and examined. He denies any new complaints    Review of Systems  Objective:     Vital Signs (Most Recent):  Temp: 98.3 °F (36.8 °C) (07/26/22 1127)  Pulse: 94 (07/26/22 1127)  Resp: 20 (07/26/22 1127)  BP: (!) 148/97 (07/26/22 1127)  SpO2: 95 % (07/26/22 1127)   Vital Signs (24h Range):  Temp:  [97.7 °F (36.5 °C)-98.8 °F (37.1 °C)] 98.3 °F (36.8 °C)  Pulse:  [85-99] 94  Resp:  [18-20] 20  SpO2:  [95 %-99 %] 95 %  BP: (100-148)/(68-97) 148/97     Weight: 71.5 kg (157 lb 10.1 oz)  Body mass index is 27.92 kg/m².    Intake/Output Summary (Last 24 hours) at 7/26/2022 1247  Last data filed at 7/25/2022 2022  Gross per 24 hour   Intake --   Output 750 ml   Net -750 ml      Physical Exam  Constitutional:       Comments: AA male laying in bed comfortably talking to mum at bedside in NAD   HENT:      Head: Normocephalic and atraumatic.   Eyes:      Extraocular Movements: Extraocular movements intact.      Conjunctiva/sclera: Conjunctivae normal.      Pupils: Pupils are equal, round, and reactive to light.   Cardiovascular:      Rate and Rhythm: Normal rate and regular rhythm.      Pulses: Normal pulses.      Heart sounds: Normal heart sounds.   Pulmonary:      Effort: Pulmonary effort is normal. No respiratory distress.      Breath sounds: Normal breath sounds. No stridor. No wheezing or rhonchi.   Abdominal:      General: There is no distension.      Palpations: Abdomen is soft. There is no mass.      Tenderness: There is no abdominal tenderness. There is no guarding.   Musculoskeletal:      Comments: Paralyzed from waist downward from spinal bifida, extremities consistent with history of spina bifida   Skin:     Capillary Refill: Capillary refill takes less than 2 seconds.   Neurological:      Mental Status: He is oriented to  person, place, and time.   Psychiatric:         Mood and Affect: Mood normal.         Behavior: Behavior normal.         Thought Content: Thought content normal.         Judgment: Judgment normal.       Significant Labs: All pertinent labs within the past 24 hours have been reviewed.    Significant Imaging: I have reviewed all pertinent imaging results/findings within the past 24 hours.      Assessment/Plan:      * Pressure injury, stage 4, with infection  CT concerning for abscess and osteomyelitis: Decubitus ulcer overlying the right ischial tuberosity.  3.9 cm ill-defined collection of fluid and air in the adjacent soft tissues, concerning for phlegmon or early abscess.  Mild cortical irregularity of the right ischial tuberosity, equivocal for acute osteomyelitis.  - General surgery consulted and patient is S/P bedside I&D yesterday 07/25  - MRI of the pelvis show concern for ischial osteomyelitis  - cultures showing GPC, GNR and also enterococcus  - continue to follow final result        Anemia  Continue to trend H/H      Hypokalemia  Continue to replete adequately      Type 2 diabetes mellitus, without long-term current use of insulin  Patient's FSGs are controlled on current medication regimen.  Last A1c reviewed-   Lab Results   Component Value Date    HGBA1C 10.6 (H) 07/24/2022     Most recent fingerstick glucose reviewed-   Recent Labs   Lab 07/25/22  1825 07/26/22  0752 07/26/22  1127   POCTGLUCOSE 145* 177* 124*     Current correctional scale  Low  Decrease anti-hyperglycemic dose as follows-   Antihyperglycemics (From admission, onward)            Start     Stop Route Frequency Ordered    07/24/22 2004  insulin aspart U-100 pen 0-5 Units         -- SubQ Every 6 hours PRN 07/24/22 1904        Hold Oral hypoglycemics while patient is in the hospital.    UTI (urinary tract infection)  Urine culture shows pan-sensitive E. Coli  Continue cefepime for now      Spina bifida  DVT prophylaxis  PT and OT  consults      Neurogenic bladder  PRN self-catheterization             Paraparesis  PT and OT  Poor sensation too but at baseline      HTN (hypertension), benign  Continue home meds        Dyslipidemia  Continue home meds      VTE Risk Mitigation (From admission, onward)         Ordered     enoxaparin injection 40 mg  Daily         07/24/22 1907     Place sequential compression device  Until discontinued         07/24/22 1907     Place ELIJAH hose  Until discontinued         07/24/22 1907                Discharge Planning   ROSS:      Code Status: Full Code   Is the patient medically ready for discharge?:     Reason for patient still in hospital (select all that apply): Treatment and Consult recommendations  Discharge Plan A: Home with family                  Hiren Preston MD  Department of Hospital Medicine   [facility]

## 2022-07-26 NOTE — SUBJECTIVE & OBJECTIVE
Interval History: AF, HDS. No acute events. Mri yesterday with small fluid collection. Wound explored at bedside, pocket entered with small amount of purulence expressed.     Medications:  Continuous Infusions:   0/9% NACL & POTASSIUM CHLORIDE 20 MEQ/L 100 mL/hr at 07/26/22 0115     Scheduled Meds:   amLODIPine  10 mg Oral Daily    atorvastatin  40 mg Oral Daily    ceFEPime (MAXIPIME) IVPB  1 g Intravenous Q12H    enoxaparin  40 mg Subcutaneous Daily    oxybutynin  15 mg Oral Daily    polyethylene glycol  17 g Oral Daily    valsartan  80 mg Oral Daily    vancomycin (VANCOCIN) IVPB  1,250 mg Intravenous Q12H     PRN Meds:acetaminophen, albuterol-ipratropium, aluminum-magnesium hydroxide-simethicone, dextrose 50%, glucagon (human recombinant), insulin aspart U-100, melatonin, morphine, naloxone, ondansetron, oxyCODONE, prochlorperazine, senna-docusate 8.6-50 mg, simethicone, sodium chloride 0.9%, Pharmacy to dose Vancomycin consult **AND** vancomycin - pharmacy to dose     Review of patient's allergies indicates:   Allergen Reactions    Latex, natural rubber      Objective:     Vital Signs (Most Recent):  Temp: 98.3 °F (36.8 °C) (07/26/22 1127)  Pulse: 94 (07/26/22 1127)  Resp: 20 (07/26/22 1127)  BP: (!) 148/97 (07/26/22 1127)  SpO2: 95 % (07/26/22 1127)   Vital Signs (24h Range):  Temp:  [97.7 °F (36.5 °C)-98.8 °F (37.1 °C)] 98.3 °F (36.8 °C)  Pulse:  [85-99] 94  Resp:  [18-20] 20  SpO2:  [95 %-99 %] 95 %  BP: (100-148)/(68-97) 148/97     Weight: 71.5 kg (157 lb 10.1 oz)  Body mass index is 27.92 kg/m².    Intake/Output - Last 3 Shifts         07/24 0700 07/25 0659 07/25 0700 07/26 0659 07/26 0700 07/27 0659    I.V. (mL/kg) 1113.9 (15.6)      IV Piggyback 1092.1      Total Intake(mL/kg) 2206 (30.9)      Urine (mL/kg/hr) 1280 750 (0.4)     Total Output 1280 750     Net +926 -750            Urine Occurrence 2 x      Stool Occurrence   1 x            Physical Exam  Vitals and nursing note reviewed.   Constitutional:        General: He is not in acute distress.     Appearance: He is not ill-appearing, toxic-appearing or diaphoretic.   HENT:      Head: Normocephalic and atraumatic.   Eyes:      General: No scleral icterus.     Extraocular Movements: Extraocular movements intact.   Cardiovascular:      Rate and Rhythm: Normal rate and regular rhythm.   Pulmonary:      Effort: Pulmonary effort is normal. No respiratory distress.   Abdominal:      General: There is no distension.      Palpations: Abdomen is soft.      Tenderness: There is no abdominal tenderness.   Genitourinary:     Comments: Scar at the top of the gluteal cleft  Right ischial pressure ulcer with foul smelling purulent drainage  Wound edges appear overall healthy, but does have some fibrinous tissue at the base  Wound tracks deep and medially  Musculoskeletal:         General: Deformity present.   Skin:     General: Skin is warm and dry.      Coloration: Skin is not jaundiced.   Neurological:      Mental Status: He is alert. Mental status is at baseline.      Cranial Nerves: No cranial nerve deficit.      Comments: Paraplegic from the umbilicus down  BUE move appropriately   Psychiatric:         Mood and Affect: Mood normal.         Behavior: Behavior normal.       Significant Labs:  I have reviewed all pertinent lab results within the past 24 hours.  CBC:   Recent Labs   Lab 07/26/22 0319   WBC 5.50   RBC 4.56*   HGB 10.5*   HCT 34.0*      MCV 75*   MCH 23.0*   MCHC 30.9*     CMP:   Recent Labs   Lab 07/26/22 0319   *   CALCIUM 8.4*   ALBUMIN 2.2*   PROT 6.9      K 3.6   CO2 29      BUN 9   CREATININE 0.8   ALKPHOS 80   ALT 9*   AST 9*   BILITOT 0.3       Significant Diagnostics:  I have reviewed all pertinent imaging results/findings within the past 24 hours.

## 2022-07-26 NOTE — ASSESSMENT & PLAN NOTE
CT concerning for abscess and osteomyelitis: Decubitus ulcer overlying the right ischial tuberosity.  3.9 cm ill-defined collection of fluid and air in the adjacent soft tissues, concerning for phlegmon or early abscess.  Mild cortical irregularity of the right ischial tuberosity, equivocal for acute osteomyelitis.  - General surgery consulted and patient is S/P bedside I&D yesterday 07/25  - MRI of the pelvis show concern for ischial osteomyelitis  - cultures showing GPC, GNR and also enterococcus  - continue to follow final result

## 2022-07-26 NOTE — PLAN OF CARE
Problem: Physical Therapy  Goal: Physical Therapy Goal  Description: Goals to be met by:      Patient will increase functional independence with mobility by performin. Bed to and from WC transfer with Modified [place]   2. Wheelchair propulsion x500 feet with Modified [place] using bilateral uppper extremities    Outcome: Ongoing, Progressing   Pt has met goals

## 2022-07-26 NOTE — PLAN OF CARE
Problem: Adult Inpatient Plan of Care  Goal: Plan of Care Review  Outcome: Ongoing, Progressing     Problem: Infection  Goal: Absence of Infection Signs and Symptoms  Outcome: Ongoing, Progressing     Problem: Diabetes Comorbidity  Goal: Blood Glucose Level Within Targeted Range  Outcome: Ongoing, Progressing     Problem: Impaired Wound Healing  Goal: Optimal Wound Healing  Outcome: Ongoing, Progressing     Problem: Fall Injury Risk  Goal: Absence of Fall and Fall-Related Injury  Outcome: Ongoing, Progressing

## 2022-07-26 NOTE — PROGRESS NOTES
Pharmacokinetic Assessment Follow Up: IV Vancomycin    Vancomycin serum concentration assessment(s):    The trough level was drawn correctly and can be used to guide therapy at this time. The measurement is within the desired definitive target range of 10 to 20 mcg/mL.    Vancomycin Regimen Plan:    Continue regimen to Vancomycin 1250 mg IV every 12 hours with next serum trough concentration measured at 0630 prior to 3rd dose on 7/27/2022    Drug levels (last 3 results):  Recent Labs   Lab Result Units 07/26/22  0628   Vancomycin-Trough ug/mL 12.0       Pharmacy will continue to follow and monitor vancomycin.    Please contact pharmacy at extension [phone] for questions regarding this assessment.    Thank you for the consult,   Shane Vega Jr       Patient brief summary:  Dale Pantoja is a 37 y.o. male initiated on antimicrobial therapy with IV Vancomycin for treatment of skin & soft tissue infection    Drug Allergies:   Review of patient's allergies indicates:   Allergen Reactions    Latex, natural rubber        Actual Body Weight:   71.5 kg    Renal Function:   Estimated Creatinine Clearance: 109.9 mL/min (based on SCr of 0.8 mg/dL).,     Dialysis Method (if applicable):  N/A    CBC (last 72 hours):  Recent Labs   Lab Result Units 07/24/22  1941 07/25/22  0351 07/26/22  0319   WBC K/uL  --  6.32 5.50   Hemoglobin g/dL  --  10.8* 10.5*   Hemoglobin A1C % 10.6*  --   --    Hematocrit %  --  34.9* 34.0*   Platelets K/uL  --  432 395   Gran % %  --  62.7 59.2   Lymph % %  --  23.6 26.5   Mono % %  --  9.2 8.9   Eosinophil % %  --  3.6 4.2   Basophil % %  --  0.6 0.7   Differential Method   --  Automated Automated       Metabolic Panel (last 72 hours):  Recent Labs   Lab Result Units 07/24/22  2350 07/25/22  0351 07/26/22  0319   Sodium mmol/L  --  138 141   Potassium mmol/L  --  3.2* 3.6   Chloride mmol/L  --  101 105   CO2 mmol/L  --  27 29   Glucose mg/dL  --  122* 119*   BUN mg/dL  --  9 9   Creatinine mg/dL   --  0.7 0.8   Albumin g/dL  --  2.4* 2.2*   Total Bilirubin mg/dL  --  0.4 0.3   Alkaline Phosphatase U/L  --  84 80   AST U/L  --  12 9*   ALT U/L  --  10 9*   Magnesium mg/dL 1.8 1.8 1.8   Phosphorus mg/dL  --  2.6* 2.6*       Vancomycin Administrations:  vancomycin given in the last 96 hours                     vancomycin 1.25 g in dextrose 5% 250 mL IVPB (ready to mix) (mg) 1,250 mg New Bag 07/26/22 0645     1,250 mg New Bag 07/25/22 2022     1,250 mg New Bag  0650    vancomycin SolR 1,500 mg (mg) 1,500 mg Given 07/24/22 1943                    Microbiologic Results:  Microbiology Results (last 7 days)       Procedure Component Value Units Date/Time    Blood Culture #2 **CANNOT BE ORDERED STAT** [[id number]] Collected: 07/24/22 1729    Order Status: Completed Specimen: Blood from Peripheral, Hand, Left Updated: 07/25/22 1903     Blood Culture, Routine No Growth to date      No Growth to date    Blood Culture #1 **CANNOT BE ORDERED STAT** [[id number]] Collected: 07/24/22 1716    Order Status: Completed Specimen: Blood from Peripheral, Upper Arm, Right Updated: 07/25/22 1903     Blood Culture, Routine No Growth to date      No Growth to date    Gram stain [[id number]] Collected: 07/25/22 1000    Order Status: Completed Specimen: Abscess from Buttocks, Right Updated: 07/25/22 1354     Gram Stain Result Few WBC's      Many Gram negative rods      Few Gram positive cocci    Culture, Anaerobe [[id number]] Collected: 07/25/22 1000    Order Status: Sent Specimen: Abscess from Buttocks, Right Updated: 07/25/22 1128    Aerobic culture [[id number]] Collected: 07/25/22 1000    Order Status: Sent Specimen: Abscess from Buttocks, Right Updated: 07/25/22 1125    Urine culture **CANNOT BE ORDERED STAT** [[id number]]  (Abnormal) Collected: 07/24/22 1849    Order Status: Completed Specimen: Urine, Catheterized Updated: 07/25/22 0806     Urine Culture, Routine GRAM NEGATIVE YURIY, NON-LACTOSE   >100,000 cfu/ml  Identification and  susceptibility pending      Narrative:      Indicated criteria for high risk culture:->Other  Other (specify):->self caths    Aerobic culture (Specify Source) **CANNOT BE ORDERED AS STAT** [[id number]] Collected: 07/24/22 1882    Order Status: Completed Specimen: Wound from Buttocks, Right Updated: 07/25/22 0759     Aerobic Bacterial Culture Insufficient incubation, culture in progress

## 2022-07-27 PROBLEM — M86.28 SUBACUTE OSTEOMYELITIS, OTHER SITE: Status: ACTIVE | Noted: 2022-07-27

## 2022-07-27 LAB
ALBUMIN SERPL BCP-MCNC: 2.2 G/DL (ref 3.5–5.2)
ALP SERPL-CCNC: 69 U/L (ref 55–135)
ALT SERPL W/O P-5'-P-CCNC: 12 U/L (ref 10–44)
ANION GAP SERPL CALC-SCNC: 8 MMOL/L (ref 8–16)
AST SERPL-CCNC: 10 U/L (ref 10–40)
BACTERIA SPEC AEROBE CULT: ABNORMAL
BASOPHILS # BLD AUTO: 0.04 K/UL (ref 0–0.2)
BASOPHILS NFR BLD: 0.8 % (ref 0–1.9)
BILIRUB SERPL-MCNC: 0.3 MG/DL (ref 0.1–1)
BUN SERPL-MCNC: 7 MG/DL (ref 6–20)
CALCIUM SERPL-MCNC: 8.3 MG/DL (ref 8.7–10.5)
CHLORIDE SERPL-SCNC: 106 MMOL/L (ref 95–110)
CO2 SERPL-SCNC: 27 MMOL/L (ref 23–29)
CREAT SERPL-MCNC: 0.7 MG/DL (ref 0.5–1.4)
DIFFERENTIAL METHOD: ABNORMAL
EOSINOPHIL # BLD AUTO: 0.3 K/UL (ref 0–0.5)
EOSINOPHIL NFR BLD: 5.3 % (ref 0–8)
ERYTHROCYTE [DISTWIDTH] IN BLOOD BY AUTOMATED COUNT: 14.5 % (ref 11.5–14.5)
EST. GFR  (AFRICAN AMERICAN): >60 ML/MIN/1.73 M^2
EST. GFR  (NON AFRICAN AMERICAN): >60 ML/MIN/1.73 M^2
GLUCOSE SERPL-MCNC: 107 MG/DL (ref 70–110)
HCT VFR BLD AUTO: 33.1 % (ref 40–54)
HGB BLD-MCNC: 10.3 G/DL (ref 14–18)
IMM GRANULOCYTES # BLD AUTO: 0.02 K/UL (ref 0–0.04)
IMM GRANULOCYTES NFR BLD AUTO: 0.4 % (ref 0–0.5)
LYMPHOCYTES # BLD AUTO: 1.6 K/UL (ref 1–4.8)
LYMPHOCYTES NFR BLD: 33.3 % (ref 18–48)
MAGNESIUM SERPL-MCNC: 1.7 MG/DL (ref 1.6–2.6)
MCH RBC QN AUTO: 23 PG (ref 27–31)
MCHC RBC AUTO-ENTMCNC: 31.1 G/DL (ref 32–36)
MCV RBC AUTO: 74 FL (ref 82–98)
MONOCYTES # BLD AUTO: 0.6 K/UL (ref 0.3–1)
MONOCYTES NFR BLD: 12.1 % (ref 4–15)
NEUTROPHILS # BLD AUTO: 2.3 K/UL (ref 1.8–7.7)
NEUTROPHILS NFR BLD: 48.1 % (ref 38–73)
NRBC BLD-RTO: 0 /100 WBC
PHOSPHATE SERPL-MCNC: 3.1 MG/DL (ref 2.7–4.5)
PLATELET # BLD AUTO: 379 K/UL (ref 150–450)
PMV BLD AUTO: 9 FL (ref 9.2–12.9)
POCT GLUCOSE: 132 MG/DL (ref 70–110)
POCT GLUCOSE: 143 MG/DL (ref 70–110)
POCT GLUCOSE: 176 MG/DL (ref 70–110)
POCT GLUCOSE: 93 MG/DL (ref 70–110)
POTASSIUM SERPL-SCNC: 3.4 MMOL/L (ref 3.5–5.1)
PROT SERPL-MCNC: 6.6 G/DL (ref 6–8.4)
RBC # BLD AUTO: 4.48 M/UL (ref 4.6–6.2)
SODIUM SERPL-SCNC: 141 MMOL/L (ref 136–145)
VANCOMYCIN TROUGH SERPL-MCNC: 13.8 UG/ML (ref 10–22)
WBC # BLD AUTO: 4.72 K/UL (ref 3.9–12.7)

## 2022-07-27 PROCEDURE — 99222 PR INITIAL HOSPITAL CARE,LEVL II: ICD-10-PCS | Mod: ,,, | Performed by: INTERNAL MEDICINE

## 2022-07-27 PROCEDURE — 84100 ASSAY OF PHOSPHORUS: CPT | Performed by: INTERNAL MEDICINE

## 2022-07-27 PROCEDURE — 25000003 PHARM REV CODE 250: Performed by: INTERNAL MEDICINE

## 2022-07-27 PROCEDURE — 80053 COMPREHEN METABOLIC PANEL: CPT | Performed by: INTERNAL MEDICINE

## 2022-07-27 PROCEDURE — 36415 COLL VENOUS BLD VENIPUNCTURE: CPT | Performed by: STUDENT IN AN ORGANIZED HEALTH CARE EDUCATION/TRAINING PROGRAM

## 2022-07-27 PROCEDURE — 36415 COLL VENOUS BLD VENIPUNCTURE: CPT | Performed by: INTERNAL MEDICINE

## 2022-07-27 PROCEDURE — 63600175 PHARM REV CODE 636 W HCPCS: Performed by: INTERNAL MEDICINE

## 2022-07-27 PROCEDURE — 99222 1ST HOSP IP/OBS MODERATE 55: CPT | Mod: ,,, | Performed by: INTERNAL MEDICINE

## 2022-07-27 PROCEDURE — 63600175 PHARM REV CODE 636 W HCPCS: Performed by: STUDENT IN AN ORGANIZED HEALTH CARE EDUCATION/TRAINING PROGRAM

## 2022-07-27 PROCEDURE — 25000003 PHARM REV CODE 250: Performed by: STUDENT IN AN ORGANIZED HEALTH CARE EDUCATION/TRAINING PROGRAM

## 2022-07-27 PROCEDURE — 80202 ASSAY OF VANCOMYCIN: CPT | Performed by: STUDENT IN AN ORGANIZED HEALTH CARE EDUCATION/TRAINING PROGRAM

## 2022-07-27 PROCEDURE — 85025 COMPLETE CBC W/AUTO DIFF WBC: CPT | Performed by: INTERNAL MEDICINE

## 2022-07-27 PROCEDURE — 21400001 HC TELEMETRY ROOM

## 2022-07-27 PROCEDURE — 83735 ASSAY OF MAGNESIUM: CPT | Performed by: INTERNAL MEDICINE

## 2022-07-27 RX ADMIN — CEFEPIME 1 G: 1 INJECTION, POWDER, FOR SOLUTION INTRAMUSCULAR; INTRAVENOUS at 12:07

## 2022-07-27 RX ADMIN — AMLODIPINE BESYLATE 10 MG: 5 TABLET ORAL at 09:07

## 2022-07-27 RX ADMIN — SODIUM CHLORIDE AND POTASSIUM CHLORIDE: 9; 1.49 INJECTION, SOLUTION INTRAVENOUS at 05:07

## 2022-07-27 RX ADMIN — SODIUM CHLORIDE AND POTASSIUM CHLORIDE: 9; 1.49 INJECTION, SOLUTION INTRAVENOUS at 06:07

## 2022-07-27 RX ADMIN — ENOXAPARIN SODIUM 40 MG: 100 INJECTION SUBCUTANEOUS at 04:07

## 2022-07-27 RX ADMIN — VALSARTAN 80 MG: 80 TABLET, FILM COATED ORAL at 09:07

## 2022-07-27 RX ADMIN — ATORVASTATIN CALCIUM 40 MG: 40 TABLET, FILM COATED ORAL at 09:07

## 2022-07-27 RX ADMIN — VANCOMYCIN HYDROCHLORIDE 1500 MG: 1.5 INJECTION, POWDER, LYOPHILIZED, FOR SOLUTION INTRAVENOUS at 09:07

## 2022-07-27 RX ADMIN — VANCOMYCIN HYDROCHLORIDE 1500 MG: 1.5 INJECTION, POWDER, LYOPHILIZED, FOR SOLUTION INTRAVENOUS at 10:07

## 2022-07-27 RX ADMIN — OXYBUTYNIN CHLORIDE 15 MG: 5 TABLET, EXTENDED RELEASE ORAL at 09:07

## 2022-07-27 NOTE — CONSULTS
[facility]  Infectious Disease  Consult Note    Patient Name: Dale Pantoja  MRN: [id number]  Admission Date: 7/24/2022  Hospital Length of Stay: 3 days  Attending Physician: Hiren Preston MD  Primary Care Provider: Juan Antonio Adorno MD     Isolation Status: No active isolations    Patient information was obtained from patient, parent, past medical records and ER records.      Inpatient consult to Infectious Diseases  Consult performed by: Mathew Edward MD  Consult ordered by: Hiren Preston MD        Assessment/Plan:     Subacute osteomyelitis, other site  - right ischial tuberosity, likely due to pressure ulcer  - s/p debridement  - Enterococcus growing, thus far  - continue empiric abx  Await final culture results but anticipating 6 weeks IV abx    Thank you for your consult. I will follow-up with patient. Please contact us if you have any additional questions.    Mathew Edward MD  Infectious Disease  [facility]    Subjective:     Principal Problem: Pressure injury, stage 4, with infection    HPI: 38 yo man with history of spina bifida and paraplegia, admitted with ischial pressure ulcer and underlying osteomyelitis. The patient underwent bedside debridement 7/25/22 with drainage of abscess. ID is consulted for osteomyelitis. The patient feels well and denies any history of fever.       Past Medical History:   Diagnosis Date    Overactive bladder     Spina bifida     Type 2 diabetes mellitus, without long-term current use of insulin 10/21/2021    Urinary tract infection        Past Surgical History:   Procedure Laterality Date    SPINE SURGERY      VENTRICULOPERITONEAL SHUNT         Review of patient's allergies indicates:   Allergen Reactions    Latex, natural rubber        Medications:  Medications Prior to Admission   Medication Sig    amLODIPine (NORVASC) 10 MG tablet Take 1 tablet (10 mg total) by mouth once daily.    atorvastatin (LIPITOR) 40 MG  "tablet Take 1 tablet (40 mg total) by mouth once daily.    metFORMIN (GLUCOPHAGE) 500 MG tablet Take 1 tablet (500 mg total) by mouth 3 (three) times daily with meals.    oxybutynin (DITROPAN XL) 15 MG TR24 Take 1 tablet (15 mg total) by mouth once daily.    polyethylene glycol (GLYCOLAX) 17 gram PwPk Take 17 g by mouth once daily.    valsartan (DIOVAN) 80 MG tablet Take 1 tablet (80 mg total) by mouth once daily.     Antibiotics (From admission, onward)                Start     Stop Route Frequency Ordered    07/27/22 0930  vancomycin 1.5 g in dextrose 5 % 250 mL IVPB (ready to mix)         -- IV Every 12 hours (non-standard times) 07/27/22 0859    07/25/22 0000  cefepime in dextrose 5 % 1 gram/50 mL IVPB 1 g         -- IV Every 12 hours (non-standard times) 07/24/22 2252    07/24/22 1821  vancomycin - pharmacy to dose  (vancomycin IVPB)        "And" Linked Group Details    -- IV pharmacy to manage frequency 07/24/22 1722          Antifungals (From admission, onward)                None          Antivirals (From admission, onward)      None             Immunization History   Administered Date(s) Administered    COVID-19, MRNA, LN-S, PF (Pfizer) (Purple Cap) 03/22/2021, 04/12/2021    DTaP (5 Pertussis Antigens) 1983, 02/06/1984, 08/20/1984, 08/27/1985    Hepatitis B 12/27/2001    Hepatitis B, Adolescent/High Risk Infant 01/14/1999    IPV 12/27/2001    Influenza 09/18/2013    Influenza - Quadrivalent - PF *Preferred* (6 months and older) 09/27/2019    Influenza - Trivalent - PF (ADULT) 09/18/2013    Influenza A (H1N1) 2009 Monovalent - IM 01/29/2010    MMR 05/15/1985, 12/27/2001    OPV 1983, 02/06/1984, 05/15/1985    Td (ADULT) 12/27/2001    Tdap 02/11/2016       Family History       Problem Relation (Age of Onset)    Spina bifida Brother          Social History     Socioeconomic History    Marital status: Single   Tobacco Use    Smoking status: Never Smoker    Smokeless tobacco: Never " Used   Substance and Sexual Activity    Alcohol use: No    Drug use: No    Sexual activity: Never     Review of Systems   Constitutional:  Negative for chills and fever.   Skin:  Positive for wound.   All other systems reviewed and are negative.  Objective:     Vital Signs (Most Recent):  Temp: 98.1 °F (36.7 °C) (07/27/22 0923)  Pulse: 93 (07/27/22 0923)  Resp: 18 (07/27/22 0923)  BP: 133/85 (07/27/22 0923)  SpO2: 99 % (07/27/22 0923) Vital Signs (24h Range):  Temp:  [98.1 °F (36.7 °C)-98.8 °F (37.1 °C)] 98.1 °F (36.7 °C)  Pulse:  [85-98] 93  Resp:  [14-20] 18  SpO2:  [95 %-99 %] 99 %  BP: (114-148)/(62-97) 133/85     Weight: 71.5 kg (157 lb 10.1 oz)  Body mass index is 27.92 kg/m².    Estimated Creatinine Clearance: 125.6 mL/min (based on SCr of 0.7 mg/dL).    Physical Exam  Vitals and nursing note reviewed.   Constitutional:       General: He is not in acute distress.     Appearance: Normal appearance. He is not ill-appearing, toxic-appearing or diaphoretic.   HENT:      Head: Normocephalic and atraumatic.   Eyes:      Extraocular Movements: Extraocular movements intact.      Conjunctiva/sclera: Conjunctivae normal.      Pupils: Pupils are equal, round, and reactive to light.   Cardiovascular:      Rate and Rhythm: Normal rate and regular rhythm.   Abdominal:      General: Abdomen is flat.      Tenderness: There is no guarding or rebound.   Neurological:      General: No focal deficit present.      Mental Status: He is alert and oriented to person, place, and time.   Psychiatric:         Mood and Affect: Mood normal.         Behavior: Behavior normal.       Significant Labs: Blood Culture:   Recent Labs   Lab 07/24/22  1716 07/24/22  1729   LABBLOO No Growth to date  No Growth to date  No Growth to date No Growth to date  No Growth to date  No Growth to date     CBC:   Recent Labs   Lab 07/26/22  0319 07/27/22  0351   WBC 5.50 4.72   HGB 10.5* 10.3*   HCT 34.0* 33.1*    379     CMP:   Recent Labs    Lab 07/26/22  0319 07/27/22  0351    141   K 3.6 3.4*    106   CO2 29 27   * 107   BUN 9 7   CREATININE 0.8 0.7   CALCIUM 8.4* 8.3*   PROT 6.9 6.6   ALBUMIN 2.2* 2.2*   BILITOT 0.3 0.3   ALKPHOS 80 69   AST 9* 10   ALT 9* 12   ANIONGAP 7* 8   EGFRNONAA >60 >60     Urine Culture:   Recent Labs   Lab 07/24/22  1849   LABURIN ESCHERICHIA COLI  >100,000 cfu/ml  *     Wound Culture:   Recent Labs   Lab 07/24/22  1712 07/25/22  1000   LABAERO Insufficient incubation, culture in progress ENTEROCOCCUS SPECIES  Few  Identification and susceptibility pending  *       Significant Imaging: I have reviewed all pertinent imaging results/findings within the past 24 hours.

## 2022-07-27 NOTE — PROGRESS NOTES
"[facility] Medicine  Progress Note    Patient Name: Dale Pantoja  MRN: [id number]  Patient Class: IP- Inpatient   Admission Date: 7/24/2022  Length of Stay: 3 days  Attending Physician: Hiren Preston MD  Primary Care Provider: Juan Antonio Adorno MD        Subjective:     Principal Problem:Pressure injury, stage 4, with infection        HPI:  Mr. Pantoja is a 38yo man with spina bifida, DM2, overactive bladder, and recurrent UTI's.   He self-catheterizes PRN at home due to urinary retention.    He states he now comes to the ED due to a worsening right ischial decubitus ulcer.  He had been keeping its development to himself, hoping it would resolved.  Unfortunately it has deepened, and over the last two days he developed a purulent, foul-smelling drainage.  He denies myalgias, fever, or chills.  He cannot feel the area, so he has no pain.  He has no hot sweats or feelings of systemic infection per him.  He has no N/V/D.    In the ED his VS's were /66   Pulse 107   Temp 98.7 °F (37.1 °C) (Oral)   Resp 20   Ht 5' 3" (1.6 m)   Wt 72.6 kg (160 lb)   SpO2 98% RA  BMI 28.34 kg/m².  Labs showed COVID NEGATIVE, K 3.3, Cr 0.7, otherwise normal.  CBC showed WBC 7.7, Hg 12.8, .    Xray of the pelvis showed a lucency involving the left aspect of the symphysis and a chronic deformity of the acetabulae and femoral heads/necks, noting progressive femoroacetabular degenerative changes.  CT pelvis showed a decubitus ulcer overlying the right ischial tuberosity with a 3.9 cm ill-defined collection of fluid and air in the adjacent soft tissues, concerning for phlegmon or early abscess.  There was mild cortical irregularity of the right ischial tuberosity, equivocal for acute osteomyelitis.  There was right inguinal and external iliac lymphadenopathy, likely reactive.  There was a small focus of air within the bladder.            Overview/Hospital Course:  Admitted with sepsis secondary to " ischial tuberosity ulcer with possible abscess. Started on IVF and IV antibiotics. General Surgery consulted and did bedside debridement/cultures. MRI concerning for ischial osteomyelitis      Interval History: patient denies any new complaints    Review of Systems  Objective:     Vital Signs (Most Recent):  Temp: 97.3 °F (36.3 °C) (07/27/22 1300)  Pulse: 91 (07/27/22 1300)  Resp: 18 (07/27/22 1300)  BP: 134/85 (07/27/22 1300)  SpO2: 99 % (07/27/22 1300) Vital Signs (24h Range):  Temp:  [97.3 °F (36.3 °C)-98.8 °F (37.1 °C)] 97.3 °F (36.3 °C)  Pulse:  [85-98] 91  Resp:  [14-20] 18  SpO2:  [95 %-99 %] 99 %  BP: (114-141)/(62-85) 134/85     Weight: 71.5 kg (157 lb 10.1 oz)  Body mass index is 27.92 kg/m².    Intake/Output Summary (Last 24 hours) at 7/27/2022 1402  Last data filed at 7/27/2022 0411  Gross per 24 hour   Intake 240 ml   Output 2000 ml   Net -1760 ml      Physical Exam  Constitutional:       Comments: AA male laying in bed comfortably talking to mum at bedside in NAD   HENT:      Head: Normocephalic and atraumatic.   Eyes:      Extraocular Movements: Extraocular movements intact.      Conjunctiva/sclera: Conjunctivae normal.      Pupils: Pupils are equal, round, and reactive to light.   Cardiovascular:      Rate and Rhythm: Normal rate and regular rhythm.      Pulses: Normal pulses.      Heart sounds: Normal heart sounds.   Pulmonary:      Effort: Pulmonary effort is normal. No respiratory distress.      Breath sounds: Normal breath sounds. No stridor. No wheezing or rhonchi.   Abdominal:      General: There is no distension.      Palpations: Abdomen is soft. There is no mass.      Tenderness: There is no abdominal tenderness. There is no guarding.   Musculoskeletal:      Comments: Paralyzed from waist downward from spinal bifida, extremities consistent with history of spina bifida   Skin:     Capillary Refill: Capillary refill takes less than 2 seconds.   Neurological:      Mental Status: He is oriented  to person, place, and time.   Psychiatric:         Mood and Affect: Mood normal.         Behavior: Behavior normal.         Thought Content: Thought content normal.         Judgment: Judgment normal.       Significant Labs: All pertinent labs within the past 24 hours have been reviewed.    Significant Imaging: I have reviewed all pertinent imaging results/findings within the past 24 hours.      Assessment/Plan:      * Pressure injury, stage 4, with infection  CT concerning for abscess and osteomyelitis: Decubitus ulcer overlying the right ischial tuberosity.  3.9 cm ill-defined collection of fluid and air in the adjacent soft tissues, concerning for phlegmon or early abscess.  Mild cortical irregularity of the right ischial tuberosity, equivocal for acute osteomyelitis.  - General surgery consulted and patient is S/P bedside I&D on 07/25  - MRI of the pelvis show concern for ischial osteomyelitis  - cultures grew enterococcus faecalis  - ID consulted yesterday for final/discharge abx recs        Subacute osteomyelitis, other site  Same as above      Anemia  Continue to trend H/H      Hypokalemia  Continue to replete adequately      Type 2 diabetes mellitus, without long-term current use of insulin  Patient's FSGs are controlled on current medication regimen.  Last A1c reviewed-   Lab Results   Component Value Date    HGBA1C 10.6 (H) 07/24/2022     Most recent fingerstick glucose reviewed-   Recent Labs   Lab 07/26/22  1625 07/26/22  1941 07/27/22  0950   POCTGLUCOSE 106 117* 176*     Current correctional scale  Low  Decrease anti-hyperglycemic dose as follows-   Antihyperglycemics (From admission, onward)            Start     Stop Route Frequency Ordered    07/24/22 2004  insulin aspart U-100 pen 0-5 Units         -- SubQ Every 6 hours PRN 07/24/22 1904        Hold Oral hypoglycemics while patient is in the hospital.    UTI (urinary tract infection)  Urine culture shows pan-sensitive E. Coli  Continue cefepime for  now      Spina bifida  DVT prophylaxis  PT and OT consults      Neurogenic bladder  PRN self-catheterization             Paraparesis  PT and OT  Poor sensation too but at baseline      HTN (hypertension), benign  Continue home meds        Dyslipidemia  Continue home meds      VTE Risk Mitigation (From admission, onward)         Ordered     enoxaparin injection 40 mg  Daily         07/24/22 1907     Place sequential compression device  Until discontinued         07/24/22 1907     Place ELIJAH hose  Until discontinued         07/24/22 1907                Discharge Planning   ROSS:      Code Status: Full Code   Is the patient medically ready for discharge?:     Reason for patient still in hospital (select all that apply): Treatment and Consult recommendations  Discharge Plan A: Home Health   Discharge Delays: (!) Post-Acute Set-up              Hiren Preston MD  Department of Hospital Medicine   [facility]

## 2022-07-27 NOTE — SUBJECTIVE & OBJECTIVE
Interval History: patient denies any new complaints    Review of Systems  Objective:     Vital Signs (Most Recent):  Temp: 97.3 °F (36.3 °C) (07/27/22 1300)  Pulse: 91 (07/27/22 1300)  Resp: 18 (07/27/22 1300)  BP: 134/85 (07/27/22 1300)  SpO2: 99 % (07/27/22 1300) Vital Signs (24h Range):  Temp:  [97.3 °F (36.3 °C)-98.8 °F (37.1 °C)] 97.3 °F (36.3 °C)  Pulse:  [85-98] 91  Resp:  [14-20] 18  SpO2:  [95 %-99 %] 99 %  BP: (114-141)/(62-85) 134/85     Weight: 71.5 kg (157 lb 10.1 oz)  Body mass index is 27.92 kg/m².    Intake/Output Summary (Last 24 hours) at 7/27/2022 1402  Last data filed at 7/27/2022 0411  Gross per 24 hour   Intake 240 ml   Output 2000 ml   Net -1760 ml      Physical Exam  Constitutional:       Comments: AA male laying in bed comfortably talking to mum at bedside in NAD   HENT:      Head: Normocephalic and atraumatic.   Eyes:      Extraocular Movements: Extraocular movements intact.      Conjunctiva/sclera: Conjunctivae normal.      Pupils: Pupils are equal, round, and reactive to light.   Cardiovascular:      Rate and Rhythm: Normal rate and regular rhythm.      Pulses: Normal pulses.      Heart sounds: Normal heart sounds.   Pulmonary:      Effort: Pulmonary effort is normal. No respiratory distress.      Breath sounds: Normal breath sounds. No stridor. No wheezing or rhonchi.   Abdominal:      General: There is no distension.      Palpations: Abdomen is soft. There is no mass.      Tenderness: There is no abdominal tenderness. There is no guarding.   Musculoskeletal:      Comments: Paralyzed from waist downward from spinal bifida, extremities consistent with history of spina bifida   Skin:     Capillary Refill: Capillary refill takes less than 2 seconds.   Neurological:      Mental Status: He is oriented to person, place, and time.   Psychiatric:         Mood and Affect: Mood normal.         Behavior: Behavior normal.         Thought Content: Thought content normal.         Judgment: Judgment  normal.       Significant Labs: All pertinent labs within the past 24 hours have been reviewed.    Significant Imaging: I have reviewed all pertinent imaging results/findings within the past 24 hours.

## 2022-07-27 NOTE — PLAN OF CARE
Problem: Skin Injury Risk Increased  Goal: Skin Health and Integrity  Outcome: Ongoing, Progressing     Problem: Adult Inpatient Plan of Care  Goal: Plan of Care Review  Outcome: Ongoing, Progressing     Problem: Infection  Goal: Absence of Infection Signs and Symptoms  Outcome: Ongoing, Progressing     Problem: Impaired Wound Healing  Goal: Optimal Wound Healing  Outcome: Ongoing, Progressing     Problem: Fall Injury Risk  Goal: Absence of Fall and Fall-Related Injury  Outcome: Ongoing, Progressing

## 2022-07-27 NOTE — ASSESSMENT & PLAN NOTE
Patient's FSGs are controlled on current medication regimen.  Last A1c reviewed-   Lab Results   Component Value Date    HGBA1C 10.6 (H) 07/24/2022     Most recent fingerstick glucose reviewed-   Recent Labs   Lab 07/26/22  1625 07/26/22  1941 07/27/22  0950   POCTGLUCOSE 106 117* 176*     Current correctional scale  Low  Decrease anti-hyperglycemic dose as follows-   Antihyperglycemics (From admission, onward)            Start     Stop Route Frequency Ordered    07/24/22 2004  insulin aspart U-100 pen 0-5 Units         -- SubQ Every 6 hours PRN 07/24/22 1904        Hold Oral hypoglycemics while patient is in the hospital.

## 2022-07-27 NOTE — HPI
38 yo man with history of spina bifida and paraplegia, admitted with ischial pressure ulcer and underlying osteomyelitis. The patient underwent bedside debridement 7/25/22 with drainage of abscess. ID is consulted for osteomyelitis. The patient feels well and denies any history of fever.

## 2022-07-27 NOTE — NURSING
Report received from night nurse CHUY Jasso. Visualized patient and assessed patient's overall condition and appearance. No acute distress noted. Will continue to monitor

## 2022-07-27 NOTE — ASSESSMENT & PLAN NOTE
CT concerning for abscess and osteomyelitis: Decubitus ulcer overlying the right ischial tuberosity.  3.9 cm ill-defined collection of fluid and air in the adjacent soft tissues, concerning for phlegmon or early abscess.  Mild cortical irregularity of the right ischial tuberosity, equivocal for acute osteomyelitis.  - General surgery consulted and patient is S/P bedside I&D on 07/25  - MRI of the pelvis show concern for ischial osteomyelitis  - cultures grew enterococcus faecalis  - ID consulted yesterday for final/discharge abx recs

## 2022-07-27 NOTE — PHARMACY MED REC
Admission Medication Reconciliation     The home medication history was taken by Christian Sakrar, [place].      Medication history obtained from Medications listed below were obtained from: Patient/family      The current inpatient medication list has been compared to the home medication list and the following discrepancies were noted:     Patient reports he/she IS TAKING the following which was not ordered upon admit   Metformin   Pt reported taking miralax twice weekly        No current facility-administered medications on file prior to encounter.     Current Outpatient Medications on File Prior to Encounter   Medication Sig Dispense Refill    amLODIPine (NORVASC) 10 MG tablet Take 1 tablet (10 mg total) by mouth once daily. 90 tablet 2    atorvastatin (LIPITOR) 40 MG tablet Take 1 tablet (40 mg total) by mouth once daily. 90 tablet 3    metFORMIN (GLUCOPHAGE) 500 MG tablet Take 1 tablet (500 mg total) by mouth 3 (three) times daily with meals. 270 tablet 3    oxybutynin (DITROPAN XL) 15 MG TR24 Take 1 tablet (15 mg total) by mouth once daily. 90 tablet 3    polyethylene glycol (GLYCOLAX) 17 gram PwPk Take 17 g by mouth once daily. 90 packet 3    valsartan (DIOVAN) 80 MG tablet Take 1 tablet (80 mg total) by mouth once daily. 90 tablet 3       Please address this information as you see fit.  Feel free to contact us if you have any questions or require assistance.    Christian Sarkar, PharmD  [phone]        .

## 2022-07-27 NOTE — PLAN OF CARE
[place] - Telemetry  Discharge Reassessment    Primary Care Provider: Juan Antonio Adorno MD    Expected Discharge Date: Pending    SW spoke with patient and mother about discharge planning. SW informed patient that he will need 6 weeks of IV abx. SW informed patient that they are still waiting on final cultures to decide which medication will work best. SW explained that a home infusion company will visit him at the hospital to teach him how to administer the IV abx. SW also informed patient that a home health agency can be coordinated to assist with wound care, picc line care, and weekly lab draws. SW inquired if he approved of the coordination of services. Patient said yes. Patient stated that he has never had IV abx or home health before and does not have a preference.     Patient also has a PCA for 5 hours a day.     Reassessment (most recent)       Discharge Reassessment - 07/27/22 1106          Discharge Reassessment    Assessment Type Discharge Planning Reassessment (P)      Did the patient's condition or plan change since previous assessment? Yes (P)      Discharge Plan discussed with: Patient;Parent(s) (P)      Name(s) and Number(s) Dayana Pantoja (Mother)   [phone] (Mobile (P)      Communicated ROSS with patient/caregiver Date not available/Unable to determine (P)      Discharge Plan A Home Health (P)      Discharge Plan B Home with family (P)      DME Needed Upon Discharge  none (P)      Discharge Barriers Identified None (P)      Why the patient remains in the hospital Requires continued medical care (P)         Post-Acute Status    Post-Acute Authorization Home Health;IV Infusion (P)      Home Health Status Pending medical clearance/testing (P)      Coverage Medicare AB and Medicaid (P)      IV Infusion Status -- (P)    Pending final cultures    Discharge Delays Post-Acute Set-up (P)

## 2022-07-27 NOTE — SUBJECTIVE & OBJECTIVE
"Past Medical History:   Diagnosis Date    Overactive bladder     Spina bifida     Type 2 diabetes mellitus, without long-term current use of insulin 10/21/2021    Urinary tract infection        Past Surgical History:   Procedure Laterality Date    SPINE SURGERY      VENTRICULOPERITONEAL SHUNT         Review of patient's allergies indicates:   Allergen Reactions    Latex, natural rubber        Medications:  Medications Prior to Admission   Medication Sig    amLODIPine (NORVASC) 10 MG tablet Take 1 tablet (10 mg total) by mouth once daily.    atorvastatin (LIPITOR) 40 MG tablet Take 1 tablet (40 mg total) by mouth once daily.    metFORMIN (GLUCOPHAGE) 500 MG tablet Take 1 tablet (500 mg total) by mouth 3 (three) times daily with meals.    oxybutynin (DITROPAN XL) 15 MG TR24 Take 1 tablet (15 mg total) by mouth once daily.    polyethylene glycol (GLYCOLAX) 17 gram PwPk Take 17 g by mouth once daily.    valsartan (DIOVAN) 80 MG tablet Take 1 tablet (80 mg total) by mouth once daily.     Antibiotics (From admission, onward)                Start     Stop Route Frequency Ordered    07/27/22 0930  vancomycin 1.5 g in dextrose 5 % 250 mL IVPB (ready to mix)         -- IV Every 12 hours (non-standard times) 07/27/22 0859    07/25/22 0000  cefepime in dextrose 5 % 1 gram/50 mL IVPB 1 g         -- IV Every 12 hours (non-standard times) 07/24/22 2252    07/24/22 1821  vancomycin - pharmacy to dose  (vancomycin IVPB)        "And" Linked Group Details    -- IV pharmacy to manage frequency 07/24/22 1722          Antifungals (From admission, onward)                None          Antivirals (From admission, onward)      None             Immunization History   Administered Date(s) Administered    COVID-19, MRNA, LN-S, PF (Pfizer) (Purple Cap) 03/22/2021, 04/12/2021    DTaP (5 Pertussis Antigens) 1983, 02/06/1984, 08/20/1984, 08/27/1985    Hepatitis B 12/27/2001    Hepatitis B, Adolescent/High Risk Infant 01/14/1999    IPV " 12/27/2001    Influenza 09/18/2013    Influenza - Quadrivalent - PF *Preferred* (6 months and older) 09/27/2019    Influenza - Trivalent - PF (ADULT) 09/18/2013    Influenza A (H1N1) 2009 Monovalent - IM 01/29/2010    MMR 05/15/1985, 12/27/2001    OPV 1983, 02/06/1984, 05/15/1985    Td (ADULT) 12/27/2001    Tdap 02/11/2016       Family History       Problem Relation (Age of Onset)    Spina bifida Brother          Social History     Socioeconomic History    Marital status: Single   Tobacco Use    Smoking status: Never Smoker    Smokeless tobacco: Never Used   Substance and Sexual Activity    Alcohol use: No    Drug use: No    Sexual activity: Never     Review of Systems   Constitutional:  Negative for chills and fever.   Skin:  Positive for wound.   All other systems reviewed and are negative.  Objective:     Vital Signs (Most Recent):  Temp: 98.1 °F (36.7 °C) (07/27/22 0923)  Pulse: 93 (07/27/22 0923)  Resp: 18 (07/27/22 0923)  BP: 133/85 (07/27/22 0923)  SpO2: 99 % (07/27/22 0923) Vital Signs (24h Range):  Temp:  [98.1 °F (36.7 °C)-98.8 °F (37.1 °C)] 98.1 °F (36.7 °C)  Pulse:  [85-98] 93  Resp:  [14-20] 18  SpO2:  [95 %-99 %] 99 %  BP: (114-148)/(62-97) 133/85     Weight: 71.5 kg (157 lb 10.1 oz)  Body mass index is 27.92 kg/m².    Estimated Creatinine Clearance: 125.6 mL/min (based on SCr of 0.7 mg/dL).    Physical Exam  Vitals and nursing note reviewed.   Constitutional:       General: He is not in acute distress.     Appearance: Normal appearance. He is not ill-appearing, toxic-appearing or diaphoretic.   HENT:      Head: Normocephalic and atraumatic.   Eyes:      Extraocular Movements: Extraocular movements intact.      Conjunctiva/sclera: Conjunctivae normal.      Pupils: Pupils are equal, round, and reactive to light.   Cardiovascular:      Rate and Rhythm: Normal rate and regular rhythm.   Abdominal:      General: Abdomen is flat.      Tenderness: There is no guarding or rebound.   Neurological:       General: No focal deficit present.      Mental Status: He is alert and oriented to person, place, and time.   Psychiatric:         Mood and Affect: Mood normal.         Behavior: Behavior normal.       Significant Labs: Blood Culture:   Recent Labs   Lab 07/24/22 1716 07/24/22  1729   LABBLOO No Growth to date  No Growth to date  No Growth to date No Growth to date  No Growth to date  No Growth to date     CBC:   Recent Labs   Lab 07/26/22 0319 07/27/22  0351   WBC 5.50 4.72   HGB 10.5* 10.3*   HCT 34.0* 33.1*    379     CMP:   Recent Labs   Lab 07/26/22 0319 07/27/22  0351    141   K 3.6 3.4*    106   CO2 29 27   * 107   BUN 9 7   CREATININE 0.8 0.7   CALCIUM 8.4* 8.3*   PROT 6.9 6.6   ALBUMIN 2.2* 2.2*   BILITOT 0.3 0.3   ALKPHOS 80 69   AST 9* 10   ALT 9* 12   ANIONGAP 7* 8   EGFRNONAA >60 >60     Urine Culture:   Recent Labs   Lab 07/24/22  1849   LABURIN ESCHERICHIA COLI  >100,000 cfu/ml  *     Wound Culture:   Recent Labs   Lab 07/24/22  1712 07/25/22  1000   LABAERO Insufficient incubation, culture in progress ENTEROCOCCUS SPECIES  Few  Identification and susceptibility pending  *       Significant Imaging: I have reviewed all pertinent imaging results/findings within the past 24 hours.

## 2022-07-27 NOTE — NURSING
Bedside Report given to night nurse CHUY Jasso. Walking rounds completed. Visualized and assessed patient NAD noted. Safety precautions maintained and call light within reach.     Chart check completed.

## 2022-07-27 NOTE — PROGRESS NOTES
Pharmacokinetic Assessment Follow Up: IV Vancomycin    Vancomycin serum concentration assessment(s):    The trough level was drawn correctly and can be used to guide therapy at this time. The measurement is below the desired definitive target range of 15 to 20 mcg/mL.    Vancomycin Regimen Plan:    Change regimen to Vancomycin 1500 mg IV every 12 hours with next serum trough concentration measured at 2030 prior to 4th dose on 7/28    Drug levels (last 3 results):  Recent Labs   Lab Result Units 07/26/22  0628 07/27/22  0731   Vancomycin-Trough ug/mL 12.0 13.8       Pharmacy will continue to follow and monitor vancomycin.    Please contact pharmacy at extension [phone] for questions regarding this assessment.    Thank you for the consult,   Gerard Iverson       Patient brief summary:  Dale Pantoja is a 37 y.o. male initiated on antimicrobial therapy with IV Vancomycin for treatment of bone/joint infection    The patient's current regimen is 1500mg every 12 hours increased from 1250mg every 12 hours    Drug Allergies:   Review of patient's allergies indicates:   Allergen Reactions    Latex, natural rubber        Actual Body Weight:   Wt Readings from Last 1 Encounters:   07/24/22 71.5 kg (157 lb 10.1 oz)       Renal Function:   Estimated Creatinine Clearance: 125.6 mL/min (based on SCr of 0.7 mg/dL).,     Dialysis Method (if applicable):  N/A    CBC (last 72 hours):  Recent Labs   Lab Result Units 07/24/22  1941 07/25/22  0351 07/26/22  0319 07/27/22  0351   WBC K/uL  --  6.32 5.50 4.72   Hemoglobin g/dL  --  10.8* 10.5* 10.3*   Hemoglobin A1C % 10.6*  --   --   --    Hematocrit %  --  34.9* 34.0* 33.1*   Platelets K/uL  --  432 395 379   Gran % %  --  62.7 59.2 48.1   Lymph % %  --  23.6 26.5 33.3   Mono % %  --  9.2 8.9 12.1   Eosinophil % %  --  3.6 4.2 5.3   Basophil % %  --  0.6 0.7 0.8   Differential Method   --  Automated Automated Automated       Metabolic Panel (last 72 hours):  Recent Labs   Lab Result  Units 07/24/22  2350 07/25/22  0351 07/26/22  0319 07/27/22  0351   Sodium mmol/L  --  138 141 141   Potassium mmol/L  --  3.2* 3.6 3.4*   Chloride mmol/L  --  101 105 106   CO2 mmol/L  --  27 29 27   Glucose mg/dL  --  122* 119* 107   BUN mg/dL  --  9 9 7   Creatinine mg/dL  --  0.7 0.8 0.7   Albumin g/dL  --  2.4* 2.2* 2.2*   Total Bilirubin mg/dL  --  0.4 0.3 0.3   Alkaline Phosphatase U/L  --  84 80 69   AST U/L  --  12 9* 10   ALT U/L  --  10 9* 12   Magnesium mg/dL 1.8 1.8 1.8 1.7   Phosphorus mg/dL  --  2.6* 2.6* 3.1       Vancomycin Administrations:  vancomycin given in the last 96 hours                     vancomycin 1.25 g in dextrose 5% 250 mL IVPB (ready to mix) (mg) 1,250 mg New Bag 07/26/22 2023     1,250 mg New Bag  0645     1,250 mg New Bag 07/25/22 2022     1,250 mg New Bag  0650    vancomycin SolR 1,500 mg (mg) 1,500 mg Given 07/24/22 1943                    Microbiologic Results:  Microbiology Results (last 7 days)       Procedure Component Value Units Date/Time    Culture, Anaerobe [[id number]] Collected: 07/25/22 1000    Order Status: Completed Specimen: Abscess from Buttocks, Right Updated: 07/27/22 0827     Anaerobic Culture Culture in progress    Blood Culture #1 **CANNOT BE ORDERED STAT** [[id number]] Collected: 07/24/22 1716    Order Status: Completed Specimen: Blood from Peripheral, Upper Arm, Right Updated: 07/26/22 1903     Blood Culture, Routine No Growth to date      No Growth to date      No Growth to date    Blood Culture #2 **CANNOT BE ORDERED STAT** [[id number]] Collected: 07/24/22 1729    Order Status: Completed Specimen: Blood from Peripheral, Hand, Left Updated: 07/26/22 1903     Blood Culture, Routine No Growth to date      No Growth to date      No Growth to date    Aerobic culture [[id number]]  (Abnormal) Collected: 07/25/22 1000    Order Status: Completed Specimen: Abscess from Buttocks, Right Updated: 07/26/22 0852     Aerobic Bacterial Culture ENTEROCOCCUS  SPECIES  Few  Identification and susceptibility pending      Urine culture **CANNOT BE ORDERED STAT** [[id number]]  (Abnormal)  (Susceptibility) Collected: 07/24/22 1849    Order Status: Completed Specimen: Urine, Catheterized Updated: 07/26/22 0756     Urine Culture, Routine ESCHERICHIA COLI  >100,000 cfu/ml      Narrative:      Indicated criteria for high risk culture:->Other  Other (specify):->self caths    Gram stain [[id number]] Collected: 07/25/22 1000    Order Status: Completed Specimen: Abscess from Buttocks, Right Updated: 07/25/22 1354     Gram Stain Result Few WBC's      Many Gram negative rods      Few Gram positive cocci    Aerobic culture (Specify Source) **CANNOT BE ORDERED AS STAT** [[id number]] Collected: 07/24/22 1712    Order Status: Completed Specimen: Wound from Buttocks, Right Updated: 07/25/22 0758     Aerobic Bacterial Culture Insufficient incubation, culture in progress

## 2022-07-27 NOTE — ASSESSMENT & PLAN NOTE
- right ischial tuberosity, likely due to pressure ulcer  - s/p debridement  - Enterococcus growing, thus far  - continue empiric abx  Await final culture results but anticipating 6 weeks IV abx

## 2022-07-27 NOTE — PT/OT/SLP PROGRESS
Physical Therapy      Patient Name:  Dale Pantoja   MRN:  [id number]    Patient met PT goals. Signing off.

## 2022-07-27 NOTE — NURSING
[facility]ed [employer] for more self cath longo for pt. Nurse was told that catheters have to come from [facility], [place]. Pt informed.

## 2022-07-28 LAB
BACTERIA BLD CULT: NORMAL
BACTERIA BLD CULT: NORMAL
POCT GLUCOSE: 113 MG/DL (ref 70–110)
POCT GLUCOSE: 122 MG/DL (ref 70–110)
POCT GLUCOSE: 132 MG/DL (ref 70–110)
POCT GLUCOSE: 89 MG/DL (ref 70–110)

## 2022-07-28 PROCEDURE — 25000003 PHARM REV CODE 250: Performed by: INTERNAL MEDICINE

## 2022-07-28 PROCEDURE — 63600175 PHARM REV CODE 636 W HCPCS: Performed by: INTERNAL MEDICINE

## 2022-07-28 PROCEDURE — 25000003 PHARM REV CODE 250: Performed by: STUDENT IN AN ORGANIZED HEALTH CARE EDUCATION/TRAINING PROGRAM

## 2022-07-28 PROCEDURE — 21400001 HC TELEMETRY ROOM

## 2022-07-28 PROCEDURE — 63600175 PHARM REV CODE 636 W HCPCS: Performed by: STUDENT IN AN ORGANIZED HEALTH CARE EDUCATION/TRAINING PROGRAM

## 2022-07-28 RX ADMIN — CEFEPIME 1 G: 1 INJECTION, POWDER, FOR SOLUTION INTRAMUSCULAR; INTRAVENOUS at 12:07

## 2022-07-28 RX ADMIN — AMLODIPINE BESYLATE 10 MG: 5 TABLET ORAL at 09:07

## 2022-07-28 RX ADMIN — AMPICILLIN SODIUM AND SULBACTAM SODIUM 3 G: 2; 1 INJECTION, POWDER, FOR SOLUTION INTRAMUSCULAR; INTRAVENOUS at 05:07

## 2022-07-28 RX ADMIN — Medication 6 MG: at 12:07

## 2022-07-28 RX ADMIN — Medication 6 MG: at 08:07

## 2022-07-28 RX ADMIN — OXYBUTYNIN CHLORIDE 15 MG: 5 TABLET, EXTENDED RELEASE ORAL at 09:07

## 2022-07-28 RX ADMIN — SODIUM CHLORIDE AND POTASSIUM CHLORIDE: 9; 1.49 INJECTION, SOLUTION INTRAVENOUS at 10:07

## 2022-07-28 RX ADMIN — AMPICILLIN SODIUM AND SULBACTAM SODIUM 3 G: 2; 1 INJECTION, POWDER, FOR SOLUTION INTRAMUSCULAR; INTRAVENOUS at 11:07

## 2022-07-28 RX ADMIN — ATORVASTATIN CALCIUM 40 MG: 40 TABLET, FILM COATED ORAL at 09:07

## 2022-07-28 RX ADMIN — AMPICILLIN SODIUM AND SULBACTAM SODIUM 3 G: 2; 1 INJECTION, POWDER, FOR SOLUTION INTRAMUSCULAR; INTRAVENOUS at 12:07

## 2022-07-28 RX ADMIN — SODIUM CHLORIDE AND POTASSIUM CHLORIDE: 9; 1.49 INJECTION, SOLUTION INTRAVENOUS at 01:07

## 2022-07-28 RX ADMIN — VALSARTAN 80 MG: 80 TABLET, FILM COATED ORAL at 09:07

## 2022-07-28 RX ADMIN — SODIUM CHLORIDE AND POTASSIUM CHLORIDE: 9; 1.49 INJECTION, SOLUTION INTRAVENOUS at 05:07

## 2022-07-28 RX ADMIN — VANCOMYCIN HYDROCHLORIDE 1500 MG: 1.5 INJECTION, POWDER, LYOPHILIZED, FOR SOLUTION INTRAVENOUS at 09:07

## 2022-07-28 RX ADMIN — ENOXAPARIN SODIUM 40 MG: 100 INJECTION SUBCUTANEOUS at 04:07

## 2022-07-28 NOTE — NURSING
Noted IV to right FA leaking; INT d/c'd; catheter intact;pressure dressing applied; IV site with no redness, slight swelling noted; new IV site to right hand; 22G Cathlon used; attempt x 1; pt tolerated well; IVF restarted

## 2022-07-28 NOTE — PROGRESS NOTES
"[facility] Medicine  Progress Note    Patient Name: Dale Pantoja  MRN: [id number]  Patient Class: IP- Inpatient   Admission Date: 7/24/2022  Length of Stay: 4 days  Attending Physician: Hiren Preston MD  Primary Care Provider: Juan Antonio Adorno MD        Subjective:     Principal Problem:Pressure injury, stage 4, with infection        HPI:  Mr. Pantoja is a 36yo man with spina bifida, DM2, overactive bladder, and recurrent UTI's.   He self-catheterizes PRN at home due to urinary retention.    He states he now comes to the ED due to a worsening right ischial decubitus ulcer.  He had been keeping its development to himself, hoping it would resolved.  Unfortunately it has deepened, and over the last two days he developed a purulent, foul-smelling drainage.  He denies myalgias, fever, or chills.  He cannot feel the area, so he has no pain.  He has no hot sweats or feelings of systemic infection per him.  He has no N/V/D.    In the ED his VS's were /66   Pulse 107   Temp 98.7 °F (37.1 °C) (Oral)   Resp 20   Ht 5' 3" (1.6 m)   Wt 72.6 kg (160 lb)   SpO2 98% RA  BMI 28.34 kg/m².  Labs showed COVID NEGATIVE, K 3.3, Cr 0.7, otherwise normal.  CBC showed WBC 7.7, Hg 12.8, .    Xray of the pelvis showed a lucency involving the left aspect of the symphysis and a chronic deformity of the acetabulae and femoral heads/necks, noting progressive femoroacetabular degenerative changes.  CT pelvis showed a decubitus ulcer overlying the right ischial tuberosity with a 3.9 cm ill-defined collection of fluid and air in the adjacent soft tissues, concerning for phlegmon or early abscess.  There was mild cortical irregularity of the right ischial tuberosity, equivocal for acute osteomyelitis.  There was right inguinal and external iliac lymphadenopathy, likely reactive.  There was a small focus of air within the bladder.            Overview/Hospital Course:  Admitted with sepsis secondary to " ischial tuberosity ulcer with possible abscess. Started on IVF and IV antibiotics. General Surgery consulted and did bedside debridement/cultures. MRI concerning for ischial osteomyelitis      Interval History: no acute events overnight    Review of Systems  Objective:     Vital Signs (Most Recent):  Temp: 98.5 °F (36.9 °C) (07/28/22 1147)  Pulse: 89 (07/28/22 1147)  Resp: 18 (07/28/22 1147)  BP: 118/76 (07/28/22 1147)  SpO2: 98 % (07/28/22 1147) Vital Signs (24h Range):  Temp:  [97.3 °F (36.3 °C)-98.7 °F (37.1 °C)] 98.5 °F (36.9 °C)  Pulse:  [86-98] 89  Resp:  [16-18] 18  SpO2:  [97 %-99 %] 98 %  BP: (108-128)/(67-81) 118/76     Weight: 71.5 kg (157 lb 10.1 oz)  Body mass index is 27.92 kg/m².    Intake/Output Summary (Last 24 hours) at 7/28/2022 1327  Last data filed at 7/28/2022 1203  Gross per 24 hour   Intake 2210 ml   Output 3875 ml   Net -1665 ml      Physical Exam  Constitutional:       Comments: AA male laying in bed comfortably talking to mum at bedside in NAD   HENT:      Head: Normocephalic and atraumatic.   Eyes:      Extraocular Movements: Extraocular movements intact.      Conjunctiva/sclera: Conjunctivae normal.      Pupils: Pupils are equal, round, and reactive to light.   Cardiovascular:      Rate and Rhythm: Normal rate and regular rhythm.      Pulses: Normal pulses.      Heart sounds: Normal heart sounds.   Pulmonary:      Effort: Pulmonary effort is normal. No respiratory distress.      Breath sounds: Normal breath sounds. No stridor. No wheezing or rhonchi.   Abdominal:      General: There is no distension.      Palpations: Abdomen is soft. There is no mass.      Tenderness: There is no abdominal tenderness. There is no guarding.   Musculoskeletal:      Comments: Paralyzed from waist downward from spinal bifida, extremities consistent with history of spina bifida   Skin:     Capillary Refill: Capillary refill takes less than 2 seconds.   Neurological:      Mental Status: He is oriented to  person, place, and time.   Psychiatric:         Mood and Affect: Mood normal.         Behavior: Behavior normal.         Thought Content: Thought content normal.         Judgment: Judgment normal.       Significant Labs: All pertinent labs within the past 24 hours have been reviewed.    Significant Imaging: I have reviewed all pertinent imaging results/findings within the past 24 hours.      Assessment/Plan:      * Pressure injury, stage 4, with infection  CT concerning for abscess and osteomyelitis: Decubitus ulcer overlying the right ischial tuberosity.  3.9 cm ill-defined collection of fluid and air in the adjacent soft tissues, concerning for phlegmon or early abscess.  Mild cortical irregularity of the right ischial tuberosity, equivocal for acute osteomyelitis.  - General surgery consulted and patient is S/P bedside I&D on 07/25  - MRI of the pelvis show concern for ischial osteomyelitis  - cultures grew enterococcus faecalis  - ID consulted for final/discharge abx recs  - Plan to discharge patient home with HH once final antibiotic regimen from ID determined        Subacute osteomyelitis, other site  Same as above      Anemia  Continue to trend H/H      Hypokalemia  Continue to replete adequately      Type 2 diabetes mellitus, without long-term current use of insulin  Patient's FSGs are controlled on current medication regimen.  Last A1c reviewed-   Lab Results   Component Value Date    HGBA1C 10.6 (H) 07/24/2022     Most recent fingerstick glucose reviewed-   Recent Labs   Lab 07/27/22  1705 07/27/22  1921 07/28/22  0807 07/28/22  1149   POCTGLUCOSE 93 143* 89 132*     Current correctional scale  Low  Decrease anti-hyperglycemic dose as follows-   Antihyperglycemics (From admission, onward)            Start     Stop Route Frequency Ordered    07/24/22 2004  insulin aspart U-100 pen 0-5 Units         -- SubQ Every 6 hours PRN 07/24/22 1904        Hold Oral hypoglycemics while patient is in the  hospital.    UTI (urinary tract infection)  Urine culture shows pan-sensitive E. Coli  Today is day 5 of cefepime - will plan to DC cefepime after today      Spina bifida  DVT prophylaxis  PT and OT consults      Neurogenic bladder  PRN self-catheterization             Paraparesis  PT and OT  Poor sensation too but at baseline      HTN (hypertension), benign  Continue home meds        Dyslipidemia  Continue home meds      VTE Risk Mitigation (From admission, onward)         Ordered     enoxaparin injection 40 mg  Daily         07/24/22 1907     Place sequential compression device  Until discontinued         07/24/22 1907     Place ELIJAH hose  Until discontinued         07/24/22 1907                Discharge Planning   ROSS:      Code Status: Full Code   Is the patient medically ready for discharge?:     Reason for patient still in hospital (select all that apply): Treatment and Consult recommendations  Discharge Plan A: Home Health   Discharge Delays: (!) Post-Acute Set-up              Hiren Preston MD  Department of Hospital Medicine   [facility]

## 2022-07-28 NOTE — PLAN OF CARE
Discussed the need to turn very often to offset pressure on pressure points; pt verbalized understanding

## 2022-07-28 NOTE — ASSESSMENT & PLAN NOTE
Patient's FSGs are controlled on current medication regimen.  Last A1c reviewed-   Lab Results   Component Value Date    HGBA1C 10.6 (H) 07/24/2022     Most recent fingerstick glucose reviewed-   Recent Labs   Lab 07/27/22  1705 07/27/22  1921 07/28/22  0807 07/28/22  1149   POCTGLUCOSE 93 143* 89 132*     Current correctional scale  Low  Decrease anti-hyperglycemic dose as follows-   Antihyperglycemics (From admission, onward)            Start     Stop Route Frequency Ordered    07/24/22 2004  insulin aspart U-100 pen 0-5 Units         -- SubQ Every 6 hours PRN 07/24/22 1904        Hold Oral hypoglycemics while patient is in the hospital.

## 2022-07-28 NOTE — PROGRESS NOTES
Therapy with vancomycin complete and consult discontinued by provider.  Pharmacy will sign off, please re-consult as needed. Thank You

## 2022-07-28 NOTE — ASSESSMENT & PLAN NOTE
Urine culture shows pan-sensitive E. Coli  Today is day 5 of cefepime - will plan to DC cefepime after today

## 2022-07-28 NOTE — ASSESSMENT & PLAN NOTE
CT concerning for abscess and osteomyelitis: Decubitus ulcer overlying the right ischial tuberosity.  3.9 cm ill-defined collection of fluid and air in the adjacent soft tissues, concerning for phlegmon or early abscess.  Mild cortical irregularity of the right ischial tuberosity, equivocal for acute osteomyelitis.  - General surgery consulted and patient is S/P bedside I&D on 07/25  - MRI of the pelvis show concern for ischial osteomyelitis  - cultures grew enterococcus faecalis  - ID consulted for final/discharge abx recs  - Plan to discharge patient home with HH once final antibiotic regimen from ID determined

## 2022-07-28 NOTE — PHYSICIAN QUERY
"PT Name: aDle Pantoja  MR #: [id number]    DOCUMENTATION CLARIFICATION     CDS/: Bruno Lazaro RN, CCDS         Contact information:   [email or web address]    This form is a permanent document in the medical record.    Query Date: July 28, 2022  By submitting this query, we are merely seeking further clarification of documentation. Please utilize your independent clinical judgment when addressing the question(s) below.    The Medical Record contains the following:   Indicator Supporting Clinical Findings Location in Medical Record   x Documentation of "Debridement" Pressure injury, stage 4, with infection  presents with a stage 4 pressure injury of the right ischium  - Sharp debridement performed at the bedside today, please see procedure note below    Procedure Note  After obtaining informed consent, the wound was explored and then sharply debrided to the level of the muscle. The wound did not probe to bone. It was debrided to healthy bleeding tissue and all loculations were broken up. In particular it was noted to track inferolateral consistent with the previous imaging. Purulence was expressed from the inferolateral cavity       7/25 General Surgery, consult  Mara Pringle MD/  Alexis Gomez MD     Documentation of "I&D"      Other       Excisional debridement is the surgical removal or cutting away of such tissue, necrosis, or slough and is classified to the root operation "Excision." Use of a sharp instrument does not always indicate that an excisional debridement was performed. Minor removal of loose fragments with scissors or using a sharp instrument to scrape away tissue is not an excisional debridement. Excisional debridement involves the use of a scalpel to remove devitalized tissue.  Nonexcisional debridement is the nonoperative brushing, irrigating, scrubbing, or washing of devitalized tissue, necrosis, slough, or foreign material. Most nonexcisional debridement procedures are " "classified to the root operation "Extraction" (pulling or stripping out or off all or a portion of a body part by the use of force).     Provider, please provide further clarification on the procedure performed on _Right ischium wound  :    [ x ] Excisional Debridement of skin   [ x ] Excisional Debridement of subcutaneous tissue/fascia   [   ] Excisional Debridement of muscle   [   ] Excisional Debridement of tendon   [   ] Excisional Debridement of bone        [   ] Non-excisional Debridement of skin   [   ] Non-excisional Debridement of subcutaneous tissue/fascia   [   ] Non-excisional Debridement of muscle   [   ] Non-excisional Debridement of tendon   [   ] Non-excisional Debridement of bone       [   ] Other Procedure (please specify): _____________   [  ] Clinically Undetermined     Reference:    ICD-10-CM/PCS Coding Clinic Third Quarter ICD-10, Effective with discharges: October 7, 2015 [facility] [employer] § Excisional and nonexcisional debridement (2015).    Form No. 59787   "

## 2022-07-28 NOTE — SUBJECTIVE & OBJECTIVE
Interval History: no acute events overnight    Review of Systems  Objective:     Vital Signs (Most Recent):  Temp: 98.5 °F (36.9 °C) (07/28/22 1147)  Pulse: 89 (07/28/22 1147)  Resp: 18 (07/28/22 1147)  BP: 118/76 (07/28/22 1147)  SpO2: 98 % (07/28/22 1147) Vital Signs (24h Range):  Temp:  [97.3 °F (36.3 °C)-98.7 °F (37.1 °C)] 98.5 °F (36.9 °C)  Pulse:  [86-98] 89  Resp:  [16-18] 18  SpO2:  [97 %-99 %] 98 %  BP: (108-128)/(67-81) 118/76     Weight: 71.5 kg (157 lb 10.1 oz)  Body mass index is 27.92 kg/m².    Intake/Output Summary (Last 24 hours) at 7/28/2022 1327  Last data filed at 7/28/2022 1203  Gross per 24 hour   Intake 2210 ml   Output 3875 ml   Net -1665 ml      Physical Exam  Constitutional:       Comments: AA male laying in bed comfortably talking to mum at bedside in NAD   HENT:      Head: Normocephalic and atraumatic.   Eyes:      Extraocular Movements: Extraocular movements intact.      Conjunctiva/sclera: Conjunctivae normal.      Pupils: Pupils are equal, round, and reactive to light.   Cardiovascular:      Rate and Rhythm: Normal rate and regular rhythm.      Pulses: Normal pulses.      Heart sounds: Normal heart sounds.   Pulmonary:      Effort: Pulmonary effort is normal. No respiratory distress.      Breath sounds: Normal breath sounds. No stridor. No wheezing or rhonchi.   Abdominal:      General: There is no distension.      Palpations: Abdomen is soft. There is no mass.      Tenderness: There is no abdominal tenderness. There is no guarding.   Musculoskeletal:      Comments: Paralyzed from waist downward from spinal bifida, extremities consistent with history of spina bifida   Skin:     Capillary Refill: Capillary refill takes less than 2 seconds.   Neurological:      Mental Status: He is oriented to person, place, and time.   Psychiatric:         Mood and Affect: Mood normal.         Behavior: Behavior normal.         Thought Content: Thought content normal.         Judgment: Judgment normal.        Significant Labs: All pertinent labs within the past 24 hours have been reviewed.    Significant Imaging: I have reviewed all pertinent imaging results/findings within the past 24 hours.

## 2022-07-29 VITALS
RESPIRATION RATE: 19 BRPM | BODY MASS INDEX: 27.93 KG/M2 | SYSTOLIC BLOOD PRESSURE: 131 MMHG | HEIGHT: 63 IN | WEIGHT: 157.63 LBS | DIASTOLIC BLOOD PRESSURE: 81 MMHG | HEART RATE: 99 BPM | TEMPERATURE: 98 F | OXYGEN SATURATION: 96 %

## 2022-07-29 LAB
BACTERIA SPEC AEROBE CULT: ABNORMAL
POCT GLUCOSE: 112 MG/DL (ref 70–110)
POCT GLUCOSE: 128 MG/DL (ref 70–110)
POCT GLUCOSE: 90 MG/DL (ref 70–110)

## 2022-07-29 PROCEDURE — 99233 PR SUBSEQUENT HOSPITAL CARE,LEVL III: ICD-10-PCS | Mod: ,,, | Performed by: INTERNAL MEDICINE

## 2022-07-29 PROCEDURE — C1751 CATH, INF, PER/CENT/MIDLINE: HCPCS

## 2022-07-29 PROCEDURE — 99233 SBSQ HOSP IP/OBS HIGH 50: CPT | Mod: ,,, | Performed by: INTERNAL MEDICINE

## 2022-07-29 PROCEDURE — 25000003 PHARM REV CODE 250: Performed by: INTERNAL MEDICINE

## 2022-07-29 PROCEDURE — 63600175 PHARM REV CODE 636 W HCPCS: Performed by: INTERNAL MEDICINE

## 2022-07-29 PROCEDURE — 36569 INSJ PICC 5 YR+ W/O IMAGING: CPT

## 2022-07-29 RX ORDER — SODIUM CHLORIDE 0.9 % (FLUSH) 0.9 %
10 SYRINGE (ML) INJECTION
Status: DISCONTINUED | OUTPATIENT
Start: 2022-07-29 | End: 2022-07-29 | Stop reason: HOSPADM

## 2022-07-29 RX ORDER — SODIUM CHLORIDE 0.9 % (FLUSH) 0.9 %
10 SYRINGE (ML) INJECTION EVERY 6 HOURS
Status: DISCONTINUED | OUTPATIENT
Start: 2022-07-29 | End: 2022-07-29 | Stop reason: HOSPADM

## 2022-07-29 RX ORDER — TALC
6 POWDER (GRAM) TOPICAL NIGHTLY PRN
Qty: 30 TABLET | Refills: 0 | Status: SHIPPED | OUTPATIENT
Start: 2022-07-29 | End: 2023-07-18

## 2022-07-29 RX ADMIN — VALSARTAN 80 MG: 80 TABLET, FILM COATED ORAL at 08:07

## 2022-07-29 RX ADMIN — AMLODIPINE BESYLATE 10 MG: 5 TABLET ORAL at 08:07

## 2022-07-29 RX ADMIN — OXYBUTYNIN CHLORIDE 15 MG: 5 TABLET, EXTENDED RELEASE ORAL at 08:07

## 2022-07-29 RX ADMIN — AMPICILLIN SODIUM AND SULBACTAM SODIUM 3 G: 2; 1 INJECTION, POWDER, FOR SOLUTION INTRAMUSCULAR; INTRAVENOUS at 04:07

## 2022-07-29 RX ADMIN — AMPICILLIN SODIUM AND SULBACTAM SODIUM 3 G: 2; 1 INJECTION, POWDER, FOR SOLUTION INTRAMUSCULAR; INTRAVENOUS at 11:07

## 2022-07-29 RX ADMIN — ENOXAPARIN SODIUM 40 MG: 100 INJECTION SUBCUTANEOUS at 04:07

## 2022-07-29 RX ADMIN — AMPICILLIN SODIUM AND SULBACTAM SODIUM 3 G: 2; 1 INJECTION, POWDER, FOR SOLUTION INTRAMUSCULAR; INTRAVENOUS at 05:07

## 2022-07-29 RX ADMIN — SODIUM CHLORIDE AND POTASSIUM CHLORIDE: 9; 1.49 INJECTION, SOLUTION INTRAVENOUS at 03:07

## 2022-07-29 RX ADMIN — ATORVASTATIN CALCIUM 40 MG: 40 TABLET, FILM COATED ORAL at 08:07

## 2022-07-29 NOTE — PLAN OF CARE
Pt is AAOx4. Room air. BG WNL.  Wound care completed to R buttocks.   Wound care consult placed d/t no order or note since hospital stay.  No falls or new injuries reported during shift, safety precautions maintained.     Problem: Adult Inpatient Plan of Care  Goal: Plan of Care Review  Outcome: Ongoing, Progressing     Problem: Adult Inpatient Plan of Care  Goal: Optimal Comfort and Wellbeing  Outcome: Ongoing, Progressing

## 2022-07-29 NOTE — ASSESSMENT & PLAN NOTE
- right ischial tuberosity, likely due to pressure ulcer  - s/p debridement  - Enterococcus growing, thus far (anaerobic culture remains pending)  - see OPAT note      Outpatient Antibiotic Therapy Plan:    Please send referral to [facility].    1) Infection: ischial osteomyelitis (Enteroccus faecalis)    2) Discharge Antibiotics:    Intravenous antibiotics:   Unasyn 12 g/d  IV by CI    3) Therapy Duration:  6 weeks    Estimated end date of IV antibiotics: 09/05/2022    4) Outpatient Weekly Labs:    Order the following labs to be drawn on Mondays:    CBC   CMP    CRP    5) Fax Lab Results to Infectious Diseases Provider: Wagner    [facility] Fax Number: [phone]    6) Outpatient Infectious Diseases Follow-up     Follow-up appointment will be arranged by the ID clinic and will be found in the patient's appointments tab.     Prior to discharge, please ensure the patient's follow-up has been scheduled.     If there is still no follow-up scheduled prior to discharge, please send an EPIC message to Christine Westbrook in Infectious Diseases.

## 2022-07-29 NOTE — SUBJECTIVE & OBJECTIVE
Interval History: Feels well. Tolerating Unasyn. Preparing for discharge.    Review of Systems   Constitutional:  Negative for fever.   Skin:  Positive for wound.   All other systems reviewed and are negative.  Objective:     Vital Signs (Most Recent):  Temp: 98.4 °F (36.9 °C) (07/29/22 1225)  Pulse: 89 (07/29/22 1225)  Resp: 19 (07/29/22 1225)  BP: 119/76 (07/29/22 1225)  SpO2: 95 % (07/29/22 1225) Vital Signs (24h Range):  Temp:  [98.1 °F (36.7 °C)-98.5 °F (36.9 °C)] 98.4 °F (36.9 °C)  Pulse:  [] 89  Resp:  [17-20] 19  SpO2:  [95 %-99 %] 95 %  BP: (101-121)/(65-78) 119/76     Weight: 71.5 kg (157 lb 10.1 oz)  Body mass index is 27.92 kg/m².    Estimated Creatinine Clearance: 125.6 mL/min (based on SCr of 0.7 mg/dL).    Physical Exam  Vitals and nursing note reviewed.   Constitutional:       Appearance: Normal appearance.   Neurological:      Mental Status: He is alert and oriented to person, place, and time.   Psychiatric:         Mood and Affect: Mood normal.         Behavior: Behavior normal.         Thought Content: Thought content normal.         Judgment: Judgment normal.       Significant Labs: CBC: No results for input(s): WBC, HGB, HCT, PLT in the last 48 hours.  CMP: No results for input(s): NA, K, CL, CO2, GLU, BUN, CREATININE, CALCIUM, PROT, ALBUMIN, BILITOT, ALKPHOS, AST, ALT, ANIONGAP, EGFRNONAA in the last 48 hours.    Invalid input(s): ESTGF[place]  Wound Culture:   Recent Labs   Lab 07/24/22  1712 07/25/22  1000   LABAERO ENTEROCOCCUS FAECALIS  Many  * ENTEROCOCCUS FAECALIS  Few  *       Significant Imaging: I have reviewed all pertinent imaging results/findings within the past 24 hours.

## 2022-07-29 NOTE — ASSESSMENT & PLAN NOTE
Patient's FSGs are controlled on current medication regimen.  Last A1c reviewed-   Lab Results   Component Value Date    HGBA1C 10.6 (H) 07/24/2022     Most recent fingerstick glucose reviewed-   Recent Labs   Lab 07/28/22  1627 07/28/22 2017 07/29/22  0751 07/29/22  1225   POCTGLUCOSE 113* 122* 90 128*     Current correctional scale  Low  Decrease anti-hyperglycemic dose as follows-   Antihyperglycemics (From admission, onward)            Start     Stop Route Frequency Ordered    07/24/22 2004  insulin aspart U-100 pen 0-5 Units         -- SubQ Every 6 hours PRN 07/24/22 1904        Hold Oral hypoglycemics while patient is in the hospital.

## 2022-07-29 NOTE — PLAN OF CARE
[facility] [place] Telemetry      HOME HEALTH ORDERS  FACE TO FACE ENCOUNTER    Patient Name: Dale Pantoja  YOB: 1985    PCP: Juan Antonio Adorno MD   PCP Address: [address] / [place] [address]  PCP Phone Number: [phone]  PCP Fax: [phone]    Encounter Date: 7/24/22    Admit to Home Health    Diagnoses:  Active Hospital Problems    Diagnosis  POA    *Pressure injury, stage 4, with infection [L89.94, L08.9]  Yes    Subacute osteomyelitis, other site [M86.28]  Yes     Priority: 2     Anemia [D64.9]  Yes    Hypokalemia [E87.6]  Yes    Type 2 diabetes mellitus, without long-term current use of insulin [E11.9]  Yes    Spina bifida [Q05.9]  Not Applicable    UTI (urinary tract infection) [N39.0]  Yes    Paraparesis [G82.20]  Yes    Neurogenic bladder [N31.9]  Yes    HTN (hypertension), benign [I10]  Yes    Dyslipidemia [E78.5]  Yes      Resolved Hospital Problems    Diagnosis Date Resolved POA    Decubitus ulcer [L89.90] 07/24/2022 Unknown    Wound infection [T14.8XXA, L08.9] 07/24/2022 Unknown    Chest pain [R07.9] 07/24/2022 Unknown       Follow Up Appointments:  Future Appointments   Date Time Provider Department Center   9/7/2022 10:30 AM INFECTIOUS DISEASE, [facility]   10/3/2022  7:15 AM NOM OIC-US1 MASTER [facility] ULTR [facility]       Allergies:  Review of patient's allergies indicates:   Allergen Reactions    Latex, natural rubber        Medications: Review discharge medications with patient and family and provide education.      I have seen and examined this patient within the last 30 days. My clinical findings that support the need for the home health skilled services and home bound status are the following:no   Weakness/numbness causing balance and gait disturbance due to Weakness/Debility making it taxing to leave home.  Patient with medication mismanagement issues requiring home bound status as evidenced by  Poor understanding of medication  regimen/dosage.     Diet:   diabetic diet 1800 calorie    Labs:  Report Lab results to PCP.    Referrals/ Consults  Aide to provide assistance with personal care, ADLs, and vital signs.    Activities:   activity as tolerated    Nursing:   Agency to admit patient within 24 hours of hospital discharge unless specified on physician order or at patient request    SN to complete comprehensive assessment including routine vital signs. Instruct on disease process and s/s of complications to report to MD. Review/verify medication list sent home with the patient at time of discharge  and instruct patient/caregiver as needed. Frequency may be adjusted depending on start of care date.     Skilled nurse to perform up to 3 visits PRN for symptoms related to diagnosis    Notify MD if SBP > 160 or < 90; DBP > 90 or < 50; HR > 120 or < 50; Temp > 101; O2 < 88%; Other:       Ok to schedule additional visits based on staff availability and patient request on consecutive days within the home health episode.    Miscellaneous   Home Infusion Therapy:   SN to perform Infusion Therapy/Central Line Care.  Review Central Line Care & Central Line Flush with patient.    Administer (drug and dose):per antibiotic instruction    Scrub the Hub: Prior to accessing the line, always perform a 30 second alcohol scrub  Each lumen of the central line is to be flushed at least daily with 10 mL Normal Saline and 3 mL Heparin flush (10 units/mL)  Skilled Nurse (SN) may draw blood from IV access  Blood Draw Procedure:   - Aspirate at least 5 mL of blood   - Discard   - Obtain specimen   - Change injection cap   - Flush with 20 mL Normal Saline followed by a                 3-5 mL Heparin flush (10 units/mL)  Central :   - Sterile dressing changes are done weekly and as needed.   - Use chlor-hexadine scrub to cleanse site, apply Biopatch to insertion site,       apply securement device dressing   - Injection caps are changed weekly and after  EVERY lab draw.   - If sterile gauze is under dressing to control oozing,                 dressing change must be performed every 24 hours until gauze is not needed.  Diabetic Care:   SN to perform and educate Diabetic management with blood glucose monitoring:    Home Health Aide:  Nursing Three times weekly and Home Health Aide Three times weekly    Wound Care Orders : ISCHIAL/GLUTEAL WOUND    I certify that this patient is confined to his home and needs HH

## 2022-07-29 NOTE — PLAN OF CARE
INDIGO sent referral to [facility]. SW also sent referral to [facility].     12:55 pm     Patient accepted by [facility]. Patient accepted by [facility].        07/29/22 1050   Post-Acute Status   Post-Acute Authorization Home Health   Home Health Status Pending post-acute provider review/more information requested   Coverage Medicare AB and Medicaid   IV Infusion Status Post acute provider reviewing for acceptance   Discharge Delays (!) Post-Acute Set-up   Discharge Plan   Discharge Plan A Home Health   Discharge Plan B Home with family

## 2022-07-29 NOTE — PROCEDURES
"Dale Pantoja is a 37 y.o. male patient.    Temp: 98.2 °F (36.8 °C) (07/29/22 1618)  Pulse: 99 (07/29/22 1618)  Resp: 19 (07/29/22 1618)  BP: 131/81 (07/29/22 1618)  SpO2: 96 % (07/29/22 1618)  Weight: 71.5 kg (157 lb 10.1 oz) (07/24/22 2210)  Height: 5' 3" (160 cm) (07/24/22 2210)    PICC  Date/Time: 7/29/2022 5:32 PM  Performed by: Burt Chris RN  Consent Done: Yes  Time out: Immediately prior to procedure a time out was called to verify the correct patient, procedure, equipment, support staff and site/side marked as required  Indications: med administration and vascular access  Anesthesia: local infiltration  Local anesthetic: lidocaine 1% without epinephrine  Anesthetic Total (mL): 5  Preparation: skin prepped with ChloraPrep  Skin prep agent dried: skin prep agent completely dried prior to procedure  Sterile barriers: all five maximum sterile barriers used - cap, mask, sterile gown, sterile gloves, and large sterile sheet  Hand hygiene: hand hygiene performed prior to central venous catheter insertion  Location details: right basilic  Catheter type: double lumen  Catheter size: 5 Fr  Catheter Length: 40cm    Ultrasound guidance: yes  Vessel Caliber: large, compressibility normal  Needle advanced into vessel with real time Ultrasound guidance.  Guidewire confirmed in vessel.  Sterile sheath used.  Number of attempts: 1  Post-procedure: blood return through all ports, chlorhexidine patch and sterile dressing applied            Name burt chris  7/29/2022    "

## 2022-07-29 NOTE — PLAN OF CARE
INDIGO followed up with Beatrice with [facility] to inquire if she taught patient when she visited earlier. Beatrice stated that she had not. Beatrice stated that patient's  mom couldnt meet until 3:30-4pm. Beatrice stated that she has the home medication and will connect patient once he gets the line placed.

## 2022-07-29 NOTE — PROGRESS NOTES
[facility]  Infectious Disease  Progress Note    Patient Name: Dale Pantoja  MRN: [id number]  Admission Date: 7/24/2022  Length of Stay: 5 days  Attending Physician: Hiren Preston MD  Primary Care Provider: Juan Antonio Adorno MD    Isolation Status: No active isolations     Assessment/Plan:      Subacute osteomyelitis, other site  - right ischial tuberosity, likely due to pressure ulcer  - s/p debridement  - Enterococcus growing, thus far (anaerobic culture remains pending)  - see OPAT note      Outpatient Antibiotic Therapy Plan:    Please send referral to [facility].    1) Infection: ischial osteomyelitis (Enteroccus faecalis)    2) Discharge Antibiotics:    Intravenous antibiotics:   Unasyn 12 g/d  IV by CI    3) Therapy Duration:  6 weeks    Estimated end date of IV antibiotics: 09/05/2022    4) Outpatient Weekly Labs:    Order the following labs to be drawn on Mondays:    CBC   CMP    CRP    5) Fax Lab Results to Infectious Diseases Provider: Wagner    [facility] Fax Number: [phone]    6) Outpatient Infectious Diseases Follow-up     Follow-up appointment will be arranged by the ID clinic and will be found in the patient's appointments tab.     Prior to discharge, please ensure the patient's follow-up has been scheduled.     If there is still no follow-up scheduled prior to discharge, please send an EPIC message to Christine Westbrook in Infectious Diseases.      Thank you for your consult. I will sign off. Please contact us if you have any additional questions.    Mathew Edward MD  Infectious Disease  [facility]    Subjective:     Principal Problem:Pressure injury, stage 4, with infection    HPI: 36 yo man with history of spina bifida and paraplegia, admitted with ischial pressure ulcer and underlying osteomyelitis. The patient underwent bedside debridement 7/25/22 with drainage of abscess. ID is consulted for osteomyelitis. The patient  feels well and denies any history of fever.     Interval History: Feels well. Tolerating Unasyn. Preparing for discharge.    Review of Systems   Constitutional:  Negative for fever.   Skin:  Positive for wound.   All other systems reviewed and are negative.  Objective:     Vital Signs (Most Recent):  Temp: 98.4 °F (36.9 °C) (07/29/22 1225)  Pulse: 89 (07/29/22 1225)  Resp: 19 (07/29/22 1225)  BP: 119/76 (07/29/22 1225)  SpO2: 95 % (07/29/22 1225) Vital Signs (24h Range):  Temp:  [98.1 °F (36.7 °C)-98.5 °F (36.9 °C)] 98.4 °F (36.9 °C)  Pulse:  [] 89  Resp:  [17-20] 19  SpO2:  [95 %-99 %] 95 %  BP: (101-121)/(65-78) 119/76     Weight: 71.5 kg (157 lb 10.1 oz)  Body mass index is 27.92 kg/m².    Estimated Creatinine Clearance: 125.6 mL/min (based on SCr of 0.7 mg/dL).    Physical Exam  Vitals and nursing note reviewed.   Constitutional:       Appearance: Normal appearance.   Neurological:      Mental Status: He is alert and oriented to person, place, and time.   Psychiatric:         Mood and Affect: Mood normal.         Behavior: Behavior normal.         Thought Content: Thought content normal.         Judgment: Judgment normal.       Significant Labs: CBC: No results for input(s): WBC, HGB, HCT, PLT in the last 48 hours.  CMP: No results for input(s): NA, K, CL, CO2, GLU, BUN, CREATININE, CALCIUM, PROT, ALBUMIN, BILITOT, ALKPHOS, AST, ALT, ANIONGAP, EGFRNONAA in the last 48 hours.    Invalid input(s): ESTGF[place]  Wound Culture:   Recent Labs   Lab 07/24/22  1712 07/25/22  1000   LABAERO ENTEROCOCCUS FAECALIS  Many  * ENTEROCOCCUS FAECALIS  Few  *       Significant Imaging: I have reviewed all pertinent imaging results/findings within the past 24 hours.

## 2022-07-29 NOTE — PLAN OF CARE
-- DO NOT REPLY / DO NOT REPLY ALL --  -- Message is from the [facility]--    COVID-19 Universal Screening: Negative    General Patient Message      Reason for Call: Patient has questions about her medication     Caller Information       Type Contact Phone    05/09/2020 06:23 PM Phone (Incoming) Zarina Soliman (Self) [phone] (M)          Alternative phone number: n/a    Turnaround time given to caller:   \"This message will be sent to [state Provider's name]. The clinical team will fulfill your request as soon as they review your message when the office opens on Monday (or after the holiday).\"     [place] - Telemetry  Discharge Final Note    Primary Care Provider: Juan Antonio Adorno MD    Expected Discharge Date: 7/29/2022    All needs met. Appointments scheduled. [facility] will provide IV abx and [facility] will provide home health services. SW notified nurse Lily that patient is ready for discharge from case management standpoint. INDIGO informed Lily that patient can go after receives picc line.     Final Discharge Note (most recent)       Final Note - 07/29/22 1531          Final Note    Assessment Type Final Discharge Note (P)      Anticipated Discharge Disposition Home-Health Care Svc (P)      What phone number can be called within the next 1-3 days to see how you are doing after discharge? [phone] (P)      Hospital Resources/Appts/Education Provided Appointments scheduled by Navigator/Coordinator;Appointments scheduled and added to AVS (P)         Post-Acute Status    Post-Acute Authorization Home Health;IV Infusion (P)      Home Health Status Set-up Complete/Auth obtained (P)      Coverage [employer] and Medicaid (P)      IV Infusion Status Set-up Complete/Auth obtained (P)      Discharge Delays None known at this time (P)                      Important Message from Medicare             Contact Info       [facility]   Specialty: Home Health Services, Home Therapy Services, Home Living Aide Services    [address] [address]   Phone: [phone]       Next Steps: Follow up    Instructions: Home Health will call to schedule first visit. You will be seen Tuesday.    [facility]    [facility] [facility] [address]   Phone: [phone]       Next Steps: Follow up    Instructions: Please call company if you have any questions about IV abx.

## 2022-07-29 NOTE — PROGRESS NOTES
[facility] educator met with patient and mother to discuss discharge plan for home IVABX. Dale Pantoja will dc home with family support. Patient will infuse medication via CADD Pump. Patient and mother educated on S.A.S.H procedure. S.A.S.H mat provided.  Patient education checklist reviewed and acknowledged by above person(s) and are agreeable to discharge with home infusion plan of care. IV administration process using aspetic technique was reviewed with successful return demonstration. RN liaison had patients mother aseptically set up my teaching CADD pump, connect cassette, reset res vol, connect medication to purple lumen of my teaching picc line by cleaning with alcohol, flushing with NS, cleaning with alcohol, connecting end CADD tubing to picc line and then starting infusion. Patient's mother repeated the process a total of 6 times. RN liaison educated mother on flushing red unused lumen of picc line once daily (after medication connect to purple lumen) aseptically with alcohol, NS, alcohol, heparin flush, close clamp and placing a new green curos cap on the end of the red lumen. Patient and his mother feel comfortable with infusion. Patient will dc home with Unasyn 12 grams continuous with an estimated EOC 9/5/22. Patient is currently pending picc line placement. At 5:10pm patient's bedside nurse Lily generously offered to connect patient to the pump for discharge once picc line was placed. Reviewed pump start up with Lily. [facility]  will follow patient for weekly dressing changes and lab draws. Time allotted for questions. Patients nurse and case management team notified teaching has been completed.     Medication delivery will be made to bedside    Cinthya Abdalla RN, BSN  Clinical Liaison   [facility]  Cell [phone]  Available M-F 8:30-5pm  Office [phone]  Available 24/7

## 2022-07-29 NOTE — DISCHARGE SUMMARY
"[facility] Medicine  Discharge Summary      Patient Name: Dale Pantoja  MRN: [id number]  Patient Class: IP- Inpatient  Admission Date: 7/24/2022  Hospital Length of Stay: 5 days  Discharge Date and Time:  07/29/2022 3:01 PM  Attending Physician: Hiren Preston MD   Discharging Provider: Hiren Preston MD  Primary Care Provider: Juan Antonio Adorno MD      HPI:   Mr. Pantoja is a 38yo man with spina bifida, DM2, overactive bladder, and recurrent UTI's.   He self-catheterizes PRN at home due to urinary retention.    He states he now comes to the ED due to a worsening right ischial decubitus ulcer.  He had been keeping its development to himself, hoping it would resolved.  Unfortunately it has deepened, and over the last two days he developed a purulent, foul-smelling drainage.  He denies myalgias, fever, or chills.  He cannot feel the area, so he has no pain.  He has no hot sweats or feelings of systemic infection per him.  He has no N/V/D.    In the ED his VS's were /66   Pulse 107   Temp 98.7 °F (37.1 °C) (Oral)   Resp 20   Ht 5' 3" (1.6 m)   Wt 72.6 kg (160 lb)   SpO2 98% RA  BMI 28.34 kg/m².  Labs showed COVID NEGATIVE, K 3.3, Cr 0.7, otherwise normal.  CBC showed WBC 7.7, Hg 12.8, .    Xray of the pelvis showed a lucency involving the left aspect of the symphysis and a chronic deformity of the acetabulae and femoral heads/necks, noting progressive femoroacetabular degenerative changes.  CT pelvis showed a decubitus ulcer overlying the right ischial tuberosity with a 3.9 cm ill-defined collection of fluid and air in the adjacent soft tissues, concerning for phlegmon or early abscess.  There was mild cortical irregularity of the right ischial tuberosity, equivocal for acute osteomyelitis.  There was right inguinal and external iliac lymphadenopathy, likely reactive.  There was a small focus of air within the bladder.            * No surgery found *      Hospital Course: "   Admitted with sepsis secondary to ischial tuberosity ulcer with possible abscess. Started on IVF and broad-spectrum IV antibiotics. General Surgery consulted and did bedside debridement/cultures. MRI concerning for ischial osteomyelitis.  Cultures from the debridement grew Enterococcus faecalis and his urine culture grew pansensitive E coli.  He completed treatment for the E coli in house.  Antibiotic was channeled to Unasyn.  ID was consulted and they recommended 6 weeks of IV Unasyn.  Patient had PICC line placed and he was discharged home on IV antibiotic and advised to follow-up with ID outpatient.       Goals of Care Treatment Preferences:  Code Status: Full Code      Consults:   Consults (From admission, onward)        Status Ordering Provider     [facility]  Once        Provider:  (Not yet assigned)    Acknowledged KENDRA RONDON     Inpatient consult to Infectious Diseases  Once        Provider:  Mathew Edward MD    Completed KENDRA RONDON     Inpatient consult to General Surgery  Once        Provider:  Allan Beckham MD    Completed GALILEA BURKS          * Pressure injury, stage 4, with infection  CT concerning for abscess and osteomyelitis: Decubitus ulcer overlying the right ischial tuberosity.  3.9 cm ill-defined collection of fluid and air in the adjacent soft tissues, concerning for phlegmon or early abscess.  Mild cortical irregularity of the right ischial tuberosity, equivocal for acute osteomyelitis.  - General surgery consulted and patient is S/P bedside I&D on 07/25  - MRI of the pelvis show concern for ischial osteomyelitis  - cultures grew enterococcus faecalis  - ID consulted for final/discharge abx recs  - Plan to discharge patient home with  once final antibiotic regimen from ID determined        Subacute osteomyelitis, other site  Same as above      Anemia  Continue to trend H/H      Hypokalemia  Continue to replete  adequately      Type 2 diabetes mellitus, without long-term current use of insulin  Patient's FSGs are controlled on current medication regimen.  Last A1c reviewed-   Lab Results   Component Value Date    HGBA1C 10.6 (H) 07/24/2022     Most recent fingerstick glucose reviewed-   Recent Labs   Lab 07/28/22  1627 07/28/22 2017 07/29/22  0751 07/29/22  1225   POCTGLUCOSE 113* 122* 90 128*     Current correctional scale  Low  Decrease anti-hyperglycemic dose as follows-   Antihyperglycemics (From admission, onward)            Start     Stop Route Frequency Ordered    07/24/22 2004  insulin aspart U-100 pen 0-5 Units         -- SubQ Every 6 hours PRN 07/24/22 1904        Hold Oral hypoglycemics while patient is in the hospital.    UTI (urinary tract infection)  Urine culture shows pan-sensitive E. Coli  Today is day 5 of cefepime - will plan to DC cefepime after today      Spina bifida  DVT prophylaxis  PT and OT consults      Neurogenic bladder  PRN self-catheterization             Paraparesis  PT and OT  Poor sensation too but at baseline      HTN (hypertension), benign  Continue home meds        Dyslipidemia  Continue home meds      Final Active Diagnoses:    Diagnosis Date Noted POA    PRINCIPAL PROBLEM:  Pressure injury, stage 4, with infection [L89.94, L08.9] 07/24/2022 Yes    Subacute osteomyelitis, other site [M86.28] 07/27/2022 Yes    Anemia [D64.9] 07/26/2022 Yes    Hypokalemia [E87.6] 07/24/2022 Yes    Type 2 diabetes mellitus, without long-term current use of insulin [E11.9] 10/21/2021 Yes    Spina bifida [Q05.9] 01/27/2016 Not Applicable    UTI (urinary tract infection) [N39.0] 01/27/2016 Yes    Paraparesis [G82.20] 04/23/2015 Yes    Neurogenic bladder [N31.9] 04/23/2015 Yes    HTN (hypertension), benign [I10] 05/19/2014 Yes    Dyslipidemia [E78.5] 02/06/2014 Yes      Problems Resolved During this Admission:    Diagnosis Date Noted Date Resolved POA    Decubitus ulcer [L89.90] 07/24/2022  "07/24/2022 Unknown    Wound infection [T14.8XXA, L08.9] 07/24/2022 07/24/2022 Unknown    Chest pain [R07.9] 07/24/2022 07/24/2022 Unknown       Discharged Condition: stable    Disposition: Home-Health Care Svc    Follow Up:   Follow-up Information     [facility] Follow up.    Specialties: Home Health Services, Home Therapy Services, Home Living Aide Services  Why: Home Health will call to schedule first visit. You will be seen Tuesday.  Contact information:  [address] [address]  [phone]           [facility] Follow up.    Why: Please call company if you have any questions about IV abx.  Contact information:  [address] [place] [address] [id number] [phone]                       Patient Instructions:      WHEELCHAIR GEL CUSHION FOR HOME USE     Order Specific Question Answer Comments   Height: 5' 3" (1.6 m)    Weight: 71.5 kg (157 lb 10.1 oz)    Length of need (1-99 months): 12        Significant Diagnostic Studies: Radiology: MRI:     Pending Diagnostic Studies:     None         Medications:  Reconciled Home Medications:      Medication List      START taking these medications    AMPICILLIN/SULBACTAM 3 G/100 ML NS (READY TO MIX)  Inject 100 mLs (3 g total) into the vein every 6 (six) hours.     melatonin 3 mg tablet  Commonly known as: MELATIN  Take 2 tablets (6 mg total) by mouth nightly as needed for Insomnia.        CONTINUE taking these medications    amLODIPine 10 MG tablet  Commonly known as: NORVASC  Take 1 tablet (10 mg total) by mouth once daily.     atorvastatin 40 MG tablet  Commonly known as: LIPITOR  Take 1 tablet (40 mg total) by mouth once daily.     metFORMIN 500 MG tablet  Commonly known as: GLUCOPHAGE  Take 1 tablet (500 mg total) by mouth 3 (three) times daily with meals.     oxybutynin 15 MG Tr24  Commonly known as: DITROPAN XL  Take 1 tablet (15 mg total) by mouth once daily.   "   polyethylene glycol 17 gram Pwpk  Commonly known as: GLYCOLAX  Take 17 g by mouth once daily.     valsartan 80 MG tablet  Commonly known as: DIOVAN  Take 1 tablet (80 mg total) by mouth once daily.            Indwelling Lines/Drains at time of discharge:   Lines/Drains/Airways     None                 Time spent on the discharge of patient: 30 minutes         Hiren Preston MD  Department of Hospital Medicine  [facility]

## 2022-07-29 NOTE — CONSULTS
[facility]  Wound Care    Patient Name:  Dale Pantoja   MRN:  [id number]  Date: 7/29/2022  Diagnosis: Pressure injury, stage 4, with infection    History:     Past Medical History:   Diagnosis Date    Overactive bladder     Spina bifida     Type 2 diabetes mellitus, without long-term current use of insulin 10/21/2021    Urinary tract infection        Social History     Socioeconomic History    Marital status: Single   Tobacco Use    Smoking status: Never Smoker    Smokeless tobacco: Never Used   Substance and Sexual Activity    Alcohol use: No    Drug use: No    Sexual activity: Never       Precautions:     Allergies as of 07/24/2022 - Reviewed 07/24/2022   Allergen Reaction Noted    Latex, natural rubber  04/23/2015       WOC Assessment Details/Treatment   Nursing consult for altered skin integrity right buttocks  A 37 year old male admitted 7/24/22 from home with pressure injury Stage 4 with infection; DM II; HTN; spina bifida; neurogenic bladder; paraparesis; dyslipidemia; hypokalemia  7/27 WBC 4.72 Hgb 10.3 Hct 33.1 Alb 2.2 Weight 157 lb  7/24 A1C 10.6   7/25 Surgery consult with bedside debridement. 7/26 Progress notes mention wet to dry Vashe soaked gauze dressings daily.  Orders placed.     07/29/2022

## 2022-07-30 LAB — BACTERIA SPEC ANAEROBE CULT: ABNORMAL

## 2022-07-30 NOTE — PLAN OF CARE
07/30/2022      Dale Kit  [address] [facility] [address]          [facility] Medicine Dept.  [facility] [address] [place] [address]  [phone] after hours  [phone] fax Principal Diagnosis:  Pressure injury, stage 4, with infection                         Wound Care Orders    Wound care: cleanse with non cytotoxic wound cleanser, pat dry with gauze;  pack wound with wet to dry vashe soaked gauze, cover with ABD pads, secure with tape daily      TORB Dr Preston,/CHRISTOPHER Watters RN  ________________________  Hiren Preston MD  07/30/2022

## 2022-07-31 NOTE — NURSING
Discharge instructions given to patient and patient mom prior to discharge. Both state understanding of information provided. IV discontinued tolerated well. Wound care completed prior to discharge. Patient mom states understanding of directions for wound care. All personal belongings with patient prior to discharge. Home infusion started by patient mom. Transported to personal vehicle via personal wheelchair.

## 2022-08-01 NOTE — PHYSICIAN QUERY
PT Name: Dale Pantoja  MR #: [id number]     DOCUMENTATION CLARIFICATION      CDS/: Bruno Lazaro RN, CCDS     Contact information:   [email or web address]      This form is a permanent document in the medical record.     Query Date: August 1, 2022    By submitting this query, we are merely seeking further clarification of documentation to reflect the severity of illness of your patient. Please utilize your independent clinical judgment when addressing the question(s) below.    The Medical Record contains the following:     Indicators   Supporting Clinical Findings Location in Medical Record   x Documentation of condition 36yo man with spina bifida, DM2, overactive bladder, and recurrent UTI's.;wheelchair bound.;  Paraparesis    He self-catheterizes PRN at home due to urinary retention.    UTI (urinary tract infection)  Urine culture shows pan-sensitive E. Coli  Continue cefepime for now   7/24 [facility]        7/26 [facility], [facility]        x Urinary Device, Catheter Neurogenic bladder  PRN self-catheterization 7/24 [facility]    Lab Value(s)      UA Results     x Cultures Urine culture: Escherichia coli >100,000   7/24  Micro     Treatment/Medication     x Other history of diabetes, spina bifida, paraplegia   Genitourinary: Negative for dysuria   7/24 ED      Provider, please clarify if there is any clinical correlation between  Self catheter/ in-out cath     and    UTI .  [   ] Due to or associated with each other   [   ] Unrelated to each other   [   ] Other explanation (please specify): _____________   [   ] Clinically undetermined       Please document in your progress notes daily for the duration of treatment until resolved, and include in your discharge summary.    Form No. [id number]

## 2022-08-02 ENCOUNTER — LAB VISIT (OUTPATIENT)
Dept: LAB | Facility: HOSPITAL | Age: 37
End: 2022-08-02
Attending: INTERNAL MEDICINE
Payer: MEDICARE

## 2022-08-02 DIAGNOSIS — M86.28 SUBACUTE OSTEOMYELITIS, OTHER SITE: Primary | ICD-10-CM

## 2022-08-02 LAB
ALBUMIN SERPL BCP-MCNC: 2.8 G/DL (ref 3.5–5.2)
ALP SERPL-CCNC: 91 U/L (ref 55–135)
ALT SERPL W/O P-5'-P-CCNC: 26 U/L (ref 10–44)
ANION GAP SERPL CALC-SCNC: 12 MMOL/L (ref 8–16)
AST SERPL-CCNC: 19 U/L (ref 10–40)
BASOPHILS # BLD AUTO: 0.05 K/UL (ref 0–0.2)
BASOPHILS NFR BLD: 0.7 % (ref 0–1.9)
BILIRUB SERPL-MCNC: 0.3 MG/DL (ref 0.1–1)
BUN SERPL-MCNC: 10 MG/DL (ref 6–20)
CALCIUM SERPL-MCNC: 8.6 MG/DL (ref 8.7–10.5)
CHLORIDE SERPL-SCNC: 103 MMOL/L (ref 95–110)
CO2 SERPL-SCNC: 28 MMOL/L (ref 23–29)
CREAT SERPL-MCNC: 0.8 MG/DL (ref 0.5–1.4)
CRP SERPL-MCNC: 20 MG/L (ref 0–8.2)
DIFFERENTIAL METHOD: ABNORMAL
EOSINOPHIL # BLD AUTO: 0.3 K/UL (ref 0–0.5)
EOSINOPHIL NFR BLD: 4.5 % (ref 0–8)
ERYTHROCYTE [DISTWIDTH] IN BLOOD BY AUTOMATED COUNT: 15.9 % (ref 11.5–14.5)
EST. GFR  (NO RACE VARIABLE): >60 ML/MIN/1.73 M^2
GLUCOSE SERPL-MCNC: 111 MG/DL (ref 70–110)
HCT VFR BLD AUTO: 36.6 % (ref 40–54)
HGB BLD-MCNC: 11.6 G/DL (ref 14–18)
IMM GRANULOCYTES # BLD AUTO: 0.3 K/UL (ref 0–0.04)
IMM GRANULOCYTES NFR BLD AUTO: 0 % (ref 0–0.5)
LYMPHOCYTES # BLD AUTO: 2.2 K/UL (ref 1–4.8)
LYMPHOCYTES NFR BLD: 31.1 % (ref 18–48)
MCH RBC QN AUTO: 23.3 PG (ref 27–31)
MCHC RBC AUTO-ENTMCNC: 31.7 G/DL (ref 32–36)
MCV RBC AUTO: 74 FL (ref 82–98)
MONOCYTES # BLD AUTO: 0.5 K/UL (ref 0.3–1)
MONOCYTES NFR BLD: 6.5 % (ref 4–15)
NEUTROPHILS # BLD AUTO: 4 K/UL (ref 1.8–7.7)
NEUTROPHILS NFR BLD: 56.9 % (ref 38–73)
NRBC BLD-RTO: 0 /100 WBC
PLATELET # BLD AUTO: 459 K/UL (ref 150–450)
PMV BLD AUTO: 9.1 FL (ref 9.2–12.9)
POTASSIUM SERPL-SCNC: 3.7 MMOL/L (ref 3.5–5.1)
PROT SERPL-MCNC: 7.7 G/DL (ref 6–8.4)
RBC # BLD AUTO: 4.97 M/UL (ref 4.6–6.2)
SODIUM SERPL-SCNC: 143 MMOL/L (ref 136–145)
WBC # BLD AUTO: 7.04 K/UL (ref 3.9–12.7)

## 2022-08-02 PROCEDURE — 85025 COMPLETE CBC W/AUTO DIFF WBC: CPT | Performed by: INTERNAL MEDICINE

## 2022-08-02 PROCEDURE — 86140 C-REACTIVE PROTEIN: CPT | Performed by: INTERNAL MEDICINE

## 2022-08-02 PROCEDURE — 80053 COMPREHEN METABOLIC PANEL: CPT | Performed by: INTERNAL MEDICINE

## 2022-08-04 NOTE — PHYSICIAN QUERY
PT Name: Dale Pantoja  MR #: [id number]     DOCUMENTATION CLARIFICATION      CDS/: Bruno Lazaro RN, CCDS     Contact information:   [email or web address]      This form is a permanent document in the medical record.     Query Date: August 4, 2022    By submitting this query, we are merely seeking further clarification of documentation to reflect the severity of illness of your patient. Please utilize your independent clinical judgment when addressing the question(s) below.    The Medical Record contains the following:     Indicators   Supporting Clinical Findings Location in Medical Record   x Documentation of condition 36yo man with spina bifida, DM2, overactive bladder, and recurrent UTI's.;wheelchair bound.;  Paraparesis    He self-catheterizes PRN at home due to urinary retention.    UTI (urinary tract infection)  Urine culture shows pan-sensitive E. Coli  Continue cefepime for now   7/24 [facility]        7/26 [facility]        x Urinary Device, Catheter Neurogenic bladder  PRN self-catheterization 7/24 [facility]    Lab Value(s)      UA Results     x Cultures Urine culture: Escherichia coli >100,000   7/24  Micro     Treatment/Medication     x Other history of diabetes, spina bifida, paraplegia   Genitourinary: Negative for dysuria   7/24 ED      Provider, please clarify if there is any clinical correlation between  Self catheter/ in-out cath     and    UTI .  [   ] Due to or associated with each other   [  x ] Unrelated to each other   [   ] Other explanation (please specify): _____________   [   ] Clinically undetermined       Please document in your progress notes daily for the duration of treatment until resolved, and include in your discharge summary.    Form No. [id number]

## 2022-08-08 ENCOUNTER — LAB VISIT (OUTPATIENT)
Dept: LAB | Facility: HOSPITAL | Age: 37
End: 2022-08-08
Attending: STUDENT IN AN ORGANIZED HEALTH CARE EDUCATION/TRAINING PROGRAM
Payer: MEDICARE

## 2022-08-08 DIAGNOSIS — M86.20: Primary | ICD-10-CM

## 2022-08-08 LAB
ALBUMIN SERPL BCP-MCNC: 3.1 G/DL (ref 3.5–5.2)
ALP SERPL-CCNC: 118 U/L (ref 55–135)
ALT SERPL W/O P-5'-P-CCNC: 42 U/L (ref 10–44)
ANION GAP SERPL CALC-SCNC: 11 MMOL/L (ref 8–16)
AST SERPL-CCNC: 19 U/L (ref 10–40)
BILIRUB SERPL-MCNC: 0.4 MG/DL (ref 0.1–1)
BUN SERPL-MCNC: 7 MG/DL (ref 6–20)
CALCIUM SERPL-MCNC: 8.6 MG/DL (ref 8.7–10.5)
CHLORIDE SERPL-SCNC: 101 MMOL/L (ref 95–110)
CO2 SERPL-SCNC: 28 MMOL/L (ref 23–29)
CREAT SERPL-MCNC: 0.7 MG/DL (ref 0.5–1.4)
CRP SERPL-MCNC: 17.6 MG/L (ref 0–8.2)
ERYTHROCYTE [DISTWIDTH] IN BLOOD BY AUTOMATED COUNT: 17.6 % (ref 11.5–14.5)
ERYTHROCYTE [SEDIMENTATION RATE] IN BLOOD BY WESTERGREN METHOD: 10 MM/HR (ref 0–10)
EST. GFR  (NO RACE VARIABLE): >60 ML/MIN/1.73 M^2
GLUCOSE SERPL-MCNC: 92 MG/DL (ref 70–110)
HCT VFR BLD AUTO: 39.3 % (ref 40–54)
HGB BLD-MCNC: 11.8 G/DL (ref 14–18)
MCH RBC QN AUTO: 22.7 PG (ref 27–31)
MCHC RBC AUTO-ENTMCNC: 30 G/DL (ref 32–36)
MCV RBC AUTO: 76 FL (ref 82–98)
PLATELET # BLD AUTO: 256 K/UL (ref 150–450)
PMV BLD AUTO: 9.8 FL (ref 9.2–12.9)
POTASSIUM SERPL-SCNC: 3.8 MMOL/L (ref 3.5–5.1)
PREALB SERPL-MCNC: 18 MG/DL (ref 20–43)
PROT SERPL-MCNC: 7.1 G/DL (ref 6–8.4)
RBC # BLD AUTO: 5.19 M/UL (ref 4.6–6.2)
SODIUM SERPL-SCNC: 140 MMOL/L (ref 136–145)
WBC # BLD AUTO: 5.72 K/UL (ref 3.9–12.7)

## 2022-08-08 PROCEDURE — 85652 RBC SED RATE AUTOMATED: CPT | Performed by: STUDENT IN AN ORGANIZED HEALTH CARE EDUCATION/TRAINING PROGRAM

## 2022-08-08 PROCEDURE — 80053 COMPREHEN METABOLIC PANEL: CPT | Performed by: STUDENT IN AN ORGANIZED HEALTH CARE EDUCATION/TRAINING PROGRAM

## 2022-08-08 PROCEDURE — 84134 ASSAY OF PREALBUMIN: CPT | Performed by: STUDENT IN AN ORGANIZED HEALTH CARE EDUCATION/TRAINING PROGRAM

## 2022-08-08 PROCEDURE — 86140 C-REACTIVE PROTEIN: CPT | Performed by: STUDENT IN AN ORGANIZED HEALTH CARE EDUCATION/TRAINING PROGRAM

## 2022-08-08 PROCEDURE — 85027 COMPLETE CBC AUTOMATED: CPT | Performed by: STUDENT IN AN ORGANIZED HEALTH CARE EDUCATION/TRAINING PROGRAM

## 2022-08-18 ENCOUNTER — LAB VISIT (OUTPATIENT)
Dept: LAB | Facility: HOSPITAL | Age: 37
End: 2022-08-18
Attending: INTERNAL MEDICINE
Payer: MEDICARE

## 2022-08-18 DIAGNOSIS — E11.69 DIABETES MELLITUS ASSOCIATED WITH HORMONAL ETIOLOGY: Primary | ICD-10-CM

## 2022-08-18 LAB
ALBUMIN SERPL BCP-MCNC: 3.4 G/DL (ref 3.5–5.2)
ALP SERPL-CCNC: 148 U/L (ref 55–135)
ALT SERPL W/O P-5'-P-CCNC: 30 U/L (ref 10–44)
ANION GAP SERPL CALC-SCNC: 11 MMOL/L (ref 8–16)
AST SERPL-CCNC: 13 U/L (ref 10–40)
BASOPHILS # BLD AUTO: 0.04 K/UL (ref 0–0.2)
BASOPHILS NFR BLD: 0.6 % (ref 0–1.9)
BILIRUB SERPL-MCNC: 0.5 MG/DL (ref 0.1–1)
BUN SERPL-MCNC: 9 MG/DL (ref 6–20)
CALCIUM SERPL-MCNC: 8.8 MG/DL (ref 8.7–10.5)
CHLORIDE SERPL-SCNC: 102 MMOL/L (ref 95–110)
CO2 SERPL-SCNC: 28 MMOL/L (ref 23–29)
CREAT SERPL-MCNC: 0.8 MG/DL (ref 0.5–1.4)
CRP SERPL-MCNC: 22.5 MG/L (ref 0–8.2)
DIFFERENTIAL METHOD: ABNORMAL
EOSINOPHIL # BLD AUTO: 0.5 K/UL (ref 0–0.5)
EOSINOPHIL NFR BLD: 6.5 % (ref 0–8)
ERYTHROCYTE [DISTWIDTH] IN BLOOD BY AUTOMATED COUNT: 18.3 % (ref 11.5–14.5)
ERYTHROCYTE [SEDIMENTATION RATE] IN BLOOD BY WESTERGREN METHOD: 25 MM/HR (ref 0–10)
EST. GFR  (NO RACE VARIABLE): >60 ML/MIN/1.73 M^2
GLUCOSE SERPL-MCNC: 65 MG/DL (ref 70–110)
HCT VFR BLD AUTO: 40.6 % (ref 40–54)
HGB BLD-MCNC: 12.4 G/DL (ref 14–18)
IMM GRANULOCYTES # BLD AUTO: 0.02 K/UL (ref 0–0.04)
IMM GRANULOCYTES NFR BLD AUTO: 0.3 % (ref 0–0.5)
LYMPHOCYTES # BLD AUTO: 1.9 K/UL (ref 1–4.8)
LYMPHOCYTES NFR BLD: 26.6 % (ref 18–48)
MCH RBC QN AUTO: 23 PG (ref 27–31)
MCHC RBC AUTO-ENTMCNC: 30.5 G/DL (ref 32–36)
MCV RBC AUTO: 75 FL (ref 82–98)
MONOCYTES # BLD AUTO: 0.6 K/UL (ref 0.3–1)
MONOCYTES NFR BLD: 7.7 % (ref 4–15)
NEUTROPHILS # BLD AUTO: 4.2 K/UL (ref 1.8–7.7)
NEUTROPHILS NFR BLD: 58.3 % (ref 38–73)
NRBC BLD-RTO: 0 /100 WBC
PLATELET # BLD AUTO: 251 K/UL (ref 150–450)
PMV BLD AUTO: 9.8 FL (ref 9.2–12.9)
POTASSIUM SERPL-SCNC: 3.6 MMOL/L (ref 3.5–5.1)
PROT SERPL-MCNC: 7.8 G/DL (ref 6–8.4)
RBC # BLD AUTO: 5.4 M/UL (ref 4.6–6.2)
SODIUM SERPL-SCNC: 141 MMOL/L (ref 136–145)
WBC # BLD AUTO: 7.13 K/UL (ref 3.9–12.7)

## 2022-08-18 PROCEDURE — 86140 C-REACTIVE PROTEIN: CPT | Performed by: INTERNAL MEDICINE

## 2022-08-18 PROCEDURE — 85025 COMPLETE CBC W/AUTO DIFF WBC: CPT | Performed by: INTERNAL MEDICINE

## 2022-08-18 PROCEDURE — 80053 COMPREHEN METABOLIC PANEL: CPT | Performed by: INTERNAL MEDICINE

## 2022-08-18 PROCEDURE — 36415 COLL VENOUS BLD VENIPUNCTURE: CPT | Performed by: INTERNAL MEDICINE

## 2022-08-18 PROCEDURE — 85652 RBC SED RATE AUTOMATED: CPT | Performed by: INTERNAL MEDICINE

## 2022-08-22 ENCOUNTER — LAB VISIT (OUTPATIENT)
Dept: LAB | Facility: HOSPITAL | Age: 37
End: 2022-08-22
Attending: STUDENT IN AN ORGANIZED HEALTH CARE EDUCATION/TRAINING PROGRAM
Payer: MEDICARE

## 2022-08-22 DIAGNOSIS — M86.261 SUBACUTE OSTEOMYELITIS, RIGHT TIBIA AND FIBULA: Primary | ICD-10-CM

## 2022-08-22 LAB
ALBUMIN SERPL BCP-MCNC: 2.8 G/DL (ref 3.5–5.2)
ALP SERPL-CCNC: 117 U/L (ref 55–135)
ALT SERPL W/O P-5'-P-CCNC: 30 U/L (ref 10–44)
ANION GAP SERPL CALC-SCNC: 15 MMOL/L (ref 8–16)
AST SERPL-CCNC: 15 U/L (ref 10–40)
BASOPHILS # BLD AUTO: 0.03 K/UL (ref 0–0.2)
BASOPHILS NFR BLD: 0.5 % (ref 0–1.9)
BILIRUB SERPL-MCNC: 0.4 MG/DL (ref 0.1–1)
BUN SERPL-MCNC: 10 MG/DL (ref 6–20)
CALCIUM SERPL-MCNC: 8.1 MG/DL (ref 8.7–10.5)
CHLORIDE SERPL-SCNC: 106 MMOL/L (ref 95–110)
CO2 SERPL-SCNC: 24 MMOL/L (ref 23–29)
CREAT SERPL-MCNC: 0.7 MG/DL (ref 0.5–1.4)
CRP SERPL-MCNC: 22.1 MG/L (ref 0–8.2)
DIFFERENTIAL METHOD: ABNORMAL
EOSINOPHIL # BLD AUTO: 0.5 K/UL (ref 0–0.5)
EOSINOPHIL NFR BLD: 7.6 % (ref 0–8)
ERYTHROCYTE [DISTWIDTH] IN BLOOD BY AUTOMATED COUNT: 18.5 % (ref 11.5–14.5)
ERYTHROCYTE [SEDIMENTATION RATE] IN BLOOD BY WESTERGREN METHOD: 16 MM/HR (ref 0–10)
EST. GFR  (NO RACE VARIABLE): >60 ML/MIN/1.73 M^2
GLUCOSE SERPL-MCNC: 64 MG/DL (ref 70–110)
HCT VFR BLD AUTO: 39.5 % (ref 40–54)
HGB BLD-MCNC: 12.2 G/DL (ref 14–18)
IMM GRANULOCYTES # BLD AUTO: 0.01 K/UL (ref 0–0.04)
IMM GRANULOCYTES NFR BLD AUTO: 0.2 % (ref 0–0.5)
LYMPHOCYTES # BLD AUTO: 1.7 K/UL (ref 1–4.8)
LYMPHOCYTES NFR BLD: 27.7 % (ref 18–48)
MCH RBC QN AUTO: 23.4 PG (ref 27–31)
MCHC RBC AUTO-ENTMCNC: 30.9 G/DL (ref 32–36)
MCV RBC AUTO: 76 FL (ref 82–98)
MONOCYTES # BLD AUTO: 0.4 K/UL (ref 0.3–1)
MONOCYTES NFR BLD: 7.2 % (ref 4–15)
NEUTROPHILS # BLD AUTO: 3.4 K/UL (ref 1.8–7.7)
NEUTROPHILS NFR BLD: 56.8 % (ref 38–73)
NRBC BLD-RTO: 0 /100 WBC
PLATELET # BLD AUTO: 244 K/UL (ref 150–450)
PMV BLD AUTO: 9.8 FL (ref 9.2–12.9)
POTASSIUM SERPL-SCNC: 3.3 MMOL/L (ref 3.5–5.1)
PREALB SERPL-MCNC: 16 MG/DL (ref 20–43)
PROT SERPL-MCNC: 7.1 G/DL (ref 6–8.4)
RBC # BLD AUTO: 5.22 M/UL (ref 4.6–6.2)
SODIUM SERPL-SCNC: 145 MMOL/L (ref 136–145)
WBC # BLD AUTO: 5.96 K/UL (ref 3.9–12.7)

## 2022-08-22 PROCEDURE — 86140 C-REACTIVE PROTEIN: CPT | Performed by: STUDENT IN AN ORGANIZED HEALTH CARE EDUCATION/TRAINING PROGRAM

## 2022-08-22 PROCEDURE — 84134 ASSAY OF PREALBUMIN: CPT | Performed by: STUDENT IN AN ORGANIZED HEALTH CARE EDUCATION/TRAINING PROGRAM

## 2022-08-22 PROCEDURE — 85025 COMPLETE CBC W/AUTO DIFF WBC: CPT | Performed by: STUDENT IN AN ORGANIZED HEALTH CARE EDUCATION/TRAINING PROGRAM

## 2022-08-22 PROCEDURE — 85652 RBC SED RATE AUTOMATED: CPT | Performed by: STUDENT IN AN ORGANIZED HEALTH CARE EDUCATION/TRAINING PROGRAM

## 2022-08-22 PROCEDURE — 80053 COMPREHEN METABOLIC PANEL: CPT | Performed by: STUDENT IN AN ORGANIZED HEALTH CARE EDUCATION/TRAINING PROGRAM

## 2022-08-24 ENCOUNTER — OFFICE VISIT (OUTPATIENT)
Dept: INFECTIOUS DISEASES | Facility: CLINIC | Age: 37
End: 2022-08-24
Payer: MEDICARE

## 2022-08-24 VITALS
DIASTOLIC BLOOD PRESSURE: 105 MMHG | BODY MASS INDEX: 27.92 KG/M2 | SYSTOLIC BLOOD PRESSURE: 165 MMHG | WEIGHT: 157.63 LBS | HEART RATE: 99 BPM | TEMPERATURE: 98 F

## 2022-08-24 DIAGNOSIS — L89.154 SACRAL DECUBITUS ULCER, STAGE IV: Primary | ICD-10-CM

## 2022-08-24 DIAGNOSIS — M86.9 OSTEOMYELITIS, UNSPECIFIED SITE, UNSPECIFIED TYPE: ICD-10-CM

## 2022-08-24 PROCEDURE — 99999 PR PBB SHADOW E&M-EST. PATIENT-LVL III: ICD-10-PCS | Mod: PBBFAC,,, | Performed by: INTERNAL MEDICINE

## 2022-08-24 PROCEDURE — 99213 OFFICE O/P EST LOW 20 MIN: CPT | Mod: PBBFAC | Performed by: INTERNAL MEDICINE

## 2022-08-24 PROCEDURE — 99214 PR OFFICE/OUTPT VISIT, EST, LEVL IV, 30-39 MIN: ICD-10-PCS | Mod: S$PBB,,, | Performed by: INTERNAL MEDICINE

## 2022-08-24 PROCEDURE — 99999 PR PBB SHADOW E&M-EST. PATIENT-LVL III: CPT | Mod: PBBFAC,,, | Performed by: INTERNAL MEDICINE

## 2022-08-24 PROCEDURE — 99214 OFFICE O/P EST MOD 30 MIN: CPT | Mod: S$PBB,,, | Performed by: INTERNAL MEDICINE

## 2022-08-24 NOTE — PROGRESS NOTES
[facility]  08/24/2022 12:46 PM    Subjective:      Chief Complaint: hospital follow up    History of Present Illness:    Patient Dale Pantoja is a 37 y.o. male with spina bifida who presents today for follow up of R ischial tuberosity osteomyelitis. Last saw Dr Edward 7/29, new to me. On 6 weeks of IV unasyn (est end date 9/5)    Says wound healing/closing. Trying to keep pressure off. Thinks he had been spending too much time in wheelchair. Trying to get pressure offloading cushion from DME, needs help with documentation for it, but not sure what he needs. Says he plays wheelchair basketball and stays active.  Denies f/c. Denies issues with picc. Reports compliance with abx. Sometimes wound gets soiled with stool- not interested in colostomy. Says mom helps with wound care.       Specimen Information: Buttocks, Right; Abscess         0 Result Notes    Component 1 mo ago    Anaerobic Culture  Abnormal   BACTEROIDES FRAGILIS   Moderate         Aerobic Bacterial Culture  Abnormal   ENTEROCOCCUS FAECALIS   Few     Resulting Agency [facility]          Susceptibility     Enterococcus faecalis     CULTURE, AEROBIC  (SPECIFY SOURCE)     Ampicillin <=2 mcg/mL Sensitive     Gentamicin Synergy Screen <=500 mcg/mL Sensitive     Tetracycline >8 mcg/mL Resistant     Vancomycin 1 mcg/mL Sensitive                     Component Ref Range & Units 2 d ago 6 d ago 2 wk ago 1 mo ago   Sed Rate 0 - 10 mm/Hr 16 High   25 High   10  72 High            Component Ref Range & Units 2 d ago 6 d ago 2 wk ago 3 wk ago 1 mo ago   CRP 0.0 - 8.2 mg/L 22.1 High   22.5 High   17.6 High   20.0 High   144.6 High                Review of Symptoms:  Constitutional: Denies fevers, chills, or weakness.  ENT: Denies dysphagia, nasal discharge, ear pain or discharge.  Cardiovascular: Denies chest pain, palpitations, orthopnea, or claudication.  Respiratory: Denies shortness of breath, cough, hemoptysis, or wheezing.  GI: Denies nausea/vomitting,  hematochezia, melena, abd pain, or changes in appetite.  : Denies dysuria, incontinence, or hematuria.  Musculoskeletal: Denies joint pain or myalgias.  Skin/breast: Denies rashes, lumps, lesions, or discharge.  Neurologic: Denies headache, dizziness, vertigo, or paresthesias.    Past Medical History:   Diagnosis Date    Overactive bladder     Spina bifida     Type 2 diabetes mellitus, without long-term current use of insulin 10/21/2021    Urinary tract infection        Past Surgical History:   Procedure Laterality Date    SPINE SURGERY      VENTRICULOPERITONEAL SHUNT         Family History   Problem Relation Age of Onset    Spina bifida Brother     Thyroid disease Neg Hx     Hypertension Neg Hx     Glaucoma Neg Hx     Prostate cancer Neg Hx     Kidney disease Neg Hx        Social History     Socioeconomic History    Marital status: Single   Tobacco Use    Smoking status: Never Smoker    Smokeless tobacco: Never Used   Substance and Sexual Activity    Alcohol use: No    Drug use: No    Sexual activity: Never       Review of patient's allergies indicates:   Allergen Reactions    Latex, natural rubber          Objective:   BP (!) 165/105 (BP Location: Left arm, Patient Position: Sitting)   Pulse 99   Temp 98.3 °F (36.8 °C) (Oral)   Wt 71.5 kg (157 lb 10.1 oz)   BMI 27.92 kg/m²     General: Afebrile, alert, comfortable, no acute distress. wheelchair  HEENT: AIDE. EOMI, no scleral icterus.   Pulmonary: Non labored,clear to auscultation A/P/L. No wheezing, crackles, or rhonchi.  Cardiac: normal S1 & S2 w/o rubs/murmurs/gallops.  Abdominal: Non-tender, non-distended.  Extremities: paraplegia.   Skin: R buttox sacral wound with pink granulation tissue, no drainage. Doesn't probe to bone  Neurological:  Alert and oriented x 4.     Labs:  Glucose   Date Value Ref Range Status   08/22/2022 64 (L) 70 - 110 mg/dL Final   08/18/2022 65 (L) 70 - 110 mg/dL Final   08/08/2022 92 70 - 110 mg/dL Final      Calcium   Date Value Ref Range Status   08/22/2022 8.1 (L) 8.7 - 10.5 mg/dL Final   08/18/2022 8.8 8.7 - 10.5 mg/dL Final   08/08/2022 8.6 (L) 8.7 - 10.5 mg/dL Final     Albumin   Date Value Ref Range Status   08/22/2022 2.8 (L) 3.5 - 5.2 g/dL Final   08/18/2022 3.4 (L) 3.5 - 5.2 g/dL Final   08/08/2022 3.1 (L) 3.5 - 5.2 g/dL Final     Total Protein   Date Value Ref Range Status   08/22/2022 7.1 6.0 - 8.4 g/dL Final   08/18/2022 7.8 6.0 - 8.4 g/dL Final   08/08/2022 7.1 6.0 - 8.4 g/dL Final     Sodium   Date Value Ref Range Status   08/22/2022 145 136 - 145 mmol/L Final   08/18/2022 141 136 - 145 mmol/L Final   08/08/2022 140 136 - 145 mmol/L Final     Potassium   Date Value Ref Range Status   08/22/2022 3.3 (L) 3.5 - 5.1 mmol/L Final   08/18/2022 3.6 3.5 - 5.1 mmol/L Final   08/08/2022 3.8 3.5 - 5.1 mmol/L Final     CO2   Date Value Ref Range Status   08/22/2022 24 23 - 29 mmol/L Final   08/18/2022 28 23 - 29 mmol/L Final   08/08/2022 28 23 - 29 mmol/L Final     Chloride   Date Value Ref Range Status   08/22/2022 106 95 - 110 mmol/L Final   08/18/2022 102 95 - 110 mmol/L Final   08/08/2022 101 95 - 110 mmol/L Final     BUN   Date Value Ref Range Status   08/22/2022 10 6 - 20 mg/dL Final   08/18/2022 9 6 - 20 mg/dL Final   08/08/2022 7 6 - 20 mg/dL Final     Creatinine   Date Value Ref Range Status   08/22/2022 0.7 0.5 - 1.4 mg/dL Final   08/18/2022 0.8 0.5 - 1.4 mg/dL Final   08/08/2022 0.7 0.5 - 1.4 mg/dL Final     Alkaline Phosphatase   Date Value Ref Range Status   08/22/2022 117 55 - 135 U/L Final   08/18/2022 148 (H) 55 - 135 U/L Final   08/08/2022 118 55 - 135 U/L Final     ALT   Date Value Ref Range Status   08/22/2022 30 10 - 44 U/L Final   08/18/2022 30 10 - 44 U/L Final   08/08/2022 42 10 - 44 U/L Final     AST   Date Value Ref Range Status   08/22/2022 15 10 - 40 U/L Final   08/18/2022 13 10 - 40 U/L Final   08/08/2022 19 10 - 40 U/L Final     Total Bilirubin   Date Value Ref Range Status    08/22/2022 0.4 0.1 - 1.0 mg/dL Final     Comment:     For infants and newborns, interpretation of results should be based  on gestational age, weight and in agreement with clinical  observations.    Premature Infant recommended reference ranges:  Up to 24 hours.............<8.0 mg/dL  Up to 48 hours............<12.0 mg/dL  3-5 days..................<15.0 mg/dL  6-29 days.................<15.0 mg/dL     08/18/2022 0.5 0.1 - 1.0 mg/dL Final     Comment:     For infants and newborns, interpretation of results should be based  on gestational age, weight and in agreement with clinical  observations.    Premature Infant recommended reference ranges:  Up to 24 hours.............<8.0 mg/dL  Up to 48 hours............<12.0 mg/dL  3-5 days..................<15.0 mg/dL  6-29 days.................<15.0 mg/dL     08/08/2022 0.4 0.1 - 1.0 mg/dL Final     Comment:     For infants and newborns, interpretation of results should be based  on gestational age, weight and in agreement with clinical  observations.    Premature Infant recommended reference ranges:  Up to 24 hours.............<8.0 mg/dL  Up to 48 hours............<12.0 mg/dL  3-5 days..................<15.0 mg/dL  6-29 days.................<15.0 mg/dL       WBC   Date Value Ref Range Status   08/22/2022 5.96 3.90 - 12.70 K/uL Final   08/18/2022 7.13 3.90 - 12.70 K/uL Final   08/08/2022 5.72 3.90 - 12.70 K/uL Final     Hemoglobin   Date Value Ref Range Status   08/22/2022 12.2 (L) 14.0 - 18.0 g/dL Final   08/18/2022 12.4 (L) 14.0 - 18.0 g/dL Final   08/08/2022 11.8 (L) 14.0 - 18.0 g/dL Final     Hematocrit   Date Value Ref Range Status   08/22/2022 39.5 (L) 40.0 - 54.0 % Final   08/18/2022 40.6 40.0 - 54.0 % Final   08/08/2022 39.3 (L) 40.0 - 54.0 % Final     MCV   Date Value Ref Range Status   08/22/2022 76 (L) 82 - 98 fL Final   08/18/2022 75 (L) 82 - 98 fL Final   08/08/2022 76 (L) 82 - 98 fL Final     Platelets   Date Value Ref Range Status   08/22/2022 244 150 - 450  K/uL Final   08/18/2022 251 150 - 450 K/uL Final   08/08/2022 256 150 - 450 K/uL Final     Lab Results   Component Value Date    CHOL 184 02/14/2022    CHOL 178 09/17/2021    CHOL 193 05/04/2020     Lab Results   Component Value Date    HDL 35 (L) 02/14/2022    HDL 34 (L) 09/17/2021    HDL 43 05/04/2020     Lab Results   Component Value Date    LDLCALC 132.8 02/14/2022    LDLCALC 128.6 09/17/2021    LDLCALC 138.2 05/04/2020     Lab Results   Component Value Date    TRIG 81 02/14/2022    TRIG 77 09/17/2021    TRIG 59 05/04/2020     Lab Results   Component Value Date    CHOLHDL 19.0 (L) 02/14/2022    CHOLHDL 19.1 (L) 09/17/2021    CHOLHDL 22.3 05/04/2020     No results found for: RPR  No results found for: QUANTIFERON    Medications:  Current Outpatient Medications on File Prior to Visit   Medication Sig Dispense Refill    amLODIPine (NORVASC) 10 MG tablet Take 1 tablet (10 mg total) by mouth once daily. 90 tablet 2    ampicillin sodium/sulbactam Na (AMPICILLIN/SULBACTAM 3 G/100 ML NS, READY TO MIX,) Inject 100 mLs (3 g total) into the vein every 6 (six) hours. 94945 mL 1    atorvastatin (LIPITOR) 40 MG tablet Take 1 tablet (40 mg total) by mouth once daily. 90 tablet 3    melatonin (MELATIN) 3 mg tablet Take 2 tablets (6 mg total) by mouth nightly as needed for Insomnia. 30 tablet 0    metFORMIN (GLUCOPHAGE) 500 MG tablet Take 1 tablet (500 mg total) by mouth 3 (three) times daily with meals. 270 tablet 3    oxybutynin (DITROPAN XL) 15 MG TR24 Take 1 tablet (15 mg total) by mouth once daily. 90 tablet 3    polyethylene glycol (GLYCOLAX) 17 gram PwPk Take 17 g by mouth once daily. 90 packet 3    valsartan (DIOVAN) 80 MG tablet Take 1 tablet (80 mg total) by mouth once daily. 90 tablet 3     No current facility-administered medications on file prior to visit.       Antibiotics:   Antibiotics (From admission, onward)            None          HIV: No components found for: HIV 1/2 AG/AB  Hepatitis C IgG: No  components found for: HEPATITIS C  Syphilis: No results found for: RPR    Hepatitis A IgG: No components found for: HEPATITIS A IGG  Hepatitis Bc IgG: No components found for: HEPATITIS B CORE IGG  Hepatitis Bs IgG:  Quantiferon: No results found for: QUANTIFERON  VZV IgG: No components found for: VARICELLA IGG    No components found for: SEDIMENTATION RATE  No components found for: C-REACTIVE PROTEIN      Microbiology x 7d:   Microbiology Results (last 7 days)     ** No results found for the last 168 hours. **          Immunization History   Administered Date(s) Administered    COVID-19, MRNA, LN-S, PF (Pfizer) (Purple Cap) 03/22/2021, 04/12/2021    DTaP (5 Pertussis Antigens) 1983, 02/06/1984, 08/20/1984, 08/27/1985    Hepatitis B 12/27/2001    Hepatitis B, Adolescent/High Risk Infant 01/14/1999    IPV 12/27/2001    Influenza 09/18/2013    Influenza - Quadrivalent - PF *Preferred* (6 months and older) 09/27/2019    Influenza - Trivalent - PF (ADULT) 09/18/2013    Influenza A (H1N1) 2009 Monovalent - IM 01/29/2010    MMR 05/15/1985, 12/27/2001    OPV 1983, 02/06/1984, 05/15/1985    Td (ADULT) 12/27/2001    Tdap 02/11/2016         Reviewed records today as well as relevant labs, cultures, and imaging    Assessment:       Iv antibiotics needed as outpatient  Sacral osteomyelitis  Stage iv sacral ulcer  Home health active care coordination    Wound closing/healing/filling in. Inflammatory markers trending down    No toxicities from antimicrobials  Extremely medically complex  Patient is high risk for infectious complications given paraplegia    Plan:     Continue antibiotics x 6 weeks as planned. Pull picc line as scheduled 9/7    Sent rx for wheelchair cushion, air matress to DME for pressure offloading    Refer to [facility] wound care    - Will monitor drug therapy for toxicity      - The following labs were ordered:    Orders Placed This Encounter   Procedures    Ambulatory referral/consult to  Wound Clinic     Standing Status:   Future     Standing Expiration Date:   9/24/2023     Referral Priority:   Routine     Referral Type:   Consultation     Referral Reason:   Specialty Services Required     Requested Specialty:   Wound Care     Number of Visits Requested:   1       I have sent communication to the referring physician and/or primary care provider.     I spent a total of 31 minutes on the day of the visit. This includes face to face time and non-face to face time preparing to see the patient (eg, review of tests), obtaining and/or reviewing separately obtained history, documenting clinical information in the electronic or other health record, independently interpreting results, and communicating results to the patient/family/caregiver, or care coordination.      Cecy Maciel MD, MPH  Infectious Disease

## 2022-08-29 ENCOUNTER — LAB VISIT (OUTPATIENT)
Dept: LAB | Facility: HOSPITAL | Age: 37
End: 2022-08-29
Attending: STUDENT IN AN ORGANIZED HEALTH CARE EDUCATION/TRAINING PROGRAM
Payer: MEDICARE

## 2022-08-29 DIAGNOSIS — M86.261 SUBACUTE OSTEOMYELITIS, RIGHT TIBIA AND FIBULA: Primary | ICD-10-CM

## 2022-08-29 LAB
ALBUMIN SERPL BCP-MCNC: 3.2 G/DL (ref 3.5–5.2)
ALP SERPL-CCNC: 143 U/L (ref 55–135)
ALT SERPL W/O P-5'-P-CCNC: 38 U/L (ref 10–44)
ANION GAP SERPL CALC-SCNC: 12 MMOL/L (ref 8–16)
AST SERPL-CCNC: 19 U/L (ref 10–40)
BASOPHILS # BLD AUTO: 0.04 K/UL (ref 0–0.2)
BASOPHILS NFR BLD: 0.7 % (ref 0–1.9)
BILIRUB SERPL-MCNC: 0.6 MG/DL (ref 0.1–1)
BUN SERPL-MCNC: 12 MG/DL (ref 6–20)
CALCIUM SERPL-MCNC: 8.7 MG/DL (ref 8.7–10.5)
CHLORIDE SERPL-SCNC: 103 MMOL/L (ref 95–110)
CO2 SERPL-SCNC: 25 MMOL/L (ref 23–29)
CREAT SERPL-MCNC: 0.7 MG/DL (ref 0.5–1.4)
CRP SERPL-MCNC: 23.4 MG/L (ref 0–8.2)
DIFFERENTIAL METHOD: ABNORMAL
EOSINOPHIL # BLD AUTO: 0.3 K/UL (ref 0–0.5)
EOSINOPHIL NFR BLD: 5.2 % (ref 0–8)
ERYTHROCYTE [DISTWIDTH] IN BLOOD BY AUTOMATED COUNT: 18.6 % (ref 11.5–14.5)
ERYTHROCYTE [SEDIMENTATION RATE] IN BLOOD BY WESTERGREN METHOD: 23 MM/HR (ref 0–10)
EST. GFR  (NO RACE VARIABLE): >60 ML/MIN/1.73 M^2
GLUCOSE SERPL-MCNC: 102 MG/DL (ref 70–110)
HCT VFR BLD AUTO: 39.7 % (ref 40–54)
HGB BLD-MCNC: 12.4 G/DL (ref 14–18)
IMM GRANULOCYTES # BLD AUTO: 0.01 K/UL (ref 0–0.04)
IMM GRANULOCYTES NFR BLD AUTO: 0.2 % (ref 0–0.5)
LYMPHOCYTES # BLD AUTO: 1.5 K/UL (ref 1–4.8)
LYMPHOCYTES NFR BLD: 27.3 % (ref 18–48)
MCH RBC QN AUTO: 23.4 PG (ref 27–31)
MCHC RBC AUTO-ENTMCNC: 31.2 G/DL (ref 32–36)
MCV RBC AUTO: 75 FL (ref 82–98)
MONOCYTES # BLD AUTO: 0.3 K/UL (ref 0.3–1)
MONOCYTES NFR BLD: 6.1 % (ref 4–15)
NEUTROPHILS # BLD AUTO: 3.4 K/UL (ref 1.8–7.7)
NEUTROPHILS NFR BLD: 60.5 % (ref 38–73)
NRBC BLD-RTO: 0 /100 WBC
PLATELET # BLD AUTO: 294 K/UL (ref 150–450)
PMV BLD AUTO: 9.1 FL (ref 9.2–12.9)
POTASSIUM SERPL-SCNC: 3.1 MMOL/L (ref 3.5–5.1)
PREALB SERPL-MCNC: 16 MG/DL (ref 20–43)
PROT SERPL-MCNC: 7.9 G/DL (ref 6–8.4)
RBC # BLD AUTO: 5.31 M/UL (ref 4.6–6.2)
SODIUM SERPL-SCNC: 140 MMOL/L (ref 136–145)
WBC # BLD AUTO: 5.56 K/UL (ref 3.9–12.7)

## 2022-08-29 PROCEDURE — 84134 ASSAY OF PREALBUMIN: CPT | Performed by: STUDENT IN AN ORGANIZED HEALTH CARE EDUCATION/TRAINING PROGRAM

## 2022-08-29 PROCEDURE — 86140 C-REACTIVE PROTEIN: CPT | Performed by: STUDENT IN AN ORGANIZED HEALTH CARE EDUCATION/TRAINING PROGRAM

## 2022-08-29 PROCEDURE — 85652 RBC SED RATE AUTOMATED: CPT | Performed by: STUDENT IN AN ORGANIZED HEALTH CARE EDUCATION/TRAINING PROGRAM

## 2022-08-29 PROCEDURE — 85025 COMPLETE CBC W/AUTO DIFF WBC: CPT | Performed by: STUDENT IN AN ORGANIZED HEALTH CARE EDUCATION/TRAINING PROGRAM

## 2022-08-29 PROCEDURE — 80053 COMPREHEN METABOLIC PANEL: CPT | Performed by: STUDENT IN AN ORGANIZED HEALTH CARE EDUCATION/TRAINING PROGRAM

## 2022-08-30 ENCOUNTER — EXTERNAL HOME HEALTH (OUTPATIENT)
Dept: HOME HEALTH SERVICES | Facility: HOSPITAL | Age: 37
End: 2022-08-30

## 2022-08-31 ENCOUNTER — DOCUMENT SCAN (OUTPATIENT)
Dept: HOME HEALTH SERVICES | Facility: HOSPITAL | Age: 37
End: 2022-08-31
Payer: MEDICARE

## 2022-09-04 ENCOUNTER — DOCUMENT SCAN (OUTPATIENT)
Dept: HOME HEALTH SERVICES | Facility: HOSPITAL | Age: 37
End: 2022-09-04
Payer: MEDICARE

## 2022-09-06 ENCOUNTER — LAB VISIT (OUTPATIENT)
Dept: LAB | Facility: HOSPITAL | Age: 37
End: 2022-09-06
Attending: INTERNAL MEDICINE
Payer: MEDICARE

## 2022-09-06 DIAGNOSIS — M86.28 SUBACUTE OSTEOMYELITIS, OTHER SITE: Primary | ICD-10-CM

## 2022-09-06 PROCEDURE — 85025 COMPLETE CBC W/AUTO DIFF WBC: CPT | Performed by: INTERNAL MEDICINE

## 2022-09-06 PROCEDURE — 84134 ASSAY OF PREALBUMIN: CPT | Performed by: INTERNAL MEDICINE

## 2022-09-06 PROCEDURE — 86140 C-REACTIVE PROTEIN: CPT | Performed by: INTERNAL MEDICINE

## 2022-09-06 PROCEDURE — 80053 COMPREHEN METABOLIC PANEL: CPT | Performed by: INTERNAL MEDICINE

## 2022-09-07 ENCOUNTER — OFFICE VISIT (OUTPATIENT)
Dept: INFECTIOUS DISEASES | Facility: CLINIC | Age: 37
End: 2022-09-07
Payer: MEDICARE

## 2022-09-07 ENCOUNTER — INFUSION (OUTPATIENT)
Dept: INFUSION THERAPY | Facility: HOSPITAL | Age: 37
End: 2022-09-07
Attending: INTERNAL MEDICINE
Payer: MEDICARE

## 2022-09-07 VITALS
WEIGHT: 157 LBS | HEART RATE: 92 BPM | HEIGHT: 63 IN | BODY MASS INDEX: 27.82 KG/M2 | OXYGEN SATURATION: 98 % | DIASTOLIC BLOOD PRESSURE: 101 MMHG | SYSTOLIC BLOOD PRESSURE: 156 MMHG

## 2022-09-07 DIAGNOSIS — L89.90 DECUBITUS ULCER: Primary | ICD-10-CM

## 2022-09-07 DIAGNOSIS — L89.154 SACRAL DECUBITUS ULCER, STAGE IV: Primary | ICD-10-CM

## 2022-09-07 LAB
ALBUMIN SERPL BCP-MCNC: 3.4 G/DL (ref 3.5–5.2)
ALP SERPL-CCNC: 129 U/L (ref 55–135)
ALT SERPL W/O P-5'-P-CCNC: 19 U/L (ref 10–44)
ANION GAP SERPL CALC-SCNC: 9 MMOL/L (ref 8–16)
AST SERPL-CCNC: 16 U/L (ref 10–40)
BASOPHILS # BLD AUTO: 0.04 K/UL (ref 0–0.2)
BASOPHILS NFR BLD: 0.9 % (ref 0–1.9)
BILIRUB SERPL-MCNC: 0.5 MG/DL (ref 0.1–1)
BUN SERPL-MCNC: 11 MG/DL (ref 6–20)
CALCIUM SERPL-MCNC: 8.6 MG/DL (ref 8.7–10.5)
CHLORIDE SERPL-SCNC: 103 MMOL/L (ref 95–110)
CO2 SERPL-SCNC: 24 MMOL/L (ref 23–29)
CREAT SERPL-MCNC: 0.7 MG/DL (ref 0.5–1.4)
CRP SERPL-MCNC: 24.5 MG/L (ref 0–8.2)
DIFFERENTIAL METHOD: ABNORMAL
EOSINOPHIL # BLD AUTO: 0.3 K/UL (ref 0–0.5)
EOSINOPHIL NFR BLD: 6.5 % (ref 0–8)
ERYTHROCYTE [DISTWIDTH] IN BLOOD BY AUTOMATED COUNT: 19.3 % (ref 11.5–14.5)
EST. GFR  (NO RACE VARIABLE): >60 ML/MIN/1.73 M^2
GLUCOSE SERPL-MCNC: 185 MG/DL (ref 70–110)
HCT VFR BLD AUTO: 40.5 % (ref 40–54)
HGB BLD-MCNC: 12.7 G/DL (ref 14–18)
IMM GRANULOCYTES # BLD AUTO: 0 K/UL (ref 0–0.04)
IMM GRANULOCYTES NFR BLD AUTO: 0 % (ref 0–0.5)
LYMPHOCYTES # BLD AUTO: 1.5 K/UL (ref 1–4.8)
LYMPHOCYTES NFR BLD: 32 % (ref 18–48)
MCH RBC QN AUTO: 23.2 PG (ref 27–31)
MCHC RBC AUTO-ENTMCNC: 31.4 G/DL (ref 32–36)
MCV RBC AUTO: 74 FL (ref 82–98)
MONOCYTES # BLD AUTO: 0.4 K/UL (ref 0.3–1)
MONOCYTES NFR BLD: 9.5 % (ref 4–15)
NEUTROPHILS # BLD AUTO: 2.4 K/UL (ref 1.8–7.7)
NEUTROPHILS NFR BLD: 51.1 % (ref 38–73)
NRBC BLD-RTO: 0 /100 WBC
PLATELET # BLD AUTO: 290 K/UL (ref 150–450)
PMV BLD AUTO: 9.8 FL (ref 9.2–12.9)
POTASSIUM SERPL-SCNC: 4.3 MMOL/L (ref 3.5–5.1)
PREALB SERPL-MCNC: 16 MG/DL (ref 20–43)
PROT SERPL-MCNC: 7.7 G/DL (ref 6–8.4)
RBC # BLD AUTO: 5.47 M/UL (ref 4.6–6.2)
SODIUM SERPL-SCNC: 136 MMOL/L (ref 136–145)
WBC # BLD AUTO: 4.65 K/UL (ref 3.9–12.7)

## 2022-09-07 PROCEDURE — 99999 PR PBB SHADOW E&M-EST. PATIENT-LVL III: CPT | Mod: PBBFAC,,,

## 2022-09-07 PROCEDURE — 99999 PR PBB SHADOW E&M-EST. PATIENT-LVL III: ICD-10-PCS | Mod: PBBFAC,,,

## 2022-09-07 PROCEDURE — 99213 PR OFFICE/OUTPT VISIT, EST, LEVL III, 20-29 MIN: ICD-10-PCS | Mod: S$PBB,,, | Performed by: INTERNAL MEDICINE

## 2022-09-07 PROCEDURE — 99211 OFF/OP EST MAY X REQ PHY/QHP: CPT | Mod: 25,27

## 2022-09-07 PROCEDURE — 99213 OFFICE O/P EST LOW 20 MIN: CPT | Mod: PBBFAC,27

## 2022-09-07 PROCEDURE — 99213 OFFICE O/P EST LOW 20 MIN: CPT | Mod: S$PBB,,, | Performed by: INTERNAL MEDICINE

## 2022-09-07 NOTE — PLAN OF CARE
Pt arrived to unit. PICC line to left arm removed at 40cm. Dressing applied. Pt monitored and no bleeding noted. Pt will follow up with provider as needed. Pt discharged from unit in wheelchair, no distress noted.

## 2022-09-13 ENCOUNTER — DOCUMENT SCAN (OUTPATIENT)
Dept: HOME HEALTH SERVICES | Facility: HOSPITAL | Age: 37
End: 2022-09-13
Payer: MEDICARE

## 2022-09-19 ENCOUNTER — TELEPHONE (OUTPATIENT)
Dept: INFECTIOUS DISEASES | Facility: CLINIC | Age: 37
End: 2022-09-19
Payer: MEDICARE

## 2022-09-28 ENCOUNTER — HOSPITAL ENCOUNTER (OUTPATIENT)
Dept: WOUND CARE | Facility: HOSPITAL | Age: 37
Discharge: HOME | End: 2022-09-28
Attending: INTERNAL MEDICINE
Payer: MEDICARE

## 2022-09-28 VITALS
TEMPERATURE: 97 F | RESPIRATION RATE: 20 BRPM | SYSTOLIC BLOOD PRESSURE: 146 MMHG | HEART RATE: 82 BPM | DIASTOLIC BLOOD PRESSURE: 90 MMHG | BODY MASS INDEX: 27.73 KG/M2 | HEIGHT: 63 IN | WEIGHT: 156.5 LBS

## 2022-09-28 DIAGNOSIS — M86.9 OSTEOMYELITIS, UNSPECIFIED SITE, UNSPECIFIED TYPE: ICD-10-CM

## 2022-09-28 DIAGNOSIS — L89.154 SACRAL DECUBITUS ULCER, STAGE IV: ICD-10-CM

## 2022-09-28 PROCEDURE — 99214 PR OFFICE/OUTPT VISIT, EST, LEVL IV, 30-39 MIN: ICD-10-PCS | Mod: ,,, | Performed by: FAMILY MEDICINE

## 2022-09-28 PROCEDURE — 99214 OFFICE O/P EST MOD 30 MIN: CPT | Mod: ,,, | Performed by: FAMILY MEDICINE

## 2022-09-28 PROCEDURE — 99215 OFFICE O/P EST HI 40 MIN: CPT | Performed by: FAMILY MEDICINE

## 2022-09-28 NOTE — PROGRESS NOTES
"[facility]                Progress Note    Subjective:       Patient ID: Dale Pantoja is a 37 y.o. male.    Chief Complaint: Wound Check     Spina bifida pt wheelchair dependence who presents today for follow up of R ischial tuberosity wound that had osteomyelitis.  Moves self to bed independently and lives at home with twin brother who also has SB. Pt drives himself. Discussed Jell cushion as has foam on w/c seat aware Gell cushion that is 3" to seat and car seat recommended. Info on where to purchase and what types given to pt. On 6 weeks of IV Unasyn . Has been seeing HH twice a week for drg change at home. Is DM and states blood sugar runs low 100 goal of  for optimal healing discussed. Has hx HTN and BP diastolic 90 states late taking BP meds. States has adequate intake protein and vegetables. Wound  has depth and is vibrant red in color collagen added.    This is an established patient in wound care.   Patient presents in the office today for evaluation of the chronic wound.  Updated HPI is noted below.    The periwound appears non-erythematous, no maceration noted, non-edematous    The wound appears stable; serous drainage. No odor. Good granulation tissue noted.     Patient denies fever, chills    Patients reports no pain     Lymphedema status is noncontributory to wound status    Contributing factors to current wound state include numerous comorbid conditions       Review of Systems   Constitutional:  Negative for activity change, appetite change and fever.   HENT:  Negative for congestion.    Respiratory:  Negative for shortness of breath and wheezing.    Cardiovascular:  Negative for chest pain and leg swelling.   Gastrointestinal:  Negative for abdominal pain, nausea and vomiting.   Skin:  Positive for wound.   Neurological:  Negative for dizziness and headaches.   Psychiatric/Behavioral:  The patient is not nervous/anxious.        Objective:      "   Physical Exam  Vitals and nursing note reviewed.   Constitutional:       Appearance: He is well-developed.   HENT:      Head: Normocephalic and atraumatic.   Eyes:      Conjunctiva/sclera: Conjunctivae normal.   Pulmonary:      Effort: Pulmonary effort is normal.   Abdominal:      General: There is no distension.      Palpations: Abdomen is soft.   Musculoskeletal:         General: Normal range of motion.      Cervical back: Normal range of motion and neck supple.   Skin:     Comments: See wound description   Neurological:      Mental Status: He is alert and oriented to person, place, and time.   Psychiatric:         Behavior: Behavior normal.       Vitals:    09/28/22 1044   BP: (!) 146/90   Pulse: 82   Resp: 20   Temp: 97 °F (36.1 °C)       Assessment:           ICD-10-CM ICD-9-CM   1. Sacral decubitus ulcer, stage IV  L89.154 707.03     707.24   2. Osteomyelitis, unspecified site, unspecified type  M86.9 730.20            Altered Skin Integrity 09/28/22 1136 Right lower Buttocks (Active)   09/28/22 1136   Altered Skin Integrity Present on Admission: yes   Side: Right   Orientation: lower   Location: Buttocks   Wound Number:    Is this injury device related?:    Primary Wound Type:    Description of Altered Skin Integrity:    Ankle-Brachial Index:    Pulses:    Removal Indication and Assessment:    Wound Outcome:    (Retired) Wound Length (cm):    (Retired) Wound Width (cm):    (Retired) Depth (cm):    Wound Description (Comments):    Removal Indications:    Wound Image   09/28/22 1136   Description of Altered Skin Integrity Full thickness tissue loss. Subcutaneous fat may be visible but bone, tendon or muscle are not exposed 09/28/22 1136   Dressing Appearance Dried drainage 09/28/22 1136   Drainage Amount Moderate 09/28/22 1136   Drainage Characteristics/Odor Malodorous;Serous 09/28/22 1136   Appearance Red 09/28/22 1136   Tissue loss description Full thickness 09/28/22 1136   Red (%), Wound Tissue Color 100 %  09/28/22 1136   Periwound Area Intact;Dry 09/28/22 1136   Wound Edges Defined 09/28/22 1136   Wound Length (cm) 2.5 cm 09/28/22 1136   Wound Width (cm) 4 cm 09/28/22 1136   Wound Depth (cm) 5 cm 09/28/22 1136   Wound Volume (cm^3) 50 cm^3 09/28/22 1136   Wound Surface Area (cm^2) 10 cm^2 09/28/22 1136   Care Cleansed with:;Antimicrobial agent;Sterile normal saline 09/28/22 1136   Dressing Changed 09/28/22 1136   Dressing Change Due 10/12/22 09/28/22 1136           Plan:            Debridement not needed and Not Done today.   Recommend off-loading   Recommend gel cushion   Keep dressing clean and intact  Continue with wound care orders and plan as noted in orders.   Continue to follow current medication regimen as per pcp   Call for any questions / concerns.       Orders Placed This Encounter   Procedures    Ambulatory referral/consult to Wound Clinic     Standing Status:   Standing     Number of Occurrences:   1     Referral Priority:   Routine     Referral Type:   Consultation     Referral Reason:   Specialty Services Required     Requested Specialty:   Wound Care     Number of Visits Requested:   1    Change dressing     Wound Dressing Orders    Dressing change frequency three times a week  Remove old dressing  Cleanse or irrigate with: Normal Saline  Protect periwound with:  periwound: Benzoin tincture   Primary dressing - adjust to fit: WOUND BASE:  Promogram Calcium Alginate with AG  Secondary dressing: ABD Pad secured with medipore tape    SUBSEQUENT HOME HEALTH ORDERS     Dressing change frequency three times a week  M W F except for day comes to clinic next appointment 12th October  Remove old dressing   Cleanse or irrigate with: Normal Saline   Protect periwound with:  periwound: Benzoin tincture   Primary dressing - adjust to fit: WOUND BASE:  Promogram Calcium Alginate with AG   Secondary dressing: ABD Pad secured with medipore tape     Order Specific Question:   What Home Health Agency is the patient  currently using?     Answer:   [facility]        Follow up in about 2 weeks (around 10/12/2022).

## 2022-09-29 PROCEDURE — G0179 MD RECERTIFICATION HHA PT: HCPCS | Mod: ,,, | Performed by: INTERNAL MEDICINE

## 2022-09-29 PROCEDURE — G0179 PR HOME HEALTH MD RECERTIFICATION: ICD-10-PCS | Mod: ,,, | Performed by: INTERNAL MEDICINE

## 2022-09-30 ENCOUNTER — EXTERNAL CHRONIC CARE MANAGEMENT (OUTPATIENT)
Dept: PRIMARY CARE CLINIC | Facility: CLINIC | Age: 37
End: 2022-09-30
Payer: MEDICARE

## 2022-09-30 PROCEDURE — 99490 CHRNC CARE MGMT STAFF 1ST 20: CPT | Mod: PBBFAC | Performed by: INTERNAL MEDICINE

## 2022-09-30 PROCEDURE — 99490 PR CHRONIC CARE MGMT, 1ST 20 MIN: ICD-10-PCS | Mod: S$PBB,,, | Performed by: INTERNAL MEDICINE

## 2022-09-30 PROCEDURE — 99490 CHRNC CARE MGMT STAFF 1ST 20: CPT | Mod: S$PBB,,, | Performed by: INTERNAL MEDICINE

## 2022-10-03 ENCOUNTER — PATIENT MESSAGE (OUTPATIENT)
Dept: UROLOGY | Facility: CLINIC | Age: 37
End: 2022-10-03
Payer: MEDICARE

## 2022-10-03 ENCOUNTER — HOSPITAL ENCOUNTER (OUTPATIENT)
Dept: RADIOLOGY | Facility: HOSPITAL | Age: 37
Discharge: HOME | End: 2022-10-03
Attending: UROLOGY
Payer: MEDICARE

## 2022-10-03 DIAGNOSIS — N31.9 NEUROGENIC BLADDER: ICD-10-CM

## 2022-10-03 PROCEDURE — 76770 US RETROPERITONEAL COMPLETE: ICD-10-PCS | Mod: 26,,, | Performed by: INTERNAL MEDICINE

## 2022-10-03 PROCEDURE — 76770 US EXAM ABDO BACK WALL COMP: CPT | Mod: TC

## 2022-10-03 PROCEDURE — 76770 US EXAM ABDO BACK WALL COMP: CPT | Mod: 26,,, | Performed by: INTERNAL MEDICINE

## 2022-10-06 ENCOUNTER — EXTERNAL HOME HEALTH (OUTPATIENT)
Dept: HOME HEALTH SERVICES | Facility: HOSPITAL | Age: 37
End: 2022-10-06
Payer: MEDICARE

## 2022-10-06 ENCOUNTER — PATIENT OUTREACH (OUTPATIENT)
Dept: HOME HEALTH SERVICES | Facility: HOSPITAL | Age: 37
End: 2022-10-06
Payer: MEDICARE

## 2022-10-12 ENCOUNTER — TELEPHONE (OUTPATIENT)
Dept: WOUND CARE | Facility: HOSPITAL | Age: 37
End: 2022-10-12
Payer: MEDICARE

## 2022-10-12 ENCOUNTER — HOSPITAL ENCOUNTER (OUTPATIENT)
Dept: WOUND CARE | Facility: HOSPITAL | Age: 37
Discharge: HOME | End: 2022-10-12
Attending: FAMILY MEDICINE
Payer: MEDICARE

## 2022-10-12 VITALS
TEMPERATURE: 98 F | DIASTOLIC BLOOD PRESSURE: 101 MMHG | SYSTOLIC BLOOD PRESSURE: 156 MMHG | RESPIRATION RATE: 16 BRPM | HEART RATE: 85 BPM

## 2022-10-12 DIAGNOSIS — L89.154 SACRAL DECUBITUS ULCER, STAGE IV: Primary | ICD-10-CM

## 2022-10-12 PROCEDURE — 99213 OFFICE O/P EST LOW 20 MIN: CPT | Performed by: FAMILY MEDICINE

## 2022-10-12 PROCEDURE — 99214 PR OFFICE/OUTPT VISIT, EST, LEVL IV, 30-39 MIN: ICD-10-PCS | Mod: ,,, | Performed by: FAMILY MEDICINE

## 2022-10-12 PROCEDURE — 99214 OFFICE O/P EST MOD 30 MIN: CPT | Mod: ,,, | Performed by: FAMILY MEDICINE

## 2022-10-12 NOTE — PROGRESS NOTES
[facility]                Progress Note    Subjective:       Patient ID: Dale Pantoja is a 37 y.o. male.    Chief Complaint: Wound Check    F/u wound care visit. Patient in w/c to exam room, transfers to exam chair per self with standby assist x1. Patient denies pain or discomfort. BP elevated, patient denies h/a, blurred vision or dizziness. Wound dressing to right buttock intact with moderate amount of drainage noted. Wound measuring 0.5cm smaller length, 2.1cm smaller depth and 0.5cm larger width with new 0.5cm undermining from 6 to 12 o'clock area. Wound care done per order. RTC in 2 weeks.    This is an established patient in wound care.   Patient presents in the office today for evaluation of the chronic wound.  Updated HPI is noted below.    The periwound appears non-erythematous, macerated, non-edematous    The wound appears wound healing    Patient denies fever, chills    Lymphedema status is noncontributory to wound status    Pain at the site of the wound is n/a     Contributing factors to current wound state include numerous comorbid conditions, off-loading    Review of Systems   Constitutional:  Negative for activity change, appetite change and fever.   HENT:  Negative for congestion.    Respiratory:  Negative for shortness of breath and wheezing.    Cardiovascular:  Negative for chest pain and leg swelling.   Gastrointestinal:  Negative for abdominal pain, nausea and vomiting.   Skin:  Positive for wound.   Neurological:  Negative for dizziness and headaches.   Psychiatric/Behavioral:  The patient is not nervous/anxious.        Objective:        Physical Exam  Vitals and nursing note reviewed.   Constitutional:       Appearance: He is well-developed.   HENT:      Head: Normocephalic and atraumatic.   Eyes:      Conjunctiva/sclera: Conjunctivae normal.   Pulmonary:      Effort: Pulmonary effort is normal.   Abdominal:      General: There is no distension.       Palpations: Abdomen is soft.   Musculoskeletal:      Cervical back: Normal range of motion and neck supple.   Skin:     Comments: See wound description   Neurological:      Mental Status: He is alert and oriented to person, place, and time.   Psychiatric:         Behavior: Behavior normal.       Vitals:    10/12/22 0959   BP: (!) 156/101   Pulse: 85   Resp: 16   Temp: 98.1 °F (36.7 °C)       Assessment:           ICD-10-CM ICD-9-CM   1. Sacral decubitus ulcer, stage IV  L89.154 707.03     707.24            Altered Skin Integrity 09/28/22 1136 Right lower Buttocks (Active)   09/28/22 1136   Altered Skin Integrity Present on Admission: yes   Side: Right   Orientation: lower   Location: Buttocks   Wound Number:    Is this injury device related?:    Primary Wound Type:    Description of Altered Skin Integrity:    Ankle-Brachial Index:    Pulses:    Removal Indication and Assessment:    Wound Outcome:    (Retired) Wound Length (cm):    (Retired) Wound Width (cm):    (Retired) Depth (cm):    Wound Description (Comments):    Removal Indications:    Wound Image   10/12/22 1001   Description of Altered Skin Integrity Full thickness tissue loss. Subcutaneous fat may be visible but bone, tendon or muscle are not exposed 10/12/22 1001   Dressing Appearance Intact;Moist drainage 10/12/22 1001   Drainage Amount Moderate 10/12/22 1001   Drainage Characteristics/Odor Serous 10/12/22 1001   Appearance Pink;Red 10/12/22 1001   Tissue loss description Full thickness 10/12/22 1001   Black (%), Wound Tissue Color 0 % 10/12/22 1001   Red (%), Wound Tissue Color 100 % 10/12/22 1001   Yellow (%), Wound Tissue Color 0 % 10/12/22 1001   Periwound Area Macerated 10/12/22 1001   Wound Edges Defined 10/12/22 1001   Wound Length (cm) 2 cm 10/12/22 1001   Wound Width (cm) 4.5 cm 10/12/22 1001   Wound Depth (cm) 2.9 cm 10/12/22 1001   Wound Volume (cm^3) 26.1 cm^3 10/12/22 1001   Wound Surface Area (cm^2) 9 cm^2 10/12/22 1001   Undermining  (depth (cm)/location) 0.5cm 6-12 o'clock 10/12/22 1001   Care Cleansed with:;Soap and water;Sterile normal saline 10/12/22 1001   Dressing Changed 10/12/22 1001   Periwound Care Moisture barrier applied;Skin barrier film applied 10/12/22 1001           Plan:            Debridement not needed and Not Done today.   Recommend off-loading   Increase protein   Keep dressing clean and intact  Continue with wound care orders and plan as noted in orders.   Continue to follow current medication regimen as per pcp   Call for any questions / concerns.         Orders Placed This Encounter   Procedures    Change dressing     Clean with NS. Gentian violet to periwound. Cavilon to area where tape will be. Promogran to wound bed, cover with Alginate AG. ABD pad taped with medipore tape.    SUBSEQUENT HOME HEALTH ORDERS     Home health for wound care three times a week on Monday, Wednesday and Friday when not seen in clinic. Patient has appt in [facility] on Wednesday 10/26/22.     Clean with vashe. Gentian violet to periwound. Apply Promogran to wound bed, cover with Aquacel AG. Abd pad over dressing taped with medipore tape.     Order Specific Question:   What Home Health Agency is the patient currently using?     Answer:   [facility]          Follow up in about 2 weeks (around 10/26/2022) for wound care MD visit.

## 2022-10-13 ENCOUNTER — DOCUMENT SCAN (OUTPATIENT)
Dept: HOME HEALTH SERVICES | Facility: HOSPITAL | Age: 37
End: 2022-10-13
Payer: MEDICARE

## 2022-10-19 ENCOUNTER — OFFICE VISIT (OUTPATIENT)
Dept: UROLOGY | Facility: CLINIC | Age: 37
End: 2022-10-19
Payer: MEDICARE

## 2022-10-19 VITALS
WEIGHT: 156.5 LBS | HEIGHT: 63 IN | BODY MASS INDEX: 27.73 KG/M2 | DIASTOLIC BLOOD PRESSURE: 84 MMHG | SYSTOLIC BLOOD PRESSURE: 165 MMHG | HEART RATE: 83 BPM

## 2022-10-19 DIAGNOSIS — R33.9 URINARY RETENTION: Primary | ICD-10-CM

## 2022-10-19 PROCEDURE — 99999 PR PBB SHADOW E&M-EST. PATIENT-LVL III: ICD-10-PCS | Mod: PBBFAC,,, | Performed by: UROLOGY

## 2022-10-19 PROCEDURE — 99214 OFFICE O/P EST MOD 30 MIN: CPT | Mod: S$PBB,,, | Performed by: UROLOGY

## 2022-10-19 PROCEDURE — 99214 PR OFFICE/OUTPT VISIT, EST, LEVL IV, 30-39 MIN: ICD-10-PCS | Mod: S$PBB,,, | Performed by: UROLOGY

## 2022-10-19 PROCEDURE — 99999 PR PBB SHADOW E&M-EST. PATIENT-LVL III: CPT | Mod: PBBFAC,,, | Performed by: UROLOGY

## 2022-10-19 PROCEDURE — 99213 OFFICE O/P EST LOW 20 MIN: CPT | Mod: PBBFAC | Performed by: UROLOGY

## 2022-10-19 NOTE — PROGRESS NOTES
[facility]         Neurogenic Bladder Return Note     10/19/2022     Referred by:  No ref. provider found     HPI: Dale Pantoja is a very pleasant 37 y.o. male referred for evaluation of neurogenic bladder with chronic urinary retention attributable to spina bifida. He currently performs clean intermittent catheterization 5x per day.  He reports no incontinence between catheterizations since beginning Ditropan XL 15 mg daily in 2016. He had urodynamic evaluation by Dr. Small at that time which was reviewed today. Low compliance was noted, though final filling pressure was not. He reports no frequent lower urinary tract infections. His most recent U/S in October 2021 demonstrated no hydronephrosis on independent review today. No infections. No hematuria. No UTI.      A review of 10+ systems was conducted with pertinent positive and negative findings documented in HPI with all other systems reviewed and negative.     Past medical, family, surgical and social history reviewed as documented in chart with pertinent positive medical, family, surgical and social history detailed in HPI.     Exam Findings:     Const: no acute distress, conversant and alert  Eyes: anicteric, extraocular muscles intact  ENMT: normocephalic, Nl oral membranes  Cardio: no cyanosis, nl cap refill  Pulm: no tachypnea; no resp distress  Abd: soft, no tenderness  Musc: no laceration, no tenderness  Neuro: alert; oriented x 3  Skin: warm, dry; no petichiae  Psych: no anxiety; normal speech           Assessment/Plan:     Neurogenic Bladder with Chronic Urinary Retention (established,stable): The patient has evidence of chronic urinary retention requiring catheterization.  He appears stable over the last 6 years since his last UDS study with no infections, frequent catheterizations, incontinence or abnormal renal studies.  The need for catheterization was assessed and the patient is likely to benefit from a regimen of clean  intermittent catheterization. The patient has previously performed intermittent catheterization in the past and is comfortable performing this alone. The etiology for urinary retention in this patient is thought to be neurogenic bladder (spina bifida). The anticipated duration of need is estimated to be indefinite. The patient will not require insertion supplies. The patient will not require a coude-tip catheter. The patient will need to catheterize 6x daily and will require a total of 180 catheters per month. .     Repeat Renal U/S now  Return to Clinic in next year for review of U/S

## 2022-10-26 ENCOUNTER — HOSPITAL ENCOUNTER (OUTPATIENT)
Dept: WOUND CARE | Facility: HOSPITAL | Age: 37
Discharge: HOME | End: 2022-10-26
Attending: FAMILY MEDICINE
Payer: MEDICARE

## 2022-10-26 ENCOUNTER — TELEPHONE (OUTPATIENT)
Dept: WOUND CARE | Facility: HOSPITAL | Age: 37
End: 2022-10-26
Payer: MEDICARE

## 2022-10-26 DIAGNOSIS — L89.154 SACRAL DECUBITUS ULCER, STAGE IV: Primary | ICD-10-CM

## 2022-10-26 PROCEDURE — 99214 OFFICE O/P EST MOD 30 MIN: CPT | Mod: 25,,, | Performed by: FAMILY MEDICINE

## 2022-10-26 PROCEDURE — 17250 CHEM CAUT OF GRANLTJ TISSUE: CPT | Mod: ,,, | Performed by: FAMILY MEDICINE

## 2022-10-26 PROCEDURE — 17250 PR CHEM CAUTERY GRANULATN TISSUE: ICD-10-PCS | Mod: ,,, | Performed by: FAMILY MEDICINE

## 2022-10-26 PROCEDURE — 17250 CHEM CAUT OF GRANLTJ TISSUE: CPT | Performed by: FAMILY MEDICINE

## 2022-10-26 PROCEDURE — 99214 PR OFFICE/OUTPT VISIT, EST, LEVL IV, 30-39 MIN: ICD-10-PCS | Mod: 25,,, | Performed by: FAMILY MEDICINE

## 2022-10-26 NOTE — PROGRESS NOTES
[facility]                Progress Note    Subjective:       Patient ID: Dale Pantoja is a 37 y.o. male.    Chief Complaint: Wound Check    F/u wound care visit. Patient in w/c to exam room, transfers to exam bed per self with standby assist x1. Patient denies pain or discomfort at present. Wound dressing to right buttock intact with moderate amount of drainage noted. Wound improving, measuring smaller. Wound care done per order. RTC in three weeks.    This is an established patient in wound care.   Patient presents in the office today for evaluation of the chronic wound.  Updated HPI is noted below.    The periwound appears non-erythematous, no maceration noted, non-edematous    The wound appears stable; fat layer exposed. Serous drainage. No fibrin / slough     Patient denies fever, chills    Lymphedema status is noncontributory to wound status    Pain at the site of the wound is n/a     Contributing factors to current wound state include numerous comorbid conditions, off-loading limitations    Review of Systems   Constitutional:  Negative for activity change, appetite change and fever.   HENT:  Negative for congestion.    Respiratory:  Negative for shortness of breath and wheezing.    Cardiovascular:  Negative for chest pain and leg swelling.   Gastrointestinal:  Negative for abdominal pain, nausea and vomiting.   Skin:  Positive for wound.   Neurological:  Negative for dizziness and headaches.   Psychiatric/Behavioral:  The patient is not nervous/anxious.        Objective:        Physical Exam  Vitals and nursing note reviewed.   Constitutional:       Appearance: He is well-developed.   HENT:      Head: Normocephalic and atraumatic.   Eyes:      Conjunctiva/sclera: Conjunctivae normal.   Pulmonary:      Effort: Pulmonary effort is normal.   Abdominal:      General: There is no distension.      Palpations: Abdomen is soft.   Musculoskeletal:      Cervical back:  Normal range of motion and neck supple.   Skin:     Comments: See wound description   Neurological:      Mental Status: He is alert and oriented to person, place, and time.   Psychiatric:         Behavior: Behavior normal.       There were no vitals filed for this visit.    Assessment:           ICD-10-CM ICD-9-CM   1. Sacral decubitus ulcer, stage IV  L89.154 707.03     707.24            Altered Skin Integrity 09/28/22 1136 Right lower Buttocks (Active)   09/28/22 1136   Altered Skin Integrity Present on Admission: yes   Side: Right   Orientation: lower   Location: Buttocks   Wound Number:    Is this injury device related?:    Primary Wound Type:    Description of Altered Skin Integrity:    Ankle-Brachial Index:    Pulses:    Removal Indication and Assessment:    Wound Outcome:    (Retired) Wound Length (cm):    (Retired) Wound Width (cm):    (Retired) Depth (cm):    Wound Description (Comments):    Removal Indications:    Wound Image   10/26/22 1045   Description of Altered Skin Integrity Full thickness tissue loss. Subcutaneous fat may be visible but bone, tendon or muscle are not exposed 10/26/22 1045   Dressing Appearance Intact;Moist drainage 10/26/22 1045   Drainage Amount Moderate 10/26/22 1045   Drainage Characteristics/Odor Serosanguineous 10/26/22 1045   Appearance Red 10/26/22 1045   Tissue loss description Full thickness 10/26/22 1045   Black (%), Wound Tissue Color 0 % 10/26/22 1045   Red (%), Wound Tissue Color 100 % 10/26/22 1045   Yellow (%), Wound Tissue Color 0 % 10/26/22 1045   Periwound Area Pale white 10/26/22 1045   Wound Edges Defined 10/26/22 1045   Wound Length (cm) 2 cm 10/26/22 1045   Wound Width (cm) 4 cm 10/26/22 1045   Wound Depth (cm) 2.9 cm 10/26/22 1045   Wound Volume (cm^3) 23.2 cm^3 10/26/22 1045   Wound Surface Area (cm^2) 8 cm^2 10/26/22 1045   Care Cleansed with:;Sterile normal saline 10/26/22 1045   Dressing Changed 10/26/22 1045   Periwound Care Skin barrier film applied  10/26/22 1045   Dressing Change Due 10/28/22 10/26/22 1045           Plan:            Chemical cauterization with silver nitrate stick x 1 of wound bed done in clinic for treatment of hypergranulation or to decrease biofilm burden. Patient tolerated procedure well.   Off-load   Use gel cushion   Increase protein   Keep dressing clean and intact  Continue with wound care orders and plan as noted in orders.   Continue to follow current medication regimen as per pcp   Call for any questions / concerns.         Orders Placed This Encounter   Procedures    Change dressing     Clean with NS. Gentian violet to periwound. Cavilon to area where tape will be. Promogran to wound bed, cover with Alginate AG. ABD pad taped with medipore tape.    Change dressing     Home health for wound care three times a week on Monday, Wednesday and Friday when not seen in clinic. Patient has appt in [facility] on Wednesday 11/16/22.     Clean with vashe. Gentian violet to periwound. Apply Promogran to wound bed, cover with Aquacel AG. Abd pad over dressing taped with medipore tape.    SUBSEQUENT HOME HEALTH ORDERS     Home health for wound care three times a week on Monday, Wednesday and Friday when not seen in clinic. Patient has appt in [facility] on Wednesday 11/16/22.     Clean with vashe. Gentian violet to periwound. Apply Promogran to wound bed, cover with Aquacel AG. Abd pad over dressing taped with medipore tape.     Order Specific Question:   What Home Health Agency is the patient currently using?     Answer:   [facility]        Follow up in about 3 weeks (around 11/16/2022) for MD wound care .

## 2022-10-28 ENCOUNTER — DOCUMENT SCAN (OUTPATIENT)
Dept: HOME HEALTH SERVICES | Facility: HOSPITAL | Age: 37
End: 2022-10-28
Payer: MEDICARE

## 2022-10-31 ENCOUNTER — EXTERNAL CHRONIC CARE MANAGEMENT (OUTPATIENT)
Dept: PRIMARY CARE CLINIC | Facility: CLINIC | Age: 37
End: 2022-10-31
Payer: MEDICARE

## 2022-10-31 PROCEDURE — 99439 CHRNC CARE MGMT STAF EA ADDL: CPT | Mod: PBBFAC | Performed by: INTERNAL MEDICINE

## 2022-10-31 PROCEDURE — 99490 CHRNC CARE MGMT STAFF 1ST 20: CPT | Mod: PBBFAC | Performed by: INTERNAL MEDICINE

## 2022-10-31 PROCEDURE — 99439 PR CHRONIC CARE MGMT, EA ADDTL 20 MIN: ICD-10-PCS | Mod: S$PBB,,, | Performed by: INTERNAL MEDICINE

## 2022-10-31 PROCEDURE — 99490 CHRNC CARE MGMT STAFF 1ST 20: CPT | Mod: S$PBB,,, | Performed by: INTERNAL MEDICINE

## 2022-10-31 PROCEDURE — 99490 PR CHRONIC CARE MGMT, 1ST 20 MIN: ICD-10-PCS | Mod: S$PBB,,, | Performed by: INTERNAL MEDICINE

## 2022-10-31 PROCEDURE — 99439 CHRNC CARE MGMT STAF EA ADDL: CPT | Mod: S$PBB,,, | Performed by: INTERNAL MEDICINE

## 2022-11-02 ENCOUNTER — DOCUMENT SCAN (OUTPATIENT)
Dept: HOME HEALTH SERVICES | Facility: HOSPITAL | Age: 37
End: 2022-11-02
Payer: MEDICARE

## 2022-11-10 ENCOUNTER — TELEPHONE (OUTPATIENT)
Dept: INTERNAL MEDICINE | Facility: CLINIC | Age: 37
End: 2022-11-10
Payer: MEDICARE

## 2022-11-14 ENCOUNTER — TELEPHONE (OUTPATIENT)
Dept: INTERNAL MEDICINE | Facility: CLINIC | Age: 37
End: 2022-11-14
Payer: MEDICARE

## 2022-11-16 ENCOUNTER — HOSPITAL ENCOUNTER (OUTPATIENT)
Dept: WOUND CARE | Facility: HOSPITAL | Age: 37
Discharge: HOME | End: 2022-11-16
Attending: FAMILY MEDICINE
Payer: MEDICARE

## 2022-11-16 VITALS — SYSTOLIC BLOOD PRESSURE: 181 MMHG | DIASTOLIC BLOOD PRESSURE: 108 MMHG | HEART RATE: 82 BPM | TEMPERATURE: 98 F

## 2022-11-16 DIAGNOSIS — L89.154 SACRAL DECUBITUS ULCER, STAGE IV: Primary | ICD-10-CM

## 2022-11-16 PROCEDURE — 17250 CHEM CAUT OF GRANLTJ TISSUE: CPT | Mod: ,,, | Performed by: FAMILY MEDICINE

## 2022-11-16 PROCEDURE — 17250 PR CHEM CAUTERY GRANULATN TISSUE: ICD-10-PCS | Mod: ,,, | Performed by: FAMILY MEDICINE

## 2022-11-16 PROCEDURE — 99214 PR OFFICE/OUTPT VISIT, EST, LEVL IV, 30-39 MIN: ICD-10-PCS | Mod: 25,,, | Performed by: FAMILY MEDICINE

## 2022-11-16 PROCEDURE — 99214 OFFICE O/P EST MOD 30 MIN: CPT | Mod: 25,,, | Performed by: FAMILY MEDICINE

## 2022-11-16 PROCEDURE — 17250 CHEM CAUT OF GRANLTJ TISSUE: CPT | Performed by: FAMILY MEDICINE

## 2022-11-16 NOTE — PROGRESS NOTES
[facility]                Progress Note    Subjective:       Patient ID: Dale Pantoja is a 37 y.o. male.    Chief Complaint: Wound Check and Dressing Change    Follow up wound care visit. Patient to exam room in wheelchair with extra cushion on seat, then self transfers to exam bed with 1 person standby assist. No c/o pain at present. Dressing intact with a moderate amound of serosanguineous, non-malodor drainage. Right Buttock wound measuring slightly larger than last week, (measures smaller in depth when patient lies on his stomach), larger in (0.5 cm) length and (0.1 cm) depth - silver nitrate applied by MD.     Wound care done as per order. Return to clinic in 3 weeks.    This is an established patient in wound care.   Patient presents in the office today for evaluation of the chronic wound.  Updated HPI is noted below.    The periwound appears non-erythematous, maceration noted, non-edematous    The wound appears stable; fat layer exposed. Minimal fibrin / slough. Serous drainage     Patient denies fever, chills    Patients reports wound drainage    Lymphedema status is noncontributory to wound status    Pain at the site of the wound is n/a    Contributing factors to current wound state include numerous comorbid conditions, off-loading limitations    Review of Systems   Constitutional:  Negative for activity change, appetite change and fever.   HENT:  Negative for congestion.    Respiratory:  Negative for shortness of breath and wheezing.    Cardiovascular:  Negative for chest pain and leg swelling.   Gastrointestinal:  Negative for abdominal pain, nausea and vomiting.   Skin:  Positive for wound.   Neurological:  Negative for dizziness and headaches.   Psychiatric/Behavioral:  The patient is not nervous/anxious.        Objective:        Physical Exam  Vitals and nursing note reviewed.   Constitutional:       Appearance: He is well-developed.   HENT:      Head:  Normocephalic and atraumatic.   Eyes:      Conjunctiva/sclera: Conjunctivae normal.   Pulmonary:      Effort: Pulmonary effort is normal.   Abdominal:      General: There is no distension.      Palpations: Abdomen is soft.   Musculoskeletal:      Cervical back: Normal range of motion and neck supple.   Skin:     Comments: See wound description   Neurological:      Mental Status: He is alert and oriented to person, place, and time.   Psychiatric:         Behavior: Behavior normal.       Vitals:    11/16/22 1010   BP: (!) 181/108   Pulse: 82   Temp: 98.1 °F (36.7 °C)       Assessment:           ICD-10-CM ICD-9-CM   1. Sacral decubitus ulcer, stage IV  L89.154 707.03     707.24            Altered Skin Integrity 09/28/22 1136 Right lower Buttocks (Active)   09/28/22 1136   Altered Skin Integrity Present on Admission: yes   Side: Right   Orientation: lower   Location: Buttocks   Wound Number:    Is this injury device related?:    Primary Wound Type:    Description of Altered Skin Integrity:    Ankle-Brachial Index:    Pulses:    Removal Indication and Assessment:    Wound Outcome:    (Retired) Wound Length (cm):    (Retired) Wound Width (cm):    (Retired) Depth (cm):    Wound Description (Comments):    Removal Indications:    Wound Image    11/16/22 1033   Description of Altered Skin Integrity Full thickness tissue loss. Subcutaneous fat may be visible but bone, tendon or muscle are not exposed 11/16/22 1033   Dressing Appearance Intact;Moist drainage 11/16/22 1033   Drainage Amount Moderate 11/16/22 1033   Drainage Characteristics/Odor Serosanguineous;No odor 11/16/22 1033   Appearance Red;Moist 11/16/22 1033   Tissue loss description Full thickness 11/16/22 1033   Black (%), Wound Tissue Color 0 % 11/16/22 1033   Red (%), Wound Tissue Color 100 % 11/16/22 1033   Yellow (%), Wound Tissue Color 0 % 11/16/22 1033   Periwound Area Macerated;Pale white 11/16/22 1033   Wound Edges Defined;Open 11/16/22 1033   Wound Length  (cm) 2 cm 11/16/22 1033   Wound Width (cm) 4.5 cm 11/16/22 1033   Wound Depth (cm) 3 cm 11/16/22 1033   Wound Volume (cm^3) 27 cm^3 11/16/22 1033   Wound Surface Area (cm^2) 9 cm^2 11/16/22 1033   Tunneling (depth (cm)/location) 0 11/16/22 1033   Undermining (depth (cm)/location) 0 11/16/22 1033   Care Cleansed with:;Antimicrobial agent;Sterile normal saline;Wound cleanser 11/16/22 1033   Dressing Changed 11/16/22 1033   Periwound Care Moisture barrier applied;Skin barrier film applied 11/16/22 1033   Dressing Change Due 12/07/22 11/16/22 1033           Plan:            Chemical cauterization with silver nitrate stick x 1 of wound bed done in clinic for treatment of hypergranulation or to decrease biofilm burden. Patient tolerated procedure well.   Increase protein   Off-load   Keep dressing clean and intact  Continue with wound care orders and plan as noted in orders.   Continue to follow current medication regimen as per pcp   Call for any questions / concerns.         Orders Placed This Encounter   Procedures    Change dressing     Clean with NS. Vashe soak for 10 minutes. Gentian violet to periwound. Cavilon to areas to be taped. Apply Promogran to wound bed, cover with Aquacel AG. Abd pad over dressing secured with medipore tape.    SUBSEQUENT HOME HEALTH ORDERS     Home health for wound care three times a week on Monday, Wednesday and Friday when not seen in clinic. Patient has appt in [facility] on Wednesday 12/07/22.     Clean with vashe. Gentian violet to periwound. Cavilon to areas to be taped. Apply Promogran to wound bed, cover with Aquacel AG. Abd pad over dressing taped with medipore tape.     Order Specific Question:   What Home Health Agency is the patient currently using?     Answer:   [facility]        Follow up in about 3 weeks (around 12/7/2022) for wound care.

## 2022-11-30 ENCOUNTER — EXTERNAL CHRONIC CARE MANAGEMENT (OUTPATIENT)
Dept: PRIMARY CARE CLINIC | Facility: CLINIC | Age: 37
End: 2022-11-30
Payer: MEDICARE

## 2022-11-30 PROCEDURE — 99490 CHRNC CARE MGMT STAFF 1ST 20: CPT | Mod: PBBFAC | Performed by: INTERNAL MEDICINE

## 2022-11-30 PROCEDURE — 99439 CHRNC CARE MGMT STAF EA ADDL: CPT | Mod: PBBFAC | Performed by: INTERNAL MEDICINE

## 2022-11-30 PROCEDURE — 99490 PR CHRONIC CARE MGMT, 1ST 20 MIN: ICD-10-PCS | Mod: S$PBB,,, | Performed by: INTERNAL MEDICINE

## 2022-11-30 PROCEDURE — 99490 CHRNC CARE MGMT STAFF 1ST 20: CPT | Mod: S$PBB,,, | Performed by: INTERNAL MEDICINE

## 2022-11-30 PROCEDURE — 99439 PR CHRONIC CARE MGMT, EA ADDTL 20 MIN: ICD-10-PCS | Mod: S$PBB,,, | Performed by: INTERNAL MEDICINE

## 2022-11-30 PROCEDURE — 99439 CHRNC CARE MGMT STAF EA ADDL: CPT | Mod: S$PBB,,, | Performed by: INTERNAL MEDICINE

## 2022-12-02 DIAGNOSIS — E11.65 TYPE 2 DIABETES MELLITUS WITH HYPERGLYCEMIA, WITHOUT LONG-TERM CURRENT USE OF INSULIN: Primary | ICD-10-CM

## 2022-12-05 ENCOUNTER — LAB VISIT (OUTPATIENT)
Dept: LAB | Facility: HOSPITAL | Age: 37
End: 2022-12-05
Attending: INTERNAL MEDICINE
Payer: MEDICARE

## 2022-12-05 ENCOUNTER — OFFICE VISIT (OUTPATIENT)
Dept: INTERNAL MEDICINE | Facility: CLINIC | Age: 37
End: 2022-12-05
Payer: MEDICARE

## 2022-12-05 VITALS
BODY MASS INDEX: 27.73 KG/M2 | HEART RATE: 86 BPM | DIASTOLIC BLOOD PRESSURE: 96 MMHG | OXYGEN SATURATION: 95 % | HEIGHT: 63 IN | SYSTOLIC BLOOD PRESSURE: 150 MMHG

## 2022-12-05 DIAGNOSIS — I10 HTN (HYPERTENSION), BENIGN: Primary | ICD-10-CM

## 2022-12-05 DIAGNOSIS — Q05.2 SPINA BIFIDA OF LUMBAR REGION WITH HYDROCEPHALUS: ICD-10-CM

## 2022-12-05 DIAGNOSIS — L89.94 PRESSURE INJURY, STAGE 4, WITH INFECTION: ICD-10-CM

## 2022-12-05 DIAGNOSIS — E11.65 TYPE 2 DIABETES MELLITUS WITH HYPERGLYCEMIA, WITHOUT LONG-TERM CURRENT USE OF INSULIN: ICD-10-CM

## 2022-12-05 DIAGNOSIS — G82.20 PARAPLEGIA: ICD-10-CM

## 2022-12-05 DIAGNOSIS — E78.5 DYSLIPIDEMIA: ICD-10-CM

## 2022-12-05 DIAGNOSIS — L08.9 PRESSURE INJURY, STAGE 4, WITH INFECTION: ICD-10-CM

## 2022-12-05 LAB
ALBUMIN SERPL BCP-MCNC: 3.5 G/DL (ref 3.5–5.2)
ALP SERPL-CCNC: 127 U/L (ref 55–135)
ALT SERPL W/O P-5'-P-CCNC: 19 U/L (ref 10–44)
ANION GAP SERPL CALC-SCNC: 13 MMOL/L (ref 8–16)
AST SERPL-CCNC: 10 U/L (ref 10–40)
BILIRUB SERPL-MCNC: 0.2 MG/DL (ref 0.1–1)
BUN SERPL-MCNC: 12 MG/DL (ref 6–20)
CALCIUM SERPL-MCNC: 9.1 MG/DL (ref 8.7–10.5)
CHLORIDE SERPL-SCNC: 99 MMOL/L (ref 95–110)
CO2 SERPL-SCNC: 28 MMOL/L (ref 23–29)
CREAT SERPL-MCNC: 0.9 MG/DL (ref 0.5–1.4)
EST. GFR  (NO RACE VARIABLE): >60 ML/MIN/1.73 M^2
ESTIMATED AVG GLUCOSE: 192 MG/DL (ref 68–131)
GLUCOSE SERPL-MCNC: 246 MG/DL (ref 70–110)
HBA1C MFR BLD: 8.3 % (ref 4–5.6)
POTASSIUM SERPL-SCNC: 3.4 MMOL/L (ref 3.5–5.1)
PROT SERPL-MCNC: 8.1 G/DL (ref 6–8.4)
SODIUM SERPL-SCNC: 140 MMOL/L (ref 136–145)

## 2022-12-05 PROCEDURE — 36415 COLL VENOUS BLD VENIPUNCTURE: CPT | Performed by: INTERNAL MEDICINE

## 2022-12-05 PROCEDURE — 99999 PR PBB SHADOW E&M-EST. PATIENT-LVL III: ICD-10-PCS | Mod: PBBFAC,,, | Performed by: INTERNAL MEDICINE

## 2022-12-05 PROCEDURE — 99214 OFFICE O/P EST MOD 30 MIN: CPT | Mod: S$PBB,,, | Performed by: INTERNAL MEDICINE

## 2022-12-05 PROCEDURE — 99214 PR OFFICE/OUTPT VISIT, EST, LEVL IV, 30-39 MIN: ICD-10-PCS | Mod: S$PBB,,, | Performed by: INTERNAL MEDICINE

## 2022-12-05 PROCEDURE — 83036 HEMOGLOBIN GLYCOSYLATED A1C: CPT | Performed by: INTERNAL MEDICINE

## 2022-12-05 PROCEDURE — 99213 OFFICE O/P EST LOW 20 MIN: CPT | Mod: PBBFAC | Performed by: INTERNAL MEDICINE

## 2022-12-05 PROCEDURE — 99999 PR PBB SHADOW E&M-EST. PATIENT-LVL III: CPT | Mod: PBBFAC,,, | Performed by: INTERNAL MEDICINE

## 2022-12-05 PROCEDURE — 80053 COMPREHEN METABOLIC PANEL: CPT | Performed by: INTERNAL MEDICINE

## 2022-12-05 RX ORDER — VALSARTAN 160 MG/1
160 TABLET ORAL DAILY
Qty: 90 TABLET | Refills: 3 | Status: SHIPPED | OUTPATIENT
Start: 2022-12-05 | End: 2023-01-03

## 2022-12-05 NOTE — PROGRESS NOTES
Clinic Note  12/5/2022      Subjective:       Patient ID:  Dale is a 37 y.o. male being seen for an established visit.    Chief Complaint: Annual Exam    37 year old male patient with medical history of paraplegia on wheelchair, spina bifida s/p  shunt, recent sacral decubitus ulcer s/p 6 weeks IV antibiotics for concern of osteomyelitis, HTN, dyslipidemia, T2DM and neurogenic bladder presents to the clinic for an annual physical exam.     HTN: Home regimen of valsartan 80 mg and amlodipine 10 mg. BP today 150/96. Recent office visits BP also elevated.   -- increase valsartan to 160 mg and return to clinic in 1 month for BP check.     T2DM: Currently on metformin 500 mg TID. Most recent A1c 4 mo ago was 10.6 - Recent POCTs range from 110 - 132. States he is exercising via wheelchair basketball.   -- repeat A1C    Dyslipidemia: Most recent lipid panel 9 mo ago with HDL 35, , TG 81 and Total 184. Patient turns 38 years next month. ASCVD risk 13.8%.  -- continue home atorvastatin 40 mg   -- emphasize continued physical activity    Hypokalemia: Most recent CMP with normal K+  but has history of hypokalemia on multiple previous labs.   -- Repeat CMP    Sacral decubitus ulcer: Has home health with wound care. Has upcoming appointment with wound care physician. Seen by infectious disease and is s/p 6 weeks of IV Abx for osteomyelitis concern.     Spina bifida: Follows with neurology,  shunt stable.    Neurogenic bladder: Follows with urology. Self-catheterizes. No complaints at this time.   -- will avoid SGLT-2 inhibitors if there is need to change anti-hyperglycemic medications    Diabetes screenings: Has had yearly eye and foot exam.     General screenings: Will need colonoscopy and PSA assessment at 43 years old. Has received annual flu vaccine and is not currently interested in COVID booster.    Lifestyle: Denies EtOH use, tobacco use or illicit substance use. Denies mood problems.     Review of Systems    Constitutional:  Negative for fever.   HENT:  Negative for sore throat.    Eyes:  Negative for double vision.   Respiratory:  Negative for cough.    Cardiovascular:  Negative for chest pain.   Gastrointestinal:  Negative for abdominal pain.   Genitourinary:  Negative for dysuria.   Musculoskeletal:  Negative for myalgias.   Skin:  Negative for itching.   Neurological:  Negative for dizziness.   Psychiatric/Behavioral:  Negative for substance abuse.      Past Medical History:   Diagnosis Date    Allergy     Hyperlipidemia     Hypertension     Neuromuscular disorder     Overactive bladder     Spina bifida     Type 2 diabetes mellitus, without long-term current use of insulin 10/21/2021    Urinary tract infection        Family History   Problem Relation Age of Onset    Diabetes Mother     Spina bifida Brother     Thyroid disease Neg Hx     Hypertension Neg Hx     Glaucoma Neg Hx     Prostate cancer Neg Hx     Kidney disease Neg Hx         reports that he has never smoked. He has never used smokeless tobacco. He reports that he does not drink alcohol and does not use drugs.    Medication List with Changes/Refills   New Medications    VALSARTAN (DIOVAN) 160 MG TABLET    Take 1 tablet (160 mg total) by mouth once daily.   Current Medications    AMLODIPINE (NORVASC) 10 MG TABLET    Take 1 tablet (10 mg total) by mouth once daily.    ATORVASTATIN (LIPITOR) 40 MG TABLET    Take 1 tablet (40 mg total) by mouth once daily.    MELATONIN (MELATIN) 3 MG TABLET    Take 2 tablets (6 mg total) by mouth nightly as needed for Insomnia.    METFORMIN (GLUCOPHAGE) 500 MG TABLET    Take 1 tablet (500 mg total) by mouth 3 (three) times daily with meals.    OXYBUTYNIN (DITROPAN XL) 15 MG TR24    Take 1 tablet (15 mg total) by mouth once daily.    POLYETHYLENE GLYCOL (GLYCOLAX) 17 GRAM PWPK    Take 17 g by mouth once daily.   Discontinued Medications    VALSARTAN (DIOVAN) 80 MG TABLET    Take 1 tablet (80 mg total) by mouth once daily.  "    Review of patient's allergies indicates:   Allergen Reactions    Latex, natural rubber Hives       Patient Active Problem List   Diagnosis    Dyslipidemia    HTN (hypertension), benign    Paraparesis    Paraplegia    Mobility impaired    Neurogenic bladder    Neurogenic bowel    Congenital nystagmus    Dependent on wheelchair    Spina bifida    UTI (urinary tract infection)    Family history of diabetes mellitus    Type 2 diabetes mellitus, without long-term current use of insulin    Pressure injury, stage 4, with infection    Hypokalemia    Anemia    Subacute osteomyelitis, other site    Sacral decubitus ulcer, stage IV           Objective:      BP (!) 150/96 (BP Location: Right arm, Patient Position: Sitting, BP Method: Large (Manual))   Pulse 86   Ht 5' 3" (1.6 m)   SpO2 95%   BMI 27.73 kg/m²   Estimated body mass index is 27.73 kg/m² as calculated from the following:    Height as of this encounter: 5' 3" (1.6 m).    Weight as of 10/19/22: 71 kg (156 lb 8.4 oz).    Physical Exam  Constitutional:       Appearance: He is not ill-appearing.      Comments: Uses wheelchair   HENT:      Head: Atraumatic.      Nose: No rhinorrhea.      Mouth/Throat:      Mouth: Mucous membranes are moist.      Pharynx: No posterior oropharyngeal erythema.   Eyes:      Pupils: Pupils are equal, round, and reactive to light.   Cardiovascular:      Rate and Rhythm: Normal rate.      Heart sounds: No murmur heard.  Pulmonary:      Effort: Pulmonary effort is normal.      Breath sounds: Normal breath sounds.   Abdominal:      General: Abdomen is flat.      Tenderness: There is no abdominal tenderness.   Genitourinary:     Comments: Deferred   Musculoskeletal:      Cervical back: Normal range of motion.      Comments: At baseline   Skin:     General: Skin is warm and dry.   Neurological:      Mental Status: He is alert.      Comments: At baseline   Psychiatric:         Mood and Affect: Mood normal.       Assessment and Plan:       "   Dale was seen today for annual exam.    Diagnoses and all orders for this visit:    HTN (hypertension), benign    Spina bifida of lumbar region with hydrocephalus    Type 2 diabetes mellitus with hyperglycemia, without long-term current use of insulin  -     Comprehensive Metabolic Panel; Future  -     Hemoglobin A1C; Future    Paraplegia    Dyslipidemia    Pressure injury, stage 4, with infection    Other orders  -     valsartan (DIOVAN) 160 MG tablet; Take 1 tablet (160 mg total) by mouth once daily.    RTC in 1 month for BP check     Other Orders Placed This Visit:  Orders Placed This Encounter   Procedures    Comprehensive Metabolic Panel    Hemoglobin A1C         Marce Maradiaga MD  Internal Medicine, PGY-1  [facility]    Discussed with Dr. Adorno

## 2022-12-06 NOTE — PROGRESS NOTES
"I have personally taken the history and examined this patient and agree with the resident's note as stated above with the following thoughts:  BP (!) 150/96 (BP Location: Right arm, Patient Position: Sitting, BP Method: Large (Manual))   Pulse 86   Ht 5' 3" (1.6 m)   SpO2 95%   BMI 27.73 kg/m²     Discussed getting vaccines up to date.  Discussed importance of low fat diet and exercise .  Blood pressure is too high today.  I reviewed his records and is always too high.  We are going to increase his medication and will bring him back in a month recheck that blood pressure.  We discussed a low-salt diet.  He has everything for his wheelchair and everything else right now and does not need a 90 L Field at the current time.  He will continue getting care for pressure injury stage IV and that seems to be slow healing.  His diabetes not been very well controlled so we need to check a hemoglobin A1c and we need to act on that.   A SGLT 2 would not be a good option for him because the self-catheterizations and the risk of urinary tract infections.  He might benefit from a DDP4 or his glucose is high enough we may need to start insulin.  "

## 2022-12-08 ENCOUNTER — DOCUMENT SCAN (OUTPATIENT)
Dept: HOME HEALTH SERVICES | Facility: HOSPITAL | Age: 37
End: 2022-12-08
Payer: MEDICARE

## 2022-12-31 ENCOUNTER — EXTERNAL CHRONIC CARE MANAGEMENT (OUTPATIENT)
Dept: PRIMARY CARE CLINIC | Facility: CLINIC | Age: 37
End: 2022-12-31
Payer: MEDICARE

## 2022-12-31 PROCEDURE — 99439 PR CHRONIC CARE MGMT, EA ADDTL 20 MIN: ICD-10-PCS | Mod: S$PBB,,, | Performed by: INTERNAL MEDICINE

## 2022-12-31 PROCEDURE — 99490 CHRNC CARE MGMT STAFF 1ST 20: CPT | Mod: S$PBB,,, | Performed by: INTERNAL MEDICINE

## 2022-12-31 PROCEDURE — 99439 CHRNC CARE MGMT STAF EA ADDL: CPT | Mod: S$PBB,,, | Performed by: INTERNAL MEDICINE

## 2022-12-31 PROCEDURE — 99439 CHRNC CARE MGMT STAF EA ADDL: CPT | Mod: PBBFAC,27 | Performed by: INTERNAL MEDICINE

## 2022-12-31 PROCEDURE — 99490 PR CHRONIC CARE MGMT, 1ST 20 MIN: ICD-10-PCS | Mod: S$PBB,,, | Performed by: INTERNAL MEDICINE

## 2022-12-31 PROCEDURE — 99490 CHRNC CARE MGMT STAFF 1ST 20: CPT | Mod: PBBFAC,27 | Performed by: INTERNAL MEDICINE

## 2023-01-03 ENCOUNTER — OFFICE VISIT (OUTPATIENT)
Dept: INTERNAL MEDICINE | Facility: CLINIC | Age: 38
End: 2023-01-03
Payer: MEDICARE

## 2023-01-03 VITALS
OXYGEN SATURATION: 95 % | HEART RATE: 92 BPM | SYSTOLIC BLOOD PRESSURE: 154 MMHG | BODY MASS INDEX: 27.73 KG/M2 | DIASTOLIC BLOOD PRESSURE: 82 MMHG | HEIGHT: 63 IN

## 2023-01-03 DIAGNOSIS — E78.5 DYSLIPIDEMIA: ICD-10-CM

## 2023-01-03 DIAGNOSIS — E11.65 TYPE 2 DIABETES MELLITUS WITH HYPERGLYCEMIA, WITHOUT LONG-TERM CURRENT USE OF INSULIN: ICD-10-CM

## 2023-01-03 DIAGNOSIS — I10 HTN (HYPERTENSION), BENIGN: Primary | ICD-10-CM

## 2023-01-03 DIAGNOSIS — G82.20 PARAPLEGIA: ICD-10-CM

## 2023-01-03 PROCEDURE — 99214 OFFICE O/P EST MOD 30 MIN: CPT | Mod: S$PBB,,, | Performed by: INTERNAL MEDICINE

## 2023-01-03 PROCEDURE — 99999 PR PBB SHADOW E&M-EST. PATIENT-LVL III: CPT | Mod: PBBFAC,,, | Performed by: INTERNAL MEDICINE

## 2023-01-03 PROCEDURE — 99999 PR PBB SHADOW E&M-EST. PATIENT-LVL III: ICD-10-PCS | Mod: PBBFAC,,, | Performed by: INTERNAL MEDICINE

## 2023-01-03 PROCEDURE — 99213 OFFICE O/P EST LOW 20 MIN: CPT | Mod: PBBFAC | Performed by: INTERNAL MEDICINE

## 2023-01-03 PROCEDURE — 99214 PR OFFICE/OUTPT VISIT, EST, LEVL IV, 30-39 MIN: ICD-10-PCS | Mod: S$PBB,,, | Performed by: INTERNAL MEDICINE

## 2023-01-03 RX ORDER — VALSARTAN 320 MG/1
320 TABLET ORAL DAILY
Qty: 90 TABLET | Refills: 3 | Status: SHIPPED | OUTPATIENT
Start: 2023-01-03 | End: 2023-03-22 | Stop reason: SDUPTHER

## 2023-01-04 NOTE — PROGRESS NOTES
"Dale Pantoja is a 38 y.o. male with HTN, paraplegia, and neurogenic bladder who presents to clinic for follow-up for his recently diagnosed T2DM.        HTN: BP slightly elevated in clinic today 160/100.. Patient reports normal average readings at home. Remains on amlodipine.  He is taking a mid-range dose of valsartan     DM: Pt reports that he is taking Metformin,  . His A1c is 8.3, down from 9.4  previously.   He is also follow-up with Ms. Tan recently.       Neurogenic bladder   Continuing with intermittent self-catheterizations. No problems with incontinence while on Ditropan.      Review of Systems   Constitutional: Negative for activity change, appetite change, fatigue and fever.   Eyes: Negative for redness and itching.   Respiratory: Negative for cough, chest tightness and shortness of breath.    Cardiovascular: Negative for chest pain, palpitations and leg swelling.   Gastrointestinal: Negative for blood in stool, change in bowel habit, nausea, reflux and change in bowel habit.   Genitourinary: Negative for bladder incontinence, flank pain and urgency.   Musculoskeletal: Negative for arthralgias and myalgias.   Neurological: Negative for dizziness, syncope, speech difficulty and headaches.   Psychiatric/Behavioral: Negative.           Objective:   Physical Exam    BP (!) 154/82   Pulse 92   Ht 5' 3" (1.6 m)   SpO2 95%   BMI 27.73 kg/m²     Constitutional:       General: He is not in acute distress.     Appearance: Normal appearance.      Comments: Sitting comfortably in wheelchair   HENT:      Head: Normocephalic and atraumatic.   Eyes:      Pupils: Pupils are equal, round, and reactive to light.   Neck:      Comments:  shunt in place on right front neck under skin  Cardiovascular:      Rate and Rhythm: Normal rate and regular rhythm.      Pulses: Normal pulses.      Heart sounds: Normal heart sounds.   Pulmonary:      Effort: Pulmonary effort is normal. No respiratory distress.      Breath " sounds: Normal breath sounds. No wheezing or rhonchi.   Abdominal:      General: Abdomen is flat. Bowel sounds are normal.      Palpations: Abdomen is soft.   Skin:     General: Skin is warm and dry.      Capillary Refill: Capillary refill takes less than 2 seconds.   Neurological:      Mental Status: He is alert and oriented to person, place, and time. Mental status is at baseline.   Psychiatric:         Mood and Affect: Mood normal.         Behavior: Behavior normal.   Right foot in boot.        Assessment and plan:  We are going to increase his valsartan to 320 mg a day and continue his current medications.  We are also going to start him on Januvia 100 mg a day for his diabetes and we will follow him up again in 2 months to see how blood pressure and diabetes is doing.  For his paraplegia we will follow this and he will let me know if he needs anything for his wheelchair.  Also continue get his catheters for self-catheterizations.    HTN (hypertension), benign    Dyslipidemia    Type 2 diabetes mellitus with hyperglycemia, without long-term current use of insulin  -     Comprehensive Metabolic Panel; Future; Expected date: 01/03/2023  -     Hemoglobin A1C; Future; Expected date: 01/03/2023    Other orders  -     SITagliptin phosphate (JANUVIA) 100 MG Tab; Take 1 tablet (100 mg total) by mouth once daily.  Dispense: 90 tablet; Refill: 3  -     valsartan (DIOVAN) 320 MG tablet; Take 1 tablet (320 mg total) by mouth once daily.  Dispense: 90 tablet; Refill: 3

## 2023-01-09 ENCOUNTER — PES CALL (OUTPATIENT)
Dept: ADMINISTRATIVE | Facility: CLINIC | Age: 38
End: 2023-01-09
Payer: MEDICARE

## 2023-01-18 ENCOUNTER — HOSPITAL ENCOUNTER (OUTPATIENT)
Dept: WOUND CARE | Facility: HOSPITAL | Age: 38
Discharge: HOME | End: 2023-01-18
Attending: FAMILY MEDICINE
Payer: MEDICARE

## 2023-01-18 VITALS — TEMPERATURE: 98 F | SYSTOLIC BLOOD PRESSURE: 160 MMHG | HEART RATE: 70 BPM | DIASTOLIC BLOOD PRESSURE: 100 MMHG

## 2023-01-18 DIAGNOSIS — L89.154 SACRAL DECUBITUS ULCER, STAGE IV: Primary | ICD-10-CM

## 2023-01-18 PROCEDURE — 17250 CHEM CAUT OF GRANLTJ TISSUE: CPT | Performed by: FAMILY MEDICINE

## 2023-01-18 PROCEDURE — 99214 PR OFFICE/OUTPT VISIT, EST, LEVL IV, 30-39 MIN: ICD-10-PCS | Mod: 25,,, | Performed by: FAMILY MEDICINE

## 2023-01-18 PROCEDURE — 99214 OFFICE O/P EST MOD 30 MIN: CPT | Mod: 25,,, | Performed by: FAMILY MEDICINE

## 2023-01-27 PROCEDURE — G0179 PR HOME HEALTH MD RECERTIFICATION: ICD-10-PCS | Mod: ,,, | Performed by: FAMILY MEDICINE

## 2023-01-27 PROCEDURE — G0179 MD RECERTIFICATION HHA PT: HCPCS | Mod: ,,, | Performed by: FAMILY MEDICINE

## 2023-01-31 ENCOUNTER — EXTERNAL CHRONIC CARE MANAGEMENT (OUTPATIENT)
Dept: PRIMARY CARE CLINIC | Facility: CLINIC | Age: 38
End: 2023-01-31
Payer: MEDICARE

## 2023-01-31 PROCEDURE — 99490 CHRNC CARE MGMT STAFF 1ST 20: CPT | Mod: PBBFAC | Performed by: INTERNAL MEDICINE

## 2023-01-31 PROCEDURE — 99490 CHRNC CARE MGMT STAFF 1ST 20: CPT | Mod: S$PBB,,, | Performed by: INTERNAL MEDICINE

## 2023-01-31 PROCEDURE — 99490 PR CHRONIC CARE MGMT, 1ST 20 MIN: ICD-10-PCS | Mod: S$PBB,,, | Performed by: INTERNAL MEDICINE

## 2023-02-13 ENCOUNTER — EXTERNAL HOME HEALTH (OUTPATIENT)
Dept: HOME HEALTH SERVICES | Facility: HOSPITAL | Age: 38
End: 2023-02-13
Payer: MEDICARE

## 2023-02-13 ENCOUNTER — PATIENT OUTREACH (OUTPATIENT)
Dept: HOME HEALTH SERVICES | Facility: HOSPITAL | Age: 38
End: 2023-02-13
Payer: MEDICARE

## 2023-02-16 ENCOUNTER — DOCUMENT SCAN (OUTPATIENT)
Dept: HOME HEALTH SERVICES | Facility: HOSPITAL | Age: 38
End: 2023-02-16
Payer: MEDICARE

## 2023-02-28 ENCOUNTER — EXTERNAL CHRONIC CARE MANAGEMENT (OUTPATIENT)
Dept: PRIMARY CARE CLINIC | Facility: CLINIC | Age: 38
End: 2023-02-28
Payer: MEDICARE

## 2023-02-28 PROCEDURE — 99490 PR CHRONIC CARE MGMT, 1ST 20 MIN: ICD-10-PCS | Mod: S$PBB,,, | Performed by: INTERNAL MEDICINE

## 2023-02-28 PROCEDURE — 99490 CHRNC CARE MGMT STAFF 1ST 20: CPT | Mod: S$PBB,,, | Performed by: INTERNAL MEDICINE

## 2023-02-28 PROCEDURE — 99490 CHRNC CARE MGMT STAFF 1ST 20: CPT | Mod: PBBFAC | Performed by: INTERNAL MEDICINE

## 2023-03-08 NOTE — ASSESSMENT & PLAN NOTE
DVT prophylaxis  PT and OT consults     Is Thalidomide Contraindicated?: No Dupixent Monitoring Guidelines: There is no laboratory monitoring requirement with Dupixent. Diagnosis (Required): Atopic Dermatitis/Eczematous Dermatitis Dupixent Dosing Override: 200mg/kl SC Q 2 weeks Detail Level: Zone Dupixent Dosing: Use Override Dosage Pregnancy And Lactation Warning Text: There have not been adverse fetal risks in women taking Dupixent while pregnant. It is unknown if this medication is excreted in breast milk.

## 2023-03-15 ENCOUNTER — PATIENT OUTREACH (OUTPATIENT)
Dept: ADMINISTRATIVE | Facility: HOSPITAL | Age: 38
End: 2023-03-15
Payer: MEDICARE

## 2023-03-15 NOTE — PROGRESS NOTES
Health Maintenance Due   Topic Date Due    Diabetes Urine Screening  Never done    COVID-19 Vaccine (3 - Booster for Pfizer series) 06/07/2021    Influenza Vaccine (1) 09/01/2022    Lipid Panel  02/14/2023    Hemoglobin A1c  03/05/2023     Triggered LINKS and reconciled immunizations. Updated Care Everywhere. Pt has lab appt scheduled for 3/27/2023. Chart review completed.

## 2023-03-29 ENCOUNTER — HOSPITAL ENCOUNTER (OUTPATIENT)
Dept: WOUND CARE | Facility: HOSPITAL | Age: 38
Discharge: HOME | End: 2023-03-29
Attending: FAMILY MEDICINE
Payer: MEDICARE

## 2023-03-29 VITALS — DIASTOLIC BLOOD PRESSURE: 73 MMHG | HEART RATE: 88 BPM | TEMPERATURE: 99 F | SYSTOLIC BLOOD PRESSURE: 167 MMHG

## 2023-03-29 DIAGNOSIS — L89.154 SACRAL DECUBITUS ULCER, STAGE IV: Primary | ICD-10-CM

## 2023-03-29 PROCEDURE — 99214 PR OFFICE/OUTPT VISIT, EST, LEVL IV, 30-39 MIN: ICD-10-PCS | Mod: ,,, | Performed by: FAMILY MEDICINE

## 2023-03-29 PROCEDURE — 17250 CHEM CAUT OF GRANLTJ TISSUE: CPT | Performed by: FAMILY MEDICINE

## 2023-03-29 PROCEDURE — 99214 OFFICE O/P EST MOD 30 MIN: CPT | Mod: ,,, | Performed by: FAMILY MEDICINE

## 2023-03-29 NOTE — PROGRESS NOTES
[facility]                Progress Note    Subjective:       Patient ID: Dale Pantoja is a 38 y.o. male.    Chief Complaint: Wound Check    To clinic in w/c able to move self to bed independantly. Has missed visits as brother, his twin who lives with him, has been very sick. Will continue HH M,W,F and return here in one month. Wound is 1 cm smaller in widthbut 1cm longer and deeper but depends on position. Wound is pink with no slough. Gauze that was in wound continued to be damp but was too big for wound . Will try risma and packing with Aquacel AG which is softer and paint mari wound with Gentian Violet as some maceration noted. Refused printed AVS.    Wound Check    This is an established patient in wound care.   Patient presents in the office today for evaluation of the chronic wound.  Updated HPI is noted below.    The periwound appears non-erythematous, macerated, non-edematous    The wound appears wound healing; wound has decreased in size; no odor. Serous drainage.     Patient denies fever, chills    Patients reports no pain     Lymphedema status is noncontributory to wound status    Contributing factors to current wound state include numerous comorbid conditions, social constraints      Review of Systems   Constitutional:  Negative for activity change, appetite change and fever.   HENT:  Negative for congestion.    Respiratory:  Negative for shortness of breath and wheezing.    Cardiovascular:  Negative for chest pain and leg swelling.   Gastrointestinal:  Negative for abdominal pain, nausea and vomiting.   Skin:  Positive for wound.   Neurological:  Negative for dizziness and headaches.   Psychiatric/Behavioral:  The patient is not nervous/anxious.        Objective:        Physical Exam  Vitals and nursing note reviewed.   Constitutional:       Appearance: He is well-developed.   HENT:      Head: Normocephalic and atraumatic.   Eyes:      Conjunctiva/sclera:  Conjunctivae normal.   Pulmonary:      Effort: Pulmonary effort is normal.   Abdominal:      General: There is no distension.      Palpations: Abdomen is soft.   Musculoskeletal:      Cervical back: Normal range of motion and neck supple.   Skin:     Comments: See wound description   Neurological:      Mental Status: He is alert and oriented to person, place, and time.   Psychiatric:         Behavior: Behavior normal.       Vitals:    03/29/23 1420   BP: (!) 167/73   Pulse: 88   Temp: 98.5 °F (36.9 °C)       Assessment:           ICD-10-CM ICD-9-CM   1. Sacral decubitus ulcer, stage IV  L89.154 707.03     707.24            Altered Skin Integrity 09/28/22 1136 Right lower Buttocks (Active)   09/28/22 1136   Altered Skin Integrity Present on Admission - Did Patient arrive to the hospital with altered skin?: yes   Side: Right   Orientation: lower   Location: Buttocks   Wound Number:    Is this injury device related?:    Primary Wound Type:    Description of Altered Skin Integrity:    Ankle-Brachial Index:    Pulses:    Removal Indication and Assessment:    Wound Outcome:    (Retired) Wound Length (cm):    (Retired) Wound Width (cm):    (Retired) Depth (cm):    Wound Description (Comments):    Removal Indications:    Wound Image   03/29/23 1553   Description of Altered Skin Integrity Full thickness tissue loss. Subcutaneous fat may be visible but bone, tendon or muscle are not exposed 03/29/23 1553   Dressing Appearance Dry;Intact 03/29/23 1553   Drainage Amount Scant 03/29/23 1553   Drainage Characteristics/Odor No odor 03/29/23 1553   Appearance Pink 03/29/23 1553   Tissue loss description Full thickness 03/29/23 1553   Red (%), Wound Tissue Color 100 % 03/29/23 1553   Periwound Area Macerated 03/29/23 1553   Wound Edges Defined 03/29/23 1553   Wound Length (cm) 1 cm 03/29/23 1553   Wound Width (cm) 3 cm 03/29/23 1553   Wound Depth (cm) 3 cm 03/29/23 1553   Wound Volume (cm^3) 9 cm^3 03/29/23 1553   Wound Surface Area  (cm^2) 3 cm^2 03/29/23 1553   Care Cleansed with:;Antimicrobial agent;Sterile normal saline 03/29/23 1553   Dressing Changed 03/29/23 1553   Dressing Change Due 04/27/23 03/29/23 1553           Plan:            Chemical cauterization with silver nitrate stick x 1 of wound bed done in clinic for treatment of hypergranulation or to decrease biofilm burden. Patient tolerated procedure well.    Recommend off-loading   Increase protein   Keep dressing clean and intact  Continue with wound care orders and plan as noted in orders.   Continue to follow current medication regimen as per pcp   Call for any questions / concerns.       Tissue pathology and/or culture taken     [] Yes      [x] No  Sharp debridement performed                   [] Yes       [x] No  Labs ordered     [] Yes       [x] No  Imaging ordered    [] Yes      [x] No    Orders Placed This Encounter   Procedures    Change dressing     Home health for wound care three times a week on Monday, Wednesday and Friday when not seen in clinic. Patient has appt in [facility] on Wednesday 4/26/23.     Clean with vashe. Gentian violet to periwound. Cavilon to areas to be taped. Apply Caron to wound bed, cover with Aquacel AG. Abd pad or Mextra 5 x 5 over dressing taped with medipore tape.    SUBSEQUENT HOME HEALTH ORDERS     Home health for wound care three times a week on Monday, Wednesday and Friday when not seen in clinic. Patient has appt in [facility] on Wednesday 4/26/23.     Clean with vashe. Gentian violet to periwound. Cavilon to areas to be taped. Apply Caron to wound bed, cover with Aquacel AG. Abd pad or Mextra 5 x 5 over dressing taped with medipore tape.     Order Specific Question:   What Home Health Agency is the patient currently using?     Answer:   [facility]        Follow up in about 1 month (around 4/29/2023).

## 2023-03-30 ENCOUNTER — LAB VISIT (OUTPATIENT)
Dept: LAB | Facility: HOSPITAL | Age: 38
End: 2023-03-30
Attending: INTERNAL MEDICINE
Payer: MEDICARE

## 2023-03-30 DIAGNOSIS — E11.65 TYPE 2 DIABETES MELLITUS WITH HYPERGLYCEMIA, WITHOUT LONG-TERM CURRENT USE OF INSULIN: ICD-10-CM

## 2023-03-30 LAB
ALBUMIN SERPL BCP-MCNC: 3.7 G/DL (ref 3.5–5.2)
ALP SERPL-CCNC: 105 U/L (ref 55–135)
ALT SERPL W/O P-5'-P-CCNC: 19 U/L (ref 10–44)
ANION GAP SERPL CALC-SCNC: 8 MMOL/L (ref 8–16)
AST SERPL-CCNC: 13 U/L (ref 10–40)
BILIRUB SERPL-MCNC: 0.4 MG/DL (ref 0.1–1)
BUN SERPL-MCNC: 11 MG/DL (ref 6–20)
CALCIUM SERPL-MCNC: 9 MG/DL (ref 8.7–10.5)
CHLORIDE SERPL-SCNC: 103 MMOL/L (ref 95–110)
CO2 SERPL-SCNC: 29 MMOL/L (ref 23–29)
CREAT SERPL-MCNC: 0.8 MG/DL (ref 0.5–1.4)
EST. GFR  (NO RACE VARIABLE): >60 ML/MIN/1.73 M^2
ESTIMATED AVG GLUCOSE: 206 MG/DL (ref 68–131)
GLUCOSE SERPL-MCNC: 104 MG/DL (ref 70–110)
HBA1C MFR BLD: 8.8 % (ref 4–5.6)
POTASSIUM SERPL-SCNC: 3.4 MMOL/L (ref 3.5–5.1)
PROT SERPL-MCNC: 8 G/DL (ref 6–8.4)
SODIUM SERPL-SCNC: 140 MMOL/L (ref 136–145)

## 2023-03-30 PROCEDURE — 83036 HEMOGLOBIN GLYCOSYLATED A1C: CPT | Performed by: INTERNAL MEDICINE

## 2023-03-30 PROCEDURE — 80053 COMPREHEN METABOLIC PANEL: CPT | Performed by: INTERNAL MEDICINE

## 2023-03-30 PROCEDURE — 36415 COLL VENOUS BLD VENIPUNCTURE: CPT | Mod: PO | Performed by: INTERNAL MEDICINE

## 2023-03-31 ENCOUNTER — EXTERNAL CHRONIC CARE MANAGEMENT (OUTPATIENT)
Dept: PRIMARY CARE CLINIC | Facility: CLINIC | Age: 38
End: 2023-03-31
Payer: MEDICARE

## 2023-03-31 PROCEDURE — 99490 PR CHRONIC CARE MGMT, 1ST 20 MIN: ICD-10-PCS | Mod: S$PBB,,, | Performed by: INTERNAL MEDICINE

## 2023-03-31 PROCEDURE — 99490 CHRNC CARE MGMT STAFF 1ST 20: CPT | Mod: S$PBB,,, | Performed by: INTERNAL MEDICINE

## 2023-03-31 PROCEDURE — 99490 CHRNC CARE MGMT STAFF 1ST 20: CPT | Mod: PBBFAC | Performed by: INTERNAL MEDICINE

## 2023-04-03 ENCOUNTER — PATIENT MESSAGE (OUTPATIENT)
Dept: ADMINISTRATIVE | Facility: HOSPITAL | Age: 38
End: 2023-04-03
Payer: MEDICARE

## 2023-04-05 ENCOUNTER — TELEPHONE (OUTPATIENT)
Dept: INTERNAL MEDICINE | Facility: CLINIC | Age: 38
End: 2023-04-05
Payer: MEDICARE

## 2023-04-05 DIAGNOSIS — E11.65 TYPE 2 DIABETES MELLITUS WITH HYPERGLYCEMIA, WITHOUT LONG-TERM CURRENT USE OF INSULIN: Primary | ICD-10-CM

## 2023-04-05 RX ORDER — PIOGLITAZONEHYDROCHLORIDE 30 MG/1
30 TABLET ORAL DAILY
Qty: 90 TABLET | Refills: 3 | Status: SHIPPED | OUTPATIENT
Start: 2023-04-05 | End: 2024-02-23 | Stop reason: SDUPTHER

## 2023-04-05 NOTE — TELEPHONE ENCOUNTER
Please call his glucoses are still not controled.  Continue current meds and I would like ot add pioglitazone 30 mg a day-- I sent a new rx to his pharmacy-- please get a bmp and hgb A1c before is see him in July

## 2023-04-05 NOTE — TELEPHONE ENCOUNTER
Spoke to patient and advised of labs and new medication. Patient verbalized understanding.       Fu labs scheduled

## 2023-04-26 ENCOUNTER — HOSPITAL ENCOUNTER (OUTPATIENT)
Dept: WOUND CARE | Facility: HOSPITAL | Age: 38
Discharge: HOME | End: 2023-04-26
Attending: FAMILY MEDICINE
Payer: MEDICARE

## 2023-04-26 VITALS — HEART RATE: 88 BPM | SYSTOLIC BLOOD PRESSURE: 157 MMHG | DIASTOLIC BLOOD PRESSURE: 90 MMHG | TEMPERATURE: 98 F

## 2023-04-26 DIAGNOSIS — L89.154 SACRAL DECUBITUS ULCER, STAGE IV: Primary | ICD-10-CM

## 2023-04-26 DIAGNOSIS — E11.9 TYPE 2 DIABETES MELLITUS WITHOUT COMPLICATION: ICD-10-CM

## 2023-04-26 PROCEDURE — 17250 PR CHEM CAUTERY GRANULATN TISSUE: ICD-10-PCS | Mod: ,,, | Performed by: FAMILY MEDICINE

## 2023-04-26 PROCEDURE — 17250 CHEM CAUT OF GRANLTJ TISSUE: CPT | Mod: ,,, | Performed by: FAMILY MEDICINE

## 2023-04-26 PROCEDURE — 99214 OFFICE O/P EST MOD 30 MIN: CPT | Mod: 25,,, | Performed by: FAMILY MEDICINE

## 2023-04-26 PROCEDURE — 99214 OFFICE O/P EST MOD 30 MIN: CPT

## 2023-04-26 PROCEDURE — 99214 PR OFFICE/OUTPT VISIT, EST, LEVL IV, 30-39 MIN: ICD-10-PCS | Mod: 25,,, | Performed by: FAMILY MEDICINE

## 2023-04-26 NOTE — PROGRESS NOTES
[facility]                Progress Note    Subjective:       Patient ID: Dale Pantoja is a 38 y.o. male.    Chief Complaint: Wound Check    Follow up visit for stage IV decubitus to right buttock. Patient arrived in a wheelchair unaccompanied. Dressing to left buttock intact. Wound with maceration around edges/ wound bed pink with moderated amount of drainage. No odor. Patient seen by Dr. Lizama. Continue plan of care. Wound care performed as ordered. Patient tolerated it well. Patient instructed to return to clinic in 4 weeks. HH to continue on Mondays, Wednesdays, and Fridays. Patient verbalizes that he understands.    No AVS, has MyChart    This is an established patient in wound care.   Patient presents in the office today for evaluation of the chronic wound.  Updated HPI is noted below.    The periwound appears non-erythematous, macerated, non-edematous    The wound appears to have increased in size. Wound looks granular. No odor. Serous drainage.     Patient denies fever, chills    Patients reports no pain. He has been busy taking care of his twin brother who has cancer.     Lymphedema status is noncontributory to wound status    Pain at the site of the wound is aching    Contributing factors to current wound state include off-loading limitations    Review of Systems   Constitutional:  Negative for activity change, appetite change and fever.   HENT:  Negative for congestion.    Respiratory:  Negative for shortness of breath and wheezing.    Cardiovascular:  Negative for chest pain and leg swelling.   Gastrointestinal:  Negative for abdominal pain, nausea and vomiting.   Skin:  Positive for wound.   Neurological:  Negative for dizziness and headaches.   Psychiatric/Behavioral:  The patient is not nervous/anxious.        Objective:        Physical Exam  Vitals and nursing note reviewed.   Constitutional:       Appearance: He is well-developed.   HENT:      Head:  Normocephalic and atraumatic.   Eyes:      Conjunctiva/sclera: Conjunctivae normal.   Pulmonary:      Effort: Pulmonary effort is normal.   Abdominal:      General: There is no distension.      Palpations: Abdomen is soft.   Musculoskeletal:      Cervical back: Normal range of motion and neck supple.   Skin:     Comments: See wound description   Neurological:      Mental Status: He is alert and oriented to person, place, and time.   Psychiatric:         Behavior: Behavior normal.       Vitals:    04/26/23 1341   BP: (!) 157/90   Pulse: 88   Temp: 98.2 °F (36.8 °C)       Assessment:           ICD-10-CM ICD-9-CM   1. Sacral decubitus ulcer, stage IV  L89.154 707.03     707.24            Altered Skin Integrity 09/28/22 1136 Right lower Buttocks (Active)   09/28/22 1136   Altered Skin Integrity Present on Admission - Did Patient arrive to the hospital with altered skin?: yes   Side: Right   Orientation: lower   Location: Buttocks   Wound Number:    Is this injury device related?:    Primary Wound Type:    Description of Altered Skin Integrity:    Ankle-Brachial Index:    Pulses:    Removal Indication and Assessment:    Wound Outcome:    (Retired) Wound Length (cm):    (Retired) Wound Width (cm):    (Retired) Depth (cm):    Wound Description (Comments):    Removal Indications:    Wound Image   04/26/23 1431   Description of Altered Skin Integrity Full thickness tissue loss with exposed bone, tendon, or muscle. Often includes undermining and tunneling. May extend into muscle and/or supporting structures. 04/26/23 1431   Dressing Appearance Moist drainage 04/26/23 1431   Drainage Amount Moderate 04/26/23 1431   Drainage Characteristics/Odor Serosanguineous 04/26/23 1431   Appearance Pink 04/26/23 1431   Tissue loss description Full thickness 04/26/23 1431   Black (%), Wound Tissue Color 0 % 04/26/23 1431   Red (%), Wound Tissue Color 100 % 04/26/23 1431   Yellow (%), Wound Tissue Color 0 % 04/26/23 1431   Periwound Area  Macerated;Moist 04/26/23 1431   Wound Length (cm) 4 cm 04/26/23 1431   Wound Width (cm) 1.5 cm 04/26/23 1431   Wound Depth (cm) 4 cm 04/26/23 1431   Wound Volume (cm^3) 24 cm^3 04/26/23 1431   Wound Surface Area (cm^2) 6 cm^2 04/26/23 1431   Tunneling (depth (cm)/location) 0 04/26/23 1431   Undermining (depth (cm)/location) 0 04/26/23 1431   Care Cleansed with:;Antimicrobial agent;Sterile normal saline 04/26/23 1431   Dressing Applied;Collagen;Absorptive Pad;Gauze 04/26/23 1431   Periwound Care Moisture barrier applied;Skin barrier film applied 04/26/23 1431   Dressing Change Due 05/24/23 04/26/23 1431           Plan:            Chemical cauterization with silver nitrate stick x 1 of wound bed done in clinic for treatment of hypergranulation or to decrease biofilm burden. Patient tolerated procedure well.    Recommend off-loading   Increase protein   Monitor for drainage.    Keep dressing clean and intact  Continue with wound care orders and plan as noted in orders.   Continue to follow current medication regimen as per pcp   Call for any questions / concerns.       Tissue pathology and/or culture taken     [] Yes      [x] No  Sharp debridement performed                   [] Yes       [x] No  Labs ordered     [] Yes       [x] No  Imaging ordered    [] Yes      [x] No    Orders Placed This Encounter   Procedures    Change dressing     Home health for wound care three times a week on Monday, Wednesday and Friday when not seen in clinic. Patient has appt in [facility] on Wednesday 05/24/23.     Clean with vashe. Gentian violet to periwound. Cavilon to areas to be taped. Apply Caron to wound bed, cover with Aquacel AG. Abd pad or Mextra 5 x 5 over dressing taped with medipore tape.    SUBSEQUENT HOME HEALTH ORDERS     Home health for wound care three times a week on Monday, Wednesday and Friday when not seen in clinic. Patient has appt in [facility] on Wednesday 05/24/23.     Clean with vashe. Gentian violet to periwound. Cavilon to  areas to be taped. Apply Caron to wound bed, cover with Aquacel AG. Abd pad or Mextra 5 x 5 over dressing taped with medipore tape.     Order Specific Question:   What Home Health Agency is the patient currently using?     Answer:   [facility]        Follow up in about 4 weeks (around 5/24/2023) for wound care.

## 2023-04-30 ENCOUNTER — EXTERNAL CHRONIC CARE MANAGEMENT (OUTPATIENT)
Dept: PRIMARY CARE CLINIC | Facility: CLINIC | Age: 38
End: 2023-04-30
Payer: MEDICARE

## 2023-04-30 PROCEDURE — 99439 CHRNC CARE MGMT STAF EA ADDL: CPT | Mod: S$PBB,,, | Performed by: INTERNAL MEDICINE

## 2023-04-30 PROCEDURE — 99490 CHRNC CARE MGMT STAFF 1ST 20: CPT | Mod: PBBFAC,25 | Performed by: INTERNAL MEDICINE

## 2023-04-30 PROCEDURE — 99439 PR CHRONIC CARE MGMT, EA ADDTL 20 MIN: ICD-10-PCS | Mod: S$PBB,,, | Performed by: INTERNAL MEDICINE

## 2023-04-30 PROCEDURE — 99439 CHRNC CARE MGMT STAF EA ADDL: CPT | Mod: PBBFAC | Performed by: INTERNAL MEDICINE

## 2023-04-30 PROCEDURE — 99490 PR CHRONIC CARE MGMT, 1ST 20 MIN: ICD-10-PCS | Mod: S$PBB,,, | Performed by: INTERNAL MEDICINE

## 2023-04-30 PROCEDURE — 99490 CHRNC CARE MGMT STAFF 1ST 20: CPT | Mod: S$PBB,,, | Performed by: INTERNAL MEDICINE

## 2023-05-01 ENCOUNTER — PATIENT MESSAGE (OUTPATIENT)
Dept: ADMINISTRATIVE | Facility: HOSPITAL | Age: 38
End: 2023-05-01
Payer: MEDICARE

## 2023-05-27 PROCEDURE — G0179 PR HOME HEALTH MD RECERTIFICATION: ICD-10-PCS | Mod: ,,, | Performed by: FAMILY MEDICINE

## 2023-05-27 PROCEDURE — G0179 MD RECERTIFICATION HHA PT: HCPCS | Mod: ,,, | Performed by: FAMILY MEDICINE

## 2023-05-31 ENCOUNTER — HOSPITAL ENCOUNTER (OUTPATIENT)
Dept: WOUND CARE | Facility: HOSPITAL | Age: 38
Discharge: HOME | End: 2023-05-31
Attending: FAMILY MEDICINE
Payer: MEDICARE

## 2023-05-31 ENCOUNTER — EXTERNAL CHRONIC CARE MANAGEMENT (OUTPATIENT)
Dept: PRIMARY CARE CLINIC | Facility: CLINIC | Age: 38
End: 2023-05-31
Payer: MEDICARE

## 2023-05-31 VITALS — TEMPERATURE: 98 F | HEART RATE: 83 BPM | SYSTOLIC BLOOD PRESSURE: 157 MMHG | DIASTOLIC BLOOD PRESSURE: 79 MMHG

## 2023-05-31 DIAGNOSIS — L89.154 SACRAL DECUBITUS ULCER, STAGE IV: Primary | ICD-10-CM

## 2023-05-31 PROCEDURE — 99490 CHRNC CARE MGMT STAFF 1ST 20: CPT | Mod: PBBFAC | Performed by: INTERNAL MEDICINE

## 2023-05-31 PROCEDURE — 17250 CHEM CAUT OF GRANLTJ TISSUE: CPT | Performed by: FAMILY MEDICINE

## 2023-05-31 PROCEDURE — 99439 CHRNC CARE MGMT STAF EA ADDL: CPT | Mod: S$PBB,,, | Performed by: INTERNAL MEDICINE

## 2023-05-31 PROCEDURE — 99214 OFFICE O/P EST MOD 30 MIN: CPT | Mod: 25,,, | Performed by: FAMILY MEDICINE

## 2023-05-31 PROCEDURE — 99214 PR OFFICE/OUTPT VISIT, EST, LEVL IV, 30-39 MIN: ICD-10-PCS | Mod: 25,,, | Performed by: FAMILY MEDICINE

## 2023-05-31 PROCEDURE — 99439 PR CHRONIC CARE MGMT, EA ADDTL 20 MIN: ICD-10-PCS | Mod: S$PBB,,, | Performed by: INTERNAL MEDICINE

## 2023-05-31 PROCEDURE — 99490 PR CHRONIC CARE MGMT, 1ST 20 MIN: ICD-10-PCS | Mod: S$PBB,,, | Performed by: INTERNAL MEDICINE

## 2023-05-31 PROCEDURE — 99439 CHRNC CARE MGMT STAF EA ADDL: CPT | Mod: PBBFAC | Performed by: INTERNAL MEDICINE

## 2023-05-31 PROCEDURE — 99490 CHRNC CARE MGMT STAFF 1ST 20: CPT | Mod: S$PBB,,, | Performed by: INTERNAL MEDICINE

## 2023-05-31 NOTE — PROGRESS NOTES
[facility]                Progress Note    Subjective:       Patient ID: Dale Pantoja is a 38 y.o. male.    Chief Complaint: Wound Check    HPI    Review of Systems      Objective:        Physical Exam    Vitals:    05/31/23 1356   BP: (!) 157/79   Pulse: 83   Temp: 98.1 °F (36.7 °C)       Assessment:           ICD-10-CM ICD-9-CM   1. Sacral decubitus ulcer, stage IV  L89.154 707.03     707.24            Altered Skin Integrity 09/28/22 1136 Right lower Buttocks (Active)   09/28/22 1136   Altered Skin Integrity Present on Admission - Did Patient arrive to the hospital with altered skin?: yes   Side: Right   Orientation: lower   Location: Buttocks   Wound Number:    Is this injury device related?:    Primary Wound Type:    Description of Altered Skin Integrity:    Ankle-Brachial Index:    Pulses:    Removal Indication and Assessment:    Wound Outcome:    (Retired) Wound Length (cm):    (Retired) Wound Width (cm):    (Retired) Depth (cm):    Wound Description (Comments):    Removal Indications:    Wound Image   05/31/23 1413   Description of Altered Skin Integrity Full thickness tissue loss. Subcutaneous fat may be visible but bone, tendon or muscle are not exposed 05/31/23 1413   Dressing Appearance Dry;Intact 05/31/23 1413   Drainage Amount Scant 05/31/23 1413   Drainage Characteristics/Odor No odor 05/31/23 1413   Appearance Red 05/31/23 1413   Tissue loss description Full thickness 05/31/23 1413   Red (%), Wound Tissue Color 100 % 05/31/23 1413   Periwound Area Macerated 05/31/23 1413   Wound Edges Defined 05/31/23 1413   Wound Length (cm) 2.7 cm 05/31/23 1413   Wound Width (cm) 1 cm 05/31/23 1413   Wound Depth (cm) 3.5 cm 05/31/23 1413   Wound Volume (cm^3) 9.45 cm^3 05/31/23 1413   Wound Surface Area (cm^2) 2.7 cm^2 05/31/23 1413   Care Cleansed with:;Antimicrobial agent;Sterile normal saline 05/31/23 1413   Dressing Changed 05/31/23 1413   Dressing Change Due  06/28/23 05/31/23 1413           Plan:              Tissue pathology and/or culture taken     [] Yes      [] No  Sharp debridement performed                   [] Yes       [] No  Labs ordered     [] Yes       [] No  Imaging ordered    [] Yes      [] No    Orders Placed This Encounter   Procedures    SUBSEQUENT HOME HEALTH ORDERS     Home health for wound care three times a week on Monday, Wednesday and Friday when not seen in clinic. Patient has appt in [facility] on Wednesday 28th June  Clean with vashe. Gentian violet to periwound. Cavilon to areas to be taped. Apply Caron to wound bed, cover with Aquacel AG. Abd pad or Mextra 5 x 5 over dressing taped with medipore tape.     Order Specific Question:   What Home Health Agency is the patient currently using?     Answer:   [facility]    Change dressing     Home health for wound care three times a week on Monday, Wednesday and Friday when not seen in clinic. atient has appt in [facility] on Wednesday 28th June  Clean with vashe. Gentian violet to periwound. Cavilon to areas to be taped. Apply Caron to wound bed, cover with Aquacel AG. Abd pad or Mextra 5 x 5 over dressing taped with medipore tape.        Follow up in about 1 month (around 6/30/2023).

## 2023-05-31 NOTE — PROGRESS NOTES
[facility]                Progress Note    Subjective:       Patient ID: Dale Pantoja is a 38 y.o. male.    Chief Complaint: No chief complaint on file.    To clinic in w/c accompanied by mother. Able to lift self to bed with arms. No complaints pain as has no feeling in that area. HH has been seeing 3 x week and pt braught supplies as appear different to ours. All supplies are same products from different companies. States do not have Mextra like ours but 3 x 3 aware OK to use that as wound smaller. Pt will purchase Gentian Violet to avoid maceration. Wound is smaller by 1.3, 3 and 0.5 cm in length width and depth. Tolerated silver nitrate application without difficulty. Will return in one month with HH doing changes 3 x week at home.    This is an established patient in wound care.   Patient presents in the office today for evaluation of the chronic wound.  Updated HPI is noted below.    The periwound appears non-erythematous, macerated, non-edematous    The wound appears wound healing    Patient denies fever, chills    Lymphedema status is noncontributory to wound status    Pain at the site of the wound is n/a     Contributing factors to current wound state include numerous comorbid conditions    Review of Systems   Constitutional:  Negative for activity change, appetite change and fever.   HENT:  Negative for congestion.    Respiratory:  Negative for shortness of breath and wheezing.    Cardiovascular:  Negative for chest pain and leg swelling.   Gastrointestinal:  Negative for abdominal pain, nausea and vomiting.   Skin:  Positive for wound.   Neurological:  Negative for dizziness and headaches.   Psychiatric/Behavioral:  The patient is not nervous/anxious.        Objective:        Physical Exam  Vitals and nursing note reviewed.   Constitutional:       Appearance: He is well-developed.   HENT:      Head: Normocephalic and atraumatic.   Eyes:       Conjunctiva/sclera: Conjunctivae normal.   Pulmonary:      Effort: Pulmonary effort is normal.   Abdominal:      General: There is no distension.      Palpations: Abdomen is soft.   Musculoskeletal:      Cervical back: Normal range of motion and neck supple.   Skin:     Comments: See wound description   Neurological:      Mental Status: He is alert and oriented to person, place, and time.   Psychiatric:         Behavior: Behavior normal.       Vitals:    05/31/23 1356   BP: (!) 157/79   Pulse: 83   Temp: 98.1 °F (36.7 °C)       Assessment:           ICD-10-CM ICD-9-CM   1. Sacral decubitus ulcer, stage IV  L89.154 707.03     707.24            Altered Skin Integrity 09/28/22 1136 Right lower Buttocks (Active)   09/28/22 1136   Altered Skin Integrity Present on Admission - Did Patient arrive to the hospital with altered skin?: yes   Side: Right   Orientation: lower   Location: Buttocks   Wound Number:    Is this injury device related?:    Primary Wound Type:    Description of Altered Skin Integrity:    Ankle-Brachial Index:    Pulses:    Removal Indication and Assessment:    Wound Outcome:    (Retired) Wound Length (cm):    (Retired) Wound Width (cm):    (Retired) Depth (cm):    Wound Description (Comments):    Removal Indications:    Wound Image   05/31/23 1413   Description of Altered Skin Integrity Full thickness tissue loss. Subcutaneous fat may be visible but bone, tendon or muscle are not exposed 05/31/23 1413   Dressing Appearance Dry;Intact 05/31/23 1413   Drainage Amount Scant 05/31/23 1413   Drainage Characteristics/Odor No odor 05/31/23 1413   Appearance Red 05/31/23 1413   Tissue loss description Full thickness 05/31/23 1413   Red (%), Wound Tissue Color 100 % 05/31/23 1413   Periwound Area Macerated 05/31/23 1413   Wound Edges Defined 05/31/23 1413   Wound Length (cm) 2.7 cm 05/31/23 1413   Wound Width (cm) 1 cm 05/31/23 1413   Wound Depth (cm) 3.5 cm 05/31/23 1413   Wound Volume (cm^3) 9.45 cm^3 05/31/23  1413   Wound Surface Area (cm^2) 2.7 cm^2 05/31/23 1413   Care Cleansed with:;Antimicrobial agent;Sterile normal saline 05/31/23 1413   Dressing Changed 05/31/23 1413   Dressing Change Due 06/28/23 05/31/23 1413           Plan:          Chemical cauterization with silver nitrate stick x 1 of wound bed done in clinic for treatment of hypergranulation or to decrease biofilm burden. Patient tolerated procedure well.   Recommend off-loading  Increase protein   Continue to use gel cushion.   Keep dressing clean and intact  Continue with wound care orders and plan as noted in orders.   Continue to follow current medication regimen as per pcp   Call for any questions / concerns.       Tissue pathology and/or culture taken     [] Yes      [x] No  chem debridement performed                   [x] Yes       [] No  Labs ordered     [] Yes       [x] No  Imaging ordered    [] Yes      [x] No    Orders Placed This Encounter   Procedures    SUBSEQUENT HOME HEALTH ORDERS     Home health for wound care three times a week on Monday, Wednesday and Friday when not seen in clinic. Patient has appt in [facility] on Wednesday 28th June  Clean with vashe. Gentian violet to periwound. Cavilon to areas to be taped. Apply Caron to wound bed, cover with Aquacel AG. Abd pad or Mextra 5 x 5 over dressing taped with medipore tape.     Order Specific Question:   What Home Health Agency is the patient currently using?     Answer:   [facility]    Change dressing     Home health for wound care three times a week on Monday, Wednesday and Friday when not seen in clinic. atient has appt in [facility] on Wednesday 28th June  Clean with vashe. Gentian violet to periwound. Cavilon to areas to be taped. Apply Caron to wound bed, cover with Aquacel AG. Abd pad or Mextra 5 x 5 over dressing taped with medipore tape.        Follow up in about 1 month (around 6/30/2023).

## 2023-06-02 ENCOUNTER — PES CALL (OUTPATIENT)
Dept: ADMINISTRATIVE | Facility: CLINIC | Age: 38
End: 2023-06-02
Payer: MEDICARE

## 2023-06-12 ENCOUNTER — EXTERNAL HOME HEALTH (OUTPATIENT)
Dept: HOME HEALTH SERVICES | Facility: HOSPITAL | Age: 38
End: 2023-06-12
Payer: MEDICARE

## 2023-06-21 DIAGNOSIS — E11.9 TYPE 2 DIABETES MELLITUS WITHOUT COMPLICATION, UNSPECIFIED WHETHER LONG TERM INSULIN USE: ICD-10-CM

## 2023-06-28 ENCOUNTER — DOCUMENT SCAN (OUTPATIENT)
Dept: HOME HEALTH SERVICES | Facility: HOSPITAL | Age: 38
End: 2023-06-28
Payer: MEDICARE

## 2023-06-28 ENCOUNTER — TELEPHONE (OUTPATIENT)
Dept: WOUND CARE | Facility: HOSPITAL | Age: 38
End: 2023-06-28
Payer: MEDICARE

## 2023-06-28 DIAGNOSIS — L89.154 SACRAL DECUBITUS ULCER, STAGE IV: Primary | ICD-10-CM

## 2023-06-28 NOTE — TELEPHONE ENCOUNTER
Spoke to patient, states he is unable to be seen today. Rescheduled to 07/05.    Patient has HH, faxed order to [facility] to reflect patient was not seen today and added visits until RTC.

## 2023-06-30 ENCOUNTER — EXTERNAL CHRONIC CARE MANAGEMENT (OUTPATIENT)
Dept: PRIMARY CARE CLINIC | Facility: CLINIC | Age: 38
End: 2023-06-30
Payer: MEDICARE

## 2023-06-30 PROCEDURE — 99490 CHRNC CARE MGMT STAFF 1ST 20: CPT | Mod: S$PBB,,, | Performed by: INTERNAL MEDICINE

## 2023-06-30 PROCEDURE — 99490 CHRNC CARE MGMT STAFF 1ST 20: CPT | Mod: PBBFAC | Performed by: INTERNAL MEDICINE

## 2023-06-30 PROCEDURE — 99490 PR CHRONIC CARE MGMT, 1ST 20 MIN: ICD-10-PCS | Mod: S$PBB,,, | Performed by: INTERNAL MEDICINE

## 2023-07-05 ENCOUNTER — HOSPITAL ENCOUNTER (OUTPATIENT)
Dept: WOUND CARE | Facility: HOSPITAL | Age: 38
Discharge: HOME | End: 2023-07-05
Attending: FAMILY MEDICINE
Payer: MEDICARE

## 2023-07-05 VITALS — SYSTOLIC BLOOD PRESSURE: 158 MMHG | HEART RATE: 100 BPM | TEMPERATURE: 97 F | DIASTOLIC BLOOD PRESSURE: 78 MMHG

## 2023-07-05 DIAGNOSIS — L89.154 SACRAL DECUBITUS ULCER, STAGE IV: Primary | ICD-10-CM

## 2023-07-05 PROCEDURE — 99214 OFFICE O/P EST MOD 30 MIN: CPT | Mod: 25,,, | Performed by: FAMILY MEDICINE

## 2023-07-05 PROCEDURE — 99214 PR OFFICE/OUTPT VISIT, EST, LEVL IV, 30-39 MIN: ICD-10-PCS | Mod: 25,,, | Performed by: FAMILY MEDICINE

## 2023-07-05 PROCEDURE — 17250 PR CHEM CAUTERY GRANULATN TISSUE: ICD-10-PCS | Mod: ,,, | Performed by: FAMILY MEDICINE

## 2023-07-05 PROCEDURE — 17250 CHEM CAUT OF GRANLTJ TISSUE: CPT | Mod: ,,, | Performed by: FAMILY MEDICINE

## 2023-07-05 PROCEDURE — 17250 CHEM CAUT OF GRANLTJ TISSUE: CPT | Performed by: FAMILY MEDICINE

## 2023-07-05 NOTE — PROGRESS NOTES
[facility]                Progress Note    Subjective:       Patient ID: Dale Pantoja is a 38 y.o. male.    Chief Complaint: Wound Check    Follow up wound care visit. Patient to exam room in wheelchair with extra cushion on seat, then self-transfers to exam bed with 1-person standby assist. No c/o pain at present. Dressing intact with a moderate amount of serosanguineous, non-malodor drainage. Right Lower Buttock wound is measuring smaller in (0.5 cm) depth.     Wound care done as per order. Return to clinic in 3 weeks.     This is an established patient in wound care.   Patient presents in the office today for evaluation of the chronic wound.  Updated HPI is noted below.    The periwound appears non-erythematous, macerated, non-edematous    The wound appears to have decreased in depth. No odor. Serous drainage. Minimal fibrin and slough     Patient denies fever, chills    Lymphedema status is noncontributory to wound status    Pain at the site of the wound is n/a     Contributing factors to current wound state include numerous comorbid conditions, off-loading limitations    Review of Systems   Constitutional:  Negative for activity change, appetite change and fever.   HENT:  Negative for congestion.    Respiratory:  Negative for shortness of breath and wheezing.    Cardiovascular:  Negative for chest pain and leg swelling.   Gastrointestinal:  Negative for abdominal pain, nausea and vomiting.   Skin:  Positive for wound.   Neurological:  Negative for dizziness and headaches.   Psychiatric/Behavioral:  The patient is not nervous/anxious.        Objective:        Physical Exam  Vitals and nursing note reviewed.   Constitutional:       Appearance: He is well-developed.   HENT:      Head: Normocephalic and atraumatic.   Eyes:      Conjunctiva/sclera: Conjunctivae normal.   Pulmonary:      Effort: Pulmonary effort is normal.   Abdominal:      General: There is no  distension.      Palpations: Abdomen is soft.   Musculoskeletal:      Cervical back: Normal range of motion and neck supple.   Skin:     Comments: See wound description   Neurological:      Mental Status: He is alert and oriented to person, place, and time.   Psychiatric:         Behavior: Behavior normal.       Vitals:    07/05/23 1416   BP: (!) 158/78   Pulse: 100   Temp: 97.2 °F (36.2 °C)       Assessment:           ICD-10-CM ICD-9-CM   1. Sacral decubitus ulcer, stage IV  L89.154 707.03     707.24            Altered Skin Integrity 09/28/22 1136 Right lower Buttocks (Active)   09/28/22 1136   Altered Skin Integrity Present on Admission - Did Patient arrive to the hospital with altered skin?: yes   Side: Right   Orientation: lower   Location: Buttocks   Wound Number:    Is this injury device related?:    Primary Wound Type:    Description of Altered Skin Integrity:    Ankle-Brachial Index:    Pulses:    Removal Indication and Assessment:    Wound Outcome:    (Retired) Wound Length (cm):    (Retired) Wound Width (cm):    (Retired) Depth (cm):    Wound Description (Comments):    Removal Indications:    Wound Image    07/05/23 1711   Dressing Appearance Intact;Moist drainage 07/05/23 1711   Drainage Amount Moderate 07/05/23 1711   Drainage Characteristics/Odor Serosanguineous;No odor 07/05/23 1711   Appearance Red;Moist 07/05/23 1711   Black (%), Wound Tissue Color 0 % 07/05/23 1711   Red (%), Wound Tissue Color 100 % 07/05/23 1711   Yellow (%), Wound Tissue Color 0 % 07/05/23 1711   Periwound Area Pale white 07/05/23 1711   Wound Edges Other (see comments) 07/05/23 1711   Wound Length (cm) 3 cm 07/05/23 1711   Wound Width (cm) 1.5 cm 07/05/23 1711   Wound Depth (cm) 3 cm 07/05/23 1711   Wound Volume (cm^3) 13.5 cm^3 07/05/23 1711   Wound Surface Area (cm^2) 4.5 cm^2 07/05/23 1711   Tunneling (depth (cm)/location) 0 07/05/23 1711   Undermining (depth (cm)/location) 0 07/05/23 1711   Care Cleansed with:;Antimicrobial  "agent;Sterile normal saline;Wound cleanser 07/05/23 1711   Dressing Changed 07/05/23 1711   Periwound Care Moisture barrier applied;Skin barrier film applied 07/05/23 1711   Dressing Change Due 07/12/23 07/05/23 1711           Plan:            Chemical cauterization with silver nitrate stick x 1 of wound bed done in clinic for treatment of hypergranulation or to decrease biofilm burden. Patient tolerated procedure well.    Recommend off-loading   Recommend gel pad   Increase protein    Keep dressing clean and intact  Continue with wound care orders and plan as noted in orders.   Continue to follow current medication regimen as per pcp   Call for any questions / concerns.     Tissue pathology and/or culture taken     [] Yes      [x] No  Chem debridement performed                   [x] Yes       [] No  Labs ordered     [] Yes       [x] No  Imaging ordered    [] Yes      [x] No    Orders Placed This Encounter   Procedures    Change dressing     Wound Dressing Orders    Dressing change frequency three times a week  Remove old dressing  Cleanse or irrigate with: Normal Saline  Protect periwound with:  Gentian Violet hugging wound bed, Cavilon to area that will be taped  Primary dressing - adjust to fit: WOUND BASE: Promogran Caron then Aquacel Ag to fill deficit  Secondary dressing: Mextra: size used: 5"x5" secured with Medipore Tape (in window-pane fashion)    SUBSEQUENT HOME HEALTH ORDERS     Home Health for Wound Care. Visit Monday, Wednesday, Friday when not in clinic. Next [facility] visit is 07/26.    Wound Dressing Orders    Dressing change frequency three times a week  Remove old dressing  Cleanse or irrigate with: Normal Saline  Protect periwound with:  Gentian Violet hugging wound bed, Cavilon to area that will be taped  Primary dressing - adjust to fit: WOUND BASE: Promogran Caron then Aquacel Ag to fill deficit  Secondary dressing: Mextra: size used: 5"x5" secured with Medipore Tape (in window-pane fashion)     " Order Specific Question:   What Home Health Agency is the patient currently using?     Answer:   [facility]        Follow up in about 3 weeks (around 7/26/2023) for wound care.

## 2023-07-06 ENCOUNTER — DOCUMENT SCAN (OUTPATIENT)
Dept: HOME HEALTH SERVICES | Facility: HOSPITAL | Age: 38
End: 2023-07-06
Payer: MEDICARE

## 2023-07-10 ENCOUNTER — LAB VISIT (OUTPATIENT)
Dept: LAB | Facility: HOSPITAL | Age: 38
End: 2023-07-10
Attending: INTERNAL MEDICINE
Payer: MEDICARE

## 2023-07-10 DIAGNOSIS — E11.65 TYPE 2 DIABETES MELLITUS WITH HYPERGLYCEMIA, WITHOUT LONG-TERM CURRENT USE OF INSULIN: ICD-10-CM

## 2023-07-10 LAB
ANION GAP SERPL CALC-SCNC: 10 MMOL/L (ref 8–16)
BUN SERPL-MCNC: 10 MG/DL (ref 6–20)
CALCIUM SERPL-MCNC: 8.6 MG/DL (ref 8.7–10.5)
CHLORIDE SERPL-SCNC: 101 MMOL/L (ref 95–110)
CO2 SERPL-SCNC: 24 MMOL/L (ref 23–29)
CREAT SERPL-MCNC: 0.8 MG/DL (ref 0.5–1.4)
EST. GFR  (NO RACE VARIABLE): >60 ML/MIN/1.73 M^2
ESTIMATED AVG GLUCOSE: 146 MG/DL (ref 68–131)
GLUCOSE SERPL-MCNC: 60 MG/DL (ref 70–110)
HBA1C MFR BLD: 6.7 % (ref 4–5.6)
POTASSIUM SERPL-SCNC: 3.1 MMOL/L (ref 3.5–5.1)
SODIUM SERPL-SCNC: 135 MMOL/L (ref 136–145)

## 2023-07-10 PROCEDURE — 80048 BASIC METABOLIC PNL TOTAL CA: CPT | Performed by: INTERNAL MEDICINE

## 2023-07-10 PROCEDURE — 83036 HEMOGLOBIN GLYCOSYLATED A1C: CPT | Performed by: INTERNAL MEDICINE

## 2023-07-10 PROCEDURE — 36415 COLL VENOUS BLD VENIPUNCTURE: CPT | Mod: PO | Performed by: INTERNAL MEDICINE

## 2023-07-12 ENCOUNTER — DOCUMENT SCAN (OUTPATIENT)
Dept: HOME HEALTH SERVICES | Facility: HOSPITAL | Age: 38
End: 2023-07-12
Payer: MEDICARE

## 2023-07-18 ENCOUNTER — CLINICAL SUPPORT (OUTPATIENT)
Dept: OPTOMETRY | Facility: CLINIC | Age: 38
End: 2023-07-18
Attending: INTERNAL MEDICINE
Payer: MEDICARE

## 2023-07-18 ENCOUNTER — OFFICE VISIT (OUTPATIENT)
Dept: INTERNAL MEDICINE | Facility: CLINIC | Age: 38
End: 2023-07-18
Payer: MEDICARE

## 2023-07-18 VITALS
SYSTOLIC BLOOD PRESSURE: 134 MMHG | OXYGEN SATURATION: 98 % | HEART RATE: 86 BPM | DIASTOLIC BLOOD PRESSURE: 84 MMHG | BODY MASS INDEX: 27.73 KG/M2 | HEIGHT: 63 IN

## 2023-07-18 DIAGNOSIS — I10 HTN (HYPERTENSION), BENIGN: ICD-10-CM

## 2023-07-18 DIAGNOSIS — E11.65 TYPE 2 DIABETES MELLITUS WITH HYPERGLYCEMIA, WITHOUT LONG-TERM CURRENT USE OF INSULIN: ICD-10-CM

## 2023-07-18 DIAGNOSIS — Z99.3 DEPENDENT ON WHEELCHAIR: ICD-10-CM

## 2023-07-18 DIAGNOSIS — E78.5 DYSLIPIDEMIA: Primary | ICD-10-CM

## 2023-07-18 PROCEDURE — 99213 OFFICE O/P EST LOW 20 MIN: CPT | Mod: PBBFAC | Performed by: INTERNAL MEDICINE

## 2023-07-18 PROCEDURE — 99999 PR PBB SHADOW E&M-EST. PATIENT-LVL III: ICD-10-PCS | Mod: PBBFAC,,, | Performed by: INTERNAL MEDICINE

## 2023-07-18 PROCEDURE — 92228 DIABETIC EYE SCREENING PHOTO: ICD-10-PCS | Mod: 26,S$PBB,, | Performed by: OPTOMETRIST

## 2023-07-18 PROCEDURE — 92228 IMG RTA DETC/MNTR DS PHY/QHP: CPT | Mod: 59,PBBFAC

## 2023-07-18 PROCEDURE — 99999 PR PBB SHADOW E&M-EST. PATIENT-LVL III: CPT | Mod: PBBFAC,,, | Performed by: INTERNAL MEDICINE

## 2023-07-18 PROCEDURE — 92228 IMG RTA DETC/MNTR DS PHY/QHP: CPT | Mod: 26,S$PBB,, | Performed by: OPTOMETRIST

## 2023-07-18 PROCEDURE — 99214 OFFICE O/P EST MOD 30 MIN: CPT | Mod: S$PBB,,, | Performed by: INTERNAL MEDICINE

## 2023-07-18 PROCEDURE — 99214 PR OFFICE/OUTPT VISIT, EST, LEVL IV, 30-39 MIN: ICD-10-PCS | Mod: S$PBB,,, | Performed by: INTERNAL MEDICINE

## 2023-07-18 NOTE — PROGRESS NOTES
Dale Pantoja is a 38 y.o. male here for a diabetic eye screening with non-dilated fundus photos per Dr. Adorno.    Patient cooperative?: Yes  Small pupils?: Yes  Last eye exam: 5.24.22    For exam results, see Encounter Report.  Yanni Bauman RN HC

## 2023-07-18 NOTE — PROGRESS NOTES
"  Dale Pantoja is a 38 y.o. male with HTN, paraplegia, and neurogenic bladder who presents to clinic for follow-up for his recently diagnosed T2DM.         HTN: BP slightly elevated in clinic today 160/100.. Patient reports normal average readings at home. Remains on amlodipine.  He is taking a mid-range dose of valsartan     DM: Pt reports that he is taking Metformin, januvia and actos.  ,  . His A1c is  6.7, down from 8.3 previously.      Neurogenic bladder   Continuing with intermittent self-catheterizations. No problems with incontinence while on Ditropan.      Review of Systems   Constitutional: Negative for activity change, appetite change, fatigue and fever.   Eyes: Negative for redness and itching.   Respiratory: Negative for cough, chest tightness and shortness of breath.    Cardiovascular: Negative for chest pain, palpitations and leg swelling.   Gastrointestinal: Negative for blood in stool, change in bowel habit, nausea, reflux and change in bowel habit.   Genitourinary: Negative for bladder incontinence, flank pain and urgency.   Musculoskeletal: Negative for arthralgias and myalgias.   Neurological: Negative for dizziness, syncope, speech difficulty and headaches.   Psychiatric/Behavioral: Negative.           Objective:   Physical Exam     /84   Pulse 86   Ht 5' 3" (1.6 m)   SpO2 98%   BMI 27.73 kg/m²        Constitutional:       General: He is not in acute distress.     Appearance: Normal appearance.      Comments: Sitting comfortably in wheelchair   HENT:      Head: Normocephalic and atraumatic.   Eyes:      Pupils: Pupils are equal, round, and reactive to light.   Neck:      Comments:  shunt in place on right front neck under skin  Cardiovascular:      Rate and Rhythm: Normal rate and regular rhythm.      Pulses: Normal pulses.      Heart sounds: Normal heart sounds.   Pulmonary:      Effort: Pulmonary effort is normal. No respiratory distress.      Breath sounds: Normal breath sounds. " No wheezing or rhonchi.   Abdominal:      General: Abdomen is flat. Bowel sounds are normal.      Palpations: Abdomen is soft.   Skin:     General: Skin is warm and dry.      Capillary Refill: Capillary refill takes less than 2 seconds.   Neurological:      Mental Status: He is alert and oriented to person, place, and time. Mental status is at baseline.   Psychiatric:         Mood and Affect: Mood normal.         Behavior: Behavior normal.   Right foot in boot.          Assessment and plan:   doing better since starting   valsartan to 320 mg a day for htn  and Januvia 100 mg a day for his diabetes For his paraplegia we will follow this and he will let me know if he needs anything for his wheelchair.  Also continue get his catheters for self-catheterizations.     HTN (hypertension), benign-- better on high dose diovan       Dyslipidemia     Type 2 diabetes mellitus with hyperglycemia, without long-term current use of insulin  -     Comprehensive Metabolic Panel; Future;    -     Hemoglobin A1C; Future;        Followup in 5 months

## 2023-07-26 ENCOUNTER — HOSPITAL ENCOUNTER (OUTPATIENT)
Dept: WOUND CARE | Facility: HOSPITAL | Age: 38
Discharge: HOME | End: 2023-07-26
Attending: FAMILY MEDICINE
Payer: MEDICARE

## 2023-07-26 VITALS — DIASTOLIC BLOOD PRESSURE: 84 MMHG | HEART RATE: 86 BPM | SYSTOLIC BLOOD PRESSURE: 137 MMHG | TEMPERATURE: 98 F

## 2023-07-26 DIAGNOSIS — L89.154 SACRAL DECUBITUS ULCER, STAGE IV: Primary | ICD-10-CM

## 2023-07-26 PROCEDURE — G0179 MD RECERTIFICATION HHA PT: HCPCS | Mod: ,,, | Performed by: FAMILY MEDICINE

## 2023-07-26 PROCEDURE — 99214 PR OFFICE/OUTPT VISIT, EST, LEVL IV, 30-39 MIN: ICD-10-PCS | Mod: 25,,, | Performed by: FAMILY MEDICINE

## 2023-07-26 PROCEDURE — 99214 OFFICE O/P EST MOD 30 MIN: CPT | Mod: 25,,, | Performed by: FAMILY MEDICINE

## 2023-07-26 PROCEDURE — 17250 CHEM CAUT OF GRANLTJ TISSUE: CPT | Performed by: FAMILY MEDICINE

## 2023-07-26 PROCEDURE — G0179 PR HOME HEALTH MD RECERTIFICATION: ICD-10-PCS | Mod: ,,, | Performed by: FAMILY MEDICINE

## 2023-07-26 NOTE — PROGRESS NOTES
[facility]                Progress Note    Subjective:       Patient ID: Dale Pantoja is a 38 y.o. male.    Chief Complaint: Wound Check    Follow up wound care visit. Patient to exam room in wheelchair with extra cushion on seat, then self-transfers to exam bed with 1-person standby assist. No c/o pain to wound bed at present. Dressing intact with a large amount of serous, non-malodor drainage. Right Lower Buttock wound is measuring smaller in (0.2 cm) length, in (0.3 cm) width and in (0.6 cm) depth. Patient reports Home Health came out as ordered.      Wound care done as per order. Return to clinic in 4 weeks. Declines AVS, has MyChart.      This is an established patient in wound care.   Patient presents in the office today for evaluation of the chronic wound.  Updated HPI is noted below.    The periwound appears non-erythematous, macerated, non-edematous    The wound appears to have improve in size. Good granulation tissue. No odor.     Patient denies fever, chills    Patients reports wound pain    Lymphedema status is contributory to wound status    Pain at the site of the wound is aching and throbbing    Contributing factors to current wound state include numerous comorbid conditions, Lymphedema    Review of Systems   Constitutional:  Negative for activity change, appetite change and fever.   HENT:  Negative for congestion.    Respiratory:  Negative for shortness of breath and wheezing.    Cardiovascular:  Negative for chest pain and leg swelling.   Gastrointestinal:  Negative for abdominal pain, nausea and vomiting.   Skin:  Positive for wound.   Neurological:  Negative for dizziness and headaches.   Psychiatric/Behavioral:  The patient is not nervous/anxious.        Objective:        Physical Exam  Vitals and nursing note reviewed.   Constitutional:       Appearance: He is well-developed.   HENT:      Head: Normocephalic and atraumatic.   Eyes:       Conjunctiva/sclera: Conjunctivae normal.   Pulmonary:      Effort: Pulmonary effort is normal.   Abdominal:      General: There is no distension.      Palpations: Abdomen is soft.   Musculoskeletal:      Cervical back: Normal range of motion and neck supple.   Skin:     Comments: See wound description   Neurological:      Mental Status: He is alert and oriented to person, place, and time.   Psychiatric:         Behavior: Behavior normal.       Vitals:    07/26/23 1412   BP: 137/84   Pulse: 86   Temp: 98.3 °F (36.8 °C)       Assessment:           ICD-10-CM ICD-9-CM   1. Sacral decubitus ulcer, stage IV  L89.154 707.03     707.24            Altered Skin Integrity 09/28/22 1136 Right lower Buttocks (Active)   09/28/22 1136   Altered Skin Integrity Present on Admission - Did Patient arrive to the hospital with altered skin?: yes   Side: Right   Orientation: lower   Location: Buttocks   Wound Number:    Is this injury device related?:    Primary Wound Type:    Description of Altered Skin Integrity:    Ankle-Brachial Index:    Pulses:    Removal Indication and Assessment:    Wound Outcome:    (Retired) Wound Length (cm):    (Retired) Wound Width (cm):    (Retired) Depth (cm):    Wound Description (Comments):    Removal Indications:    Wound Image     07/26/23 1443   Dressing Appearance Intact;Moist drainage 07/26/23 1443   Drainage Amount Large 07/26/23 1443   Drainage Characteristics/Odor Serous 07/26/23 1443   Appearance Red;Moist 07/26/23 1443   Tissue loss description Full thickness 07/26/23 1443   Black (%), Wound Tissue Color 0 % 07/26/23 1443   Red (%), Wound Tissue Color 100 % 07/26/23 1443   Yellow (%), Wound Tissue Color 0 % 07/26/23 1443   Periwound Area Pale white 07/26/23 1443   Wound Edges Other (see comments) 07/26/23 1443   Wound Length (cm) 2.8 cm 07/26/23 1443   Wound Width (cm) 1.2 cm 07/26/23 1443   Wound Depth (cm) 2.4 cm 07/26/23 1443   Wound Volume (cm^3) 8.064 cm^3 07/26/23 1443   Wound Surface  "Area (cm^2) 3.36 cm^2 07/26/23 1443   Tunneling (depth (cm)/location) 0 07/26/23 1443   Undermining (depth (cm)/location) 0 07/26/23 1443   Care Cleansed with:;Antimicrobial agent;Sterile normal saline 07/26/23 1443   Dressing Changed 07/26/23 1443   Periwound Care Skin barrier film applied 07/26/23 1443   Dressing Change Due 08/23/23 07/26/23 1443           Plan:            Chemical cauterization with silver nitrate stick x 1 of wound bed done in clinic for treatment of hypergranulation or to decrease biofilm burden. Patient tolerated procedure well.    Recommend off-loading   Continue gel pad   No culture or imaging ordered   Recommend increase protein    Keep dressing clean and intact  Continue with wound care orders and plan as noted in orders.   Continue to follow current medication regimen as per pcp   Call for any questions / concerns.       Orders Placed This Encounter   Procedures    SUBSEQUENT HOME HEALTH ORDERS     Home Health for Wound Care. Visit Monday, Wednesday, Friday when not in clinic. Next [facility] visit is 08/23.     Wound Dressing Orders     Dressing change frequency three times a week   Remove old dressing   Cleanse or irrigate with: Normal Saline   Protect periwound with:  Gentian Violet hugging wound bed, Cavilon to area that will be taped   Primary dressing: WOUND BASE: Promogran Caron then Aquacel Ag to fill deficit   Secondary dressing: Mextra: size used: 5"x5" secured with Medipore Tape (in window-pane fashion)     Order Specific Question:   What Home Health Agency is the patient currently using?     Answer:   [facility]    Change dressing     Wound Dressing Orders     Dressing change frequency three times a week   Remove old dressing   Cleanse or irrigate with: Normal Saline   Protect periwound with:  Gentian Violet hugging wound bed, Cavilon to area that will be taped   Primary dressing: WOUND BASE: Promogran Caron then Aquacel Ag to fill deficit   Secondary dressing: Mextra: " "size used: 5"x5" secured with Medipore Tape (in window-pane fashion)    Home Health will change Monday, Wednesday, Friday between visits. Return to clinic in 4 weeks.        Follow up in about 4 weeks (around 8/23/2023) for wound care.       "

## 2023-07-31 ENCOUNTER — EXTERNAL CHRONIC CARE MANAGEMENT (OUTPATIENT)
Dept: PRIMARY CARE CLINIC | Facility: CLINIC | Age: 38
End: 2023-07-31
Payer: MEDICARE

## 2023-07-31 PROCEDURE — 99490 CHRNC CARE MGMT STAFF 1ST 20: CPT | Mod: S$PBB,,, | Performed by: INTERNAL MEDICINE

## 2023-07-31 PROCEDURE — 99490 PR CHRONIC CARE MGMT, 1ST 20 MIN: ICD-10-PCS | Mod: S$PBB,,, | Performed by: INTERNAL MEDICINE

## 2023-07-31 PROCEDURE — 99490 CHRNC CARE MGMT STAFF 1ST 20: CPT | Mod: PBBFAC | Performed by: INTERNAL MEDICINE

## 2023-08-01 ENCOUNTER — PES CALL (OUTPATIENT)
Dept: ADMINISTRATIVE | Facility: CLINIC | Age: 38
End: 2023-08-01
Payer: MEDICARE

## 2023-08-10 ENCOUNTER — EXTERNAL HOME HEALTH (OUTPATIENT)
Dept: HOME HEALTH SERVICES | Facility: HOSPITAL | Age: 38
End: 2023-08-10
Payer: MEDICARE

## 2023-08-31 ENCOUNTER — EXTERNAL CHRONIC CARE MANAGEMENT (OUTPATIENT)
Dept: PRIMARY CARE CLINIC | Facility: CLINIC | Age: 38
End: 2023-08-31
Payer: MEDICARE

## 2023-08-31 PROCEDURE — 99490 CHRNC CARE MGMT STAFF 1ST 20: CPT | Mod: PBBFAC | Performed by: INTERNAL MEDICINE

## 2023-08-31 PROCEDURE — 99490 CHRNC CARE MGMT STAFF 1ST 20: CPT | Mod: S$PBB,,, | Performed by: INTERNAL MEDICINE

## 2023-08-31 PROCEDURE — 99490 PR CHRONIC CARE MGMT, 1ST 20 MIN: ICD-10-PCS | Mod: S$PBB,,, | Performed by: INTERNAL MEDICINE

## 2023-09-13 ENCOUNTER — PATIENT MESSAGE (OUTPATIENT)
Dept: WOUND CARE | Facility: HOSPITAL | Age: 38
End: 2023-09-13
Payer: MEDICARE

## 2023-09-13 ENCOUNTER — HOSPITAL ENCOUNTER (OUTPATIENT)
Dept: WOUND CARE | Facility: HOSPITAL | Age: 38
Discharge: HOME | End: 2023-09-13
Attending: FAMILY MEDICINE
Payer: MEDICARE

## 2023-09-13 VITALS — TEMPERATURE: 98 F | SYSTOLIC BLOOD PRESSURE: 148 MMHG | DIASTOLIC BLOOD PRESSURE: 98 MMHG | HEART RATE: 99 BPM

## 2023-09-13 DIAGNOSIS — L89.154 SACRAL DECUBITUS ULCER, STAGE IV: Primary | ICD-10-CM

## 2023-09-13 PROCEDURE — 99214 OFFICE O/P EST MOD 30 MIN: CPT | Mod: 25,,, | Performed by: FAMILY MEDICINE

## 2023-09-13 PROCEDURE — 17250 PR CHEM CAUTERY GRANULATN TISSUE: ICD-10-PCS | Mod: ,,, | Performed by: FAMILY MEDICINE

## 2023-09-13 PROCEDURE — 17250 CHEM CAUT OF GRANLTJ TISSUE: CPT | Performed by: FAMILY MEDICINE

## 2023-09-13 PROCEDURE — 99214 PR OFFICE/OUTPT VISIT, EST, LEVL IV, 30-39 MIN: ICD-10-PCS | Mod: 25,,, | Performed by: FAMILY MEDICINE

## 2023-09-13 PROCEDURE — 17250 CHEM CAUT OF GRANLTJ TISSUE: CPT | Mod: ,,, | Performed by: FAMILY MEDICINE

## 2023-09-13 NOTE — PROGRESS NOTES
"[facility]                Progress Note    Subjective:       Patient ID: Dale Pantoja is a 38 y.o. male.    Chief Complaint: Wound Check and Dressing Change    Follow up wound care visit. Patient to exam room in wheelchair with extra cushion on seat, then self-transfers to exam bed with 1-person standby assist. No c/o pain to wound bed at present. Dressing intact. Right Lower Buttock wound is measuring smaller in (0.2 cm) length, and in (0.4 cm) depth. Patient reports Home Health came out as ordered, but he has questions regarding supplies. Sent [employer] link for patient to send photos of supplies received for MD to assess.     B/P highly elevated 200/107 with auto. Patient denies headaches, or blurred vision, but does report "feeling tired all of a sudden". Repeated B/P with manual cuff - 166/110 after allowing to rest for 20 minutes, gave three 8 oz cups of water, allowed to rest again. B/P repeated again 15 minutes later with manual cuff 148/98. Patient advised to follow up with PCP if elevated B/P's occur again, otherwise continue to hydrate today. He verbalized understanding. Wound care done as per order. Return to clinic in 4 weeks. Declines AVS, has MyChart.        This is an established patient in wound care.   Patient presents in the office today for evaluation of the chronic wound.  Updated HPI is noted below.    The periwound appears non-erythematous, macerated, non-edematous    The wound appears to have decreased in size. Good granulation tissue; serous drainage. No odor.     Patient denies fever, chills    Lymphedema status is noncontributory to wound status    Pain at the site of the wound is n/a     Contributing factors to current wound state include off-loading limitations    Review of Systems   Constitutional:  Negative for activity change, appetite change and fever.   HENT:  Negative for congestion.    Respiratory:  Negative for shortness of breath and " wheezing.    Cardiovascular:  Negative for chest pain and leg swelling.   Gastrointestinal:  Negative for abdominal pain, nausea and vomiting.   Skin:  Positive for wound.   Neurological:  Negative for dizziness and headaches.   Psychiatric/Behavioral:  The patient is not nervous/anxious.          Objective:        Physical Exam  Vitals and nursing note reviewed.   Constitutional:       Appearance: He is well-developed.   HENT:      Head: Normocephalic and atraumatic.   Eyes:      Conjunctiva/sclera: Conjunctivae normal.   Pulmonary:      Effort: Pulmonary effort is normal.   Abdominal:      General: There is no distension.      Palpations: Abdomen is soft.   Musculoskeletal:      Cervical back: Normal range of motion and neck supple.   Skin:     Comments: See wound description   Neurological:      Mental Status: He is alert and oriented to person, place, and time.   Psychiatric:         Behavior: Behavior normal.         Vitals:    09/13/23 1435   BP: (!) 148/98   Pulse:    Temp:        Assessment:           ICD-10-CM ICD-9-CM   1. Sacral decubitus ulcer, stage IV  L89.154 707.03     707.24            Altered Skin Integrity 09/28/22 1136 Right lower Buttocks (Active)   09/28/22 1136   Altered Skin Integrity Present on Admission - Did Patient arrive to the hospital with altered skin?: yes   Side: Right   Orientation: lower   Location: Buttocks   Wound Number:    Is this injury device related?:    Primary Wound Type:    Description of Altered Skin Integrity:    Ankle-Brachial Index:    Pulses:    Removal Indication and Assessment:    Wound Outcome:    (Retired) Wound Length (cm):    (Retired) Wound Width (cm):    (Retired) Depth (cm):    Wound Description (Comments):    Removal Indications:    Wound Image    09/13/23 1437   Dressing Appearance Intact;Moist drainage 09/13/23 1437   Drainage Amount Moderate 09/13/23 1437   Drainage Characteristics/Odor Serosanguineous;Creamy;No odor 09/13/23 1437   Appearance Red;Moist  "09/13/23 1437   Tissue loss description Full thickness 09/13/23 1437   Black (%), Wound Tissue Color 0 % 09/13/23 1437   Red (%), Wound Tissue Color 100 % 09/13/23 1437   Yellow (%), Wound Tissue Color 0 % 09/13/23 1437   Periwound Area Pale white;Macerated 09/13/23 1437   Wound Edges Other (see comments) 09/13/23 1437   Wound Length (cm) 2.6 cm 09/13/23 1437   Wound Width (cm) 1.2 cm 09/13/23 1437   Wound Depth (cm) 2 cm 09/13/23 1437   Wound Volume (cm^3) 6.24 cm^3 09/13/23 1437   Wound Surface Area (cm^2) 3.12 cm^2 09/13/23 1437   Tunneling (depth (cm)/location) 0 09/13/23 1437   Undermining (depth (cm)/location) 0 09/13/23 1437   Care Cleansed with:;Antimicrobial agent;Sterile normal saline 09/13/23 1437   Dressing Changed 09/13/23 1437   Periwound Care Skin barrier film applied;Absorptive dressing applied 09/13/23 1437   Dressing Change Due 10/11/23 09/13/23 1437           Plan:            Chemical cauterization with silver nitrate stick x 1 of wound bed done in clinic for treatment of hypergranulation or to decrease biofilm burden. Patient tolerated procedure well.   Recommend off-loading   Increase protein   Recommend 3 inch gel cushion    Keep dressing clean and intact  Discussed increase protein intake   Discussed wound expectations and plan   Continue with wound care orders and plan as noted in orders.   Continue to follow current medication regimen as per pcp   Call for any questions / concerns.         Orders Placed This Encounter   Procedures    Change dressing     Wound Dressing Orders     Dressing change frequency three times a week   Remove old dressing   Cleanse or irrigate with: Normal Saline   Protect periwound with:  Cavilon   Primary dressing: WOUND BASE: Caron then Aquacel Ag to fill deficit   Secondary dressing: Mextra: size used: 5"x5" secured with Medipore Tape (in window-pane fashion)     Home Health will change Monday, Wednesday, Friday between visits. Return to clinic in 4 weeks.    " "SUBSEQUENT HOME HEALTH ORDERS     Home Health for Wound Care. Visit Monday, Wednesday, Friday when not in clinic. Next [facility] visit is 10/11/2023.     Wound Dressing Orders     Dressing change frequency three times a week   Remove old dressing   Cleanse or irrigate with: Normal Saline   Protect periwound with:  Gentian Violet hugging wound bed, Cavilon to area that will be taped   Primary dressing: WOUND BASE: Promogran Caron then Aquacel Ag to fill deficit   Secondary dressing: Mextra: size used: 5"x5" secured with Medipore Tape (in window-pane fashion)     Order Specific Question:   What Home Health Agency is the patient currently using?     Answer:   [facility]        Follow up in about 4 weeks (around 10/11/2023).       "

## 2023-09-24 PROCEDURE — G0179 PR HOME HEALTH MD RECERTIFICATION: ICD-10-PCS | Mod: ,,, | Performed by: FAMILY MEDICINE

## 2023-09-24 PROCEDURE — G0179 MD RECERTIFICATION HHA PT: HCPCS | Mod: ,,, | Performed by: FAMILY MEDICINE

## 2023-09-26 ENCOUNTER — DOCUMENT SCAN (OUTPATIENT)
Dept: HOME HEALTH SERVICES | Facility: HOSPITAL | Age: 38
End: 2023-09-26
Payer: MEDICARE

## 2023-09-30 ENCOUNTER — EXTERNAL CHRONIC CARE MANAGEMENT (OUTPATIENT)
Dept: PRIMARY CARE CLINIC | Facility: CLINIC | Age: 38
End: 2023-09-30
Payer: MEDICARE

## 2023-09-30 PROCEDURE — 99490 CHRNC CARE MGMT STAFF 1ST 20: CPT | Mod: S$PBB,,, | Performed by: INTERNAL MEDICINE

## 2023-09-30 PROCEDURE — 99490 CHRNC CARE MGMT STAFF 1ST 20: CPT | Mod: PBBFAC | Performed by: INTERNAL MEDICINE

## 2023-09-30 PROCEDURE — 99490 PR CHRONIC CARE MGMT, 1ST 20 MIN: ICD-10-PCS | Mod: S$PBB,,, | Performed by: INTERNAL MEDICINE

## 2023-10-04 ENCOUNTER — DOCUMENT SCAN (OUTPATIENT)
Dept: HOME HEALTH SERVICES | Facility: HOSPITAL | Age: 38
End: 2023-10-04
Payer: MEDICARE

## 2023-10-06 ENCOUNTER — EXTERNAL HOME HEALTH (OUTPATIENT)
Dept: HOME HEALTH SERVICES | Facility: HOSPITAL | Age: 38
End: 2023-10-06
Payer: MEDICARE

## 2023-10-11 ENCOUNTER — HOSPITAL ENCOUNTER (OUTPATIENT)
Dept: WOUND CARE | Facility: HOSPITAL | Age: 38
Discharge: HOME | End: 2023-10-11
Attending: FAMILY MEDICINE
Payer: MEDICARE

## 2023-10-11 VITALS — HEART RATE: 90 BPM | TEMPERATURE: 98 F | SYSTOLIC BLOOD PRESSURE: 165 MMHG | DIASTOLIC BLOOD PRESSURE: 85 MMHG

## 2023-10-11 DIAGNOSIS — L89.154 SACRAL DECUBITUS ULCER, STAGE IV: Primary | ICD-10-CM

## 2023-10-11 PROCEDURE — 99214 OFFICE O/P EST MOD 30 MIN: CPT | Mod: ,,, | Performed by: FAMILY MEDICINE

## 2023-10-11 PROCEDURE — 99214 PR OFFICE/OUTPT VISIT, EST, LEVL IV, 30-39 MIN: ICD-10-PCS | Mod: ,,, | Performed by: FAMILY MEDICINE

## 2023-10-11 PROCEDURE — 99213 OFFICE O/P EST LOW 20 MIN: CPT | Performed by: FAMILY MEDICINE

## 2023-10-11 NOTE — PROGRESS NOTES
[facility]                Progress Note    Subjective:       Patient ID: Dale Pantoja is a 38 y.o. male.    Chief Complaint: Wound Care    Follow up wound care visit. Patient to exam room in wheelchair with extra cushion on seat, then self-transfers to exam bed with 1-person standby assist. No c/o pain to wound bed at present. Dressing intact. Right Lower Buttock wound is measuring smaller in (0.2 cm) length, and in (0.2 cm) depth. Patient reports Home Health came out as ordered.    No change to order. Wound care done as per order. Return to clinic in 4 weeks. Declines AVS, has MyChart.      This is an established patient in wound care.   Patient presents in the office today for evaluation of the chronic wound.  Updated HPI is noted below.    The periwound appears non-erythematous, macerated, non-edematous    The wound appears stable; fat layer exposed. Good granulation tissue. No odor. Serous drainage.     Patient denies fever, chills    Patient reports he has been playing wheelchair football and basketball.     Lymphedema status is noncontributory to wound status    Pain at the site of the wound is n./a     Contributing factors to current wound state include off-loading limitations    Review of Systems   Constitutional:  Negative for activity change, appetite change and fever.   HENT:  Negative for congestion.    Respiratory:  Negative for shortness of breath and wheezing.    Cardiovascular:  Negative for chest pain and leg swelling.   Gastrointestinal:  Negative for abdominal pain, nausea and vomiting.   Skin:  Positive for wound.   Neurological:  Negative for dizziness and headaches.   Psychiatric/Behavioral:  The patient is not nervous/anxious.          Objective:        Physical Exam  Vitals and nursing note reviewed.   Constitutional:       Appearance: He is well-developed.   HENT:      Head: Normocephalic and atraumatic.   Eyes:      Conjunctiva/sclera:  Conjunctivae normal.   Pulmonary:      Effort: Pulmonary effort is normal.   Abdominal:      General: There is no distension.      Palpations: Abdomen is soft.   Musculoskeletal:         General: Normal range of motion.      Cervical back: Normal range of motion and neck supple.   Skin:     Comments: See wound description   Neurological:      Mental Status: He is alert and oriented to person, place, and time.   Psychiatric:         Behavior: Behavior normal.         Vitals:    10/11/23 1113   BP: (!) 165/85   Pulse: 90   Temp: 97.9 °F (36.6 °C)       Assessment:           ICD-10-CM ICD-9-CM   1. Sacral decubitus ulcer, stage IV  L89.154 707.03     707.24            Altered Skin Integrity 09/28/22 1136 Right lower Buttocks (Active)   09/28/22 1136   Altered Skin Integrity Present on Admission - Did Patient arrive to the hospital with altered skin?: yes   Side: Right   Orientation: lower   Location: Buttocks   Wound Number:    Is this injury device related?:    Primary Wound Type:    Description of Altered Skin Integrity:    Ankle-Brachial Index:    Pulses:    Removal Indication and Assessment:    Wound Outcome:    (Retired) Wound Length (cm):    (Retired) Wound Width (cm):    (Retired) Depth (cm):    Wound Description (Comments):    Removal Indications:    Wound Image   10/11/23 1135   Dressing Appearance Intact;Moist drainage 10/11/23 1135   Drainage Amount Large 10/11/23 1135   Drainage Characteristics/Odor Serosanguineous 10/11/23 1135   Appearance Red;Moist 10/11/23 1135   Tissue loss description Full thickness 10/11/23 1135   Black (%), Wound Tissue Color 0 % 10/11/23 1135   Red (%), Wound Tissue Color 100 % 10/11/23 1135   Yellow (%), Wound Tissue Color 0 % 10/11/23 1135   Periwound Area Pale white;Dry 10/11/23 1135   Wound Edges Defined;Open 10/11/23 1135   Wound Length (cm) 2.4 cm 10/11/23 1135   Wound Width (cm) 1.3 cm 10/11/23 1135   Wound Depth (cm) 1.8 cm 10/11/23 1135   Wound Volume (cm^3) 5.616 cm^3  "10/11/23 1135   Wound Surface Area (cm^2) 3.12 cm^2 10/11/23 1135   Tunneling (depth (cm)/location) 0 10/11/23 1135   Undermining (depth (cm)/location) 0 10/11/23 1135   Care Cleansed with:;Antimicrobial agent;Sterile normal saline;Wound cleanser 10/11/23 1135   Dressing Changed 10/11/23 1135   Periwound Care Skin barrier film applied;Absorptive dressing applied 10/11/23 1135   Dressing Change Due 11/08/23 10/11/23 1135           Plan:            Debridement not needed and Not Done today.   Recommend off-loading  Gel cushion recommended   Use vashe soak   Monitor drainage.   Keep dressing clean and intact  Discussed increase protein intake   Discussed wound expectations and plan   Continue with wound care orders and plan as noted in orders.   Continue to follow current medication regimen as per pcp   Call for any questions / concerns.           Orders Placed This Encounter   Procedures    Change dressing     Wound Dressing Orders     Dressing change frequency three times a week   Remove old dressing   Cleanse or irrigate with: Normal Saline   Protect periwound with:  Cavilon   Primary dressing: WOUND BASE: Caron then Aquacel Ag to fill deficit   Secondary dressing: Mextra: size used: 5"x5" secured with Medipore Tape (in window-pane fashion)     Home Health will change Monday, Wednesday, Friday between visits. Return to clinic in 4 weeks.    SUBSEQUENT HOME HEALTH ORDERS     Home Health for Wound Care. Visit Monday, Wednesday, Friday when not in clinic. Next [facility] visit is 11/08/2023.     Wound Dressing Orders     Dressing change frequency three times a week   Remove old dressing   Cleanse or irrigate with: Normal Saline   Protect periwound with:  Gentian Violet hugging wound bed, Cavilon to area that will be taped   Primary dressing: WOUND BASE: Promogran Caron then Aquacel Ag to fill deficit   Secondary dressing: Mextra: size used: 5"x5" secured with Medipore Tape (in window-pane fashion)     Order Specific " Question:   What Home Health Agency is the patient currently using?     Answer:   [facility]        Follow up in about 4 weeks (around 11/8/2023) for wound care.

## 2023-10-12 ENCOUNTER — HOSPITAL ENCOUNTER (OUTPATIENT)
Dept: RADIOLOGY | Facility: HOSPITAL | Age: 38
Discharge: HOME | End: 2023-10-12
Attending: UROLOGY
Payer: MEDICARE

## 2023-10-12 DIAGNOSIS — R33.9 URINARY RETENTION: ICD-10-CM

## 2023-10-12 PROCEDURE — 76770 US EXAM ABDO BACK WALL COMP: CPT | Mod: 26,,, | Performed by: RADIOLOGY

## 2023-10-12 PROCEDURE — 76770 US RETROPERITONEAL COMPLETE: ICD-10-PCS | Mod: 26,,, | Performed by: RADIOLOGY

## 2023-10-12 PROCEDURE — 76770 US EXAM ABDO BACK WALL COMP: CPT | Mod: TC

## 2023-10-23 ENCOUNTER — TELEPHONE (OUTPATIENT)
Dept: INTERNAL MEDICINE | Facility: CLINIC | Age: 38
End: 2023-10-23
Payer: MEDICARE

## 2023-10-23 NOTE — TELEPHONE ENCOUNTER
----- Message from Odilia Rodriguez sent at 10/23/2023 10:54 AM CDT -----  Contact: [phone]    Patient is calling for Medical Advice regarding: Patient would like to know if his 90 L paper work is ready for . Please call and advise.       Comments: Patient states they were dropped off 2 weeks ago.

## 2023-10-31 ENCOUNTER — EXTERNAL CHRONIC CARE MANAGEMENT (OUTPATIENT)
Dept: PRIMARY CARE CLINIC | Facility: CLINIC | Age: 38
End: 2023-10-31
Payer: MEDICARE

## 2023-10-31 PROCEDURE — 99490 PR CHRONIC CARE MGMT, 1ST 20 MIN: ICD-10-PCS | Mod: S$PBB,,, | Performed by: INTERNAL MEDICINE

## 2023-10-31 PROCEDURE — 99490 CHRNC CARE MGMT STAFF 1ST 20: CPT | Mod: PBBFAC | Performed by: INTERNAL MEDICINE

## 2023-10-31 PROCEDURE — 99490 CHRNC CARE MGMT STAFF 1ST 20: CPT | Mod: S$PBB,,, | Performed by: INTERNAL MEDICINE

## 2023-11-01 ENCOUNTER — DOCUMENT SCAN (OUTPATIENT)
Dept: HOME HEALTH SERVICES | Facility: HOSPITAL | Age: 38
End: 2023-11-01
Payer: MEDICARE

## 2023-11-08 ENCOUNTER — TELEPHONE (OUTPATIENT)
Dept: WOUND CARE | Facility: HOSPITAL | Age: 38
End: 2023-11-08
Payer: MEDICARE

## 2023-11-15 ENCOUNTER — HOSPITAL ENCOUNTER (OUTPATIENT)
Dept: WOUND CARE | Facility: HOSPITAL | Age: 38
Discharge: HOME | End: 2023-11-15
Attending: FAMILY MEDICINE
Payer: MEDICARE

## 2023-11-15 VITALS — TEMPERATURE: 97 F | SYSTOLIC BLOOD PRESSURE: 169 MMHG | HEART RATE: 81 BPM | DIASTOLIC BLOOD PRESSURE: 109 MMHG

## 2023-11-15 DIAGNOSIS — L89.154 SACRAL DECUBITUS ULCER, STAGE IV: Primary | ICD-10-CM

## 2023-11-15 PROCEDURE — 17250 CHEM CAUT OF GRANLTJ TISSUE: CPT | Performed by: FAMILY MEDICINE

## 2023-11-15 PROCEDURE — 17250 CHEM CAUT OF GRANLTJ TISSUE: CPT | Mod: ICN,,, | Performed by: FAMILY MEDICINE

## 2023-11-15 PROCEDURE — 99214 OFFICE O/P EST MOD 30 MIN: CPT | Mod: 25,ICN,, | Performed by: FAMILY MEDICINE

## 2023-11-15 PROCEDURE — 99214 PR OFFICE/OUTPT VISIT, EST, LEVL IV, 30-39 MIN: ICD-10-PCS | Mod: 25,ICN,, | Performed by: FAMILY MEDICINE

## 2023-11-15 PROCEDURE — 17250 PR CHEM CAUTERY GRANULATN TISSUE: ICD-10-PCS | Mod: ICN,,, | Performed by: FAMILY MEDICINE

## 2023-11-15 NOTE — PROGRESS NOTES
"[facility]                Progress Note    Subjective:       Patient ID: Dale Pantoja is a 38 y.o. male.    Chief Complaint: Wound Care    Follow up wound care visit. Patient to exam room in wheelchair with extra cushion on seat, then self-transfers to exam bed with 1-person standby assist. No c/o pain to wound bed at present. Dressing intact without strike through. Patient missed his visit last week d/t passing of his twin brother. Right Lower Buttock wound is measuring smaller in (0.3 cm) length, and in (0.3 cm) width, but deeper in (0.3 cm) depth. Patient reports Home Health came out as ordered.     Caron and Hydrofera Blue transfer applied in clinic, HH to continue with previous order. B/P elevated, patient reports "just took meds. I also had to park far and work hard to get here". He denies chest pains, visual changes, dizziness, or shortness of breath at present. Wound care done as per order. Return to clinic in 4 weeks. Declines AVS, has MyChart.          Review of Systems   Constitutional: Negative.    HENT: Negative.     Eyes: Negative.    Respiratory: Negative.     Cardiovascular: Negative.    Musculoskeletal: Negative.    Skin:  Positive for wound.   Neurological: Negative.    Hematological: Negative.    All other systems reviewed and are negative.        Objective:        Physical Exam  Vitals reviewed.   Constitutional:       Appearance: Normal appearance. He is well-developed.   HENT:      Head: Normocephalic and atraumatic.   Eyes:      Extraocular Movements: Extraocular movements intact.      Conjunctiva/sclera: Conjunctivae normal.      Pupils: Pupils are equal, round, and reactive to light.   Musculoskeletal:      Cervical back: Normal range of motion.   Skin:     General: Skin is warm and dry.      Comments: Please see wound description   Neurological:      Mental Status: He is alert and oriented to person, place, and time.   Psychiatric:         " "Behavior: Behavior normal.         Vitals:    11/15/23 0956   BP: (!) 169/109   Pulse: 81   Temp: 97.1 °F (36.2 °C)     Wound Debridement    Date/Time: 11/15/2023 9:48 AM    Performed by: Myriam Brown MD  Authorized by: Myriam Brown MD    Time out: Immediately prior to procedure a "time out" was called to verify the correct patient, procedure, equipment, support staff and site/side marked as required.    Consent Done?:  Yes (Written)  Local anesthesia used?: No      Wound Details:    Location:  Sacrum    Type of Debridement:  Non-excisional       Length (cm):  2.1       Area (sq cm):  2.1       Width (cm):  1       Percent Debrided (%):  100       Depth (cm):  2.1       Total Area Debrided (sq cm):  2.1    Depth of debridement:  Muscle/fascia/tendon    Devitalized tissue debrided:  Slough and Biofilm    Debridement - 1st Wound - Instruments: silver nitrate.  Bleeding:  Minimal  Hemostasis Achieved: Yes  Method Used:  Silver Nitrate  Patient tolerance:  Patient tolerated the procedure well with no immediate complications        Assessment:           ICD-10-CM ICD-9-CM   1. Sacral decubitus ulcer, stage IV  L89.154 707.03     707.24            Altered Skin Integrity 09/28/22 1136 Right lower Buttocks (Active)   09/28/22 1136   Altered Skin Integrity Present on Admission - Did Patient arrive to the hospital with altered skin?: yes   Side: Right   Orientation: lower   Location: Buttocks   Wound Number:    Is this injury device related?:    Primary Wound Type:    Description of Altered Skin Integrity:    Ankle-Brachial Index:    Pulses:    Removal Indication and Assessment:    Wound Outcome:    (Retired) Wound Length (cm):    (Retired) Wound Width (cm):    (Retired) Depth (cm):    Wound Description (Comments):    Removal Indications:    Wound Image   11/15/23 1040   Dressing Appearance Intact;Moist drainage 11/15/23 1040   Drainage Amount Moderate 11/15/23 1040   Drainage Characteristics/Odor " Serosanguineous;No odor 11/15/23 1040   Appearance Red;Moist 11/15/23 1040   Tissue loss description Full thickness 11/15/23 1040   Black (%), Wound Tissue Color 0 % 11/15/23 1040   Red (%), Wound Tissue Color 100 % 11/15/23 1040   Yellow (%), Wound Tissue Color 0 % 11/15/23 1040   Periwound Area Pale white 11/15/23 1040   Wound Edges Open 11/15/23 1040   Wound Length (cm) 2.1 cm 11/15/23 1040   Wound Width (cm) 1 cm 11/15/23 1040   Wound Depth (cm) 2.1 cm 11/15/23 1040   Wound Volume (cm^3) 4.41 cm^3 11/15/23 1040   Wound Surface Area (cm^2) 2.1 cm^2 11/15/23 1040   Tunneling (depth (cm)/location) 0 11/15/23 1040   Undermining (depth (cm)/location) 0 11/15/23 1040   Care Cleansed with:;Antimicrobial agent;Sterile normal saline 11/15/23 1040   Dressing Changed;Collagen;Methylene blue/gentian violet;Absorptive Pad 11/15/23 1040   Periwound Care Skin barrier film applied;Absorptive dressing applied 11/15/23 1040   Dressing Change Due 12/13/23 11/15/23 1040           Plan:          Debridement needed and Done today.   Continue off-loading / leg elevation   Continue eating protein   Keep dressing clean and intact  Continue with wound care orders and plan as noted in orders.   Continue to follow current medication regimen as per pcp   Call for any questions / concerns.       Tissue pathology and/or culture taken     [] Yes      [x] No  Sharp debridement performed                   [x] Yes       [] No  Labs ordered     [] Yes       [x] No  Imaging ordered    [] Yes      [x] No    Orders Placed This Encounter   Procedures    Debridement     This order was created via procedure documentation     Standing Status:   Standing     Number of Occurrences:   1    Change dressing     Wound Dressing Orders     Dressing change frequency three times a week   Remove old dressing   Cleanse or irrigate with: Normal Saline   Protect periwound with:  Cavilon   Primary dressing: WOUND BASE: Caron then Hydrofera Blue TRANSFER (cut to fill  "deficit)   Secondary dressing: Mextra: size used: 5"x5" secured with Medipore Tape (in window-pane fashion)     Home Health will change Monday, Wednesday, Friday between visits. Return to clinic in 4 weeks.    SUBSEQUENT HOME HEALTH ORDERS     Home Health for Wound Care. Visit Monday, Wednesday, Friday when not in clinic. Next [facility] is 12/13/2023.    Wound Dressing Orders    Dressing change frequency three times a week  Remove old dressing  Cleanse or irrigate with: Normal Saline  Protect periwound with:  Gentian Violet hugging wound bed, Cavilon to area that will be taped  Primary dressing: WOUND BASE: Caron then Aquacel Ag to fill deficit  Secondary dressing: Mextra: size used: 5"x5" secured with Medipore Tape (in window-pane fashion)     Order Specific Question:   What Home Health Agency is the patient currently using?     Answer:   [facility]        Follow up in about 4 weeks (around 12/13/2023) for wound care.       "

## 2023-11-23 PROCEDURE — G0179 MD RECERTIFICATION HHA PT: HCPCS | Mod: ,,, | Performed by: FAMILY MEDICINE

## 2023-11-30 ENCOUNTER — EXTERNAL CHRONIC CARE MANAGEMENT (OUTPATIENT)
Dept: PRIMARY CARE CLINIC | Facility: CLINIC | Age: 38
End: 2023-11-30
Payer: MEDICARE

## 2023-11-30 PROCEDURE — 99490 CHRNC CARE MGMT STAFF 1ST 20: CPT | Mod: S$PBB,,, | Performed by: INTERNAL MEDICINE

## 2023-11-30 PROCEDURE — 99490 CHRNC CARE MGMT STAFF 1ST 20: CPT | Mod: PBBFAC | Performed by: INTERNAL MEDICINE

## 2023-11-30 PROCEDURE — 99490 PR CHRONIC CARE MGMT, 1ST 20 MIN: ICD-10-PCS | Mod: S$PBB,,, | Performed by: INTERNAL MEDICINE

## 2023-12-12 ENCOUNTER — LAB VISIT (OUTPATIENT)
Dept: LAB | Facility: HOSPITAL | Age: 38
End: 2023-12-12
Attending: INTERNAL MEDICINE
Payer: MEDICARE

## 2023-12-12 DIAGNOSIS — E78.5 DYSLIPIDEMIA: ICD-10-CM

## 2023-12-12 DIAGNOSIS — E11.65 TYPE 2 DIABETES MELLITUS WITH HYPERGLYCEMIA, WITHOUT LONG-TERM CURRENT USE OF INSULIN: ICD-10-CM

## 2023-12-12 LAB
ALBUMIN SERPL BCP-MCNC: 3.6 G/DL (ref 3.5–5.2)
ALP SERPL-CCNC: 109 U/L (ref 55–135)
ALT SERPL W/O P-5'-P-CCNC: 27 U/L (ref 10–44)
ANION GAP SERPL CALC-SCNC: 11 MMOL/L (ref 8–16)
AST SERPL-CCNC: 14 U/L (ref 10–40)
BILIRUB SERPL-MCNC: 0.6 MG/DL (ref 0.1–1)
BUN SERPL-MCNC: 13 MG/DL (ref 6–20)
CALCIUM SERPL-MCNC: 9.2 MG/DL (ref 8.7–10.5)
CHLORIDE SERPL-SCNC: 98 MMOL/L (ref 95–110)
CHOLEST SERPL-MCNC: 182 MG/DL (ref 120–199)
CHOLEST/HDLC SERPL: 4.7 {RATIO} (ref 2–5)
CO2 SERPL-SCNC: 27 MMOL/L (ref 23–29)
CREAT SERPL-MCNC: 0.9 MG/DL (ref 0.5–1.4)
EST. GFR  (NO RACE VARIABLE): >60 ML/MIN/1.73 M^2
ESTIMATED AVG GLUCOSE: 226 MG/DL (ref 68–131)
GLUCOSE SERPL-MCNC: 177 MG/DL (ref 70–110)
HBA1C MFR BLD: 9.5 % (ref 4–5.6)
HDLC SERPL-MCNC: 39 MG/DL (ref 40–75)
HDLC SERPL: 21.4 % (ref 20–50)
LDLC SERPL CALC-MCNC: 129.6 MG/DL (ref 63–159)
NONHDLC SERPL-MCNC: 143 MG/DL
POTASSIUM SERPL-SCNC: 3.5 MMOL/L (ref 3.5–5.1)
PROT SERPL-MCNC: 8 G/DL (ref 6–8.4)
SODIUM SERPL-SCNC: 136 MMOL/L (ref 136–145)
TRIGL SERPL-MCNC: 67 MG/DL (ref 30–150)

## 2023-12-12 PROCEDURE — 83036 HEMOGLOBIN GLYCOSYLATED A1C: CPT | Performed by: INTERNAL MEDICINE

## 2023-12-12 PROCEDURE — 80061 LIPID PANEL: CPT | Performed by: INTERNAL MEDICINE

## 2023-12-12 PROCEDURE — 36415 COLL VENOUS BLD VENIPUNCTURE: CPT | Mod: PO | Performed by: INTERNAL MEDICINE

## 2023-12-12 PROCEDURE — 80053 COMPREHEN METABOLIC PANEL: CPT | Performed by: INTERNAL MEDICINE

## 2023-12-13 ENCOUNTER — HOSPITAL ENCOUNTER (OUTPATIENT)
Dept: WOUND CARE | Facility: HOSPITAL | Age: 38
Discharge: HOME | End: 2023-12-13
Attending: FAMILY MEDICINE
Payer: MEDICARE

## 2023-12-13 VITALS — DIASTOLIC BLOOD PRESSURE: 101 MMHG | SYSTOLIC BLOOD PRESSURE: 178 MMHG | HEART RATE: 97 BPM | TEMPERATURE: 97 F

## 2023-12-13 DIAGNOSIS — L89.154 SACRAL DECUBITUS ULCER, STAGE IV: Primary | ICD-10-CM

## 2023-12-13 PROCEDURE — 99214 OFFICE O/P EST MOD 30 MIN: CPT | Mod: ,,, | Performed by: FAMILY MEDICINE

## 2023-12-13 PROCEDURE — 99214 PR OFFICE/OUTPT VISIT, EST, LEVL IV, 30-39 MIN: ICD-10-PCS | Mod: ,,, | Performed by: FAMILY MEDICINE

## 2023-12-13 PROCEDURE — 99214 OFFICE O/P EST MOD 30 MIN: CPT | Performed by: FAMILY MEDICINE

## 2023-12-13 NOTE — PROGRESS NOTES
"[facility]                Progress Note    Subjective:       Patient ID: Dale Pantoja is a 38 y.o. male.    Chief Complaint: Wound Check and Dressing Change    Follow up wound care visit. Patient to exam room in wheelchair with extra cushion on seat, then self-transfers to exam bed with 1-person standby assist. No c/o pain to wound bed at present. Dressing intact without strike through. Patient reports trying to deal with grieving of his twin brother, gave coping with grief information (contact discussion group). Right Lower Buttock wound is measuring smaller in (0.1 cm) length, but deeper in (0.7 cm) depth at 12 o'clock. Patient reports Home Health came out as ordered. He is having problems receiving in/out catheters, advised would send message to Urologist but for him to call as well, patient verbalized understanding.      Caron changed to Endoform. B/P elevated, patient reports it's his grieving, he says his other levels "have been off too". He denies chest pains, visual changes, dizziness, or shortness of breath at present. Wound care done as per order. Return to clinic in 4 weeks. Suraj ACEVES, has Nikole.    MD note:  patient presenting today for follow up of pressure ulcer to right glut/ischial region.  He has been having dressings changed by home health and states that now that it is smaller, he can partially change it in between dressing changes if it drains too much.  He denies any fevers, chills, or sweats.  He is wheelchair bound, but stays active.  He is using gel pad in the chair to help offset pressure to the affected area.          Review of Systems   Constitutional: Negative.    HENT: Negative.     Eyes: Negative.    Respiratory: Negative.     Cardiovascular: Negative.    Gastrointestinal: Negative.    Skin:  Positive for wound.         Objective:        Physical Exam  Constitutional:       Appearance: Normal appearance.   HENT:      Head: " Normocephalic and atraumatic.      Mouth/Throat:      Mouth: Mucous membranes are moist.      Pharynx: Oropharynx is clear.   Eyes:      Extraocular Movements: Extraocular movements intact.      Conjunctiva/sclera: Conjunctivae normal.      Pupils: Pupils are equal, round, and reactive to light.   Pulmonary:      Effort: Pulmonary effort is normal. No respiratory distress.   Abdominal:      Palpations: Abdomen is soft.      Tenderness: There is no right CVA tenderness.   Skin:     General: Skin is warm.      Comments: +DTI stage 4 to right ishcium with macerated borders; no slough in wound bed and no active purulent drainage on exam   Neurological:      Mental Status: He is alert and oriented to person, place, and time. Mental status is at baseline.      Sensory: Sensory deficit present.         Vitals:    12/13/23 1005   BP: (!) 178/101   Pulse: 97   Temp: 96.8 °F (36 °C)       Assessment:           ICD-10-CM ICD-9-CM   1. Sacral decubitus ulcer, stage IV  L89.154 707.03     707.24            Altered Skin Integrity 09/28/22 1136 Right lower Buttocks (Active)   09/28/22 1136   Altered Skin Integrity Present on Admission - Did Patient arrive to the hospital with altered skin?: yes   Side: Right   Orientation: lower   Location: Buttocks   Wound Number:    Is this injury device related?:    Primary Wound Type:    Description of Altered Skin Integrity:    Ankle-Brachial Index:    Pulses:    Removal Indication and Assessment:    Wound Outcome:    (Retired) Wound Length (cm):    (Retired) Wound Width (cm):    (Retired) Depth (cm):    Wound Description (Comments):    Removal Indications:    Wound Image   12/13/23 1151   Dressing Appearance Intact;Moist drainage 12/13/23 1151   Drainage Amount Moderate 12/13/23 1151   Drainage Characteristics/Odor Serosanguineous 12/13/23 1151   Appearance Red;Moist 12/13/23 1151   Tissue loss description Full thickness 12/13/23 1151   Black (%), Wound Tissue Color 0 % 12/13/23 1151   Red  "(%), Wound Tissue Color 100 % 12/13/23 1151   Yellow (%), Wound Tissue Color 0 % 12/13/23 1151   Periwound Area Pale white 12/13/23 1151   Wound Edges Defined;Open 12/13/23 1151   Wound Length (cm) 2 cm 12/13/23 1151   Wound Width (cm) 1 cm 12/13/23 1151   Wound Depth (cm) 2.8 cm 12/13/23 1151   Wound Volume (cm^3) 5.6 cm^3 12/13/23 1151   Wound Surface Area (cm^2) 2 cm^2 12/13/23 1151   Tunneling (depth (cm)/location) 0 12/13/23 1151   Undermining (depth (cm)/location) 0 12/13/23 1151   Care Cleansed with:;Antimicrobial agent;Sterile normal saline;Wound cleanser 12/13/23 1151   Dressing Applied;Collagen;Methylene blue/gentian violet;Absorptive Pad 12/13/23 1151   Periwound Care Skin barrier film applied;Absorptive dressing applied 12/13/23 1151   Dressing Change Due 01/10/24 12/13/23 1151           Plan:              Tissue pathology and/or culture taken     [] Yes      [x] No  Sharp debridement performed                   [] Yes       [x] No  Labs ordered     [] Yes       [x] No  Imaging ordered    [] Yes      [x] No    Orders Placed This Encounter   Procedures    Change dressing     Wound Dressing Orders    Dressing change frequency three times a week  Remove old dressing  Cleanse or irrigate with: Normal Saline  Protect periwound with:  Cavilon  Primary dressing: WOUND BASE: Endoform then Hydrofera Blue TRANSFER (cut to size of wound)  Secondary dressing: Mextra: size used: 5"x5" secured with Medipore Tape (in window-pane fashion)    Home Health will change Monday, Wednesday, Friday between visits. Return to clinic in 4 weeks.    SUBSEQUENT HOME HEALTH ORDERS     Home Health for Wound Care. Visit Monday, Wednesday, Friday when not in clinic. Next [facility] visit is 1/10/2024.     Wound Dressing Orders     Dressing change frequency three times a week   Remove old dressing   Cleanse or irrigate with: Normal Saline   Protect periwound with:  Gentian Violet hugging wound bed, Cavilon to area that will be taped   Primary " "dressing: WOUND BASE: Endoform then Hydrofera Blue (TRANSFER - NOT READY, NO PLASTIC BACKING) cut to size of wound   Secondary dressing: Mextra: size used: 5"x5" secured with Medipore Tape (in window-pane fashion)     Order Specific Question:   What Home Health Agency is the patient currently using?     Answer:   [facility]        Follow up in about 4 weeks (around 1/10/2024) for wound care.     Karl Hernandez MD    "

## 2023-12-19 ENCOUNTER — OFFICE VISIT (OUTPATIENT)
Dept: INTERNAL MEDICINE | Facility: CLINIC | Age: 38
End: 2023-12-19
Payer: MEDICARE

## 2023-12-19 VITALS
HEART RATE: 81 BPM | DIASTOLIC BLOOD PRESSURE: 110 MMHG | SYSTOLIC BLOOD PRESSURE: 146 MMHG | HEIGHT: 63 IN | OXYGEN SATURATION: 99 % | BODY MASS INDEX: 27.73 KG/M2

## 2023-12-19 DIAGNOSIS — K59.2 NEUROGENIC BOWEL: ICD-10-CM

## 2023-12-19 DIAGNOSIS — E78.5 DYSLIPIDEMIA: ICD-10-CM

## 2023-12-19 DIAGNOSIS — E11.65 TYPE 2 DIABETES MELLITUS WITH HYPERGLYCEMIA, WITHOUT LONG-TERM CURRENT USE OF INSULIN: Primary | ICD-10-CM

## 2023-12-19 DIAGNOSIS — I10 HTN (HYPERTENSION), BENIGN: ICD-10-CM

## 2023-12-19 DIAGNOSIS — Z99.3 DEPENDENT ON WHEELCHAIR: ICD-10-CM

## 2023-12-19 PROCEDURE — 99213 OFFICE O/P EST LOW 20 MIN: CPT | Mod: PBBFAC | Performed by: INTERNAL MEDICINE

## 2023-12-19 PROCEDURE — 99215 PR OFFICE/OUTPT VISIT, EST, LEVL V, 40-54 MIN: ICD-10-PCS | Mod: S$PBB,,, | Performed by: INTERNAL MEDICINE

## 2023-12-19 PROCEDURE — 99999 PR PBB SHADOW E&M-EST. PATIENT-LVL III: ICD-10-PCS | Mod: PBBFAC,,, | Performed by: INTERNAL MEDICINE

## 2023-12-19 PROCEDURE — 99999 PR PBB SHADOW E&M-EST. PATIENT-LVL III: CPT | Mod: PBBFAC,,, | Performed by: INTERNAL MEDICINE

## 2023-12-19 PROCEDURE — 99215 OFFICE O/P EST HI 40 MIN: CPT | Mod: S$PBB,,, | Performed by: INTERNAL MEDICINE

## 2023-12-19 RX ORDER — ATORVASTATIN CALCIUM 80 MG/1
80 TABLET, FILM COATED ORAL DAILY
Qty: 90 TABLET | Refills: 3 | Status: SHIPPED | OUTPATIENT
Start: 2023-12-19

## 2023-12-19 RX ORDER — DAPAGLIFLOZIN 10 MG/1
10 TABLET, FILM COATED ORAL DAILY
Qty: 90 TABLET | Refills: 3 | Status: SHIPPED | OUTPATIENT
Start: 2023-12-19 | End: 2024-02-21

## 2023-12-19 RX ORDER — TRAZODONE HYDROCHLORIDE 50 MG/1
50 TABLET ORAL NIGHTLY
Qty: 30 TABLET | Refills: 11 | Status: SHIPPED | OUTPATIENT
Start: 2023-12-19 | End: 2024-12-18

## 2023-12-19 NOTE — PROGRESS NOTES
"  Dale Pantoja is a 38 y.o. male with HTN, paraplegia, and neurogenic bladder who presents to clinic for follow-up for his htn and  T2DM.  He recently lost his twin brother.  He is not sleeping well.          HTN: BP   elevated in clinic today 160/100.. Patient reports normal average readings at home. Remains on amlodipine. and  valsartan-- He has not been exercising or seeping well recently       DM: Pt reports that he is taking Metformin, januvia and actos.  ,  . His A1c is  9.5, up from 6.7.  He has fallen off his diet.   previously.          Neurogenic bladder   Continuing with intermittent self-catheterizations. No problems with incontinence while on Ditropan.      Review of Systems   Constitutional:+  for significant activity change.  He is getting a gym membership and starting with a .  Denies , appetite change, fatigue and fever.   Eyes: Negative for redness and itching.   Respiratory: Negative for cough, chest tightness and shortness of breath.    Cardiovascular: Negative for chest pain, palpitations and leg swelling.   Gastrointestinal: Negative for blood in stool, change in bowel habit, nausea, reflux and change in bowel habit.   Genitourinary: Negative for bladder incontinence, flank pain and urgency.   Musculoskeletal: Negative for arthralgias and myalgias.   Neurological: Negative for dizziness, syncope, speech difficulty and headaches.   Psychiatric/Behavioral: Negative.           Objective:   Physical Exam          BP (!) 146/110 (BP Location: Right arm, Patient Position: Sitting, BP Method: Large (Manual))   Pulse 81   Ht 5' 3" (1.6 m)   SpO2 99%   BMI 27.73 kg/m²           Constitutional:       General: He is not in acute distress.     Appearance: Normal appearance.      Comments: Sitting comfortably in wheelchair   HENT:      Head: Normocephalic and atraumatic.   Eyes:      Pupils: Pupils are equal, round, and reactive to light.   Neck:      Comments:  shunt in place on right front " neck under skin  Cardiovascular:      Rate and Rhythm: Normal rate and regular rhythm.      Pulses: Normal pulses.      Heart sounds: Normal heart sounds.   Pulmonary:      Effort: Pulmonary effort is normal. No respiratory distress.      Breath sounds: Normal breath sounds. No wheezing or rhonchi.   Abdominal:      General: Abdomen is flat. Bowel sounds are normal.      Palpations: Abdomen is soft.   Skin:     General: Skin is warm and dry.      Capillary Refill: Capillary refill takes less than 2 seconds.   Neurological:      Mental Status: He is alert and oriented to person, place, and time. Mental status is at baseline.   Psychiatric:         Mood and Affect: Mood normal.         Behavior: Behavior normal.   Right foot in boot.          Assessment and plan:         Will add farxiga for his diabetes-- he is is going to get back on his exercise and diet.  Wheelchair basketball start next month.  Will diet ad exercise - will try some trazodone for sleep/grief reaction.  Will stop the lipitor 40 mg and increase to 80 mg with ldl goal of <70.    For his paraplegia we will follow this and he will let me know if he needs anything for his wheelchair.  Also continue get his catheters for self-catheterizations.     HTN (hypertension), benign-- farixga should help-- may start HCTZ if no improvement          Dyslipidemia     Type 2 diabetes mellitus with hyperglycemia, without long-term current use of insulin  -          Followup in 2 months  -- discussed grief reaction

## 2023-12-21 ENCOUNTER — EXTERNAL HOME HEALTH (OUTPATIENT)
Dept: HOME HEALTH SERVICES | Facility: HOSPITAL | Age: 38
End: 2023-12-21
Payer: MEDICARE

## 2023-12-29 ENCOUNTER — OFFICE VISIT (OUTPATIENT)
Dept: UROLOGY | Facility: CLINIC | Age: 38
End: 2023-12-29
Payer: MEDICARE

## 2023-12-29 VITALS
HEART RATE: 90 BPM | DIASTOLIC BLOOD PRESSURE: 114 MMHG | SYSTOLIC BLOOD PRESSURE: 177 MMHG | WEIGHT: 156.5 LBS | BODY MASS INDEX: 27.73 KG/M2 | HEIGHT: 63 IN

## 2023-12-29 DIAGNOSIS — N31.9 NEUROGENIC BLADDER: Primary | ICD-10-CM

## 2023-12-29 PROCEDURE — 99999 PR PBB SHADOW E&M-EST. PATIENT-LVL III: CPT | Mod: PBBFAC,,, | Performed by: UROLOGY

## 2023-12-29 PROCEDURE — 99214 OFFICE O/P EST MOD 30 MIN: CPT | Mod: S$PBB,,, | Performed by: UROLOGY

## 2023-12-29 PROCEDURE — 99999 PR PBB SHADOW E&M-EST. PATIENT-LVL III: ICD-10-PCS | Mod: PBBFAC,,, | Performed by: UROLOGY

## 2023-12-29 PROCEDURE — 99213 OFFICE O/P EST LOW 20 MIN: CPT | Mod: PBBFAC | Performed by: UROLOGY

## 2023-12-29 PROCEDURE — 99214 PR OFFICE/OUTPT VISIT, EST, LEVL IV, 30-39 MIN: ICD-10-PCS | Mod: S$PBB,,, | Performed by: UROLOGY

## 2023-12-29 NOTE — PROGRESS NOTES
[facility]         Neurogenic Bladder Return Note     12/29/2023     Referred by:  No ref. provider found     HPI: Dale Pantoja is a very pleasant 38 y.o. male referred for evaluation of neurogenic bladder with chronic urinary retention attributable to spina bifida. He currently performs clean intermittent catheterization 5x per day.  He reports no incontinence between catheterizations since beginning Ditropan XL 15 mg daily in 2016. He had urodynamic evaluation by Dr. Small at that time which was reviewed today. Low compliance was noted, though final filling pressure was not. He reports no frequent lower urinary tract infections. His most recent U/S in October 2023 demonstrated no hydronephrosis on independent review today. No infections. No hematuria. No UTI.      A review of 10+ systems was conducted with pertinent positive and negative findings documented in HPI with all other systems reviewed and negative.     Past medical, family, surgical and social history reviewed as documented in chart with pertinent positive medical, family, surgical and social history detailed in HPI.     Exam Findings:     Const: no acute distress, conversant and alert  Eyes: anicteric, extraocular muscles intact  ENMT: normocephalic, Nl oral membranes  Cardio: no cyanosis, nl cap refill  Pulm: no tachypnea; no resp distress  Abd: soft, no tenderness  Musc: no laceration, no tenderness  Neuro: alert; oriented x 3  Skin: warm, dry; no petichiae  Psych: no anxiety; normal speech           Assessment/Plan:     Neurogenic Bladder with Chronic Urinary Retention (established,stable): The patient has evidence of chronic urinary retention requiring catheterization.  He appears stable over the last 6 years since his last UDS study with no infections, frequent catheterizations, incontinence or abnormal renal studies.  The need for catheterization was assessed and the patient is likely to benefit from a regimen of clean  intermittent catheterization. The patient has previously performed intermittent catheterization in the past and is comfortable performing this alone. The etiology for urinary retention in this patient is thought to be neurogenic bladder (spina bifida). The anticipated duration of need is estimated to be indefinite. The patient will not require insertion supplies. The patient will not require a coude-tip catheter. The patient will need to catheterize 6x daily and will require a total of 180 catheters per month. .     Repeat Renal U/S in one year  Return to Clinic in one year

## 2023-12-31 ENCOUNTER — EXTERNAL CHRONIC CARE MANAGEMENT (OUTPATIENT)
Dept: PRIMARY CARE CLINIC | Facility: CLINIC | Age: 38
End: 2023-12-31
Payer: MEDICARE

## 2023-12-31 PROCEDURE — 99490 CHRNC CARE MGMT STAFF 1ST 20: CPT | Mod: S$PBB,,, | Performed by: INTERNAL MEDICINE

## 2023-12-31 PROCEDURE — 99490 CHRNC CARE MGMT STAFF 1ST 20: CPT | Mod: PBBFAC | Performed by: INTERNAL MEDICINE

## 2024-01-11 DIAGNOSIS — Z00.00 ENCOUNTER FOR MEDICARE ANNUAL WELLNESS EXAM: ICD-10-CM

## 2024-01-22 PROCEDURE — G0179 MD RECERTIFICATION HHA PT: HCPCS | Mod: ,,, | Performed by: FAMILY MEDICINE

## 2024-01-31 ENCOUNTER — EXTERNAL CHRONIC CARE MANAGEMENT (OUTPATIENT)
Dept: PRIMARY CARE CLINIC | Facility: CLINIC | Age: 39
End: 2024-01-31
Payer: MEDICARE

## 2024-01-31 ENCOUNTER — HOSPITAL ENCOUNTER (OUTPATIENT)
Dept: WOUND CARE | Facility: HOSPITAL | Age: 39
Discharge: HOME | End: 2024-01-31
Attending: INTERNAL MEDICINE
Payer: MEDICARE

## 2024-01-31 VITALS — HEART RATE: 80 BPM | TEMPERATURE: 98 F | DIASTOLIC BLOOD PRESSURE: 115 MMHG | SYSTOLIC BLOOD PRESSURE: 178 MMHG

## 2024-01-31 DIAGNOSIS — L89.154 SACRAL DECUBITUS ULCER, STAGE IV: Primary | ICD-10-CM

## 2024-01-31 PROCEDURE — 99214 OFFICE O/P EST MOD 30 MIN: CPT | Mod: HCNC,,, | Performed by: INTERNAL MEDICINE

## 2024-01-31 PROCEDURE — 99214 OFFICE O/P EST MOD 30 MIN: CPT | Mod: HCNC | Performed by: INTERNAL MEDICINE

## 2024-01-31 PROCEDURE — 99490 CHRNC CARE MGMT STAFF 1ST 20: CPT | Mod: S$GLB,,, | Performed by: INTERNAL MEDICINE

## 2024-01-31 NOTE — PROGRESS NOTES
"[facility]                Progress Note    Subjective:       Patient ID: Dale Pantoja is a 39 y.o. male.    Chief Complaint: Wound Check and Dressing Change    Follow up wound care visit. Patient to exam room in wheelchair with extra cushion on seat, then self-transfers to exam bed with 1-person standby assist. No c/o pain to wound bed at present. Dressing intact without strike through. Patient reports "doing better" at home. He reports Home Health came out as ordered, but "there has been different nurses. There are some that don't understand what the dressings are for". He brought in several of the dressing types and cleaning solution (Wound Cleanser, Caron, Endoform, Opticell Ag, Hydrofera Blue Transfer and Hydrofera Blue Classic). Instructions written in detail on Home Health order and copy of order given to patient to give to staff nurse from . Advised to contact Home Health  if problems continue, he verbalized understanding. Right Lower Buttock wound is measuring smaller in (0.6 cm) length, larger in (1.3 cm) width.      Systolic B/P elevated, patient reports taking HTN meds this morning, but states he had a long way to move d/t parking. He denies chest pains, visual changes, dizziness, or shortness of breath at present. Wound care done as per order. Return to clinic in 4 weeks. Suraj ACEVES, has Janaet.    Patient  with spina bifida paraplegia and diabetes presents as scheduled follow up for right ischial pressure wound (stage IV) home health changing dressing three times per week. Has had issue with supplies and not always gettign the correct components to dressing does not occasional saturation of dressing if plastic backing hydrafera blue is used. He is insensate of area. He has regular bowel regimen using self catherization for urinary retenion no fevers chills. Wound has been present at least since June 2022. Never evaluated for skin graft. " Diabetes recently more poorly controlled based on elevation of A1c he has made dietary changes           Review of Systems      Objective:        Physical Exam  Constitutional:       General: He is not in acute distress.  Pulmonary:      Effort: Pulmonary effort is normal. No respiratory distress.   Abdominal:      General: There is no distension.      Palpations: Abdomen is soft.   Skin:     Comments: Right posterior gluteal/ischial wound with fibrin to perimeter central pink tissue wound depth explored with sterile qtip without evidence of loculations bone is NOT palpable on wound probing no expressible discharge   Neurological:      Mental Status: He is alert.         Vitals:    01/31/24 0945   BP: (!) 178/115   Pulse: 80   Temp: 97.8 °F (36.6 °C)       Assessment:           ICD-10-CM ICD-9-CM   1. Sacral decubitus ulcer, stage IV  L89.154 707.03     707.24            Altered Skin Integrity 09/28/22 1136 Right lower Buttocks (Active)   09/28/22 1136   Altered Skin Integrity Present on Admission - Did Patient arrive to the hospital with altered skin?: yes   Side: Right   Orientation: lower   Location: Buttocks   Wound Number:    Is this injury device related?:    Primary Wound Type:    Description of Altered Skin Integrity:    Ankle-Brachial Index:    Pulses:    Removal Indication and Assessment:    Wound Outcome:    (Retired) Wound Length (cm):    (Retired) Wound Width (cm):    (Retired) Depth (cm):    Wound Description (Comments):    Removal Indications:    Wound Image   01/31/24 1031   Dressing Appearance Intact;Moist drainage 01/31/24 1031   Drainage Amount Large 01/31/24 1031   Drainage Characteristics/Odor Alcantar 01/31/24 1031   Appearance Red;Moist 01/31/24 1031   Tissue loss description Full thickness 01/31/24 1031   Black (%), Wound Tissue Color 0 % 01/31/24 1031   Red (%), Wound Tissue Color 100 % 01/31/24 1031   Yellow (%), Wound Tissue Color 0 % 01/31/24 1031   Periwound Area Pale white;Macerated 01/31/24  "1031   Wound Length (cm) 1.4 cm 01/31/24 1031   Wound Width (cm) 2.3 cm 01/31/24 1031   Wound Depth (cm) 2.8 cm 01/31/24 1031   Wound Volume (cm^3) 9.016 cm^3 01/31/24 1031   Wound Surface Area (cm^2) 3.22 cm^2 01/31/24 1031   Tunneling (depth (cm)/location) 0 01/31/24 1031   Undermining (depth (cm)/location) 0 01/31/24 1031   Care Cleansed with:;Antimicrobial agent;Sterile normal saline;Wound cleanser 01/31/24 1031   Dressing Applied;Collagen;Methylene blue/gentian violet;Absorptive Pad 01/31/24 1031   Periwound Care Skin barrier film applied 01/31/24 1031   Dressing Change Due 02/28/24 01/31/24 1031           Plan:            Has gel pad and foam overlay for wheel chair and bed continue endoform packing mextra for any drainage. Given his paraplegia and chronic non healing pressure wound he may be a candiate for split flap and  skin grafting. Unfortunately given his diabetes being poorly controlled this needs to be addressed prior to surgical referral. Will continue with current offloading strategy and home health changes three times per week. To follow up with PCP for DM management and return to wound clinic in four weeks for check no debridement done today  Tissue pathology and/or culture taken     [] Yes      [x] No  Sharp debridement performed                   [] Yes       [x] No  Labs ordered     [] Yes       [x] No  Imaging ordered    [] Yes      [x] No    Orders Placed This Encounter   Procedures    Change dressing     Wound Dressing Orders     Dressing change frequency three times a week (Home Health will change Monday, Wednesday, Friday between visits)  Remove old dressing   Cleanse or irrigate with: Normal Saline   Protect periwound with:  Gentian Violet hugging wound bed, Cavilon to area that will be taped   Primary dressing: WOUND BASE: Endoform packed into wound bed then Hydrofera Blue (TRANSFER) cut to size of wound   Secondary dressing: Mextra: size used: 5"x5" secured with Medipore Tape (in " "window-pane fashion)     Return to clinic in 4 weeks    SUBSEQUENT HOME HEALTH ORDERS     Home Health for Wound Care. Visit Monday, Wednesday, Friday when not in clinic. Next [facility] visit is 1/10/2024.     Wound Dressing Orders     Dressing change frequency three times a week   Remove old dressing   Cleanse or irrigate with: Normal Saline   Protect periwound with:  Gentian Violet hugging wound bed, Cavilon to area that will be taped   Primary dressing: WOUND BASE: Endoform (or Caron - patient supply) packed into wound bed then Hydrofera Blue (TRANSFER - NOT READY, NO PLASTIC BACKING) cut to size of wound   Secondary dressing: Mextra: size used: 5"x5" secured with Medipore Tape (in window-pane fashion)       1. Remove old dressing  2. Cleanse with Vashe, Normal Saline or Wound Cleanser  3. Pat Dry with gauze  4. Gentian Violet hugging wound bed (painted in thin layer to keep macerated area dry)  5. Cavilon to area that will be taped   6. Endoform (or Caron - patient supply - both are collagens) packed into wound bed   7. Then pack with Hydrofera Blue (TRANSFER - NOT READY, NO PLASTIC BACKING         or Hydrofera Blue Classic - must be hydrate with normal saline before cutting or [employer] Lighting II Ag) cut to size of wound   8. Outer dressing: Mextra: size used: 5"x5" secured with Medipore Tape (in window-pane fashion)       Evaluate for acute changes (purulence, increased redness/swelling, increased drainage, malodor, increased pain, pallor, necrosis) please contact physician on any acute changes. If after clinic hours or over the weekend, send patient to the ER.      Call clinic at [phone] if any changes, substitutions or questions about orders.     DO NOT DEVIATE FROM ORDER. PLEASE ORDER SUPPLIES NEED TO APPLY TO PATIENT'S WOUNDS.     Order Specific Question:   What Home Health Agency is the patient currently using?     Answer:   [facility]        Follow up in about 4 weeks (around 2/28/2024) for Wound Care. "

## 2024-02-20 ENCOUNTER — OFFICE VISIT (OUTPATIENT)
Dept: INTERNAL MEDICINE | Facility: CLINIC | Age: 39
End: 2024-02-20
Payer: MEDICARE

## 2024-02-20 ENCOUNTER — LAB VISIT (OUTPATIENT)
Dept: LAB | Facility: HOSPITAL | Age: 39
End: 2024-02-20
Attending: INTERNAL MEDICINE
Payer: MEDICARE

## 2024-02-20 VITALS
DIASTOLIC BLOOD PRESSURE: 102 MMHG | HEART RATE: 95 BPM | OXYGEN SATURATION: 99 % | BODY MASS INDEX: 27.73 KG/M2 | HEIGHT: 63 IN | SYSTOLIC BLOOD PRESSURE: 142 MMHG

## 2024-02-20 DIAGNOSIS — E78.5 DYSLIPIDEMIA: ICD-10-CM

## 2024-02-20 DIAGNOSIS — E11.69 TYPE 2 DIABETES MELLITUS WITH OTHER SPECIFIED COMPLICATION, WITHOUT LONG-TERM CURRENT USE OF INSULIN: ICD-10-CM

## 2024-02-20 DIAGNOSIS — G82.20 PARAPLEGIA: ICD-10-CM

## 2024-02-20 DIAGNOSIS — Q05.2 SPINA BIFIDA OF LUMBAR REGION WITH HYDROCEPHALUS: ICD-10-CM

## 2024-02-20 DIAGNOSIS — N31.9 NEUROGENIC BLADDER: ICD-10-CM

## 2024-02-20 DIAGNOSIS — E11.65 TYPE 2 DIABETES MELLITUS WITH HYPERGLYCEMIA, WITHOUT LONG-TERM CURRENT USE OF INSULIN: Primary | ICD-10-CM

## 2024-02-20 DIAGNOSIS — E11.65 TYPE 2 DIABETES MELLITUS WITH HYPERGLYCEMIA, WITHOUT LONG-TERM CURRENT USE OF INSULIN: ICD-10-CM

## 2024-02-20 DIAGNOSIS — I10 HTN (HYPERTENSION), BENIGN: ICD-10-CM

## 2024-02-20 PROBLEM — L89.154 SACRAL DECUBITUS ULCER, STAGE IV: Status: RESOLVED | Noted: 2022-09-28 | Resolved: 2024-02-20

## 2024-02-20 PROBLEM — L08.9 PRESSURE INJURY, STAGE 4, WITH INFECTION: Status: RESOLVED | Noted: 2022-07-24 | Resolved: 2024-02-20

## 2024-02-20 PROBLEM — L89.94 PRESSURE INJURY, STAGE 4, WITH INFECTION: Status: RESOLVED | Noted: 2022-07-24 | Resolved: 2024-02-20

## 2024-02-20 LAB
ALBUMIN SERPL BCP-MCNC: 3.6 G/DL (ref 3.5–5.2)
ALP SERPL-CCNC: 117 U/L (ref 55–135)
ALT SERPL W/O P-5'-P-CCNC: 31 U/L (ref 10–44)
ANION GAP SERPL CALC-SCNC: 10 MMOL/L (ref 8–16)
AST SERPL-CCNC: 20 U/L (ref 10–40)
BILIRUB SERPL-MCNC: 0.6 MG/DL (ref 0.1–1)
BUN SERPL-MCNC: 14 MG/DL (ref 6–20)
CALCIUM SERPL-MCNC: 9.2 MG/DL (ref 8.7–10.5)
CHLORIDE SERPL-SCNC: 104 MMOL/L (ref 95–110)
CHOLEST SERPL-MCNC: 196 MG/DL (ref 120–199)
CHOLEST/HDLC SERPL: 5.2 {RATIO} (ref 2–5)
CO2 SERPL-SCNC: 24 MMOL/L (ref 23–29)
CREAT SERPL-MCNC: 1 MG/DL (ref 0.5–1.4)
EST. GFR  (NO RACE VARIABLE): >60 ML/MIN/1.73 M^2
ESTIMATED AVG GLUCOSE: 286 MG/DL (ref 68–131)
GLUCOSE SERPL-MCNC: 123 MG/DL (ref 70–110)
HBA1C MFR BLD: 11.6 % (ref 4–5.6)
HDLC SERPL-MCNC: 38 MG/DL (ref 40–75)
HDLC SERPL: 19.4 % (ref 20–50)
LDLC SERPL CALC-MCNC: 143.2 MG/DL (ref 63–159)
NONHDLC SERPL-MCNC: 158 MG/DL
POTASSIUM SERPL-SCNC: 3.3 MMOL/L (ref 3.5–5.1)
PROT SERPL-MCNC: 8.2 G/DL (ref 6–8.4)
SODIUM SERPL-SCNC: 138 MMOL/L (ref 136–145)
TRIGL SERPL-MCNC: 74 MG/DL (ref 30–150)

## 2024-02-20 PROCEDURE — 99999 PR PBB SHADOW E&M-EST. PATIENT-LVL III: CPT | Mod: PBBFAC,HCNC,, | Performed by: INTERNAL MEDICINE

## 2024-02-20 PROCEDURE — 80061 LIPID PANEL: CPT | Mod: HCNC | Performed by: INTERNAL MEDICINE

## 2024-02-20 PROCEDURE — 99214 OFFICE O/P EST MOD 30 MIN: CPT | Mod: HCNC,S$GLB,, | Performed by: INTERNAL MEDICINE

## 2024-02-20 PROCEDURE — 1159F MED LIST DOCD IN RCRD: CPT | Mod: HCNC,CPTII,S$GLB, | Performed by: INTERNAL MEDICINE

## 2024-02-20 PROCEDURE — 3046F HEMOGLOBIN A1C LEVEL >9.0%: CPT | Mod: HCNC,CPTII,S$GLB, | Performed by: INTERNAL MEDICINE

## 2024-02-20 PROCEDURE — 36415 COLL VENOUS BLD VENIPUNCTURE: CPT | Mod: HCNC | Performed by: INTERNAL MEDICINE

## 2024-02-20 PROCEDURE — 3077F SYST BP >= 140 MM HG: CPT | Mod: HCNC,CPTII,S$GLB, | Performed by: INTERNAL MEDICINE

## 2024-02-20 PROCEDURE — 83036 HEMOGLOBIN GLYCOSYLATED A1C: CPT | Mod: HCNC | Performed by: INTERNAL MEDICINE

## 2024-02-20 PROCEDURE — 3080F DIAST BP >= 90 MM HG: CPT | Mod: HCNC,CPTII,S$GLB, | Performed by: INTERNAL MEDICINE

## 2024-02-20 PROCEDURE — G2211 COMPLEX E/M VISIT ADD ON: HCPCS | Mod: HCNC,S$GLB,, | Performed by: INTERNAL MEDICINE

## 2024-02-20 PROCEDURE — 3008F BODY MASS INDEX DOCD: CPT | Mod: HCNC,CPTII,S$GLB, | Performed by: INTERNAL MEDICINE

## 2024-02-20 PROCEDURE — 80053 COMPREHEN METABOLIC PANEL: CPT | Mod: HCNC | Performed by: INTERNAL MEDICINE

## 2024-02-20 RX ORDER — HYDROCHLOROTHIAZIDE 25 MG/1
25 TABLET ORAL DAILY
Qty: 90 TABLET | Refills: 3 | Status: SHIPPED | OUTPATIENT
Start: 2024-02-20

## 2024-02-20 NOTE — PROGRESS NOTES
"[facility]  CLINIC NOTE    Patient Name: Dale Pantoja  YOB: 1985    PRESENTING HISTORY       History of Present Illness:  Mr. Dale Pantoja is a 39 y.o. male with HTN, paraplegia, and neurogenic bladder who presents to clinic for follow-up of HTN and T2DM. He was last seen 2 months ago at which time his A1c had increased from 6.7% to 9.5% for which Farxiga was added to his regimen and he planned to return to exercise and diet regimens. He also noted difficulty sleeping after the death of his brother for which trazodone was started. At that time, Lipitor was increased to 80mg QHS.    Today he notes doing well overall. He has been going back to the gym and started wheelchair basketball. He does not drive to the gym and pushes himself the 2-3 miles to the gym. His sleep has been "good" since starting on trazodone.     HTN: BP elevated in clinic today at 142/102; improved from visit 2 months ago which was 160/100. Remains on amlodipine and valsartan. He reports home BP readings no higher than 130/80.     DM: Pt reports that he is taking Metformin, Farxiga, Januvia and Actos. Notes improvement in his diet, notably with more salads and protein. He does not monitor his blood glucose at home.     Neurogenic bladder: Continuing with intermittent self-catheterizations. No problems with incontinence. He is unsure if he needs a prescription for his wheelchair.      Review of Systems   Constitutional:  Negative for chills, diaphoresis, fever, malaise/fatigue and weight loss.   Respiratory:  Negative for cough, shortness of breath and wheezing.    Cardiovascular:  Negative for chest pain and palpitations.   Gastrointestinal:  Negative for blood in stool, constipation, diarrhea, melena, nausea and vomiting.   Genitourinary:  Negative for hematuria.   Musculoskeletal:  Negative for falls.   Neurological:  Negative for dizziness.   Psychiatric/Behavioral:  Negative for depression. The patient " is not nervous/anxious.        PAST HISTORY:     Past Medical History:   Diagnosis Date    Allergy     Hyperlipidemia     Hypertension     Neuromuscular disorder     Overactive bladder     Spina bifida     Type 2 diabetes mellitus, without long-term current use of insulin 10/21/2021    Urinary tract infection        Past Surgical History:   Procedure Laterality Date    SPINE SURGERY      VENTRICULOPERITONEAL SHUNT         Family History   Problem Relation Age of Onset    Diabetes Mother     Spina bifida Brother     Thyroid disease Neg Hx     Hypertension Neg Hx     Glaucoma Neg Hx     Prostate cancer Neg Hx     Kidney disease Neg Hx        Social History     Socioeconomic History    Marital status: Single   Tobacco Use    Smoking status: Never    Smokeless tobacco: Never   Substance and Sexual Activity    Alcohol use: No    Drug use: No    Sexual activity: Never       MEDICATIONS & ALLERGIES:     Current Outpatient Medications on File Prior to Visit   Medication Sig    amLODIPine (NORVASC) 10 MG tablet Take 1 tablet (10 mg total) by mouth once daily.    atorvastatin (LIPITOR) 80 MG tablet Take 1 tablet (80 mg total) by mouth once daily.    dapagliflozin propanediol (FARXIGA) 10 mg tablet Take 1 tablet (10 mg total) by mouth once daily.    metFORMIN (GLUCOPHAGE) 500 MG tablet Take 1 tablet (500 mg total) by mouth 3 (three) times daily with meals.    pioglitazone (ACTOS) 30 MG tablet Take 1 tablet (30 mg total) by mouth once daily.    polyethylene glycol (GLYCOLAX) 17 gram PwPk Take 17 g by mouth once daily.    SITagliptin phosphate (JANUVIA) 100 MG Tab Take 1 tablet (100 mg total) by mouth once daily.    traZODone (DESYREL) 50 MG tablet Take 1 tablet (50 mg total) by mouth every evening.    valsartan (DIOVAN) 320 MG tablet Take 1 tablet (320 mg total) by mouth once daily.     No current facility-administered medications on file prior to visit.       Review of patient's allergies indicates:   Allergen Reactions     "Latex, natural rubber Hives       OBJECTIVE:   Vital Signs:  Vitals:    02/20/24 0825   BP: (!) 142/102   Pulse: 95   SpO2: 99%   Height: 5' 3" (1.6 m)       No results found for this or any previous visit (from the past 24 hour(s)).      Physical Exam  Vitals and nursing note reviewed.   Constitutional:       General: He is not in acute distress.     Appearance: He is normal weight. He is not toxic-appearing.   HENT:      Head: Normocephalic and atraumatic.   Eyes:      Extraocular Movements: Extraocular movements intact.      Pupils: Pupils are equal, round, and reactive to light.   Cardiovascular:      Rate and Rhythm: Normal rate and regular rhythm.   Pulmonary:      Effort: Pulmonary effort is normal. No respiratory distress.      Breath sounds: Normal breath sounds. No wheezing, rhonchi or rales.   Abdominal:      General: Abdomen is flat. There is no distension.      Palpations: Abdomen is soft.      Tenderness: There is no abdominal tenderness. There is no guarding.   Musculoskeletal:      Cervical back: Normal range of motion and neck supple.      Right lower leg: No edema.      Left lower leg: No edema.   Skin:     General: Skin is warm and dry.   Neurological:      General: No focal deficit present.      Mental Status: He is alert and oriented to person, place, and time.      Gait: Gait abnormal.      Comments: + paraplegia   Psychiatric:         Mood and Affect: Mood normal.         Behavior: Behavior normal.         Laboratory  Lab Results   Component Value Date    WBC 4.65 09/06/2022    HGB 12.7 (L) 09/06/2022    HCT 40.5 09/06/2022    MCV 74 (L) 09/06/2022     09/06/2022     @[id number](GLU,NA,K,Cl,CO2,BUN,Creatinine,Calcium,MG)@  Lab Results   Component Value Date    INR 1.1 07/24/2022     Lab Results   Component Value Date    HGBA1C 9.5 (H) 12/12/2023     No results for input(s): "POCTGLUCOSE" in the last 72 hours.    Diagnostic Results:  Labs: Reviewed    ASSESSMENT & PLAN:     Dale was " seen today for follow-up.    Diagnoses and all orders for this visit:    Type 2 diabetes mellitus with hyperglycemia, without long-term current use of insulin  -     Diabetes Digital Medicine (DDMP) Enrollment Order    HTN (hypertension), benign  Elevated today. Reports improved BP readings at home. Will add HCTZ to his regimen and enroll in digital HTN.  -     Hypertension Digital Medicine (HDMP) Enrollment Order  -     hydroCHLOROthiazide (HYDRODIURIL) 25 MG tablet; Take 1 tablet (25 mg total) by mouth once daily.  -     continue amlodipine 10mg daily and valsartan 320mg    Dyslipidemia       - will obtain lipid panel today       - continue Atorvastatin 80mg QHS    Spina bifida of lumbar region with hydrocephalus  Paraplegia  Neurogenic bladder       - will contact the clinic if he requires documentation for his wheelchair / catheters    Type 2 diabetes mellitus with other specified complication, without long-term current use of insulin       - repeat A1c today       - continue metformin, Farxiga, Actos, and Januvia    RTC in 3 months, sooner as needed    Discussed with Dr. Adorno  - staff attestation to follow    Misty Mcbride, DO  Internal Medicine PGY-1

## 2024-02-20 NOTE — PROGRESS NOTES
"I have personally taken the history and examined this patient and agree with the resident's note as stated above with the following thoughts:  BP (!) 142/102 (BP Location: Right arm, Patient Position: Sitting, BP Method: Large (Manual))   Pulse 95   Ht 5' 3" (1.6 m)   SpO2 99%   BMI 27.73 kg/m²     Discussed getting vaccines up to date.  Discussed importance of low fat diet and exercise.  I am going to enroll him into the digital hypertension and the digital diabetes clinic.  He says his blood pressure is running much better at home but I have my doubts.  We are going to start some hydrochlorothiazide see him back again in a couple months.  He says his sugars doing much better with titrating his medication and will check labs to make sure that is true also.  We went over the appropriate range of his blood pressure in his goal should be under 130 and under 80.  We also went over the goals for his diabetes.      Our office providers are the focal point for all needed health care services that are part of ongoing care related to a patient's single serious condition or the patient's complex conditions.   "

## 2024-02-21 ENCOUNTER — TELEPHONE (OUTPATIENT)
Dept: INTERNAL MEDICINE | Facility: CLINIC | Age: 39
End: 2024-02-21
Payer: MEDICARE

## 2024-02-21 DIAGNOSIS — Q05.2 SPINA BIFIDA OF LUMBAR REGION WITH HYDROCEPHALUS: ICD-10-CM

## 2024-02-21 DIAGNOSIS — E11.69 TYPE 2 DIABETES MELLITUS WITH OTHER SPECIFIED COMPLICATION, WITHOUT LONG-TERM CURRENT USE OF INSULIN: Primary | ICD-10-CM

## 2024-02-21 RX ORDER — DAPAGLIFLOZIN 10 MG/1
10 TABLET, FILM COATED ORAL DAILY
Qty: 90 TABLET | Refills: 3 | Status: SHIPPED | OUTPATIENT
Start: 2024-02-21

## 2024-02-21 NOTE — TELEPHONE ENCOUNTER
Please call.  His glucose is way out of control.  His hemoglobin A1c is 11.9.  Make sure he is taking his medicine and let him know that I me him to come in and discuss starting insulin..   See him Friday at 1:00 pm        His glucose in his hemoglobin A1c show a decent disconnect .  He has been resistant to insulin initiation.

## 2024-02-21 NOTE — TELEPHONE ENCOUNTER
Informed pt of his lab results and Dr. Adorno's recommendation, he verbalized understanding. He will come in on Friday to discuss.    Dinesh LOYA

## 2024-02-21 NOTE — TELEPHONE ENCOUNTER
Left pt a voicemail instructing him to contact the office in regards to recent lab results.    Dinesh FIGUEROA.

## 2024-02-23 ENCOUNTER — OFFICE VISIT (OUTPATIENT)
Dept: INTERNAL MEDICINE | Facility: CLINIC | Age: 39
End: 2024-02-23
Payer: MEDICARE

## 2024-02-23 DIAGNOSIS — I10 HTN (HYPERTENSION), BENIGN: ICD-10-CM

## 2024-02-23 DIAGNOSIS — E11.69 TYPE 2 DIABETES MELLITUS WITH OTHER SPECIFIED COMPLICATION, WITHOUT LONG-TERM CURRENT USE OF INSULIN: Primary | ICD-10-CM

## 2024-02-23 PROCEDURE — 99213 OFFICE O/P EST LOW 20 MIN: CPT | Mod: HCNC,S$GLB,, | Performed by: INTERNAL MEDICINE

## 2024-02-23 PROCEDURE — 4010F ACE/ARB THERAPY RXD/TAKEN: CPT | Mod: HCNC,CPTII,S$GLB, | Performed by: INTERNAL MEDICINE

## 2024-02-23 PROCEDURE — 3046F HEMOGLOBIN A1C LEVEL >9.0%: CPT | Mod: HCNC,CPTII,S$GLB, | Performed by: INTERNAL MEDICINE

## 2024-02-23 RX ORDER — METFORMIN HYDROCHLORIDE 500 MG/1
500 TABLET ORAL
Qty: 270 TABLET | Refills: 3
Start: 2024-02-23 | End: 2024-04-15 | Stop reason: SDUPTHER

## 2024-02-23 RX ORDER — VALSARTAN 320 MG/1
320 TABLET ORAL DAILY
Qty: 90 TABLET | Refills: 3 | Status: SHIPPED | OUTPATIENT
Start: 2024-02-23 | End: 2025-02-22

## 2024-02-23 RX ORDER — PIOGLITAZONEHYDROCHLORIDE 30 MG/1
30 TABLET ORAL DAILY
Qty: 90 TABLET | Refills: 3 | Status: SHIPPED | OUTPATIENT
Start: 2024-02-23

## 2024-02-23 NOTE — PROGRESS NOTES
This note was generated with [employer] voice recognition software. I apologize for any possible typographical errors.  Yoni seems to have trouble with pronouns so I apologize if I Mis pronoun anyone       He brings his meds in with him today.  His is her for uncontrolled DM and uncontroeld bp -- His meds are hctz- 25 - which he got yesterday  Atorvastatin 80 mg a day  Trazodone 50 mg a day   And farxiga 10 mg a day--      Missing: Diovan 320 mg a day  Januvia 100mg a day  Actos 30 mg a day  Metformin 500 mg tid.  Norvasc 10 mg a day      When I saw him earlier this week  he was not taking any bp meds-- today he is just taking 25 mg of hctz.  /80 today  \    Recent, hgb A1c  :  11.6--  Faxiga 10 mg  a day--( been having this)   suppose to have been on actos 30 and metformin and januvia--   He does not remember these.        There were no vitals taken for this visit.  Physical exam.  He is well-appearing gentleman wheelchair.  Brings in all his medicine today     This is a gentleman with poorly controlled diabetes and poorly controlled blood pressure..  He has not taking his medicine appropriate.  Assessment and plan we are going to restart the metformin and pioglitazone and the Diovan and see him back again in a month.    Type 2 diabetes mellitus with other specified complication, without long-term current use of insulin  -     pioglitazone (ACTOS) 30 MG tablet; Take 1 tablet (30 mg total) by mouth once daily.  Dispense: 90 tablet; Refill: 3  -     metFORMIN (GLUCOPHAGE) 500 MG tablet; Take 1 tablet (500 mg total) by mouth 3 (three) times daily with meals.  Dispense: 270 tablet; Refill: 3  -     Comprehensive Metabolic Panel; Future; Expected date: 02/23/2024  -     Hemoglobin A1C; Future; Expected date: 02/23/2024    HTN (hypertension), benign  -     valsartan (DIOVAN) 320 MG tablet; Take 1 tablet (320 mg total) by mouth once daily.  Dispense: 90 tablet; Refill: 3

## 2024-02-28 ENCOUNTER — HOSPITAL ENCOUNTER (OUTPATIENT)
Dept: WOUND CARE | Facility: HOSPITAL | Age: 39
Discharge: HOME | End: 2024-02-28
Attending: FAMILY MEDICINE
Payer: MEDICARE

## 2024-02-28 VITALS
RESPIRATION RATE: 18 BRPM | TEMPERATURE: 98 F | SYSTOLIC BLOOD PRESSURE: 140 MMHG | HEART RATE: 99 BPM | DIASTOLIC BLOOD PRESSURE: 80 MMHG

## 2024-02-28 DIAGNOSIS — L89.154 SACRAL DECUBITUS ULCER, STAGE IV: Primary | ICD-10-CM

## 2024-02-28 PROCEDURE — 11042 DBRDMT SUBQ TIS 1ST 20SQCM/<: CPT | Mod: HCNC,,, | Performed by: FAMILY MEDICINE

## 2024-02-28 PROCEDURE — 99214 OFFICE O/P EST MOD 30 MIN: CPT | Mod: 25,HCNC,, | Performed by: FAMILY MEDICINE

## 2024-02-28 PROCEDURE — 11042 DBRDMT SUBQ TIS 1ST 20SQCM/<: CPT | Mod: HCNC | Performed by: FAMILY MEDICINE

## 2024-02-28 NOTE — PROGRESS NOTES
[facility]                Progress Note    Subjective:       Patient ID: Dale Pantoja is a 39 y.o. male.    Chief Complaint: Non-healing Wound Follow Up    Follow up wound care visit. Patient ambulated to exam room in wheelchair with extra cushion, then self transfer from the wheelchair to the bed, with nurse by side. Denies fever, chills, vomiting, nausea, or diarrhea at present. Dressing is intact, without strike through. No c/o pain to wound bed at present. He reports home health come out as order. Right Lower Buttock wound is measuring larger in depth. Wound care done per MD order. Decline AVS, states he has MyChart on his cellphone.       Return to clinic in 4 week.      This is an established patient in wound care.   Patient presents in the office today for evaluation of the chronic wound.  Updated HPI is noted below.    The periwound appears non-erythematous, macerated, non-edematous    The wound appears no sign of infection; stable good granulation tissue. Serous drainage. No odor.     Patient denies fever, chills    Patients reports no pain; Patient likes to play wheelchair basketball. His twin brother recently passed in October 2023. He tries to maintain healthy diet     Lymphedema status is contributory to wound status    Pain at the site of the wound is n/a     Contributing factors to current wound state include numerous comorbid conditions, off-loading limitations    Review of Systems   Constitutional:  Negative for activity change, appetite change and fever.   HENT:  Negative for congestion.    Respiratory:  Negative for shortness of breath and wheezing.    Cardiovascular:  Negative for chest pain and leg swelling.   Gastrointestinal:  Negative for abdominal pain, nausea and vomiting.   Skin:  Positive for wound.   Neurological:  Negative for dizziness and headaches.   Psychiatric/Behavioral:  The patient is not nervous/anxious.          Objective:         Physical Exam  Vitals and nursing note reviewed.   Constitutional:       Appearance: He is well-developed.   HENT:      Head: Normocephalic and atraumatic.   Eyes:      Conjunctiva/sclera: Conjunctivae normal.   Pulmonary:      Effort: Pulmonary effort is normal.   Abdominal:      General: There is no distension.      Palpations: Abdomen is soft.   Musculoskeletal:      Cervical back: Normal range of motion and neck supple.   Skin:     Comments: See wound description   Neurological:      Mental Status: He is alert and oriented to person, place, and time.   Psychiatric:         Behavior: Behavior normal.         Vitals:    02/28/24 1032   BP: (!) 140/80   Pulse: 99   Resp: 18   Temp: 97.6 °F (36.4 °C)       Assessment:           ICD-10-CM ICD-9-CM   1. Sacral decubitus ulcer, stage IV  L89.154 707.03     707.24            Altered Skin Integrity 09/28/22 1136 Right lower Buttocks (Active)   09/28/22 1136   Altered Skin Integrity Present on Admission - Did Patient arrive to the hospital with altered skin?: yes   Side: Right   Orientation: lower   Location: Buttocks   Wound Number:    Is this injury device related?:    Primary Wound Type:    Description of Altered Skin Integrity:    Ankle-Brachial Index:    Pulses:    Removal Indication and Assessment:    Wound Outcome:    (Retired) Wound Length (cm):    (Retired) Wound Width (cm):    (Retired) Depth (cm):    Wound Description (Comments):    Removal Indications:    Wound Image    02/28/24 0958   Description of Altered Skin Integrity Full thickness tissue loss. Subcutaneous fat may be visible but bone, tendon or muscle are not exposed 02/28/24 0958   Dressing Appearance Intact;Moist drainage 02/28/24 0958   Drainage Amount Large 02/28/24 0958   Drainage Characteristics/Odor Alcantar 02/28/24 0958   Appearance Red;Moist 02/28/24 0958   Tissue loss description Full thickness 02/28/24 0958   Red (%), Wound Tissue Color 100 % 02/28/24 0958   Periwound Area Pale white;Macerated  02/28/24 0958   Wound Edges Rolled/closed 02/28/24 0958   Wound Length (cm) 1.3 cm 02/28/24 0958   Wound Width (cm) 2.2 cm 02/28/24 0958   Wound Depth (cm) 4 cm 02/28/24 0958   Wound Volume (cm^3) 11.44 cm^3 02/28/24 0958   Wound Surface Area (cm^2) 2.86 cm^2 02/28/24 0958   Care Cleansed with:;Antimicrobial agent;Sterile normal saline;Debrided 02/28/24 0958   Dressing Applied;Collagen;Hydrofiber;Absorptive Pad 02/28/24 0958   Periwound Care Moisturizer applied 02/28/24 0958   Dressing Change Due 03/27/24 02/28/24 0958           Debridement    Date/Time: 2/28/2024 9:49 AM    Performed by: Ofelia Lizama MD  Authorized by: Ofelia Lizama MD    Consent Done?:  Yes (Verbal)  Local anesthesia used?: No      Wound Details:    Location:  Right buttock    Type of Debridement:  Excisional       Length (cm):  1.3       Area (sq cm):  2.86       Width (cm):  2.2       Percent Debrided (%):  100       Depth (cm):  4       Total Area Debrided (sq cm):  2.86    Depth of debridement:  Subcutaneous tissue    Tissue debrided:  Subcutaneous    Devitalized tissue debrided:  Slough and Fibrin    Instruments:  Curette  Bleeding:  Minimal  Hemostasis Achieved: Yes  Method Used:  Pressure  Patient tolerance:  Patient tolerated the procedure well with no immediate complications      Plan:            Debridement needed and Done today.   Recommend off-loading  Continue to use gel-cushion   Control drainage.   Keep dressing clean and intact  Discussed increase protein intake   Discussed wound expectations and plan   Continue with wound care orders and plan as noted in orders.   Continue to follow current medication regimen as per pcp   Call for any questions / concerns.         Tissue pathology and/or culture taken     [] Yes      [x] No  Sharp debridement performed                   [x] Yes       [] No  Labs ordered     [] Yes       [x] No  Imaging ordered    [] Yes      [x] No    No orders of the defined types were placed in this  encounter.       Follow up in about 4 weeks (around 3/27/2024) for wound care.

## 2024-02-29 ENCOUNTER — EXTERNAL CHRONIC CARE MANAGEMENT (OUTPATIENT)
Dept: PRIMARY CARE CLINIC | Facility: CLINIC | Age: 39
End: 2024-02-29
Payer: MEDICARE

## 2024-02-29 PROCEDURE — 99490 CHRNC CARE MGMT STAFF 1ST 20: CPT | Mod: S$GLB,,, | Performed by: INTERNAL MEDICINE

## 2024-03-21 ENCOUNTER — DOCUMENT SCAN (OUTPATIENT)
Dept: HOME HEALTH SERVICES | Facility: HOSPITAL | Age: 39
End: 2024-03-21
Payer: MEDICARE

## 2024-03-22 PROCEDURE — G0179 MD RECERTIFICATION HHA PT: HCPCS | Mod: ,,, | Performed by: FAMILY MEDICINE

## 2024-03-27 ENCOUNTER — HOSPITAL ENCOUNTER (OUTPATIENT)
Dept: WOUND CARE | Facility: HOSPITAL | Age: 39
Discharge: HOME | End: 2024-03-27
Attending: FAMILY MEDICINE
Payer: MEDICARE

## 2024-03-27 ENCOUNTER — EXTERNAL HOME HEALTH (OUTPATIENT)
Dept: HOME HEALTH SERVICES | Facility: HOSPITAL | Age: 39
End: 2024-03-27
Payer: MEDICARE

## 2024-03-27 VITALS
DIASTOLIC BLOOD PRESSURE: 85 MMHG | RESPIRATION RATE: 18 BRPM | HEART RATE: 95 BPM | SYSTOLIC BLOOD PRESSURE: 140 MMHG | TEMPERATURE: 98 F

## 2024-03-27 DIAGNOSIS — L89.154 SACRAL DECUBITUS ULCER, STAGE IV: Primary | ICD-10-CM

## 2024-03-27 PROCEDURE — 87186 SC STD MICRODIL/AGAR DIL: CPT | Mod: HCNC | Performed by: FAMILY MEDICINE

## 2024-03-27 PROCEDURE — 99213 OFFICE O/P EST LOW 20 MIN: CPT | Mod: HCNC | Performed by: FAMILY MEDICINE

## 2024-03-27 PROCEDURE — 99214 OFFICE O/P EST MOD 30 MIN: CPT | Mod: HCNC,,, | Performed by: FAMILY MEDICINE

## 2024-03-27 PROCEDURE — 87077 CULTURE AEROBIC IDENTIFY: CPT | Mod: HCNC | Performed by: FAMILY MEDICINE

## 2024-03-27 PROCEDURE — 87070 CULTURE OTHR SPECIMN AEROBIC: CPT | Mod: HCNC | Performed by: FAMILY MEDICINE

## 2024-03-27 PROCEDURE — 87147 CULTURE TYPE IMMUNOLOGIC: CPT | Mod: HCNC | Performed by: FAMILY MEDICINE

## 2024-03-27 NOTE — PROGRESS NOTES
[facility]                Progress Note    Subjective:       Patient ID: Dale Pantoja is a 39 y.o. male.    Chief Complaint: Non-healing Wound Follow Up    Follow up wound care visit. Patient ambulated to the exam room in wheelchair, then self transfer self from the wheelchair to the exam bed, with medial assistant standing by to assist. Denies fever, chills, nausea, vomiting, or diarrhea at present. Dressing intact without strike through drainage. Patient reported HH came out as order. MD orders for HH to leave patient with extra supplies for excessive drainage. Wound care done per MD order. Culture was taken from sacrum and sent to lab per MD verbal orders.    Return to clinic in 4 week.    This is an established patient in wound care.   Patient presents in the office today for evaluation of the chronic wound.  Updated HPI is noted below.    The periwound appears non-erythematous, macerated, non-edematous    The wound appears to have thickening of skin /epibole  around wound - may be from shearing motion. It may be what is causing healing delay. Recommend for Patient to be evaluated by surgery for recommendation/ debridement     Patient denies wound pain    Patients reports wound drainage    Lymphedema status is noncontributory to wound status    Contributing factors to current wound state include off-loading limitations    Review of Systems   Constitutional:  Negative for activity change, appetite change and fever.   HENT:  Negative for congestion.    Respiratory:  Negative for shortness of breath and wheezing.    Cardiovascular:  Negative for chest pain and leg swelling.   Gastrointestinal:  Negative for abdominal pain, nausea and vomiting.   Skin:  Positive for wound.   Neurological:  Negative for dizziness and headaches.   Psychiatric/Behavioral:  The patient is not nervous/anxious.          Objective:        Physical Exam  Vitals and nursing note reviewed.    Constitutional:       Appearance: He is well-developed.   HENT:      Head: Normocephalic and atraumatic.   Eyes:      Conjunctiva/sclera: Conjunctivae normal.   Pulmonary:      Effort: Pulmonary effort is normal.   Abdominal:      General: There is no distension.      Palpations: Abdomen is soft.   Musculoskeletal:      Cervical back: Normal range of motion and neck supple.   Skin:     Comments: See wound description   Neurological:      Mental Status: He is alert and oriented to person, place, and time.   Psychiatric:         Behavior: Behavior normal.         Vitals:    03/27/24 0951   BP: (!) 140/85   Pulse: 95   Resp: 18   Temp: 97.8 °F (36.6 °C)       Assessment:           ICD-10-CM ICD-9-CM   1. Sacral decubitus ulcer, stage IV  L89.154 707.03     707.24            Altered Skin Integrity 09/28/22 1136 Right lower Buttocks (Active)   09/28/22 1136   Altered Skin Integrity Present on Admission - Did Patient arrive to the hospital with altered skin?: yes   Side: Right   Orientation: lower   Location: Buttocks   Wound Number:    Is this injury device related?:    Primary Wound Type:    Description of Altered Skin Integrity:    Ankle-Brachial Index:    Pulses:    Removal Indication and Assessment:    Wound Outcome:    (Retired) Wound Length (cm):    (Retired) Wound Width (cm):    (Retired) Depth (cm):    Wound Description (Comments):    Removal Indications:    Wound Image   03/27/24 1200   Description of Altered Skin Integrity Full thickness tissue loss. Subcutaneous fat may be visible but bone, tendon or muscle are not exposed 03/27/24 1200   Dressing Appearance Intact;Moist drainage 03/27/24 1200   Drainage Amount Large 03/27/24 1200   Drainage Characteristics/Odor Alcantar 03/27/24 1200   Appearance Red;Moist 03/27/24 1200   Tissue loss description Full thickness 03/27/24 1200   Periwound Area Macerated;Pale white 03/27/24 1200   Wound Edges Rolled/closed 03/27/24 1200   Wound Length (cm) 1.3 cm 03/27/24 1200    Wound Width (cm) 2.2 cm 03/27/24 1200   Wound Depth (cm) 4 cm 03/27/24 1200   Wound Volume (cm^3) 11.44 cm^3 03/27/24 1200   Wound Surface Area (cm^2) 2.86 cm^2 03/27/24 1200   Care Cleansed with:;Sterile normal saline 03/27/24 1200   Dressing Applied;Collagen;Calcium alginate;Absorptive Pad;Other (comment) 03/27/24 1200   Periwound Care Moisturizer applied;Skin barrier film applied 03/27/24 1200   Dressing Change Due 04/24/24 03/27/24 1200           Plan:            Debridement not needed and Not Done today.   Wound culture taken today - f/u and treat as indicated   The wound appears to have thickening of skin /epibole  around wound - may be from shearing motion. It may be what is causing healing delay. Recommend for Patient to be evaluated by surgery for recommendation/ debridement   Keep dressing clean and intact  Discussed increase protein intake   Discussed wound expectations and plan   Continue with wound care orders and plan as noted in orders.   Continue to follow current medication regimen as per pcp   Call for any questions / concerns.       Tissue pathology and/or culture taken     [x] Yes      [] No  Sharp debridement performed                   [] Yes       [x] No  Labs ordered     [] Yes       [x] No  Imaging ordered    [] Yes      [x] No    Orders Placed This Encounter   Procedures    CULTURE, AEROBIC  (SPECIFY SOURCE)          Ambulatory referral/consult to General Surgery     Standing Status:   Future     Standing Expiration Date:   4/27/2025     Referral Priority:   Routine     Referral Type:   Consultation     Referral Reason:   Specialty Services Required     Referred to Provider:   Allan Beckhma MD     Requested Specialty:   General Surgery     Number of Visits Requested:   1    Change dressing     Wound Dressing Orders     Dressing change frequency three times a week (Home Health will change Monday, Wednesday, Friday between visits)   Remove old dressing   Cleanse or irrigate with: Normal  "Saline   Protect periwound with:  Gentian Violet hugging wound bed, Cavilon to area that will be taped   Primary dressing: WOUND BASE: Endoform packed into wound bed then Aquacel AG ribbon pack inside wound, leave 2-inch tail.   Secondary dressing: Mextra: size used: 5"x5" secured with Medipore Tape (in window-pane fashion)     Return to clinic in 4 weeks    SUBSEQUENT HOME HEALTH ORDERS     Home Health for Wound Care. Visit Monday, Wednesday, Friday when not in clinic. Next [facility] visit is 4/24/2024.     Wound Dressing Orders     Dressing change frequency three times a week   Remove old dressing   Cleanse or irrigate with: Normal Saline   Protect periwound with:  Gentian Violet hugging wound bed, Cavilon to area that will be taped   Primary dressing: WOUND BASE: Endoform (or Caron - patient supply) packed into wound bed then Aquacel AG ribbon pack inside wound, leave 2-inch tail   Secondary dressing: Mextra: size used: 5"x5" secured with Medipore Tape (in window-pane fashion)   Other: Please leave patient with extra dressing supplies for excessive drainage    1. Remove old dressing   2. Cleanse with Vashe, Normal Saline or Wound Cleanser   3. Pat Dry with gauze   4. Gentian Violet hugging wound bed (painted in thin layer to keep macerated area dry)   5. Cavilon to area that will be taped   6. Endoform (or Caron - patient supply - both are collagens) packed into wound bed   7. Then pack Aquacel AG ribbon inside wound, leave 2-inch tail   8. Outer dressing: Mextra: size used: 5"x5" secured with Medipore Tape (in window-pane fashion)       Evaluate for acute changes (purulence, increased redness/swelling, increased drainage, malodor, increased pain, pallor, necrosis) please contact physician on any acute changes. If after clinic hours or over the weekend, send patient to the ER.      Call clinic at [phone] if any changes, substitutions or questions about orders.      DO NOT DEVIATE FROM ORDER. PLEASE ORDER " SUPPLIES NEED TO APPLY TO PATIENT'S WOUNDS.     Order Specific Question:   What Home Health Agency is the patient currently using?     Answer:   Other/External     Comments:   [facility]        Follow up in about 4 weeks (around 4/24/2024) for wound care.

## 2024-03-29 LAB
BACTERIA SPEC AEROBE CULT: ABNORMAL
BACTERIA SPEC AEROBE CULT: ABNORMAL

## 2024-03-31 ENCOUNTER — EXTERNAL CHRONIC CARE MANAGEMENT (OUTPATIENT)
Dept: PRIMARY CARE CLINIC | Facility: CLINIC | Age: 39
End: 2024-03-31
Payer: MEDICARE

## 2024-03-31 PROCEDURE — 99490 CHRNC CARE MGMT STAFF 1ST 20: CPT | Mod: S$GLB,,, | Performed by: INTERNAL MEDICINE

## 2024-04-09 ENCOUNTER — OFFICE VISIT (OUTPATIENT)
Dept: SURGERY | Facility: CLINIC | Age: 39
End: 2024-04-09
Payer: MEDICARE

## 2024-04-09 VITALS
DIASTOLIC BLOOD PRESSURE: 82 MMHG | HEIGHT: 63 IN | SYSTOLIC BLOOD PRESSURE: 128 MMHG | HEART RATE: 98 BPM | BODY MASS INDEX: 27.73 KG/M2 | WEIGHT: 156.5 LBS

## 2024-04-09 DIAGNOSIS — L89.154 SACRAL DECUBITUS ULCER, STAGE IV: Primary | ICD-10-CM

## 2024-04-09 PROCEDURE — 99999 PR PBB SHADOW E&M-EST. PATIENT-LVL III: CPT | Mod: PBBFAC,HCNC,, | Performed by: SURGERY

## 2024-04-09 PROCEDURE — 4010F ACE/ARB THERAPY RXD/TAKEN: CPT | Mod: HCNC,CPTII,S$GLB, | Performed by: SURGERY

## 2024-04-09 PROCEDURE — 1159F MED LIST DOCD IN RCRD: CPT | Mod: HCNC,CPTII,S$GLB, | Performed by: SURGERY

## 2024-04-09 PROCEDURE — 99213 OFFICE O/P EST LOW 20 MIN: CPT | Mod: HCNC,S$GLB,, | Performed by: SURGERY

## 2024-04-09 PROCEDURE — 3074F SYST BP LT 130 MM HG: CPT | Mod: HCNC,CPTII,S$GLB, | Performed by: SURGERY

## 2024-04-09 PROCEDURE — 3008F BODY MASS INDEX DOCD: CPT | Mod: HCNC,CPTII,S$GLB, | Performed by: SURGERY

## 2024-04-09 PROCEDURE — 3046F HEMOGLOBIN A1C LEVEL >9.0%: CPT | Mod: HCNC,CPTII,S$GLB, | Performed by: SURGERY

## 2024-04-09 PROCEDURE — 3079F DIAST BP 80-89 MM HG: CPT | Mod: HCNC,CPTII,S$GLB, | Performed by: SURGERY

## 2024-04-09 RX ORDER — SODIUM CHLORIDE 9 MG/ML
INJECTION, SOLUTION INTRAVENOUS CONTINUOUS
Status: CANCELLED | OUTPATIENT
Start: 2024-04-09

## 2024-04-09 RX ORDER — CEFAZOLIN SODIUM 2 G/50ML
2 SOLUTION INTRAVENOUS
Status: CANCELLED | OUTPATIENT
Start: 2024-04-09

## 2024-04-09 NOTE — H&P (VIEW-ONLY)
History & Physical    Subjective     History of Present Illness:  Patient is a 39 y.o. male presents with non healing right posterior hip/buttock decubitus ulcer. He is paraplegic. He reports recent changes in wound care regimen and now the wound is non healing.  Per wound care clinic, referral request for debridement.  He is interested in debridement to help the wound heal.  He is insensate in this area.    Chief Complaint   Patient presents with    Consult    ulcer       Review of patient's allergies indicates:   Allergen Reactions    Latex, natural rubber Hives       Current Outpatient Medications   Medication Sig Dispense Refill    atorvastatin (LIPITOR) 80 MG tablet Take 1 tablet (80 mg total) by mouth once daily. 90 tablet 3    dapagliflozin propanediol (FARXIGA) 10 mg tablet Take 1 tablet (10 mg total) by mouth once daily. 90 tablet 3    hydroCHLOROthiazide (HYDRODIURIL) 25 MG tablet Take 1 tablet (25 mg total) by mouth once daily. 90 tablet 3    metFORMIN (GLUCOPHAGE) 500 MG tablet Take 1 tablet (500 mg total) by mouth 3 (three) times daily with meals. 270 tablet 3    pioglitazone (ACTOS) 30 MG tablet Take 1 tablet (30 mg total) by mouth once daily. 90 tablet 3    polyethylene glycol (GLYCOLAX) 17 gram PwPk Take 17 g by mouth once daily. 90 packet 3    traZODone (DESYREL) 50 MG tablet Take 1 tablet (50 mg total) by mouth every evening. 30 tablet 11    valsartan (DIOVAN) 320 MG tablet Take 1 tablet (320 mg total) by mouth once daily. 90 tablet 3     No current facility-administered medications for this visit.       Past Medical History:   Diagnosis Date    Allergy     Hyperlipidemia     Hypertension     Neuromuscular disorder     Overactive bladder     Spina bifida     Type 2 diabetes mellitus, without long-term current use of insulin 10/21/2021    Urinary tract infection      Past Surgical History:   Procedure Laterality Date    SPINE SURGERY      VENTRICULOPERITONEAL SHUNT       Family History   Problem  "Relation Age of Onset    Diabetes Mother     Spina bifida Brother     Thyroid disease Neg Hx     Hypertension Neg Hx     Glaucoma Neg Hx     Prostate cancer Neg Hx     Kidney disease Neg Hx      Social History     Tobacco Use    Smoking status: Never    Smokeless tobacco: Never   Substance Use Topics    Alcohol use: No    Drug use: No        Review of Systems:  Review of Systems   Constitutional:  Negative for chills and fever.   HENT: Negative.     Eyes: Negative.    Respiratory:  Negative for cough, chest tightness and shortness of breath.    Cardiovascular: Negative.    Gastrointestinal:  Negative for abdominal pain, blood in stool, constipation, diarrhea, nausea and vomiting.   Endocrine: Negative for cold intolerance and heat intolerance.   Genitourinary: Negative.    Musculoskeletal: Negative.    Skin:  Positive for wound (right posterior hip/ischial spine area stage 4 decubitus ulcer).   Neurological:  Positive for weakness. Negative for dizziness, syncope and light-headedness.        Paraplegic   Psychiatric/Behavioral:  Negative for agitation, confusion and hallucinations.           Objective     Vital Signs (Most Recent)  Pulse: 98 (04/09/24 1030)  BP: 128/82 (04/09/24 1030)  5' 3" (1.6 m)  71 kg (156 lb 8.4 oz)     Physical Exam:  Physical Exam  Constitutional:       General: He is not in acute distress.     Appearance: He is well-developed. He is not diaphoretic.      Comments: Paraplegic lying in bed.     HENT:      Head: Normocephalic and atraumatic.   Eyes:      Conjunctiva/sclera: Conjunctivae normal.      Pupils: Pupils are equal, round, and reactive to light.   Cardiovascular:      Rate and Rhythm: Normal rate and regular rhythm.      Pulses: Normal pulses.      Heart sounds: Normal heart sounds.   Pulmonary:      Effort: Pulmonary effort is normal. No respiratory distress.      Breath sounds: Normal breath sounds. No stridor. No wheezing.   Abdominal:      General: Bowel sounds are normal. There " is no distension.      Palpations: Abdomen is soft.      Tenderness: There is no abdominal tenderness.   Musculoskeletal:         General: Normal range of motion.      Cervical back: Normal range of motion and neck supple.   Skin:     General: Skin is warm and dry.      Findings: No rash.             Comments: Tunneling wound with surrounding bulky tissue. No purulence.  Bone is palpable with surrounding granulation tissue.   Neurological:      Mental Status: He is alert and oriented to person, place, and time.      Cranial Nerves: No cranial nerve deficit.      Comments: Paraplegic   Psychiatric:         Behavior: Behavior normal.              Assessment and Plan   39 yr old paraplegic black man w debridement ulcer stage 4 right buttock    PLAN:    To OR 4/18/24 for debridement of decubitus ulcer, right sided.  Risks and side effects discussed with the patient. Alterative therapies discussed. Patient agreeable to procedure and has signed consent which was discussed in detail.

## 2024-04-10 ENCOUNTER — TELEPHONE (OUTPATIENT)
Dept: PREADMISSION TESTING | Facility: HOSPITAL | Age: 39
End: 2024-04-10
Payer: MEDICARE

## 2024-04-10 ENCOUNTER — ANESTHESIA EVENT (OUTPATIENT)
Dept: SURGERY | Facility: HOSPITAL | Age: 39
End: 2024-04-10
Payer: MEDICARE

## 2024-04-12 ENCOUNTER — HOSPITAL ENCOUNTER (OUTPATIENT)
Dept: PREADMISSION TESTING | Facility: HOSPITAL | Age: 39
Discharge: HOME | End: 2024-04-12
Attending: SURGERY
Payer: MEDICARE

## 2024-04-12 ENCOUNTER — HOSPITAL ENCOUNTER (OUTPATIENT)
Dept: RADIOLOGY | Facility: HOSPITAL | Age: 39
Discharge: HOME | End: 2024-04-12
Attending: SURGERY
Payer: MEDICARE

## 2024-04-12 VITALS
SYSTOLIC BLOOD PRESSURE: 124 MMHG | BODY MASS INDEX: 27.64 KG/M2 | HEART RATE: 86 BPM | TEMPERATURE: 97 F | RESPIRATION RATE: 18 BRPM | DIASTOLIC BLOOD PRESSURE: 79 MMHG | HEIGHT: 63 IN | WEIGHT: 156 LBS | OXYGEN SATURATION: 100 %

## 2024-04-12 DIAGNOSIS — E11.9 DM (DIABETES MELLITUS): ICD-10-CM

## 2024-04-12 DIAGNOSIS — Z01.818 PREOP TESTING: Primary | ICD-10-CM

## 2024-04-12 DIAGNOSIS — L89.154 SACRAL DECUBITUS ULCER, STAGE IV: ICD-10-CM

## 2024-04-12 LAB
ANION GAP SERPL CALC-SCNC: 8 MMOL/L (ref 8–16)
BASOPHILS # BLD AUTO: 0.05 K/UL (ref 0–0.2)
BASOPHILS NFR BLD: 0.8 % (ref 0–1.9)
BUN SERPL-MCNC: 21 MG/DL (ref 6–20)
CALCIUM SERPL-MCNC: 9.6 MG/DL (ref 8.7–10.5)
CHLORIDE SERPL-SCNC: 101 MMOL/L (ref 95–110)
CO2 SERPL-SCNC: 28 MMOL/L (ref 23–29)
CREAT SERPL-MCNC: 1.1 MG/DL (ref 0.5–1.4)
DIFFERENTIAL METHOD BLD: ABNORMAL
EOSINOPHIL # BLD AUTO: 0.3 K/UL (ref 0–0.5)
EOSINOPHIL NFR BLD: 3.9 % (ref 0–8)
ERYTHROCYTE [DISTWIDTH] IN BLOOD BY AUTOMATED COUNT: 15.8 % (ref 11.5–14.5)
EST. GFR  (NO RACE VARIABLE): >60 ML/MIN/1.73 M^2
ESTIMATED AVG GLUCOSE: 220 MG/DL (ref 68–131)
GLUCOSE SERPL-MCNC: 108 MG/DL (ref 70–110)
HBA1C MFR BLD: 9.3 % (ref 4–5.6)
HCT VFR BLD AUTO: 43.1 % (ref 40–54)
HGB BLD-MCNC: 13.2 G/DL (ref 14–18)
IMM GRANULOCYTES # BLD AUTO: 0.02 K/UL (ref 0–0.04)
IMM GRANULOCYTES NFR BLD AUTO: 0.3 % (ref 0–0.5)
LYMPHOCYTES # BLD AUTO: 2.2 K/UL (ref 1–4.8)
LYMPHOCYTES NFR BLD: 33.9 % (ref 18–48)
MCH RBC QN AUTO: 23.1 PG (ref 27–31)
MCHC RBC AUTO-ENTMCNC: 30.6 G/DL (ref 32–36)
MCV RBC AUTO: 75 FL (ref 82–98)
MONOCYTES # BLD AUTO: 0.5 K/UL (ref 0.3–1)
MONOCYTES NFR BLD: 7.1 % (ref 4–15)
NEUTROPHILS # BLD AUTO: 3.6 K/UL (ref 1.8–7.7)
NEUTROPHILS NFR BLD: 54 % (ref 38–73)
NRBC BLD-RTO: 0 /100 WBC
OHS QRS DURATION: 88 MS
OHS QTC CALCULATION: 425 MS
PLATELET # BLD AUTO: 318 K/UL (ref 150–450)
PMV BLD AUTO: 9.9 FL (ref 9.2–12.9)
POTASSIUM SERPL-SCNC: 3.9 MMOL/L (ref 3.5–5.1)
RBC # BLD AUTO: 5.72 M/UL (ref 4.6–6.2)
SODIUM SERPL-SCNC: 137 MMOL/L (ref 136–145)
WBC # BLD AUTO: 6.6 K/UL (ref 3.9–12.7)

## 2024-04-12 PROCEDURE — 71045 X-RAY EXAM CHEST 1 VIEW: CPT | Mod: 26,,, | Performed by: RADIOLOGY

## 2024-04-12 PROCEDURE — 83036 HEMOGLOBIN GLYCOSYLATED A1C: CPT | Performed by: SURGERY

## 2024-04-12 PROCEDURE — 71045 X-RAY EXAM CHEST 1 VIEW: CPT | Mod: TC,FY

## 2024-04-12 PROCEDURE — 85025 COMPLETE CBC W/AUTO DIFF WBC: CPT | Performed by: SURGERY

## 2024-04-12 PROCEDURE — 80048 BASIC METABOLIC PNL TOTAL CA: CPT | Performed by: SURGERY

## 2024-04-12 NOTE — DISCHARGE INSTRUCTIONS
YOUR PROCEDURE WILL BE AT [facility] at [address] [place] [address]                  Before 7 AM, enter through the Emergency Entrance..   After 7 AM enter through the Main Entrance.                 Report to the Same Day Surgery Registration Desk in the hallway.(Just beside the Same Day Surgery Unit)      Your procedure  is scheduled for ___4/18/2024_______.    Call [phone] between 2pm and 5pm on __4/17/2024_____to find out your arrival time for the day of surgery.    You may have two visitors.  No children under 12 years old.     You will be going to the Same Day Surgery Unit on the 2nd floor of the hospital.    Important instructions:  Do not eat anything after midnight.  You may have plain water, non carbonated.  You may also have Gatorade or Powerade after midnight.    Stop all fluids 2 hours before your surgery.    It is okay to brush your teeth.  Do not have gum, candy or mints.    SEE MEDICATION SHEET.   TAKE MEDICATIONS AS DIRECTED WITH SIPS OF WATER.      Do not take any diabetic medication on the morning of surgery unless instructed to do so by your doctor or pre op nurse.      All GLP-1 weekly diabetic/weight loss medications must not be taken for one week before your surgery, or your surgery could be canceled.      STOP taking Aspirin, Ibuprofen,  Advil, Motrin, Mobic(meloxicam), Aleve (naproxen), Fish oil, and Vitamin E for at least 7 days before your surgery.     You may take Tylenol if needed which is not a blood thinner.    Please shower the night before and the morning of your surgery.      Follow any Prep Instructions given by your surgeon.    Use Chlorhexidine soap as instructed by your pre op nurse.   Please place clean linens on your bed the night before surgery. Please wear fresh clean clothing after each shower.    No shaving of procedural area at least 4-5 days before surgery due to increased risk of skin irritation and/or possible  infection.    Female patients may be asked for a urine specimen on the morning of the surgery.  Please check with your nurse before using the restroom.    Contact lenses and removable denture work may not be worn during your procedure.    You may wear deodorant only. If you are having breast surgery, do not wear deodorant on the operative side.    Do not wear powder, body lotion, perfume/cologne or make-up.    Do not wear any jewelry or have any metal on your body.    You will be asked to remove any dentures or partials for the procedure.    If you are going home on the same day of surgery, you must arrange for a family member or a friend to drive you home.  Public transportation is prohibited.  You will not be able to drive home if you were given anesthesia or sedation.    Patients who want to have their Post-op prescriptions filled from our in-house [facility], bring a Credit/Debit Card or cash with you. A co-pay may be required.  The pharmacy closes at 5:30 pm.    Wear loose fitting clothes allowing for bandages.    Please leave money and valuables home.      You may bring your cell phone.    Call the doctor if fever or illness should occur before your surgery.    Call [phone] to contact us here if needed.                            CLOTHES ON DAY OF SURGERY    SHOULDER surgery:  you must have a very oversized shirt.  Very, Very large.  You will probably have a large sling on with your arm strapped to your chest.  You will not be able to put the arm of the operated shoulder into a sleeve.  You can put the arm of the un-operated shoulder into the sleeve, but the shirt will need to be draped over the operated shoulder.       ARM or HAND surgery:  make sure that your sleeves are large and loose enough to pass over large dressings or cast.      BREAST or UNDERARM surgery:  wear a loose, button down shirt so that you can dress without raising your arms over your head.    ABDOMINAL surgery:  wear loose,  comfortable clothing.  Nothing tight around the abdomen.  NO JEANS    PENIS or SCROTAL surgery:  loose comfortable clothing.  Large sweat pants, pajama pants or a robe.  ABSOLUTELY NO JEANS      LEG or FOOT surgery:  wear large loose pants that are able to pass over any large dressings or casts.  You could also wear loose shorts or a skirt.

## 2024-04-12 NOTE — ANESTHESIA PREPROCEDURE EVALUATION
[facility]  Anesthesia Pre-Operative Evaluation         Patient Name/: Dale Pantoja, 1985  MRN: [id number]    SUBJECTIVE:     Pre-operative evaluation for Procedure(s) (LRB):  DEBRIDEMENT, PRESSURE ULCER (Right)     2024    Dale Pantoja is a 39 y.o. male w/ a significant PMHx of HTN, HLD, DM and Spina bifida (paraplegic, s/p  shunt) who presents due to sacral decubitus ulcer.    Patient now presents for the above procedure(s).    ________________________________________  No results found for this or any previous visit.    ________________________________________    Prev airway: None documented.    LDA: None documented.     Drips: None documented.      Patient Active Problem List   Diagnosis    Dyslipidemia    HTN (hypertension), benign    Paraparesis    Paraplegia    Mobility impaired    Neurogenic bladder    Neurogenic bowel    Congenital nystagmus    Dependent on wheelchair    Spina bifida    UTI (urinary tract infection)    Family history of diabetes mellitus    Type 2 diabetes mellitus with other specified complication, without long-term current use of insulin    Hypokalemia    Anemia    Subacute osteomyelitis, other site    Sacral decubitus ulcer, stage IV       Review of patient's allergies indicates:   Allergen Reactions    Latex, natural rubber Hives       Current Inpatient Medications:       No current facility-administered medications on file prior to encounter.     Current Outpatient Medications on File Prior to Encounter   Medication Sig Dispense Refill    atorvastatin (LIPITOR) 80 MG tablet Take 1 tablet (80 mg total) by mouth once daily. 90 tablet 3    dapagliflozin propanediol (FARXIGA) 10 mg tablet Take 1 tablet (10 mg total) by mouth once daily. 90 tablet 3    hydroCHLOROthiazide (HYDRODIURIL) 25 MG tablet Take 1 tablet (25 mg total) by mouth once daily. 90 tablet 3    pioglitazone (ACTOS) 30 MG tablet Take 1 tablet (30 mg total) by mouth once daily. 90 tablet 3     traZODone (DESYREL) 50 MG tablet Take 1 tablet (50 mg total) by mouth every evening. 30 tablet 11    valsartan (DIOVAN) 320 MG tablet Take 1 tablet (320 mg total) by mouth once daily. 90 tablet 3    polyethylene glycol (GLYCOLAX) 17 gram PwPk Take 17 g by mouth once daily. 90 packet 3       Past Surgical History:   Procedure Laterality Date    SPINE SURGERY      VENTRICULOPERITONEAL SHUNT         Social History:  Tobacco Use: Low Risk  (4/18/2024)    Patient History     Smoking Tobacco Use: Never     Smokeless Tobacco Use: Never     Passive Exposure: Not on file       Alcohol Use: Not on file       OBJECTIVE:     Vital Signs Range:  BMI Readings from Last 1 Encounters:   04/17/24 27.63 kg/m²       Temp:  [36.7 °C (98.1 °F)]   Pulse:  [89]   Resp:  [18]   BP: (146)/(94)   SpO2:  [99 %]        Significant Labs:        Component Value Date/Time    WBC 6.60 04/12/2024 1015    HGB 13.2 (L) 04/12/2024 1015    HCT 43.1 04/12/2024 1015     04/12/2024 1015     04/12/2024 1015    K 3.9 04/12/2024 1015     04/12/2024 1015    CO2 28 04/12/2024 1015     04/12/2024 1015    BUN 21 (H) 04/12/2024 1015    CREATININE 1.1 04/12/2024 1015    MG 1.7 07/27/2022 0351    PHOS 3.1 07/27/2022 0351    CALCIUM 9.6 04/12/2024 1015    ALBUMIN 3.6 02/20/2024 0917    PROT 8.2 02/20/2024 0917    ALKPHOS 117 02/20/2024 0917    BILITOT 0.6 02/20/2024 0917    AST 20 02/20/2024 0917    ALT 31 02/20/2024 0917    INR 1.1 07/24/2022 2350    HGBA1C 9.3 (H) 04/12/2024 1015        Please see Results Review for additional labs.     Diagnostic Studies: No relevant studies.    EKG:   Results for orders placed or performed in visit on 04/12/24   EKG 12-lead    Collection Time: 04/12/24 10:18 AM   Result Value Ref Range    QRS Duration 88 ms    [facility] QTC Calculation 425 ms    Narrative    Test Reason :     Vent. Rate : 077 BPM     Atrial Rate : 077 BPM     P-R Int : 176 ms          QRS Dur : 088 ms      QT Int : 376 ms       P-R-T Axes :  055 -06 010 degrees     QTc Int : 425 ms    Normal sinus rhythm  Nonspecific T wave abnormality  Abnormal ECG  No previous ECGs available  Confirmed by Koko Salgado MD ([id number]) on 4/12/2024 2:11:29 PM    Referred By:             Confirmed By:Koko Salgado MD       ECHO:  See subjective, if available.      ASSESSMENT/PLAN:                                                                                                                  04/12/2024  Dale Pantoja is a 39 y.o., male scheduled for  DEBRIDEMENT, PRESSURE ULCER (Right) on 4/18/2024.      Past Medical History:   Diagnosis Date    Allergy     Hyperlipidemia     Hypertension     Neuromuscular disorder     Overactive bladder     Spina bifida     Type 2 diabetes mellitus, without long-term current use of insulin 10/21/2021    Urinary tract infection        Past Surgical History:   Procedure Laterality Date    SPINE SURGERY      VENTRICULOPERITONEAL SHUNT             Pre-op Assessment    I have reviewed the Patient Summary Reports.     I have reviewed the Nursing Notes. I have reviewed the NPO Status.   I have reviewed the Medications.     Review of Systems  Anesthesia Hx:  No problems with previous Anesthesia             Denies Family Hx of Anesthesia complications.    Denies Personal Hx of Anesthesia complications.                    Social:  Non-Smoker, No Alcohol Use       Hematology/Oncology:  Hematology Normal   Oncology Normal                                   EENT/Dental:  EENT/Dental Normal           Cardiovascular:     Hypertension                                        Pulmonary:  Pulmonary Normal                       Renal/:  Renal/ Normal                 Hepatic/GI:  Hepatic/GI Normal                 Musculoskeletal:  Musculoskeletal Normal                Neurological:    Neuromuscular Disease,        Spina bifida,  shunt  paraplegic                            Endocrine:  Diabetes, poorly controlled, type 2           Dermatological:  Skin Normal  Sacral decubitus ulcer, stage IV   Psych:  Psychiatric Normal                    Physical Exam  General: Well nourished, Cooperative and Alert  paraplegic  Airway:  Mallampati: III   Mouth Opening: Normal  TM Distance: Normal  Tongue: Normal  Neck ROM: Normal ROM    Dental:  Intact        Anesthesia Plan  Type of Anesthesia, risks & benefits discussed:    Anesthesia Type: MAC  Intra-op Monitoring Plan: Standard ASA Monitors  Post Op Pain Control Plan: multimodal analgesia and IV/PO Opioids PRN  Induction:  IV  Informed Consent: Informed consent signed with the Patient and all parties understand the risks and agree with anesthesia plan.  All questions answered. Patient consented to blood products? No  ASA Score: 3  Day of Surgery Review of History & Physical: H&P Update referred to the surgeon/provider.    Ready For Surgery From Anesthesia Perspective.     .

## 2024-04-15 DIAGNOSIS — E11.69 TYPE 2 DIABETES MELLITUS WITH OTHER SPECIFIED COMPLICATION, WITHOUT LONG-TERM CURRENT USE OF INSULIN: ICD-10-CM

## 2024-04-15 RX ORDER — METFORMIN HYDROCHLORIDE 500 MG/1
500 TABLET ORAL
Qty: 270 TABLET | Refills: 3
Start: 2024-04-15 | End: 2024-05-01 | Stop reason: SDUPTHER

## 2024-04-15 NOTE — TELEPHONE ENCOUNTER
No care due was identified.  Health [facility] Embedded Care Due Messages. Reference number: [id number].   4/15/2024 9:26:44 AM CDT

## 2024-04-16 ENCOUNTER — DOCUMENT SCAN (OUTPATIENT)
Dept: HOME HEALTH SERVICES | Facility: HOSPITAL | Age: 39
End: 2024-04-16
Payer: MEDICARE

## 2024-04-17 ENCOUNTER — TELEPHONE (OUTPATIENT)
Dept: SURGERY | Facility: HOSPITAL | Age: 39
End: 2024-04-17
Payer: MEDICARE

## 2024-04-18 ENCOUNTER — HOSPITAL ENCOUNTER (OUTPATIENT)
Facility: HOSPITAL | Age: 39
Discharge: HOME | End: 2024-04-18
Attending: SURGERY | Admitting: SURGERY
Payer: MEDICARE

## 2024-04-18 ENCOUNTER — ANESTHESIA (OUTPATIENT)
Dept: SURGERY | Facility: HOSPITAL | Age: 39
End: 2024-04-18
Payer: MEDICARE

## 2024-04-18 VITALS
RESPIRATION RATE: 18 BRPM | SYSTOLIC BLOOD PRESSURE: 125 MMHG | OXYGEN SATURATION: 99 % | BODY MASS INDEX: 27.63 KG/M2 | WEIGHT: 156 LBS | HEART RATE: 87 BPM | TEMPERATURE: 98 F | DIASTOLIC BLOOD PRESSURE: 56 MMHG

## 2024-04-18 DIAGNOSIS — L89.154 SACRAL DECUBITUS ULCER, STAGE IV: Primary | ICD-10-CM

## 2024-04-18 DIAGNOSIS — E11.69 TYPE 2 DIABETES MELLITUS WITH OTHER SPECIFIED COMPLICATION, WITHOUT LONG-TERM CURRENT USE OF INSULIN: ICD-10-CM

## 2024-04-18 DIAGNOSIS — R89.5 POSITIVE CULTURE FINDINGS IN WOUND: Primary | ICD-10-CM

## 2024-04-18 LAB — POCT GLUCOSE: 112 MG/DL (ref 70–110)

## 2024-04-18 PROCEDURE — D9220A PRA ANESTHESIA: Mod: ANES,,, | Performed by: ANESTHESIOLOGY

## 2024-04-18 PROCEDURE — 71000015 HC POSTOP RECOV 1ST HR: Performed by: SURGERY

## 2024-04-18 PROCEDURE — 88304 TISSUE EXAM BY PATHOLOGIST: CPT | Performed by: PATHOLOGY

## 2024-04-18 PROCEDURE — 37000009 HC ANESTHESIA EA ADD 15 MINS: Performed by: SURGERY

## 2024-04-18 PROCEDURE — 25000003 PHARM REV CODE 250: Performed by: ANESTHESIOLOGY

## 2024-04-18 PROCEDURE — 25000003 PHARM REV CODE 250: Performed by: NURSE ANESTHETIST, CERTIFIED REGISTERED

## 2024-04-18 PROCEDURE — 36000706: Performed by: SURGERY

## 2024-04-18 PROCEDURE — 63600175 PHARM REV CODE 636 W HCPCS: Performed by: NURSE ANESTHETIST, CERTIFIED REGISTERED

## 2024-04-18 PROCEDURE — 11042 DBRDMT SUBQ TIS 1ST 20SQCM/<: CPT | Mod: ,,, | Performed by: SURGERY

## 2024-04-18 PROCEDURE — D9220A PRA ANESTHESIA: Mod: CRNA,,, | Performed by: NURSE ANESTHETIST, CERTIFIED REGISTERED

## 2024-04-18 PROCEDURE — 82962 GLUCOSE BLOOD TEST: CPT | Performed by: SURGERY

## 2024-04-18 PROCEDURE — 63600175 PHARM REV CODE 636 W HCPCS: Performed by: SURGERY

## 2024-04-18 PROCEDURE — 88304 TISSUE EXAM BY PATHOLOGIST: CPT | Mod: 26,,, | Performed by: PATHOLOGY

## 2024-04-18 PROCEDURE — 36000707: Performed by: SURGERY

## 2024-04-18 PROCEDURE — 63600175 PHARM REV CODE 636 W HCPCS: Performed by: ANESTHESIOLOGY

## 2024-04-18 PROCEDURE — 71000016 HC POSTOP RECOV ADDL HR: Performed by: SURGERY

## 2024-04-18 PROCEDURE — 25000003 PHARM REV CODE 250: Performed by: SURGERY

## 2024-04-18 PROCEDURE — 37000008 HC ANESTHESIA 1ST 15 MINUTES: Performed by: SURGERY

## 2024-04-18 RX ORDER — HYDROMORPHONE HYDROCHLORIDE 2 MG/ML
0.2 INJECTION, SOLUTION INTRAMUSCULAR; INTRAVENOUS; SUBCUTANEOUS EVERY 5 MIN PRN
Status: CANCELLED | OUTPATIENT
Start: 2024-04-18

## 2024-04-18 RX ORDER — FENTANYL CITRATE 50 UG/ML
INJECTION, SOLUTION INTRAMUSCULAR; INTRAVENOUS
Status: DISCONTINUED | OUTPATIENT
Start: 2024-04-18 | End: 2024-04-18

## 2024-04-18 RX ORDER — SODIUM CHLORIDE, SODIUM LACTATE, POTASSIUM CHLORIDE, CALCIUM CHLORIDE 600; 310; 30; 20 MG/100ML; MG/100ML; MG/100ML; MG/100ML
INJECTION, SOLUTION INTRAVENOUS CONTINUOUS
Status: DISCONTINUED | OUTPATIENT
Start: 2024-04-18 | End: 2024-04-18 | Stop reason: HOSPADM

## 2024-04-18 RX ORDER — ONDANSETRON HYDROCHLORIDE 2 MG/ML
INJECTION, SOLUTION INTRAVENOUS
Status: DISCONTINUED | OUTPATIENT
Start: 2024-04-18 | End: 2024-04-18

## 2024-04-18 RX ORDER — AMOXICILLIN AND CLAVULANATE POTASSIUM 875; 125 MG/1; MG/1
1 TABLET, FILM COATED ORAL EVERY 12 HOURS
Qty: 20 TABLET | Refills: 0 | Status: SHIPPED | OUTPATIENT
Start: 2024-04-18 | End: 2024-06-04

## 2024-04-18 RX ORDER — ACETAMINOPHEN 500 MG
1000 TABLET ORAL
Status: COMPLETED | OUTPATIENT
Start: 2024-04-18 | End: 2024-04-18

## 2024-04-18 RX ORDER — SODIUM CHLORIDE 0.9 % (FLUSH) 0.9 %
10 SYRINGE (ML) INJECTION
Status: CANCELLED | OUTPATIENT
Start: 2024-04-18

## 2024-04-18 RX ORDER — LIDOCAINE HYDROCHLORIDE 20 MG/ML
INJECTION INTRAVENOUS
Status: DISCONTINUED | OUTPATIENT
Start: 2024-04-18 | End: 2024-04-18

## 2024-04-18 RX ORDER — ONDANSETRON HYDROCHLORIDE 2 MG/ML
4 INJECTION, SOLUTION INTRAVENOUS DAILY PRN
Status: CANCELLED | OUTPATIENT
Start: 2024-04-18

## 2024-04-18 RX ORDER — DEXAMETHASONE SODIUM PHOSPHATE 4 MG/ML
INJECTION, SOLUTION INTRA-ARTICULAR; INTRALESIONAL; INTRAMUSCULAR; INTRAVENOUS; SOFT TISSUE
Status: DISCONTINUED | OUTPATIENT
Start: 2024-04-18 | End: 2024-04-18

## 2024-04-18 RX ORDER — PROPOFOL 10 MG/ML
VIAL (ML) INTRAVENOUS CONTINUOUS PRN
Status: DISCONTINUED | OUTPATIENT
Start: 2024-04-18 | End: 2024-04-18

## 2024-04-18 RX ORDER — MIDAZOLAM HYDROCHLORIDE 1 MG/ML
INJECTION INTRAMUSCULAR; INTRAVENOUS
Status: DISCONTINUED | OUTPATIENT
Start: 2024-04-18 | End: 2024-04-18

## 2024-04-18 RX ORDER — SODIUM CHLORIDE 9 MG/ML
INJECTION, SOLUTION INTRAVENOUS CONTINUOUS
Status: DISCONTINUED | OUTPATIENT
Start: 2024-04-18 | End: 2024-04-18 | Stop reason: HOSPADM

## 2024-04-18 RX ADMIN — ACETAMINOPHEN 1000 MG: 500 TABLET ORAL at 07:04

## 2024-04-18 RX ADMIN — ONDANSETRON 4 MG: 2 INJECTION, SOLUTION INTRAMUSCULAR; INTRAVENOUS at 07:04

## 2024-04-18 RX ADMIN — DEXAMETHASONE SODIUM PHOSPHATE 4 MG: 4 INJECTION, SOLUTION INTRAMUSCULAR; INTRAVENOUS at 07:04

## 2024-04-18 RX ADMIN — PROPOFOL 75 MCG/KG/MIN: 10 INJECTION, EMULSION INTRAVENOUS at 07:04

## 2024-04-18 RX ADMIN — CEFAZOLIN 2 G: 2 INJECTION, POWDER, FOR SOLUTION INTRAMUSCULAR; INTRAVENOUS at 07:04

## 2024-04-18 RX ADMIN — FENTANYL CITRATE 25 MCG: 50 INJECTION, SOLUTION INTRAMUSCULAR; INTRAVENOUS at 07:04

## 2024-04-18 RX ADMIN — GLYCOPYRROLATE 0.1 MG: 0.2 INJECTION, SOLUTION INTRAMUSCULAR; INTRAVITREAL at 07:04

## 2024-04-18 RX ADMIN — SODIUM CHLORIDE, SODIUM LACTATE, POTASSIUM CHLORIDE, AND CALCIUM CHLORIDE: .6; .31; .03; .02 INJECTION, SOLUTION INTRAVENOUS at 07:04

## 2024-04-18 RX ADMIN — MIDAZOLAM HYDROCHLORIDE 2 MG: 1 INJECTION, SOLUTION INTRAMUSCULAR; INTRAVENOUS at 07:04

## 2024-04-18 RX ADMIN — LIDOCAINE HYDROCHLORIDE 100 MG: 20 INJECTION, SOLUTION INTRAVENOUS at 07:04

## 2024-04-18 NOTE — DISCHARGE SUMMARY
[facility] [place] - Surgery  Discharge Note  Short Stay    Procedure(s) (LRB):  DEBRIDEMENT, PRESSURE ULCER (Right)      OUTCOME: Patient tolerated treatment/procedure well without complication and is now ready for discharge.    DISPOSITION: Home or Self Care    FINAL DIAGNOSIS: stage 4 decubitus ulcer, right sided    FOLLOWUP: In clinic    DISCHARGE INSTRUCTIONS:    Discharge Procedure Orders   Diet Adult Regular     Notify your health care provider if you experience any of the following:  temperature >100.4     Notify your health care provider if you experience any of the following:  persistent nausea and vomiting or diarrhea     Notify your health care provider if you experience any of the following:  severe uncontrolled pain     Notify your health care provider if you experience any of the following:  redness, tenderness, or signs of infection (pain, swelling, redness, odor or green/yellow discharge around incision site)     Remove dressing in 24 hours   Order Comments: Remove packing in 24 hours and resume home health wound care dressing changes.     Activity as tolerated        TIME SPENT ON DISCHARGE: 12 minutes

## 2024-04-18 NOTE — OP NOTE
[facility] [place] - Surgery  Operative Note    SUMMARY     Surgery Date: 4/18/2024     Surgeons and Role:     * Allan Beckham MD - Primary    Assisting Surgeon: Tristan Mulligan MD    Pre-op Diagnosis:  Sacral decubitus ulcer, stage IV [L89.154]    Post-op Diagnosis:  Post-Op Diagnosis Codes:     * Sacral decubitus ulcer, stage IV [L89.154]    Procedure(s) (LRB):  DEBRIDEMENT, PRESSURE ULCER (Right)    Anesthesia: Choice    Indication for procedure: Dale Pantoja is 39 y.o. male with hx of non healing right sided decubitus ulcer. After discussion of disease process, we discussed options and have elected for operation to perform debridement of wound.    Description of Procedure: After consent was obtained, patient was taken to the OR. The decubitus ulcer, right sided, was prepped and draped in a standard sterile fashion after sedation anesthesia was started. Time out was performed. Antibiotics were started with ancef. We began the procedure by exploring the wound finding that it tracked proximally 3 cm there was no exposed bone there was good healthy granulation tissue at the base of the wound.  Superficially there was some bulky whitish colored tissue extending around the dermis towards the epidermis in a rim around the wound.  After discussion with primary wound care provider we discussed that this may be impeding the wound healing and opted to debride this sharply.  We used Allis to grabbed this rim of tissue with whitish plaques on it and we cut it away sharply 15 blade as well as with the Bovie electrocautery for hemostasis.  The underlying tissue revealed fat muscle and dermis all which were healthy and bleeding.  We irrigated the wound with saline and scraped away some granulation tissue and found that our wound at this point looked clean and hemostatic we debrided away the abnormal tissue.  We packed with a saline soaked Kerlix and covered this dry gauze.  The excised tissue was sent to pathology as rim of ulcer  tissue.  All counts were correct x2. Patient was awakened from anesthesia and taken to the recovery room in a stable condition having suffered no issues at this time.    Description of the findings of the procedure:   Wound measurements after debridement were 4 cm x 3.5 cm x 4 cm in depth  Good healthy granulation tissue packed with saline soaked Kerlix    Estimated Blood Loss: * No values recorded between 4/18/2024  7:31 AM and 4/18/2024  7:50 AM *         Specimens:   Specimen (24h ago, onward)       Start     Ordered    04/18/24 0736  Specimen to Pathology, Surgery General Surgery  Once        Comments: Pre-op Diagnosis: Sacral decubitus ulcer, stage IV [L89.154]Procedure(s):DEBRIDEMENT, PRESSURE ULCER Number of specimens: 1Name of specimens: Edge of ulcer     References:    Click here for ordering Quick Tip   Question Answer Comment   Procedure Type: General Surgery    Which provider would you like to cc? DANIEL ALVAREZ    Release to patient Immediate        04/18/24 0741                  Drains/Implants: saline soaked kerlix packing

## 2024-04-18 NOTE — DISCHARGE INSTRUCTIONS
[facility]  Office # [phone]    Discharge Instructions for Same Day Surgery     Call the office for and appointment if one has not already been made.     Diet: Drink plenty of fluids the first 48 hours and you may resume your   usual diet.     Activity: No heavy lifting (over 10 pounds), pushing or pulling until your   post op visit. Your doctor's office may have told you to limit your lifting to less weight, or even no weight.  Be sure to follow those instructions.       Do not drive, drink alcohol, or sign legal documents for 24 hours, or if taking narcotic pain medication.    Dressings: Remove the dressing 24 hours after surgery. Home Health will manage dressing changes.       Medical: Call the doctor for any of the following problems: fever above 101,   severe pain, bleeding, or abdominal distention (swelling).   If constipated you may take any stool softener you choose.     Occasionally small areas of skin numbness or an unpleasant skin sensation can result. Also, you may find that your incision is swollen and tender for a few days.  Some redness around sutures and staples is a normal reaction, but if the discomfort persists or worsens, call you doctor.     Fall Prevention  Millions of people fall every year and injure themselves. You may have had anesthesia or sedation which may increase your risk of falling. You may have health issues that put you at an increased risk of falling.     Here are ways to reduce your risk of falling.    Make your home safe by keeping walkways clear of objects you may trip over.  Use non-slip pads under rugs. Do not use area rugs or small throw rugs.  Use non-slip mats in bathtubs and showers.  Install handrails and lights on staircases.  Do not walk in poorly lit areas.  Do not stand on chairs or wobbly ladders.  Use caution when reaching overhead or looking upward. This position can cause a loss of balance.  Be sure your shoes fit properly, have non-slip  bottoms and are in good condition.   Wear shoes both inside and out. Avoid going barefoot or wearing slippers.  Be cautious when going up and down stairs, curbs, and when walking on uneven sidewalks.  If your balance is poor, consider using a cane or walker.  If your fall was related to alcohol use, stop or limit alcohol intake.   If your fall was related to use of sleeping medicines, talk to your doctor about this. You may need to reduce your dosage at bedtime if you awaken during the night to go to the bathroom.    To reduce the need for nighttime bathroom trips:  Avoid drinking fluids for several hours before going to bed  Empty your bladder before going to bed  Men can keep a urinal at the bedside  Stay as active as you can. Balance, flexibility, strength, and endurance all come from exercise. They all play a role in preventing falls. Ask your healthcare provider which types of activity are right for you.  Get your vision checked on a regular basis.  If you have pets, know where they are before you stand up or walk so you don't trip over them.  Use night lights.

## 2024-04-18 NOTE — TRANSFER OF CARE
Anesthesia Transfer of Care Note    Patient: Dale Pantoja    Procedure(s) Performed: Procedure(s) (LRB):  DEBRIDEMENT, PRESSURE ULCER (Right)    Patient location: [facility]    Anesthesia Type: MAC    Transport from OR: Transported from OR on room air with adequate spontaneous ventilation    Post pain: adequate analgesia    Post assessment: no apparent anesthetic complications and tolerated procedure well    Post vital signs: stable    Level of consciousness: awake and alert    Nausea/Vomiting: no nausea/vomiting    Complications: none    Transfer of care protocol was followed      Last vitals: Visit Vitals  /72 (BP Location: Left arm, Patient Position: Lying)   Pulse 78   Temp 36.5 °C (97.7 °F) (Oral)   Resp 20   Wt 70.8 kg (156 lb)   SpO2 98%   BMI 27.63 kg/m²

## 2024-04-18 NOTE — ANESTHESIA POSTPROCEDURE EVALUATION
Anesthesia Post Evaluation    Patient: Dale Pantoja    Procedure(s) Performed: Procedure(s) (LRB):  DEBRIDEMENT, PRESSURE ULCER (Right)    Final Anesthesia Type: MAC      Patient location during evaluation: [facility]  Patient participation: Yes- Able to Participate  Level of consciousness: awake and alert  Post-procedure vital signs: reviewed and stable  Pain management: adequate  Airway patency: patent    PONV status at discharge: No PONV  Anesthetic complications: no      Cardiovascular status: hemodynamically stable and blood pressure returned to baseline  Respiratory status: room air, spontaneous ventilation and unassisted  Hydration status: euvolemic  Follow-up not needed.              Vitals Value Taken Time   /72 04/18/24 0753   Temp 36.5 °C (97.7 °F) 04/18/24 0753   Pulse 78 04/18/24 0753   Resp 20 04/18/24 0753   SpO2 98 % 04/18/24 0753         No case tracking events are documented in the log.      Pain/Mirna Score: Pain Rating Prior to Med Admin: 0 (4/18/2024  7:25 AM)  Pain Rating Post Med Admin: 0 (4/18/2024  7:25 AM)

## 2024-04-22 LAB
FINAL PATHOLOGIC DIAGNOSIS: NORMAL
GROSS: NORMAL
Lab: NORMAL

## 2024-04-24 ENCOUNTER — HOSPITAL ENCOUNTER (OUTPATIENT)
Dept: WOUND CARE | Facility: HOSPITAL | Age: 39
Discharge: HOME | End: 2024-04-24
Attending: FAMILY MEDICINE
Payer: MEDICARE

## 2024-04-24 VITALS — HEART RATE: 82 BPM | DIASTOLIC BLOOD PRESSURE: 93 MMHG | SYSTOLIC BLOOD PRESSURE: 160 MMHG | TEMPERATURE: 98 F

## 2024-04-24 DIAGNOSIS — L89.154 SACRAL DECUBITUS ULCER, STAGE IV: Primary | ICD-10-CM

## 2024-04-24 PROCEDURE — 99214 OFFICE O/P EST MOD 30 MIN: CPT | Mod: ,,, | Performed by: FAMILY MEDICINE

## 2024-04-24 PROCEDURE — 99213 OFFICE O/P EST LOW 20 MIN: CPT | Performed by: FAMILY MEDICINE

## 2024-04-24 NOTE — PROGRESS NOTES
[facility]                Progress Note    Subjective:       Patient ID: Dale Pantoja is a 39 y.o. male.    Chief Complaint: Wound Check and Dressing Change    Follow up wound care visit. Patient rolled to exam room in wheelchair (per self), he then self ambulates to exam bed with nurse standing by. He denies pain to wound bed at present. Patient denies fever, chills, vomiting, nausea, or diarrhea at present. Dressing is intact with no strike through drainage. Patient had surgical debridement of wound bed on 04/18 with follow up on 05/01. Surgeon changed dressing to saline soaked gauze packed into wound bed then Mextra with Medipore border (removed today). Patient reports HH came out as ordered. Right Lower Buttock wound is measuring larger since last visit, but slightly smaller in width since debridement (after Sx 4 cm x 3.5 cm x 4 cm).      Wound care done as per order, MD changed back to collagen with alginate packing. Return to clinic in 4 weeks.    MD note:  patient presenting today for follow up s/p wound debridement of sacral decubitus ulcer.  He has been getting dressings changed by home health.  No fevers, chills, or sweats.  No odor from wound that he has noticed.   He states he is scheduled to follow up with the surgeon next week.          Review of Systems   Constitutional: Negative.    HENT: Negative.     Eyes: Negative.    Respiratory: Negative.     Cardiovascular: Negative.    Gastrointestinal: Negative.    Skin:  Positive for wound.         Objective:        Physical Exam  Constitutional:       Appearance: Normal appearance.   HENT:      Head: Normocephalic and atraumatic.      Mouth/Throat:      Mouth: Mucous membranes are moist.      Pharynx: Oropharynx is clear.   Eyes:      Extraocular Movements: Extraocular movements intact.      Conjunctiva/sclera: Conjunctivae normal.      Pupils: Pupils are equal, round, and reactive to light.   Pulmonary:       Effort: Pulmonary effort is normal. No respiratory distress.   Abdominal:      General: There is no distension.      Palpations: Abdomen is soft.   Skin:     General: Skin is warm.      Comments: +sacral decubitus with mild maceration to border; no excess slough and no purulent drainage on exam   Neurological:      Mental Status: He is alert and oriented to person, place, and time. Mental status is at baseline.         Vitals:    04/24/24 0947   BP: (!) 160/93   Pulse: 82   Temp: 97.9 °F (36.6 °C)       Assessment:           ICD-10-CM ICD-9-CM   1. Sacral decubitus ulcer, stage IV  L89.154 707.03     707.24            Altered Skin Integrity 09/28/22 1136 Right lower Buttocks (Active)   09/28/22 1136   Altered Skin Integrity Present on Admission - Did Patient arrive to the hospital with altered skin?: yes   Side: Right   Orientation: lower   Location: Buttocks   Wound Number:    Is this injury device related?:    Primary Wound Type:    Description of Altered Skin Integrity:    Ankle-Brachial Index:    Pulses:    Removal Indication and Assessment:    Wound Outcome:    (Retired) Wound Length (cm):    (Retired) Wound Width (cm):    (Retired) Depth (cm):    Wound Description (Comments):    Removal Indications:    Wound Image   04/24/24 1023   Dressing Appearance Intact;Moist drainage 04/24/24 1023   Drainage Amount Moderate 04/24/24 1023   Drainage Characteristics/Odor Serosanguineous 04/24/24 1023   Appearance Red;Pink;White;Moist 04/24/24 1023   Tissue loss description Full thickness 04/24/24 1023   Black (%), Wound Tissue Color 0 % 04/24/24 1023   Red (%), Wound Tissue Color 98 % 04/24/24 1023   Yellow (%), Wound Tissue Color 2 % 04/24/24 1023   Periwound Area Dry;Intact 04/24/24 1023   Wound Edges Defined;Open 04/24/24 1023   Wound Length (cm) 4 cm 04/24/24 1023   Wound Width (cm) 3.2 cm 04/24/24 1023   Wound Depth (cm) 4 cm 04/24/24 1023   Wound Volume (cm^3) 51.2 cm^3 04/24/24 1023   Wound Surface Area (cm^2) 12.8  "cm^2 04/24/24 1023   Tunneling (depth (cm)/location) 0 04/24/24 1023   Undermining (depth (cm)/location) 0 04/24/24 1023   Care Cleansed with:;Antimicrobial agent;Sterile normal saline;Wound cleanser 04/24/24 1023   Dressing Applied;Collagen;Calcium alginate;Absorptive Pad 04/24/24 1023   Periwound Care Skin barrier film applied;Absorptive dressing applied 04/24/24 1023   Dressing Change Due 05/22/24 04/24/24 1023           Plan:              Tissue pathology and/or culture taken     [] Yes      [x] No  Sharp debridement performed                   [] Yes       [x] No  Labs ordered     [] Yes       [x] No  Imaging ordered    [] Yes      [x] No    Orders Placed This Encounter   Procedures    Change dressing     Wound Dressing Orders     Dressing change frequency three times a week (Home Health will change Monday, Wednesday, Friday between visits)   Remove old dressing   Cleanse or irrigate with: Normal Saline   Protect periwound with:  Cavilon to periwound and area that will be taped   Primary dressing: WOUND BASE: Promogran (2 used) packed into wound bed then Aquacel AG ribbon pack inside wound, leave 2-inch tail.   Secondary dressing: Mextra: size used: 5" x 5" secured with Medipore Tape (in window-pane fashion)     Return to clinic in 4 weeks    SUBSEQUENT HOME HEALTH ORDERS     Home Health for Wound Care. Visit on Monday, Wednesday, Friday when patient is not seen in clinic. Patient will return to clinic on May 22, 2024.    Wound Dressing Orders     Dressing change frequency three times a week (Home Health will change Monday, Wednesday, Friday between visits)   Remove old dressing   Cleanse or irrigate with: Normal Saline   Protect periwound with:  Cavilon to periwound and area that will be taped   Primary dressing: WOUND BASE: Promogran (2 used) packed into wound bed then Aquacel AG ribbon pack inside wound, leave 2-inch tail.   Secondary dressing: Mextra: size used: 5" x 5" secured with Medipore Tape (in " "window-pane fashion)    1. Remove old dressing   2. Cleanse with Vashe, Normal Saline or Wound Cleanser   3. Pat Dry with gauze   4. Cavilon to area that will be taped   5. Promogran packed into wound bed   6. Then pack Aquacel AG ribbon inside wound, leave 2-inch tail   7. Outer dressing: Mextra: size used: 5"x5" secured with Medipore Tape (in window-pane fashion)     Evaluate for acute changes (purulence, increased redness/swelling, increased drainage, malodor, increased pain, pallor, necrosis) please contact physician on any acute changes. If after clinic hours or over the weekend, send patient to the ER.      Call clinic at [phone] if any changes, substitutions or questions about orders.      DO NOT DEVIATE FROM ORDER. PLEASE ORDER SUPPLIES NEED TO APPLY TO PATIENT'S WOUNDS.     Order Specific Question:   What Home Health Agency is the patient currently using?     Answer:   [facility]        Follow up in about 4 weeks (around 5/22/2024) for Wound Care.     Karl Hernandez MD    "

## 2024-04-26 ENCOUNTER — TELEPHONE (OUTPATIENT)
Dept: INTERNAL MEDICINE | Facility: CLINIC | Age: 39
End: 2024-04-26
Payer: MEDICARE

## 2024-04-26 DIAGNOSIS — E11.69 TYPE 2 DIABETES MELLITUS WITH OTHER SPECIFIED COMPLICATION, WITHOUT LONG-TERM CURRENT USE OF INSULIN: ICD-10-CM

## 2024-04-26 NOTE — TELEPHONE ENCOUNTER
Sandra Coleman  Staff  Phone Number: [phone]     Pt is needing a refill of the following med. Per [facility], they do not have this on file.    1. metFORMIN (GLUCOPHAGE) 500 MG tablet  Take 1 tablet (500 mg total) by mouth 3 (three) times daily with meals.    Please send to:  [facility]/pharmacy #[id number] - [place], [place] - [address]  Phone: [phone]  Fax: [phone]    Im happy to help. I can be reached by reply to this message or a call at the number below, if needed. Please let me know if theres anything I can do. Thank you.    Sandra Coleman LPN  Care Coordinator  [facility]  [phone] ext [phone]

## 2024-04-30 ENCOUNTER — EXTERNAL CHRONIC CARE MANAGEMENT (OUTPATIENT)
Dept: PRIMARY CARE CLINIC | Facility: CLINIC | Age: 39
End: 2024-04-30
Payer: MEDICARE

## 2024-04-30 ENCOUNTER — LAB VISIT (OUTPATIENT)
Dept: LAB | Facility: HOSPITAL | Age: 39
End: 2024-04-30
Attending: INTERNAL MEDICINE
Payer: MEDICARE

## 2024-04-30 DIAGNOSIS — Q05.2 SPINA BIFIDA OF LUMBAR REGION WITH HYDROCEPHALUS: ICD-10-CM

## 2024-04-30 DIAGNOSIS — E11.69 TYPE 2 DIABETES MELLITUS WITH OTHER SPECIFIED COMPLICATION, WITHOUT LONG-TERM CURRENT USE OF INSULIN: ICD-10-CM

## 2024-04-30 LAB
ALBUMIN SERPL BCP-MCNC: 3.2 G/DL (ref 3.5–5.2)
ALP SERPL-CCNC: 101 U/L (ref 55–135)
ALT SERPL W/O P-5'-P-CCNC: 19 U/L (ref 10–44)
ANION GAP SERPL CALC-SCNC: 10 MMOL/L (ref 8–16)
AST SERPL-CCNC: 13 U/L (ref 10–40)
BASOPHILS # BLD AUTO: 0.03 K/UL (ref 0–0.2)
BASOPHILS NFR BLD: 0.6 % (ref 0–1.9)
BILIRUB SERPL-MCNC: 0.6 MG/DL (ref 0.1–1)
BUN SERPL-MCNC: 19 MG/DL (ref 6–20)
CALCIUM SERPL-MCNC: 9.4 MG/DL (ref 8.7–10.5)
CHLORIDE SERPL-SCNC: 103 MMOL/L (ref 95–110)
CO2 SERPL-SCNC: 27 MMOL/L (ref 23–29)
CREAT SERPL-MCNC: 0.8 MG/DL (ref 0.5–1.4)
DIFFERENTIAL METHOD BLD: ABNORMAL
EOSINOPHIL # BLD AUTO: 0.3 K/UL (ref 0–0.5)
EOSINOPHIL NFR BLD: 5.1 % (ref 0–8)
ERYTHROCYTE [DISTWIDTH] IN BLOOD BY AUTOMATED COUNT: 18.3 % (ref 11.5–14.5)
EST. GFR  (NO RACE VARIABLE): >60 ML/MIN/1.73 M^2
ESTIMATED AVG GLUCOSE: 197 MG/DL (ref 68–131)
GLUCOSE SERPL-MCNC: 103 MG/DL (ref 70–110)
HBA1C MFR BLD: 8.5 % (ref 4–5.6)
HCT VFR BLD AUTO: 43.6 % (ref 40–54)
HGB BLD-MCNC: 13.2 G/DL (ref 14–18)
IMM GRANULOCYTES # BLD AUTO: 0.01 K/UL (ref 0–0.04)
IMM GRANULOCYTES NFR BLD AUTO: 0.2 % (ref 0–0.5)
LYMPHOCYTES # BLD AUTO: 2 K/UL (ref 1–4.8)
LYMPHOCYTES NFR BLD: 39.8 % (ref 18–48)
MCH RBC QN AUTO: 23.4 PG (ref 27–31)
MCHC RBC AUTO-ENTMCNC: 30.3 G/DL (ref 32–36)
MCV RBC AUTO: 77 FL (ref 82–98)
MONOCYTES # BLD AUTO: 0.4 K/UL (ref 0.3–1)
MONOCYTES NFR BLD: 7.7 % (ref 4–15)
NEUTROPHILS # BLD AUTO: 2.4 K/UL (ref 1.8–7.7)
NEUTROPHILS NFR BLD: 46.6 % (ref 38–73)
NRBC BLD-RTO: 0 /100 WBC
PLATELET # BLD AUTO: 296 K/UL (ref 150–450)
PMV BLD AUTO: 10.3 FL (ref 9.2–12.9)
POTASSIUM SERPL-SCNC: 3.4 MMOL/L (ref 3.5–5.1)
PROT SERPL-MCNC: 7.6 G/DL (ref 6–8.4)
RBC # BLD AUTO: 5.64 M/UL (ref 4.6–6.2)
SODIUM SERPL-SCNC: 140 MMOL/L (ref 136–145)
WBC # BLD AUTO: 5.07 K/UL (ref 3.9–12.7)

## 2024-04-30 PROCEDURE — 99490 CHRNC CARE MGMT STAFF 1ST 20: CPT | Mod: S$GLB,,, | Performed by: INTERNAL MEDICINE

## 2024-04-30 PROCEDURE — 80053 COMPREHEN METABOLIC PANEL: CPT | Performed by: INTERNAL MEDICINE

## 2024-04-30 PROCEDURE — 99439 CHRNC CARE MGMT STAF EA ADDL: CPT | Mod: S$GLB,,, | Performed by: INTERNAL MEDICINE

## 2024-04-30 PROCEDURE — 36415 COLL VENOUS BLD VENIPUNCTURE: CPT | Mod: PO | Performed by: INTERNAL MEDICINE

## 2024-04-30 PROCEDURE — 85025 COMPLETE CBC W/AUTO DIFF WBC: CPT | Performed by: INTERNAL MEDICINE

## 2024-04-30 PROCEDURE — 83036 HEMOGLOBIN GLYCOSYLATED A1C: CPT | Performed by: INTERNAL MEDICINE

## 2024-05-01 ENCOUNTER — OFFICE VISIT (OUTPATIENT)
Dept: SURGERY | Facility: CLINIC | Age: 39
End: 2024-05-01
Payer: MEDICARE

## 2024-05-01 VITALS
DIASTOLIC BLOOD PRESSURE: 77 MMHG | SYSTOLIC BLOOD PRESSURE: 129 MMHG | HEART RATE: 86 BPM | WEIGHT: 156.06 LBS | HEIGHT: 63 IN | BODY MASS INDEX: 27.65 KG/M2

## 2024-05-01 DIAGNOSIS — L89.154 SACRAL DECUBITUS ULCER, STAGE IV: Primary | ICD-10-CM

## 2024-05-01 PROCEDURE — 1159F MED LIST DOCD IN RCRD: CPT | Mod: CPTII,S$GLB,, | Performed by: SURGERY

## 2024-05-01 PROCEDURE — 3078F DIAST BP <80 MM HG: CPT | Mod: CPTII,S$GLB,, | Performed by: SURGERY

## 2024-05-01 PROCEDURE — 99999 PR PBB SHADOW E&M-EST. PATIENT-LVL III: CPT | Mod: PBBFAC,,, | Performed by: SURGERY

## 2024-05-01 PROCEDURE — 4010F ACE/ARB THERAPY RXD/TAKEN: CPT | Mod: CPTII,S$GLB,, | Performed by: SURGERY

## 2024-05-01 PROCEDURE — 3074F SYST BP LT 130 MM HG: CPT | Mod: CPTII,S$GLB,, | Performed by: SURGERY

## 2024-05-01 PROCEDURE — 3052F HG A1C>EQUAL 8.0%<EQUAL 9.0%: CPT | Mod: CPTII,S$GLB,, | Performed by: SURGERY

## 2024-05-01 PROCEDURE — 99024 POSTOP FOLLOW-UP VISIT: CPT | Mod: S$GLB,,, | Performed by: SURGERY

## 2024-05-01 RX ORDER — METFORMIN HYDROCHLORIDE 500 MG/1
500 TABLET ORAL
Qty: 270 TABLET | Refills: 3 | Status: SHIPPED | OUTPATIENT
Start: 2024-05-01

## 2024-05-01 NOTE — PROGRESS NOTES
Surgery Clinic Note    Dale Pantoja is a 39 y.o. year old male in clinic today for follow up of wound debridement of decubitus ulcer. Home health has been tending to the wound. He has seen wound care clinic. He reports no pain (he is insensate in the area). No drainage.  No f/c/n/v/sob/cp    ROS:  Negative except above    PE:  Vitals:    05/01/24 0956   BP: 129/77   Pulse: 86       NAD  No belabored breathing  Right sided posterior ischial spine area: Ulcer opening with clean edges, no drainage. Underlying space tracked a few cm with granulation tissue. Packing replaced    A/P:  Dale Pantoja is a 39 y.o. year old male w chronic decubitus ulcer    -continue home health wound care.  -f/u w wound care. If wound does not progress to healing, we can do further debridement to assist.    Allan Beckham  General Surgery - [facility]  5/1/2024

## 2024-05-07 ENCOUNTER — OFFICE VISIT (OUTPATIENT)
Dept: INTERNAL MEDICINE | Facility: CLINIC | Age: 39
End: 2024-05-07
Payer: MEDICARE

## 2024-05-07 VITALS
HEIGHT: 63 IN | DIASTOLIC BLOOD PRESSURE: 88 MMHG | SYSTOLIC BLOOD PRESSURE: 130 MMHG | OXYGEN SATURATION: 95 % | HEART RATE: 75 BPM | BODY MASS INDEX: 27.65 KG/M2

## 2024-05-07 DIAGNOSIS — E78.5 DYSLIPIDEMIA: ICD-10-CM

## 2024-05-07 DIAGNOSIS — I10 HTN (HYPERTENSION), BENIGN: ICD-10-CM

## 2024-05-07 DIAGNOSIS — E11.69 TYPE 2 DIABETES MELLITUS WITH OTHER SPECIFIED COMPLICATION, WITHOUT LONG-TERM CURRENT USE OF INSULIN: Primary | ICD-10-CM

## 2024-05-07 PROCEDURE — 3075F SYST BP GE 130 - 139MM HG: CPT | Mod: CPTII,S$GLB,, | Performed by: INTERNAL MEDICINE

## 2024-05-07 PROCEDURE — 4010F ACE/ARB THERAPY RXD/TAKEN: CPT | Mod: CPTII,S$GLB,, | Performed by: INTERNAL MEDICINE

## 2024-05-07 PROCEDURE — 99214 OFFICE O/P EST MOD 30 MIN: CPT | Mod: S$GLB,,, | Performed by: INTERNAL MEDICINE

## 2024-05-07 PROCEDURE — 1159F MED LIST DOCD IN RCRD: CPT | Mod: CPTII,S$GLB,, | Performed by: INTERNAL MEDICINE

## 2024-05-07 PROCEDURE — 3008F BODY MASS INDEX DOCD: CPT | Mod: CPTII,S$GLB,, | Performed by: INTERNAL MEDICINE

## 2024-05-07 PROCEDURE — 3052F HG A1C>EQUAL 8.0%<EQUAL 9.0%: CPT | Mod: CPTII,S$GLB,, | Performed by: INTERNAL MEDICINE

## 2024-05-07 PROCEDURE — 3079F DIAST BP 80-89 MM HG: CPT | Mod: CPTII,S$GLB,, | Performed by: INTERNAL MEDICINE

## 2024-05-07 PROCEDURE — 99999 PR PBB SHADOW E&M-EST. PATIENT-LVL III: CPT | Mod: PBBFAC,,, | Performed by: INTERNAL MEDICINE

## 2024-05-07 NOTE — PROGRESS NOTES
"I have personally taken the history and examined this patient and agree with the resident's note as stated above with the following thoughts:  /88 (BP Location: Right arm, Patient Position: Sitting, BP Method: Large (Manual))   Pulse 75   Ht 5' 3" (1.6 m)   SpO2 95%   BMI 27.65 kg/m²     Discussed getting vaccines up to date.  Discussed importance of low fat diet and exercise.  Follow upin 3 months-- needs to get a pill box. Stay on meds and bring meds with him next visit.    We have had some difficulties with him being on his medicines which hopefully we cleared up for the last time at the last visit.  Her some bring his medicines with him during his visits.  Blood pressure is doing so much better.  Diabetes also seems to be improving.  "

## 2024-05-07 NOTE — PROGRESS NOTES
INTERNAL MEDICINE RESIDENT CLINIC  CLINIC NOTE    Patient Name: Dale Pantoja  YOB: 1985    PRESENTING HISTORY       History of Present Illness:  Mr. Dale Pantoja is a 39 y.o. male with HTN, paraplegia, and neurogenic bladder who presents to clinic for follow-up and management of HTN and T2DM. He was last seen 2 months ago at which time he had improvement in his BP which he attributed to increasing his exercise regimen with basketball. He is now playing tennis and football. Since his last visit, he has undergone debridement of a sacral decubitus ulcer.    HTN: BP elevated in clinic today at 130/88; improved from visit 2 months ago which was 142/102. He is now on HCTZ 25mg daily and valsartan 320mg daily. He reports home BP readings typically SBP < 140 and DBP < 80.     DM: Hgb A1c 8.5% 7 days ago, down from 11.5% two months ago. Pt reports that he is taking Metformin, Farxiga, and Actos. Notes improvement in his diet, notably with more salads and protein. He does not monitor his blood glucose at home.     Neurogenic bladder: Continuing with intermittent self-catheterizations. No problems with incontinence.    Review of Systems   Constitutional:  Negative for fever and weight loss.   Respiratory:  Negative for shortness of breath.    Cardiovascular:  Negative for chest pain.   Gastrointestinal:  Negative for abdominal pain, blood in stool and vomiting.   Genitourinary:  Negative for dysuria and hematuria.       PAST HISTORY:     Past Medical History:   Diagnosis Date    Allergy     Hyperlipidemia     Hypertension     Neuromuscular disorder     Overactive bladder     Spina bifida     Type 2 diabetes mellitus, without long-term current use of insulin 10/21/2021    Urinary tract infection        Past Surgical History:   Procedure Laterality Date    PRESSURE ULCER DEBRIDEMENT Right 4/18/2024    Procedure: DEBRIDEMENT, PRESSURE ULCER;  Surgeon: Allan Beckham MD;  Location: [facility];  Service: General;   "Laterality: Right;  RN PREOP 4/12/2024----[facility]    SPINE SURGERY      VENTRICULOPERITONEAL SHUNT         Family History   Problem Relation Name Age of Onset    Diabetes Mother      Spina bifida Brother      Thyroid disease Neg Hx      Hypertension Neg Hx      Glaucoma Neg Hx      Prostate cancer Neg Hx      Kidney disease Neg Hx         Social History     Socioeconomic History    Marital status: Single   Tobacco Use    Smoking status: Never    Smokeless tobacco: Never   Substance and Sexual Activity    Alcohol use: No    Drug use: No    Sexual activity: Never       MEDICATIONS & ALLERGIES:     Current Outpatient Medications on File Prior to Visit   Medication Sig    amoxicillin-clavulanate 875-125mg (AUGMENTIN) 875-125 mg per tablet Take 1 tablet by mouth every 12 (twelve) hours. (Patient not taking: Reported on 5/1/2024)    atorvastatin (LIPITOR) 80 MG tablet Take 1 tablet (80 mg total) by mouth once daily.    dapagliflozin propanediol (FARXIGA) 10 mg tablet Take 1 tablet (10 mg total) by mouth once daily.    hydroCHLOROthiazide (HYDRODIURIL) 25 MG tablet Take 1 tablet (25 mg total) by mouth once daily.    metFORMIN (GLUCOPHAGE) 500 MG tablet Take 1 tablet (500 mg total) by mouth 3 (three) times daily with meals.    pioglitazone (ACTOS) 30 MG tablet Take 1 tablet (30 mg total) by mouth once daily.    polyethylene glycol (GLYCOLAX) 17 gram PwPk Take 17 g by mouth once daily.    traZODone (DESYREL) 50 MG tablet Take 1 tablet (50 mg total) by mouth every evening.    valsartan (DIOVAN) 320 MG tablet Take 1 tablet (320 mg total) by mouth once daily.     No current facility-administered medications on file prior to visit.       Review of patient's allergies indicates:   Allergen Reactions    Latex, natural rubber Hives       OBJECTIVE:   Vital Signs:  Vitals:    05/07/24 0827   BP: 130/88   Pulse: 75   SpO2: 95%   Height: 5' 3" (1.6 m)       No results found for this or any previous visit (from the past 24 " "hour(s)).      Physical Exam  Vitals and nursing note reviewed.   Constitutional:       General: He is not in acute distress.     Appearance: He is normal weight. He is not toxic-appearing.   HENT:      Head: Normocephalic and atraumatic.   Eyes:      Extraocular Movements: Extraocular movements intact.      Pupils: Pupils are equal, round, and reactive to light.   Cardiovascular:      Rate and Rhythm: Normal rate and regular rhythm.      Heart sounds: No murmur heard.  Pulmonary:      Effort: Pulmonary effort is normal. No respiratory distress.      Breath sounds: Normal breath sounds. No stridor. No wheezing, rhonchi or rales.   Abdominal:      General: Abdomen is flat. There is no distension.      Palpations: Abdomen is soft.      Tenderness: There is no abdominal tenderness. There is no guarding or rebound.   Musculoskeletal:      Cervical back: Normal range of motion and neck supple.      Right lower leg: No edema.      Left lower leg: No edema.   Skin:     General: Skin is warm and dry.   Neurological:      Mental Status: He is alert and oriented to person, place, and time. Mental status is at baseline.      Motor: Weakness present.      Gait: Gait abnormal.   Psychiatric:         Mood and Affect: Mood normal.         Behavior: Behavior normal.         Thought Content: Thought content normal.         Judgment: Judgment normal.         Laboratory  Lab Results   Component Value Date    WBC 5.07 04/30/2024    HGB 13.2 (L) 04/30/2024    HCT 43.6 04/30/2024    MCV 77 (L) 04/30/2024     04/30/2024     @[id number](GLU,NA,K,Cl,CO2,BUN,Creatinine,Calcium,MG)@  Lab Results   Component Value Date    INR 1.1 07/24/2022     Lab Results   Component Value Date    HGBA1C 8.5 (H) 04/30/2024     No results for input(s): "POCTGLUCOSE" in the last 72 hours.    Diagnostic Results:  Labs: Reviewed    ASSESSMENT & PLAN:     Dale was seen today for follow-up.    Diagnoses and all orders for this visit:    Type 2 diabetes " mellitus with other specified complication, without long-term current use of insulin  Hgb A1c improving. Will continue current regimen of Farxiga, Metformin and Actos. Advised to obtain a pillbox for ease of administration and to bring in his medications to his next appointment.  -     Comprehensive Metabolic Panel; Future  -     Hemoglobin A1C; Future    HTN (hypertension), benign  BP improving. Will continue current regimen of HCTZ and Valsartan. Advised to obtain a pillbox for ease of administration and to bring in his medications to his next appointment.    Dyslipidemia  Continue Atorvastatin 80mg nightly.  -     Lipid Panel; Future        RTC in 3 months    Discussed with Dr. Adorno  - staff attestation to follow    Misty Mcbride DO  Internal Medicine PGY-1

## 2024-05-09 ENCOUNTER — DOCUMENT SCAN (OUTPATIENT)
Dept: HOME HEALTH SERVICES | Facility: HOSPITAL | Age: 39
End: 2024-05-09
Payer: MEDICARE

## 2024-05-15 ENCOUNTER — EXTERNAL HOME HEALTH (OUTPATIENT)
Dept: HOME HEALTH SERVICES | Facility: HOSPITAL | Age: 39
End: 2024-05-15
Payer: MEDICARE

## 2024-05-22 ENCOUNTER — HOSPITAL ENCOUNTER (OUTPATIENT)
Dept: WOUND CARE | Facility: HOSPITAL | Age: 39
Discharge: HOME | End: 2024-05-22
Attending: FAMILY MEDICINE
Payer: MEDICARE

## 2024-05-22 VITALS
DIASTOLIC BLOOD PRESSURE: 86 MMHG | SYSTOLIC BLOOD PRESSURE: 154 MMHG | HEART RATE: 92 BPM | TEMPERATURE: 98 F | RESPIRATION RATE: 18 BRPM

## 2024-05-22 DIAGNOSIS — L89.154 SACRAL DECUBITUS ULCER, STAGE IV: Primary | ICD-10-CM

## 2024-05-22 PROCEDURE — 11042 DBRDMT SUBQ TIS 1ST 20SQCM/<: CPT | Mod: HCNC | Performed by: FAMILY MEDICINE

## 2024-05-22 PROCEDURE — 99214 OFFICE O/P EST MOD 30 MIN: CPT | Mod: 25,HCNC,, | Performed by: FAMILY MEDICINE

## 2024-05-22 PROCEDURE — 11042 DBRDMT SUBQ TIS 1ST 20SQCM/<: CPT | Mod: HCNC,,, | Performed by: FAMILY MEDICINE

## 2024-05-22 NOTE — PROGRESS NOTES
[facility]                Progress Note    Subjective:       Patient ID: Dale Pantoja is a 39 y.o. male.    Chief Complaint: Non-healing Wound Follow Up, Wound Care, and Dressing Change    Patient was seen today for follow up wound care visit. Patient rolled himself in his wheelchair to the exam room, with LPN by side. Patient transfer from wheelchair, wheels lock, to the exam bed per self with LPN standing by to assist. He denies pain or discomfort to the Right Buttock wound bed. Dressing intact without strike through drainage. He reported HH came out as order. [facility] orders were updated to leave patient with extra dressing supplies. [facility] was faxed update orders. Wound care done per MD orders. Patient transfer self from exam bed to wheelchair, wheels lock, with LPN standing by to assist.    Return to clinic in 4 week.      This is an established patient in wound care.   Patient presents in the office today for evaluation of the chronic wound.  Updated HPI is noted below.    The periwound appears non-erythematous, no maceration noted, non-edematous    The wound appears to have improved. Serous drainage; no odor. An area of hypergranulation is noted.     Patient denies wound pain    Patients reports he has decreased his sports related activities to rest up for the summer.     Lymphedema status is noncontributory to wound status    Pain at the site of the wound is aching    Contributing factors to current wound state include numerous comorbid conditions, off-loading limitations    Review of Systems   Constitutional:  Negative for activity change, appetite change and fever.   HENT:  Negative for congestion.    Respiratory:  Negative for shortness of breath and wheezing.    Cardiovascular:  Negative for chest pain and leg swelling.   Gastrointestinal:  Negative for abdominal pain, nausea and vomiting.   Skin:  Positive for wound.   Neurological:  Negative for  dizziness and headaches.   Psychiatric/Behavioral:  The patient is not nervous/anxious.          Objective:        Physical Exam  Vitals and nursing note reviewed.   Constitutional:       Appearance: He is well-developed.   HENT:      Head: Normocephalic and atraumatic.   Eyes:      Conjunctiva/sclera: Conjunctivae normal.   Pulmonary:      Effort: Pulmonary effort is normal.   Abdominal:      General: There is no distension.      Palpations: Abdomen is soft.   Musculoskeletal:         General: Normal range of motion.      Cervical back: Normal range of motion and neck supple.   Skin:     Comments: See wound description   Neurological:      Mental Status: He is alert and oriented to person, place, and time.   Psychiatric:         Behavior: Behavior normal.         Vitals:    05/22/24 1218   BP: (!) 154/86   Pulse: 92   Resp: 18   Temp: 97.6 °F (36.4 °C)       Assessment:           ICD-10-CM ICD-9-CM   1. Sacral decubitus ulcer, stage IV  L89.154 707.03     707.24            Altered Skin Integrity 09/28/22 1136 Right lower Buttocks (Active)   09/28/22 1136   Altered Skin Integrity Present on Admission - Did Patient arrive to the hospital with altered skin?: yes   Side: Right   Orientation: lower   Location: Buttocks   Wound Number:    Is this injury device related?:    Primary Wound Type:    Description of Altered Skin Integrity:    Ankle-Brachial Index:    Pulses:    Removal Indication and Assessment:    Wound Outcome:    (Retired) Wound Length (cm):    (Retired) Wound Width (cm):    (Retired) Depth (cm):    Wound Description (Comments):    Removal Indications:    Wound Image    05/22/24 1221   Description of Altered Skin Integrity Full thickness tissue loss. Subcutaneous fat may be visible but bone, tendon or muscle are not exposed 05/22/24 1221   Dressing Appearance Intact;Moist drainage 05/22/24 1221   Drainage Amount Small 05/22/24 1221   Drainage Characteristics/Odor Serosanguineous;No odor 05/22/24 1221    Appearance Pink;Red;Moist;White 05/22/24 1221   Tissue loss description Full thickness 05/22/24 1221   Red (%), Wound Tissue Color 100 % 05/22/24 1221   Periwound Area Dry 05/22/24 1221   Wound Edges Open;Defined 05/22/24 1221   Wound Length (cm) 2.5 cm 05/22/24 1221   Wound Width (cm) 3.2 cm 05/22/24 1221   Wound Depth (cm) 4.3 cm 05/22/24 1221   Wound Volume (cm^3) 34.4 cm^3 05/22/24 1221   Wound Surface Area (cm^2) 8 cm^2 05/22/24 1221   Care Cleansed with:;Sterile normal saline;Debrided 05/22/24 1221   Dressing Applied;Collagen;Calcium alginate;Absorptive Pad;Other (comment) 05/22/24 1221   Periwound Care Skin barrier film applied 05/22/24 1221   Dressing Change Due 06/19/24 05/22/24 1221           Debridement    Date/Time: 5/22/2024 9:47 AM    Performed by: Ofelia Lizama MD  Authorized by: Ofelia Lizama MD    Consent Done?:  Yes (Verbal)  Local anesthesia used?: Yes    Local anesthetic:  Topical anesthetic    Wound Details:    Location:  Right buttock    Type of Debridement:  Excisional       Length (cm):  2.5       Area (sq cm):  8       Width (cm):  3.2       Percent Debrided (%):  100       Depth (cm):  4.3       Total Area Debrided (sq cm):  8    Depth of debridement:  Subcutaneous tissue    Tissue debrided:  Subcutaneous    Devitalized tissue debrided:  Slough and Fibrin    Instruments:  Curette  Bleeding:  Minimal  Hemostasis Achieved: Yes  Method Used:  Pressure  Patient tolerance:  Patient tolerated the procedure well with no immediate complications          Plan:            Debridement needed and Done today.   Keep dressing clean and intact  Recommend off-loading   Discussed increase protein intake   Discussed wound expectations and plan   Continue with wound care orders and plan as noted in orders.   Continue to follow current medication regimen as per pcp   Call for any questions / concerns.       Tissue pathology and/or culture taken     [] Yes      [x] No  Sharp debridement performed               "     [x] Yes       [] No  Labs ordered     [] Yes       [x] No  Imaging ordered    [] Yes      [x] No    Orders Placed This Encounter   Procedures    Change dressing     Wound Dressing Orders    Dressing change frequency three times a week (Home Health will change Monday, Wednesday, Friday between visits)  Remove old dressing  Cleanse or irrigate with: Normal Saline  Protect periwound with:  Cavilon to periwound and area that will be taped  Primary dressing: WOUND BASE: Promogran (2 used) packed into wound bed then Aquacel AG ribbon pack inside wound, leave 2-inch tail.  Secondary dressing: Mextra: size used: 5" x 5" secured with Medipore Tape (in window-pane fashion)    Return to clinic in 4 weeks    SUBSEQUENT HOME HEALTH ORDERS     Home Health for Wound Care. Visit on Monday, Wednesday, Friday when patient is not seen in clinic. Patient will return to clinic on June 19, 2024.     Wound Dressing Orders     Dressing change frequency three times a week (Home Health will change Monday, Wednesday, Friday between visits)   Remove old dressing   Cleanse or irrigate with: Normal Saline   Protect periwound with:  Cavilon to periwound and area that will be taped   Primary dressing: WOUND BASE: Promogran (2 used) packed into wound bed then Aquacel AG ribbon pack inside wound, leave 2-inch tail.   Secondary dressing: Mextra: size used: 5" x 5" secured with Medipore Tape (in window-pane fashion)     1. Remove old dressing   2. Cleanse with Vashe, Normal Saline or Wound Cleanser   3. Pat Dry with gauze   4. Cavilon to area that will be taped   5. Promogran packed into wound bed   6. Then pack Aquacel AG ribbon inside wound, leave 2-inch tail   7. Outer dressing: Mextra: size used: 5"x5" secured with Medipore Tape (in window-pane fashion)   8. Leave patient with extra supplies for excessive drainage between visits.    Evaluate for acute changes (purulence, increased redness/swelling, increased drainage, malodor, increased pain, " pallor, necrosis) please contact physician on any acute changes. If after clinic hours or over the weekend, send patient to the ER.      Call clinic at [phone] if any changes, substitutions or questions about orders.      DO NOT DEVIATE FROM ORDER. PLEASE ORDER SUPPLIES NEED TO APPLY TO PATIENT'S WOUNDS.     Order Specific Question:   What Home Health Agency is the patient currently using?     Answer:   [facility]        Follow up in about 4 weeks (around 6/19/2024) for wound care.

## 2024-05-29 ENCOUNTER — PATIENT OUTREACH (OUTPATIENT)
Dept: ADMINISTRATIVE | Facility: HOSPITAL | Age: 39
End: 2024-05-29
Payer: MEDICARE

## 2024-05-29 DIAGNOSIS — E11.69 TYPE 2 DIABETES MELLITUS WITH OTHER SPECIFIED COMPLICATION, WITHOUT LONG-TERM CURRENT USE OF INSULIN: Primary | ICD-10-CM

## 2024-05-29 NOTE — PROGRESS NOTES
Health Maintenance Due   Topic Date Due    Diabetes Urine Screening  Never done    COVID-19 Vaccine (3 - 2023-24 season) 09/01/2023    Foot Exam  12/05/2023    Eye Exam  07/18/2024     Triggered LINKS and reconciled immunizations. Updated Care Everywhere. Pt has lab appt scheduled for 7/31/24- order for urine microalbumin screening ordered and linked to lab appt. Chart review completed as part of April MA Gap List report.

## 2024-05-31 ENCOUNTER — EXTERNAL CHRONIC CARE MANAGEMENT (OUTPATIENT)
Dept: PRIMARY CARE CLINIC | Facility: CLINIC | Age: 39
End: 2024-05-31
Payer: MEDICARE

## 2024-05-31 PROCEDURE — 99490 CHRNC CARE MGMT STAFF 1ST 20: CPT | Mod: S$GLB,,, | Performed by: INTERNAL MEDICINE

## 2024-06-03 ENCOUNTER — TELEPHONE (OUTPATIENT)
Dept: ADMINISTRATIVE | Facility: CLINIC | Age: 39
End: 2024-06-03
Payer: MEDICARE

## 2024-06-03 DIAGNOSIS — Z71.89 COMPLEX CARE COORDINATION: ICD-10-CM

## 2024-06-03 NOTE — TELEPHONE ENCOUNTER
Called pt, informed pt I was calling to remind pt of his in office EAWV on 6/4/24; clinic location provided to patient; pt confirmed appointment

## 2024-06-04 ENCOUNTER — OFFICE VISIT (OUTPATIENT)
Dept: FAMILY MEDICINE | Facility: CLINIC | Age: 39
End: 2024-06-04
Payer: MEDICARE

## 2024-06-04 VITALS
BODY MASS INDEX: 27.65 KG/M2 | HEIGHT: 63 IN | HEART RATE: 77 BPM | OXYGEN SATURATION: 98 % | DIASTOLIC BLOOD PRESSURE: 80 MMHG | SYSTOLIC BLOOD PRESSURE: 138 MMHG | TEMPERATURE: 98 F

## 2024-06-04 DIAGNOSIS — K59.01 SLOW TRANSIT CONSTIPATION: ICD-10-CM

## 2024-06-04 DIAGNOSIS — N31.9 NEUROGENIC BLADDER: ICD-10-CM

## 2024-06-04 DIAGNOSIS — R26.9 ABNORMALITY OF GAIT AND MOBILITY: ICD-10-CM

## 2024-06-04 DIAGNOSIS — Z99.3 DEPENDENCE ON WHEELCHAIR: ICD-10-CM

## 2024-06-04 DIAGNOSIS — G82.20 PARAPLEGIA: ICD-10-CM

## 2024-06-04 DIAGNOSIS — Z00.00 ENCOUNTER FOR PREVENTIVE HEALTH EXAMINATION: ICD-10-CM

## 2024-06-04 DIAGNOSIS — Q05.2 SPINA BIFIDA OF LUMBAR REGION WITH HYDROCEPHALUS: ICD-10-CM

## 2024-06-04 DIAGNOSIS — L89.154 SACRAL DECUBITUS ULCER, STAGE IV: ICD-10-CM

## 2024-06-04 DIAGNOSIS — Z00.00 ENCOUNTER FOR MEDICARE ANNUAL WELLNESS EXAM: ICD-10-CM

## 2024-06-04 DIAGNOSIS — E11.69 TYPE 2 DIABETES MELLITUS WITH OTHER SPECIFIED COMPLICATION, WITHOUT LONG-TERM CURRENT USE OF INSULIN: Primary | ICD-10-CM

## 2024-06-04 PROCEDURE — 4010F ACE/ARB THERAPY RXD/TAKEN: CPT | Mod: HCNC,CPTII,S$GLB,

## 2024-06-04 PROCEDURE — 1159F MED LIST DOCD IN RCRD: CPT | Mod: HCNC,CPTII,S$GLB,

## 2024-06-04 PROCEDURE — 99999 PR PBB SHADOW E&M-EST. PATIENT-LVL IV: CPT | Mod: PBBFAC,HCNC,,

## 2024-06-04 PROCEDURE — 3079F DIAST BP 80-89 MM HG: CPT | Mod: HCNC,CPTII,S$GLB,

## 2024-06-04 PROCEDURE — 3075F SYST BP GE 130 - 139MM HG: CPT | Mod: HCNC,CPTII,S$GLB,

## 2024-06-04 PROCEDURE — G0439 PPPS, SUBSEQ VISIT: HCPCS | Mod: HCNC,S$GLB,,

## 2024-06-04 PROCEDURE — G9920 SCRNING PERF AND NEGATIVE: HCPCS | Mod: HCNC,CPTII,S$GLB,

## 2024-06-04 PROCEDURE — 1160F RVW MEDS BY RX/DR IN RCRD: CPT | Mod: HCNC,CPTII,S$GLB,

## 2024-06-04 PROCEDURE — 3052F HG A1C>EQUAL 8.0%<EQUAL 9.0%: CPT | Mod: HCNC,CPTII,S$GLB,

## 2024-06-04 NOTE — ASSESSMENT & PLAN NOTE
Wound care comes to house three times per week and pt changes dressing on weekend. Pt has gel cushion on wheelchair that helps. Lives by himself at home. Twin passed away last year from bladder CA.

## 2024-06-04 NOTE — PATIENT INSTRUCTIONS
Counseling and Referral of Other Preventative  (Italic type indicates deductible and co-insurance are waived)    Patient Name: Dale Pantoja  Today's Date: 6/4/2024    Health Maintenance       Date Due Completion Date    Diabetes Urine Screening Never done ---    COVID-19 Vaccine (3 - 2023-24 season) 09/01/2023 4/12/2021    Foot Exam 12/05/2023 12/5/2022 (Done)    Override on 12/5/2022: Done    Override on 9/17/2021: Done    Eye Exam 07/18/2024 7/18/2023    Hemoglobin A1c 07/30/2024 4/30/2024    Lipid Panel 02/20/2025 2/20/2024    Low Dose Statin 05/01/2025 5/1/2024    TETANUS VACCINE 02/11/2026 2/11/2016    Pneumococcal Vaccines (Age 0-64) (3 of 3 - PPSV23 or PCV20) 01/04/2048 10/11/2021        No orders of the defined types were placed in this encounter.      The following information is provided to all patients.  This information is to help you find resources for any of the problems found today that may be affecting your health:                  Living healthy guide: www.[facility].[place].gov      Understanding Diabetes: www.diabetes.org      Eating healthy: www.cdc.gov/healthyweight      CDC home safety checklist: www.cdc.gov/steadi/patient.html      Agency on Aging: www.goea.[place].[facility]      Alcoholics anonymous (AA): www.aa.org      Physical Activity: www.tyshawn.nih.gov/zs5yhih      Tobacco use: www.quitwithusla.org

## 2024-06-04 NOTE — ASSESSMENT & PLAN NOTE
Self caths 5-6 times per day. Stable, chronic condition.  Will continue to maximize risk factor reduction and adjust medication as needed

## 2024-06-04 NOTE — ASSESSMENT & PLAN NOTE
Stable, chronic condition.  Will continue to maximize risk factor reduction and adjust medication as needed

## 2024-06-04 NOTE — PROGRESS NOTES
HPI     Chief Complaint:  AWV    Dale Pantoja is a 39 y.o. male with multiple medical diagnoses as listed in the medical history and problem list that presented for a Medicare AWV and comprehensive Health Risk Assessment today.  Pt is new to me.       The following components were reviewed and updated:    Medical history  Family History  Social history  Allergies and Current Medications  Health Risk Assessment  Health Maintenance  Care Team     HPI      Assessment & Plan     (all problems are new to me)    Diagnoses and health risks identified today and associated recommendations/orders:    Problem List Items Addressed This Visit          Neuro    Paraplegia    Current Assessment & Plan     Wheelchair bound. Stable, chronic condition.  Will continue to maximize risk factor reduction and adjust medication as needed           Spina bifida    Current Assessment & Plan     Stable, chronic condition.  Will continue to maximize risk factor reduction and adjust medication as needed              Renal/    Neurogenic bladder    Current Assessment & Plan     Self caths 5-6 times per day. Stable, chronic condition.  Will continue to maximize risk factor reduction and adjust medication as needed              Endocrine    Type 2 diabetes mellitus with other specified complication, without long-term current use of insulin - Primary    Current Assessment & Plan     Lab Results   Component Value Date    HGBA1C 8.5 (H) 04/30/2024     A1C improved, pt admits health declined when he was taking care of brother last year prior to him passing. Managed by PCP. The current medical regimen is effective;  continue present plan and medications.              Orthopedic    Sacral decubitus ulcer, stage IV    Current Assessment & Plan     Wound care comes to house three times per week and pt changes dressing on weekend. Pt has gel cushion on wheelchair that helps. Lives by himself at home. Twin passed away last year from bladder CA.            Other Visit Diagnoses       Encounter for Medicare annual wellness exam      Pt was seen today for an Annual Wellness visit. Healthcare maintenance and screening recommendations were discussed and updated as indicated. Return in one year for AWV.      Slow transit constipation      The current medical regimen is effective;  continue present plan and medications.      Dependence on wheelchair      Stable, chronic condition.  Will continue to maximize risk factor reduction and adjust medication as needed      Abnormality of gait and mobility      Wheelchair bound    Encounter for preventive health examination      Counseled on age appropriate medical preventative services including age appropriate cancer screenings, age appropriate eye and dental exams, over all nutritional health, need for a consistent exercise regimen, and an over all push towards maintaining a vigorous and active lifestyle.  Counseled on age appropriate vaccines and discussed upcoming health care needs based on age/gender. Discussed good sleep hygiene and stress management.                --------------------------------------------    ** See Completed Assessments for Annual Wellness Visit within the encounter summary.**    The following assessments were completed:  Living Situation  CAGE  Depression Screening  Timed Get Up and Go - unable to perform; wheelchair bound  Whisper Test  Cognitive Function Screening  Nutrition Screening  ADL Screening  PAQ Screening      Provided Dale Pantoja  with a 5-10 year written screening schedule and personal prevention plan. Recommendations were developed using the USPSTF age appropriate recommendations. Education, counseling, and referrals were provided as needed. After Visit Summary printed and given to patient which includes a list of additional screenings\tests needed.    Review for Opioid Screening: Pt does not have Rx for Opioids     Review for Substance Use Disorders: Patient does not abuse use  "substances      Exam     Review of Systems:  (as noted above)  Review of Systems    Physical Exam:   Physical Exam  Constitutional:       General: He is not in acute distress.     Appearance: Normal appearance. He is not toxic-appearing.   Neurological:      General: No focal deficit present.      Mental Status: He is alert and oriented to person, place, and time.   Psychiatric:         Mood and Affect: Mood normal.       Vitals:    06/04/24 1000   BP: 138/80   Pulse: 77   Temp: 98.2 °F (36.8 °C)   TempSrc: Oral   SpO2: 98%   Height: 5' 3" (1.6 m)      Body mass index is 27.65 kg/m².    Clock:              Health Maintenance:  Health Maintenance         Date Due Completion Date    Diabetes Urine Screening Never done ---    COVID-19 Vaccine (3 - 2023-24 season) 09/01/2023 4/12/2021    Foot Exam 12/05/2023 12/5/2022 (Done)    Override on 12/5/2022: Done    Override on 9/17/2021: Done    Eye Exam 07/18/2024 7/18/2023    Hemoglobin A1c 07/30/2024 4/30/2024    Lipid Panel 02/20/2025 2/20/2024    Low Dose Statin 05/01/2025 5/1/2024    TETANUS VACCINE 02/11/2026 2/11/2016    Pneumococcal Vaccines (Age 0-64) (3 of 3 - PPSV23 or PCV20) 01/04/2048 10/11/2021            Health maintenance reviewed  F/u with PCP for HM gaps      Follow Up:  No follow-ups on file.    History     Past Medical History:  Past Medical History:   Diagnosis Date    Allergy     Hyperlipidemia     Hypertension     Neuromuscular disorder     Overactive bladder     Spina bifida     Type 2 diabetes mellitus, without long-term current use of insulin 10/21/2021    Urinary tract infection        Past Surgical History:  Past Surgical History:   Procedure Laterality Date    PRESSURE ULCER DEBRIDEMENT Right 4/18/2024    Procedure: DEBRIDEMENT, PRESSURE ULCER;  Surgeon: Allan Beckham MD;  Location: [facility];  Service: General;  Laterality: Right;  RN PREOP 4/12/2024----[facility]ERGY    SPINE SURGERY      VENTRICULOPERITONEAL SHUNT         Social " History:  Social History     Socioeconomic History    Marital status: Single   Tobacco Use    Smoking status: Never    Smokeless tobacco: Never   Substance and Sexual Activity    Alcohol use: No    Drug use: No    Sexual activity: Never     Social Determinants of Health     Financial Resource Strain: Low Risk  (6/4/2024)    Overall Financial Resource Strain (CARDIA)     Difficulty of Paying Living Expenses: Not hard at all   Food Insecurity: No Food Insecurity (6/4/2024)    Hunger Vital Sign     Worried About Running Out of Food in the Last Year: Never true     Ran Out of Food in the Last Year: Never true   Transportation Needs: No Transportation Needs (6/4/2024)    PRAPARE - Transportation     Lack of Transportation (Medical): No     Lack of Transportation (Non-Medical): No   Physical Activity: Sufficiently Active (6/4/2024)    Exercise Vital Sign     Days of Exercise per Week: 7 days     Minutes of Exercise per Session: 120 min   Stress: No Stress Concern Present (6/4/2024)    [facility] - Occupational Stress Questionnaire     Feeling of Stress : Not at all   Housing Stability: Low Risk  (6/4/2024)    Housing Stability Vital Sign     Unable to Pay for Housing in the Last Year: No     Homeless in the Last Year: No       Family History:  Family History   Problem Relation Name Age of Onset    Diabetes Mother      Spina bifida Brother      Thyroid disease Neg Hx      Hypertension Neg Hx      Glaucoma Neg Hx      Prostate cancer Neg Hx      Kidney disease Neg Hx         Allergies and Medications: (updated and reviewed)  Review of patient's allergies indicates:   Allergen Reactions    Latex, natural rubber Hives     Current Outpatient Medications   Medication Sig Dispense Refill    atorvastatin (LIPITOR) 80 MG tablet Take 1 tablet (80 mg total) by mouth once daily. 90 tablet 3    dapagliflozin propanediol (FARXIGA) 10 mg tablet Take 1 tablet (10 mg total) by mouth once daily. 90 tablet 3     hydroCHLOROthiazide (HYDRODIURIL) 25 MG tablet Take 1 tablet (25 mg total) by mouth once daily. 90 tablet 3    metFORMIN (GLUCOPHAGE) 500 MG tablet Take 1 tablet (500 mg total) by mouth 3 (three) times daily with meals. 270 tablet 3    pioglitazone (ACTOS) 30 MG tablet Take 1 tablet (30 mg total) by mouth once daily. 90 tablet 3    valsartan (DIOVAN) 320 MG tablet Take 1 tablet (320 mg total) by mouth once daily. 90 tablet 3     No current facility-administered medications for this visit.       Patient Care Team:  Juan Antonio Adorno Jr., MD as PCP - General (Internal Medicine)  Smita Garcia MA as Care Coordinator (Internal Medicine)  Gail Styles as Digital Medicine Health   [place] (Home Health Services)  Ofelia Lizama MD as Wound Care (Wound Care)         - The patient is given an After Visit Summary that lists all medications with directions, allergies, education, orders placed during this encounter and follow-up instructions.      - I have reviewed the patient's medical information including past medical, family, and social history sections including the medications and allergies.      - We discussed the patient's current medications.     This note was created by combination of typed  and MModal dictation.  Transcription errors may be present.  If there are any questions, please contact me.         I offered to discuss advanced care planning, including how to pick a person who would make decisions for you if you were unable to make them for yourself, called a health care power of , and what kind of decisions you might make such as use of life sustaining treatments such as ventilators and tube feeding when faced with a life limiting illness recorded on a living will that they will need to know. (How you want to be cared for as you near the end of your natural life)

## 2024-06-04 NOTE — ASSESSMENT & PLAN NOTE
Lab Results   Component Value Date    HGBA1C 8.5 (H) 04/30/2024     A1C improved, pt admits health declined when he was taking care of brother last year prior to him passing. Managed by PCP. The current medical regimen is effective;  continue present plan and medications.

## 2024-06-04 NOTE — ASSESSMENT & PLAN NOTE
Wheelchair bound. Stable, chronic condition.  Will continue to maximize risk factor reduction and adjust medication as needed

## 2024-06-10 ENCOUNTER — EXTERNAL HOME HEALTH (OUTPATIENT)
Dept: HOME HEALTH SERVICES | Facility: HOSPITAL | Age: 39
End: 2024-06-10
Payer: MEDICARE

## 2024-06-10 ENCOUNTER — PATIENT MESSAGE (OUTPATIENT)
Dept: INTERNAL MEDICINE | Facility: CLINIC | Age: 39
End: 2024-06-10
Payer: MEDICARE

## 2024-06-18 ENCOUNTER — DOCUMENT SCAN (OUTPATIENT)
Dept: HOME HEALTH SERVICES | Facility: HOSPITAL | Age: 39
End: 2024-06-18
Payer: MEDICARE

## 2024-06-18 ENCOUNTER — TELEPHONE (OUTPATIENT)
Dept: WOUND CARE | Facility: HOSPITAL | Age: 39
End: 2024-06-18
Payer: MEDICARE

## 2024-06-18 NOTE — TELEPHONE ENCOUNTER
Called pt to advise of canceled appt for 6-19-24 due to clinic staffing advised pt that he was rescheduled for 6-26-24 at 10 am. Advised pt that will call home health to advise come out and change dressing so the pt does not miss care.   Pt advised understanding.

## 2024-06-18 NOTE — TELEPHONE ENCOUNTER
Called pt home health spoke with stacey partida, he states home health will go out tomm, and change dressing.

## 2024-06-26 ENCOUNTER — HOSPITAL ENCOUNTER (OUTPATIENT)
Dept: WOUND CARE | Facility: HOSPITAL | Age: 39
Discharge: HOME | End: 2024-06-26
Attending: FAMILY MEDICINE
Payer: MEDICARE

## 2024-06-26 VITALS
DIASTOLIC BLOOD PRESSURE: 92 MMHG | RESPIRATION RATE: 18 BRPM | HEART RATE: 86 BPM | TEMPERATURE: 98 F | SYSTOLIC BLOOD PRESSURE: 139 MMHG

## 2024-06-26 DIAGNOSIS — L89.154 SACRAL DECUBITUS ULCER, STAGE IV: Primary | ICD-10-CM

## 2024-06-26 PROCEDURE — 87077 CULTURE AEROBIC IDENTIFY: CPT | Mod: HCNC | Performed by: FAMILY MEDICINE

## 2024-06-26 PROCEDURE — 87205 SMEAR GRAM STAIN: CPT | Mod: HCNC | Performed by: FAMILY MEDICINE

## 2024-06-26 PROCEDURE — 11042 DBRDMT SUBQ TIS 1ST 20SQCM/<: CPT | Mod: HCNC,,, | Performed by: FAMILY MEDICINE

## 2024-06-26 PROCEDURE — 99214 OFFICE O/P EST MOD 30 MIN: CPT | Mod: 25,HCNC,, | Performed by: FAMILY MEDICINE

## 2024-06-26 PROCEDURE — 87070 CULTURE OTHR SPECIMN AEROBIC: CPT | Mod: HCNC | Performed by: FAMILY MEDICINE

## 2024-06-26 PROCEDURE — 11042 DBRDMT SUBQ TIS 1ST 20SQCM/<: CPT | Mod: HCNC | Performed by: FAMILY MEDICINE

## 2024-06-26 PROCEDURE — 87147 CULTURE TYPE IMMUNOLOGIC: CPT | Mod: HCNC | Performed by: FAMILY MEDICINE

## 2024-06-26 PROCEDURE — 87186 SC STD MICRODIL/AGAR DIL: CPT | Mod: 59,HCNC | Performed by: FAMILY MEDICINE

## 2024-06-26 NOTE — PROGRESS NOTES
[facility]                Progress Note    Subjective:       Patient ID: Dale Pantoja is a 39 y.o. male.    Chief Complaint: Non-healing Wound Follow Up, Dressing Change, and Wound Care    Patient was seen today for follow up wound care visit. Patient push self in motorized wheelchair to exam room, with LPN by side. Patient was able to transfer self from wheelchair, wheels lock, on to the exam bed with LPN standing by to assist. He denies pain or discomfort to the wound bed. Denies fever, nausea, chills, vomiting, or diarrhea at present. Dressing intact without strike through drainage. Right Lower Buttock wound bed is measuring smaller in length, width, and depth since last visit on 05/22/2024.  He reported HH came out as ordered.  Wound care done per MD orders. Culture was taken from Right Buttock and sent to lab per MD orders. Patient transfer self from the exam bed to the motorized wheelchair, wheels lock, with LPN standing by to assist. Decline AVS, patient has MyChart.    Return to clinic in 4 week.      This is an established patient in wound care.   Patient presents in the office today for evaluation of the chronic wound.  Updated HPI is noted below.    The periwound appears non-erythematous, no maceration noted, non-edematous    The wound appears to have decreased in size. Fibrin and slough in wound bed. Serous drainage. No odor.     Patient denies fever, chills    Patients reports wound pain    Lymphedema status is noncontributory to wound status    Pain at the site of the wound is n/a     Contributing factors to current wound state include numerous comorbid conditions, off-loading limitations     Review of Systems   Constitutional:  Negative for activity change, appetite change and fever.   HENT:  Negative for congestion.    Respiratory:  Negative for shortness of breath and wheezing.    Cardiovascular:  Negative for chest pain and leg swelling.    Gastrointestinal:  Negative for abdominal pain, nausea and vomiting.   Skin:  Positive for wound.   Neurological:  Negative for dizziness and headaches.   Psychiatric/Behavioral:  The patient is not nervous/anxious.          Objective:        Physical Exam  Vitals and nursing note reviewed.   Constitutional:       Appearance: He is well-developed.   HENT:      Head: Normocephalic and atraumatic.   Eyes:      Conjunctiva/sclera: Conjunctivae normal.   Pulmonary:      Effort: Pulmonary effort is normal.   Abdominal:      General: There is no distension.      Palpations: Abdomen is soft.   Musculoskeletal:         General: Normal range of motion.      Cervical back: Normal range of motion and neck supple.   Skin:     Comments: See wound description   Neurological:      Mental Status: He is alert and oriented to person, place, and time.   Psychiatric:         Behavior: Behavior normal.         Vitals:    06/26/24 1118   BP: (!) 139/92   Pulse: 86   Resp: 18   Temp: 97.8 °F (36.6 °C)       Assessment:           ICD-10-CM ICD-9-CM   1. Sacral decubitus ulcer, stage IV  L89.154 707.03     707.24            Wound 04/18/24 0734 Incision Right Buttocks (Active)   04/18/24 0734   Present on Original Admission: N   Primary Wound Type: Incision   Side: Right   Orientation:    Location: Buttocks   Wound Approximate Age at First Assessment (Weeks):    Wound Number:    Is this injury device related?:    Incision Type:    Closure Method:    Wound Description (Comments):    Type:    Additional Comments: ulcer packed with curlex soaked in saline , 4x4 and medipore tape   Ankle-Brachial Index:    Pulses:    Removal Indication and Assessment:    Wound Outcome:    Wound Image   06/26/24 1126   Dressing Appearance Intact;Moist drainage 06/26/24 1126   Drainage Amount Moderate 06/26/24 1126   Drainage Characteristics/Odor Serosanguineous;No odor 06/26/24 1126   Appearance Pink;Moist 06/26/24 1126   Tissue loss description Full thickness  06/26/24 1126   Red (%), Wound Tissue Color 100 % 06/26/24 1126   Periwound Area Pale white 06/26/24 1126   Wound Edges Open;Defined 06/26/24 1126   Wound Length (cm) 2.3 cm 06/26/24 1126   Wound Width (cm) 3 cm 06/26/24 1126   Wound Depth (cm) 3.5 cm 06/26/24 1126   Wound Volume (cm^3) 24.15 cm^3 06/26/24 1126   Wound Surface Area (cm^2) 6.9 cm^2 06/26/24 1126   Care Cleansed with:;Sterile normal saline 06/26/24 1126   Dressing Applied;Collagen;Calcium alginate;Absorptive Pad 06/26/24 1126   Periwound Care Skin barrier film applied 06/26/24 1126   Dressing Change Due 07/24/24 06/26/24 1126           Debridement    Date/Time: 6/26/2024 9:32 AM    Performed by: Ofelia Lizama MD  Authorized by: Ofelia Lizama MD    Consent Done?:  Yes (Verbal)  Local anesthesia used?: Yes    Local anesthetic:  Topical anesthetic    Wound Details:    Location:  Right buttock    Type of Debridement:  Excisional       Length (cm):  2.3       Area (sq cm):  6.9       Width (cm):  3       Percent Debrided (%):  100       Depth (cm):  3.5       Total Area Debrided (sq cm):  6.9    Depth of debridement:  Subcutaneous tissue    Tissue debrided:  Subcutaneous    Devitalized tissue debrided:  Slough and Fibrin    Instruments:  Curette  Bleeding:  Minimal  Hemostasis Achieved: Yes  Method Used:  Pressure  Patient tolerance:  Patient tolerated the procedure well with no immediate complications       Plan:            Debridement needed and Done today.   Keep dressing clean and intact  F/u with culture and treat as indicated   Discussed increase protein intake   Discussed wound expectations and plan   Continue with wound care orders and plan as noted in orders.   Continue to follow current medication regimen as per pcp   Call for any questions / concerns.       Tissue pathology and/or culture taken     [x] Yes      [] No  Sharp debridement performed                   [x] Yes       [] No  Labs ordered     [] Yes       [x] No  Imaging ordered    []  "Yes      [x] No    Orders Placed This Encounter   Procedures    CULTURE, TISSUE    CULTURE, TISSUE    Change dressing     Wound Dressing Orders    Dressing change frequency three times a week (Home Health will change Monday, Wednesday, Friday between visits)  Remove old dressing  Cleanse or irrigate with: Normal Saline  Protect periwound with:  Cavilon to periwound and area that will be taped  Primary dressing: WOUND BASE: Promogran (2 used) packed into wound bed then Aquacel AG ribbon pack inside wound, leave 2-inch tail.  Secondary dressing: Mextra: size used: 5" x 5" secured with Medipore Tape (in window-pane fashion)    Return to clinic in 4 weeks    SUBSEQUENT HOME HEALTH ORDERS     Home Health for Wound Care. Visit on Monday, Wednesday, Friday when patient is not seen in clinic. Patient will return to clinic on July 24, 2024.    Wound Dressing Orders    Dressing change frequency three times a week (Home Health will change Monday, Wednesday, Friday between visits)  Remove old dressing  Cleanse or irrigate with: Normal Saline  Protect periwound with:  Cavilon to periwound and area that will be taped  Primary dressing: WOUND BASE: Promogran (2 used) packed into wound bed then Aquacel AG ribbon pack inside wound, leave 2-inch tail.  Secondary dressing: Mextra: size used: 5" x 5" secured with Medipore Tape (in window-pane fashion)    1. Remove old dressing  2. Cleanse with Vashe, Normal Saline or Wound Cleanser  3. Pat Dry with gauze  4. Cavilon to area that will be taped  5. Promogran packed into wound bed  6. Then pack Aquacel AG ribbon inside wound, leave 2-inch tail  7. Outer dressing: Mextra: size used: 5"x5" secured with Medipore Tape (in window-pane fashion)  8. Leave patient with extra supplies for excessive drainage between visits.    Evaluate for acute changes (purulence, increased redness/swelling, increased drainage, malodor, increased pain, pallor, necrosis) please contact physician on any acute " changes. If after clinic hours or over the weekend, send patient to the ER.     Call clinic at [phone] if any changes, substitutions or questions about orders.     DO NOT DEVIATE FROM ORDER. PLEASE ORDER SUPPLIES NEED TO APPLY TO PATIENT'S WOUNDS.     Order Specific Question:   What Home Health Agency is the patient currently using?     Answer:   [facility]        Follow up in about 4 weeks (around 7/24/2024) for wound care.

## 2024-06-29 LAB
BACTERIA TISS AEROBE CULT: ABNORMAL
BACTERIA TISS AEROBE CULT: ABNORMAL
GRAM STN SPEC: ABNORMAL
GRAM STN SPEC: ABNORMAL

## 2024-06-30 ENCOUNTER — EXTERNAL CHRONIC CARE MANAGEMENT (OUTPATIENT)
Dept: PRIMARY CARE CLINIC | Facility: CLINIC | Age: 39
End: 2024-06-30
Payer: MEDICARE

## 2024-06-30 PROCEDURE — 99490 CHRNC CARE MGMT STAFF 1ST 20: CPT | Mod: S$GLB,,, | Performed by: INTERNAL MEDICINE

## 2024-07-01 ENCOUNTER — TELEPHONE (OUTPATIENT)
Dept: WOUND CARE | Facility: HOSPITAL | Age: 39
End: 2024-07-01
Payer: MEDICARE

## 2024-07-01 NOTE — TELEPHONE ENCOUNTER
----- Message from Ofelia Lizama MD sent at 7/1/2024  8:01 AM CDT -----  Please send for antibiotic powder

## 2024-07-01 NOTE — TELEPHONE ENCOUNTER
Contacted patient to advised prescription for topical ABX has been sent to [facility]. To answer when they call to verify information as needed to receive therapy. He states his HH was inquiring about the ABX powder, advised to tell them it will be put into the order once confirmation received. Patient verbalized understanding.

## 2024-07-01 NOTE — TELEPHONE ENCOUNTER
"Your fax has been successfully sent to [phone] at [phone].  ------------------------------------------------------------  From: [id number]  ------------------------------------------------------------  7/1/2024 9:09:06 AM Transmission Record          Sent to [phone] with remote ID "[id number]"          Result: (0/339;0/0) Success          Page record: 1 - 15   "

## 2024-07-01 NOTE — TELEPHONE ENCOUNTER
Order for Topical ABX faxed to [facility] with lab result, patient demographics, last clinic note, medication list and drug allergies.

## 2024-07-18 ENCOUNTER — DOCUMENT SCAN (OUTPATIENT)
Dept: HOME HEALTH SERVICES | Facility: HOSPITAL | Age: 39
End: 2024-07-18
Payer: MEDICARE

## 2024-07-20 PROCEDURE — G0179 MD RECERTIFICATION HHA PT: HCPCS | Mod: ,,, | Performed by: FAMILY MEDICINE

## 2024-07-24 ENCOUNTER — HOSPITAL ENCOUNTER (OUTPATIENT)
Dept: WOUND CARE | Facility: HOSPITAL | Age: 39
Discharge: HOME | End: 2024-07-24
Attending: INTERNAL MEDICINE
Payer: MEDICARE

## 2024-07-24 VITALS — SYSTOLIC BLOOD PRESSURE: 154 MMHG | DIASTOLIC BLOOD PRESSURE: 106 MMHG | HEART RATE: 74 BPM | TEMPERATURE: 97 F

## 2024-07-24 DIAGNOSIS — L89.154 SACRAL DECUBITUS ULCER, STAGE IV: Primary | ICD-10-CM

## 2024-07-24 DIAGNOSIS — G82.20 PARAPLEGIA: ICD-10-CM

## 2024-07-24 DIAGNOSIS — E11.9 TYPE 2 DIABETES MELLITUS WITHOUT COMPLICATION, UNSPECIFIED WHETHER LONG TERM INSULIN USE: ICD-10-CM

## 2024-07-24 DIAGNOSIS — R89.5 POSITIVE CULTURE FINDINGS IN WOUND: ICD-10-CM

## 2024-07-24 PROCEDURE — 99214 OFFICE O/P EST MOD 30 MIN: CPT | Mod: HCNC,,, | Performed by: INTERNAL MEDICINE

## 2024-07-24 PROCEDURE — 99213 OFFICE O/P EST LOW 20 MIN: CPT | Mod: HCNC | Performed by: INTERNAL MEDICINE

## 2024-07-24 NOTE — PROGRESS NOTES
[facility]                Progress Note    Subjective:       Patient ID: Dale Pantoja is a 39 y.o. male.    Chief Complaint: Dressing Change and Wound Care    Follow Up wound care visit. Patient rolled to exam via wheelchair to [facility], he then self transfers to exam bed with nurse standing at his side for possible assistance. Patient denies fever, chills, nausea, vomiting or diarrhea at present. Patient denies pain or discomfort to wound bed at present. Patient reports not having any issues with dressing since last visit. Patient reports home health came out as ordered without complaint. Dressing appears intact without strikethrough drainage. Dressing removed & wound cleaned by nurse. Right Buttock wound appears pink and moist, with less depth and width.    Dressing applied per MD orders. Patient will return to [facility] in 3 weeks. Patient declined AVS, has MyChart.     Wound dressing changed daily (by patient or by home health nurse) no drainage he is insensate of area no fevers chills he has gel pad for his wheelchair        Review of Systems      Objective:        Physical Exam  Constitutional:       General: He is not in acute distress.  Skin:     Comments: Right gluteal wound with apprxoimately 2cm of depth no visible bone no periwound induration, (prior depth 4.5cm)   Neurological:      Mental Status: He is alert.         Vitals:    07/24/24 0947   BP: (!) 154/106   Pulse: 74   Temp: 97.2 °F (36.2 °C)       Assessment:           ICD-10-CM ICD-9-CM   1. Sacral decubitus ulcer, stage IV  L89.154 707.03     707.24   2. Positive culture findings in wound  R89.5 795.39            Altered Skin Integrity 09/28/22 1136 Right lower Buttocks (Active)   09/28/22 1136   Altered Skin Integrity Present on Admission - Did Patient arrive to the hospital with altered skin?: yes   Side: Right   Orientation: lower   Location: Buttocks   Wound Number:    Is this injury device related?:     Primary Wound Type:    Description of Altered Skin Integrity:    Ankle-Brachial Index:    Pulses:    Removal Indication and Assessment:    Wound Outcome:    (Retired) Wound Length (cm):    (Retired) Wound Width (cm):    (Retired) Depth (cm):    Wound Description (Comments):    Removal Indications:    Wound Image   07/24/24 1010   Dressing Appearance Intact;Moist drainage 07/24/24 1010   Drainage Amount Moderate 07/24/24 1010   Drainage Characteristics/Odor Serosanguineous;No odor 07/24/24 1010   Appearance Pink;Moist 07/24/24 1010   Tissue loss description Full thickness 07/24/24 1010   Black (%), Wound Tissue Color 0 % 07/24/24 1010   Red (%), Wound Tissue Color 100 % 07/24/24 1010   Yellow (%), Wound Tissue Color 0 % 07/24/24 1010   Periwound Area Pale white 07/24/24 1010   Wound Edges Defined;Open 07/24/24 1010   Wound Length (cm) 2.5 cm 07/24/24 1010   Wound Width (cm) 1.5 cm 07/24/24 1010   Wound Depth (cm) 2 cm 07/24/24 1010   Wound Volume (cm^3) 7.5 cm^3 07/24/24 1010   Wound Surface Area (cm^2) 3.75 cm^2 07/24/24 1010   Tunneling (depth (cm)/location) 0 07/24/24 1010   Undermining (depth (cm)/location) 0 07/24/24 1010   Care Cleansed with:;Antimicrobial agent;Sterile normal saline;Wound cleanser 07/24/24 1010   Periwound Care Absorptive dressing applied 07/24/24 1010   Dressing Change Due 08/14/24 07/24/24 1010           Plan:            Significant improvement in wound depth continue offloadign dressign topical antibiotic powder to manage drainage no debridement performed today home ehatl to continue to assist with dressing changes return for wound check in four weeks  Tissue pathology and/or culture taken     [] Yes      [x] No  Sharp debridement performed                   [] Yes       [x] No  Labs ordered     [] Yes       [x] No  Imaging ordered    [] Yes      [x] No    Orders Placed This Encounter   Procedures    Change dressing     Wound Dressing Orders     Dressing change frequency three times a  "week (Home Health will change Monday, Wednesday, Friday between visits)   Remove old dressing   Cleanse or irrigate with: Normal Saline   Protect periwound with:  Cavilon to periwound and area that will be taped   Primary dressing: WOUND BASE: ABX powder (1 scoop, Tobramycin/Colistimethate) then Promogran (2 used) packed into wound bed then Aquacel AG    Secondary dressing: Mextra: size used: 5" x 5" secured with Medipore Tape (in window-pane fashion)     Return to clinic in 3 weeks    SUBSEQUENT HOME HEALTH ORDERS     Home Health for Wound Care. Visit on Monday, Wednesday, Friday when patient is not seen in clinic. Patient will return to clinic on 08/14/24.    Wound Dressing Orders    Dressing change frequency three times a week (Home Health will change Monday, Wednesday, Friday between visits)   Remove old dressing   Cleanse or irrigate with: Normal Saline   Protect periwound with:  Cavilon to periwound and area that will be taped   Primary dressing: WOUND BASE: ABX powder (1 scoop, Tobramycin/Colistimethate) then Promogran (2 used) packed into wound bed then Aquacel AG    Secondary dressing: Mextra: size used: 5" x 5" secured with Medipore Tape (in window-pane fashion)    1. Remove old dressing  2. Cleanse with Vashe, Normal Saline or Wound Cleanser  3. Pat Dry with gauze  4. Cavilon to area that will be taped  5. ABX powder (1 scoop, pt's own Tobramycin/Colistimethate)   6. Promogran (or collagen equivalent) x 2 packed into wound bed  7. Then Aquacel AG laid outside wound bed  8. Outer dressing: Mextra: size used: 5"x5" secured with Medipore Tape (in window-pane fashion)  **Leave patient with extra supplies for excessive drainage between visits.    Evaluate for acute changes (purulence, increased redness/swelling, increased drainage, malodor, increased pain, pallor, necrosis) please contact physician on any acute changes. If after clinic hours or over the weekend, send patient to the ER.     Call clinic at " [phone] if any changes, substitutions or questions about orders.     DO NOT DEVIATE FROM ORDER. PLEASE ORDER SUPPLIES NEED TO APPLY TO PATIENT'S WOUNDS.     Order Specific Question:   What Home Health Agency is the patient currently using?     Answer:   [facility]        Follow up in about 3 weeks (around 8/14/2024) for Wound Care.

## 2024-07-31 ENCOUNTER — EXTERNAL CHRONIC CARE MANAGEMENT (OUTPATIENT)
Dept: PRIMARY CARE CLINIC | Facility: CLINIC | Age: 39
End: 2024-07-31
Payer: MEDICARE

## 2024-07-31 ENCOUNTER — LAB VISIT (OUTPATIENT)
Dept: LAB | Facility: HOSPITAL | Age: 39
End: 2024-07-31
Attending: INTERNAL MEDICINE
Payer: MEDICARE

## 2024-07-31 DIAGNOSIS — E11.69 TYPE 2 DIABETES MELLITUS WITH OTHER SPECIFIED COMPLICATION, WITHOUT LONG-TERM CURRENT USE OF INSULIN: ICD-10-CM

## 2024-07-31 DIAGNOSIS — E78.5 DYSLIPIDEMIA: ICD-10-CM

## 2024-07-31 LAB
ALBUMIN SERPL BCP-MCNC: 3.6 G/DL (ref 3.5–5.2)
ALP SERPL-CCNC: 108 U/L (ref 55–135)
ALT SERPL W/O P-5'-P-CCNC: 20 U/L (ref 10–44)
ANION GAP SERPL CALC-SCNC: 9 MMOL/L (ref 8–16)
AST SERPL-CCNC: 16 U/L (ref 10–40)
BILIRUB SERPL-MCNC: 0.4 MG/DL (ref 0.1–1)
BUN SERPL-MCNC: 16 MG/DL (ref 6–20)
CALCIUM SERPL-MCNC: 9.3 MG/DL (ref 8.7–10.5)
CHLORIDE SERPL-SCNC: 104 MMOL/L (ref 95–110)
CHOLEST SERPL-MCNC: 127 MG/DL (ref 120–199)
CHOLEST/HDLC SERPL: 3.4 {RATIO} (ref 2–5)
CO2 SERPL-SCNC: 28 MMOL/L (ref 23–29)
CREAT SERPL-MCNC: 1.2 MG/DL (ref 0.5–1.4)
EST. GFR  (NO RACE VARIABLE): >60 ML/MIN/1.73 M^2
ESTIMATED AVG GLUCOSE: 166 MG/DL (ref 68–131)
GLUCOSE SERPL-MCNC: 103 MG/DL (ref 70–110)
HBA1C MFR BLD: 7.4 % (ref 4–5.6)
HDLC SERPL-MCNC: 37 MG/DL (ref 40–75)
HDLC SERPL: 29.1 % (ref 20–50)
LDLC SERPL CALC-MCNC: 80.8 MG/DL (ref 63–159)
NONHDLC SERPL-MCNC: 90 MG/DL
POTASSIUM SERPL-SCNC: 3.5 MMOL/L (ref 3.5–5.1)
PROT SERPL-MCNC: 7.5 G/DL (ref 6–8.4)
SODIUM SERPL-SCNC: 141 MMOL/L (ref 136–145)
TRIGL SERPL-MCNC: 46 MG/DL (ref 30–150)

## 2024-07-31 PROCEDURE — 36415 COLL VENOUS BLD VENIPUNCTURE: CPT | Mod: HCNC,PO | Performed by: INTERNAL MEDICINE

## 2024-07-31 PROCEDURE — 80053 COMPREHEN METABOLIC PANEL: CPT | Mod: HCNC | Performed by: INTERNAL MEDICINE

## 2024-07-31 PROCEDURE — 83036 HEMOGLOBIN GLYCOSYLATED A1C: CPT | Mod: HCNC | Performed by: INTERNAL MEDICINE

## 2024-07-31 PROCEDURE — 99490 CHRNC CARE MGMT STAFF 1ST 20: CPT | Mod: S$GLB,,, | Performed by: INTERNAL MEDICINE

## 2024-07-31 PROCEDURE — 80061 LIPID PANEL: CPT | Mod: HCNC | Performed by: INTERNAL MEDICINE

## 2024-08-06 ENCOUNTER — OFFICE VISIT (OUTPATIENT)
Dept: INTERNAL MEDICINE | Facility: CLINIC | Age: 39
End: 2024-08-06
Payer: MEDICARE

## 2024-08-06 VITALS
BODY MASS INDEX: 27.65 KG/M2 | OXYGEN SATURATION: 97 % | SYSTOLIC BLOOD PRESSURE: 128 MMHG | DIASTOLIC BLOOD PRESSURE: 75 MMHG | HEIGHT: 63 IN | HEART RATE: 82 BPM

## 2024-08-06 DIAGNOSIS — E11.69 TYPE 2 DIABETES MELLITUS WITH OTHER SPECIFIED COMPLICATION, WITHOUT LONG-TERM CURRENT USE OF INSULIN: Primary | ICD-10-CM

## 2024-08-06 DIAGNOSIS — E78.5 DYSLIPIDEMIA: ICD-10-CM

## 2024-08-06 DIAGNOSIS — G82.20 PARAPARESIS: ICD-10-CM

## 2024-08-06 DIAGNOSIS — I10 HTN (HYPERTENSION), BENIGN: ICD-10-CM

## 2024-08-06 PROCEDURE — 4010F ACE/ARB THERAPY RXD/TAKEN: CPT | Mod: HCNC,CPTII,S$GLB, | Performed by: INTERNAL MEDICINE

## 2024-08-06 PROCEDURE — 1159F MED LIST DOCD IN RCRD: CPT | Mod: HCNC,CPTII,S$GLB, | Performed by: INTERNAL MEDICINE

## 2024-08-06 PROCEDURE — 3074F SYST BP LT 130 MM HG: CPT | Mod: HCNC,CPTII,S$GLB, | Performed by: INTERNAL MEDICINE

## 2024-08-06 PROCEDURE — 3078F DIAST BP <80 MM HG: CPT | Mod: HCNC,CPTII,S$GLB, | Performed by: INTERNAL MEDICINE

## 2024-08-06 PROCEDURE — 3051F HG A1C>EQUAL 7.0%<8.0%: CPT | Mod: HCNC,CPTII,S$GLB, | Performed by: INTERNAL MEDICINE

## 2024-08-06 PROCEDURE — 3066F NEPHROPATHY DOC TX: CPT | Mod: HCNC,CPTII,S$GLB, | Performed by: INTERNAL MEDICINE

## 2024-08-06 PROCEDURE — 3008F BODY MASS INDEX DOCD: CPT | Mod: HCNC,CPTII,S$GLB, | Performed by: INTERNAL MEDICINE

## 2024-08-06 PROCEDURE — 99999 PR PBB SHADOW E&M-EST. PATIENT-LVL III: CPT | Mod: PBBFAC,HCNC,, | Performed by: INTERNAL MEDICINE

## 2024-08-06 PROCEDURE — 3061F NEG MICROALBUMINURIA REV: CPT | Mod: HCNC,CPTII,S$GLB, | Performed by: INTERNAL MEDICINE

## 2024-08-06 PROCEDURE — 1160F RVW MEDS BY RX/DR IN RCRD: CPT | Mod: HCNC,CPTII,S$GLB, | Performed by: INTERNAL MEDICINE

## 2024-08-06 PROCEDURE — 99214 OFFICE O/P EST MOD 30 MIN: CPT | Mod: HCNC,S$GLB,, | Performed by: INTERNAL MEDICINE

## 2024-08-13 ENCOUNTER — CLINICAL SUPPORT (OUTPATIENT)
Dept: INTERNAL MEDICINE | Facility: CLINIC | Age: 39
End: 2024-08-13
Attending: INTERNAL MEDICINE
Payer: MEDICARE

## 2024-08-13 DIAGNOSIS — E11.9 TYPE 2 DIABETES MELLITUS WITHOUT COMPLICATION, UNSPECIFIED WHETHER LONG TERM INSULIN USE: ICD-10-CM

## 2024-08-13 PROCEDURE — 92228 IMG RTA DETC/MNTR DS PHY/QHP: CPT | Mod: HCNC,TC,S$GLB, | Performed by: INTERNAL MEDICINE

## 2024-08-13 PROCEDURE — 2025F 7 FLD RTA PHOTO W/O RTNOPTHY: CPT | Mod: HCNC,CPTII,S$GLB, | Performed by: OPTOMETRIST

## 2024-08-13 PROCEDURE — 92228 IMG RTA DETC/MNTR DS PHY/QHP: CPT | Mod: 26,HCNC,S$GLB, | Performed by: OPTOMETRIST

## 2024-08-13 NOTE — PROGRESS NOTES
Dale Pantoja is a 39 y.o. male here for a diabetic eye screening with non-dilated fundus photos per Dr. Adorno.    Patient cooperative?: Yes  Small pupils?: Yes  Last eye exam: last year    For exam results, see Encounter Report.  Yanni Bauman RN HC

## 2024-08-14 ENCOUNTER — HOSPITAL ENCOUNTER (OUTPATIENT)
Dept: WOUND CARE | Facility: HOSPITAL | Age: 39
Discharge: HOME | End: 2024-08-14
Attending: FAMILY MEDICINE
Payer: MEDICARE

## 2024-08-14 VITALS
HEART RATE: 85 BPM | RESPIRATION RATE: 14 BRPM | SYSTOLIC BLOOD PRESSURE: 158 MMHG | TEMPERATURE: 98 F | DIASTOLIC BLOOD PRESSURE: 98 MMHG

## 2024-08-14 DIAGNOSIS — L89.154 SACRAL DECUBITUS ULCER, STAGE IV: Primary | ICD-10-CM

## 2024-08-14 PROCEDURE — 11042 DBRDMT SUBQ TIS 1ST 20SQCM/<: CPT | Mod: HCNC | Performed by: FAMILY MEDICINE

## 2024-08-14 PROCEDURE — 99214 OFFICE O/P EST MOD 30 MIN: CPT | Mod: 25,HCNC,, | Performed by: FAMILY MEDICINE

## 2024-08-14 PROCEDURE — 11042 DBRDMT SUBQ TIS 1ST 20SQCM/<: CPT | Mod: HCNC,,, | Performed by: FAMILY MEDICINE

## 2024-08-14 NOTE — PROGRESS NOTES
"[facility]                Progress Note    Subjective:       Patient ID: Dale Pantoja is a 39 y.o. male.    Chief Complaint: Wound Care and Dressing Change    Follow Up wound care visit. Patient rolled to exam room via wheelchair to [facility]. He then self ambulates to exam table with nurse standing by. Patient denies fever, chills, nausea, vomiting or diarrhea at present. Patient denies pain or discomfort to wound bed at present. Patient reports home health came out as ordered but c/o nurse not "putting dressing in correctly". Advised will send notation to . Dressing appears intact without strikethrough drainage. Dressing removed & wound cleaned by nurse. Right Lower Buttock wound is measuring smaller in length and width.     No change to dressing order; applied per MD order. Patient will return to [facility] in 2 weeks. Patient declined AVS, has MyChart.     This is an established patient in wound care.   Patient presents in the office today for evaluation of the chronic wound.  Updated HPI is noted below.    The periwound appears non-erythematous, no maceration noted, non-edematous    The wound appears wound healing    Patient denies fever, chills    Patients reports he has been off-loading    Lymphedema status is noncontributory to wound status    Pain at the site of the wound is aching and throbbing    Contributing factors to current wound state include numerous comorbid conditions       Review of Systems   Constitutional:  Negative for activity change, appetite change and fever.   HENT:  Negative for congestion.    Respiratory:  Negative for shortness of breath and wheezing.    Cardiovascular:  Negative for chest pain and leg swelling.   Gastrointestinal:  Negative for abdominal pain, nausea and vomiting.   Skin:  Positive for wound.   Neurological:  Negative for dizziness and headaches.   Psychiatric/Behavioral:  The patient is not nervous/anxious.          Objective:      "   Physical Exam  Vitals and nursing note reviewed.   Constitutional:       Appearance: He is well-developed.   HENT:      Head: Normocephalic and atraumatic.   Eyes:      Conjunctiva/sclera: Conjunctivae normal.   Pulmonary:      Effort: Pulmonary effort is normal.   Abdominal:      General: There is no distension.      Palpations: Abdomen is soft.   Musculoskeletal:         General: Normal range of motion.      Cervical back: Normal range of motion and neck supple.   Skin:     Comments: See wound description   Neurological:      Mental Status: He is alert and oriented to person, place, and time.   Psychiatric:         Behavior: Behavior normal.         Vitals:    08/14/24 1000   BP: (!) 158/98   Pulse: 85   Resp: 14   Temp: 97.6 °F (36.4 °C)       Assessment:           ICD-10-CM ICD-9-CM   1. Sacral decubitus ulcer, stage IV  L89.154 707.03     707.24            Altered Skin Integrity 09/28/22 1136 Right lower Buttocks (Active)   09/28/22 1136   Altered Skin Integrity Present on Admission - Did Patient arrive to the hospital with altered skin?: yes   Side: Right   Orientation: lower   Location: Buttocks   Wound Number:    Is this injury device related?:    Primary Wound Type:    Description of Altered Skin Integrity:    Ankle-Brachial Index:    Pulses:    Removal Indication and Assessment:    Wound Outcome:    (Retired) Wound Length (cm):    (Retired) Wound Width (cm):    (Retired) Depth (cm):    Wound Description (Comments):    Removal Indications:    Wound Image     08/14/24 1005   Dressing Appearance Intact;Moist drainage 08/14/24 1005   Drainage Amount Small 08/14/24 1005   Drainage Characteristics/Odor Serosanguineous 08/14/24 1005   Appearance Red;Moist 08/14/24 1005   Tissue loss description Full thickness 08/14/24 1005   Black (%), Wound Tissue Color 0 % 08/14/24 1005   Red (%), Wound Tissue Color 100 % 08/14/24 1005   Yellow (%), Wound Tissue Color 0 % 08/14/24 1005   Periwound Area Pale white;Macerated  08/14/24 1005   Wound Edges Defined;Open 08/14/24 1005   Wound Length (cm) 1.5 cm 08/14/24 1005   Wound Width (cm) 2 cm 08/14/24 1005   Wound Depth (cm) 2 cm 08/14/24 1005   Wound Volume (cm^3) 6 cm^3 08/14/24 1005   Wound Surface Area (cm^2) 3 cm^2 08/14/24 1005   Tunneling (depth (cm)/location) 0 08/14/24 1005   Undermining (depth (cm)/location) 0 08/14/24 1005   Care Cleansed with:;Antimicrobial agent;Sterile normal saline 08/14/24 1005   Dressing Changed 08/14/24 1005   Periwound Care Moisture barrier applied;Absorptive dressing applied 08/14/24 1005   Dressing Change Due 08/28/24 08/14/24 1005           Debridement    Date/Time: 8/14/2024 9:35 AM    Performed by: Ofelia Lizama MD  Authorized by: Ofelia Lizama MD    Consent Done?:  Yes (Verbal)  Local anesthesia used?: No    Local anesthetic:  Topical anesthetic    Wound Details:    Location:  Right buttock    Type of Debridement:  Excisional       Length (cm):  1.5       Area (sq cm):  3       Width (cm):  2       Percent Debrided (%):  100       Depth (cm):  2       Total Area Debrided (sq cm):  3    Depth of debridement:  Subcutaneous tissue    Tissue debrided:  Subcutaneous    Devitalized tissue debrided:  Slough and Fibrin    Instruments:  Curette  Bleeding:  Minimal  Hemostasis Achieved: Yes  Method Used:  Pressure  Patient tolerance:  Patient tolerated the procedure well with no immediate complications      Plan:            Debridement needed and Done today.   Continue off-loading   Keep dressing clean and intact  Discussed increase protein intake   Discussed wound expectations and plan   Continue with wound care orders and plan as noted in orders.   Continue to follow current medication regimen as per pcp   Call for any questions / concerns.        Tissue pathology and/or culture taken     [] Yes      [x] No  Sharp debridement performed                   [x] Yes       [] No  Labs ordered     [] Yes       [x] No  Imaging ordered    [] Yes      [x]  "No    Orders Placed This Encounter   Procedures    Change dressing     Wound Dressing Orders    Dressing change frequency three times a week (Home Health will change Monday, Wednesday, Friday between visits)  Remove old dressing  Cleanse or irrigate with: Normal Saline  Protect periwound with:  Cavilon to periwound and area that will be taped  Primary dressing: WOUND BASE: ABX powder (1 scoop, Tobramycin/Colistimethate) then Promogran (2 used) packed into wound bed then Aquacel AG    Secondary dressing: Mextra: size used: 5" x 5" secured with Medipore Tape (in window-pane fashion)    Return to clinic in 2 weeks    SUBSEQUENT HOME HEALTH ORDERS     Home Health for Wound Care. Visit on Monday, Wednesday, Friday when patient is not seen in clinic. Patient will return to clinic on 08/28/24.     Wound Dressing Orders     Dressing change frequency three times a week (Home Health will change Monday, Wednesday, Friday between visits)   Remove old dressing   Cleanse or irrigate with: Normal Saline   Protect periwound with:  Cavilon to periwound and area that will be taped   Primary dressing: WOUND BASE: ABX powder (1 scoop, Tobramycin/Colistimethate) then Promogran (2 used) packed into wound bed then Aquacel AG    Secondary dressing: Mextra: size used: 5" x 5" secured with Medipore Tape (in window-pane fashion)     1. Remove old dressing   2. Cleanse with Vashe, Normal Saline or Wound Cleanser   3. Pat Dry with gauze   4. Cavilon to area that will be taped   5. ABX powder (1 scoop, pt's own Tobramycin/Colistimethate)   6. Promogran (or collagen equivalent) x 2 packed into wound bed   7. Then Aquacel AG laid outside wound bed (DO NOT PACK INTO WOUND BED)  8. Outer dressing: Mextra: size used: 5"x5" secured with Medipore Tape (in window-pane fashion)   **Leave patient with extra supplies for excessive drainage between visits.     Evaluate for acute changes (purulence, increased redness/swelling, increased drainage, malodor, " increased pain, pallor, necrosis) please contact physician on any acute changes. If after clinic hours or over the weekend, send patient to the ER.      Call clinic at [phone] if any changes, substitutions or questions about orders.      DO NOT DEVIATE FROM ORDER. PLEASE ORDER SUPPLIES NEED TO APPLY TO PATIENT'S WOUNDS.     Order Specific Question:   What Home Health Agency is the patient currently using?     Answer:   [facility]        Follow up in about 2 weeks (around 8/28/2024) for Wound Care.

## 2024-08-19 ENCOUNTER — EXTERNAL HOME HEALTH (OUTPATIENT)
Dept: HOME HEALTH SERVICES | Facility: HOSPITAL | Age: 39
End: 2024-08-19
Payer: MEDICARE

## 2024-08-28 ENCOUNTER — HOSPITAL ENCOUNTER (OUTPATIENT)
Dept: WOUND CARE | Facility: HOSPITAL | Age: 39
Discharge: HOME | End: 2024-08-28
Attending: FAMILY MEDICINE
Payer: MEDICARE

## 2024-08-28 VITALS
HEART RATE: 77 BPM | TEMPERATURE: 98 F | RESPIRATION RATE: 14 BRPM | SYSTOLIC BLOOD PRESSURE: 140 MMHG | DIASTOLIC BLOOD PRESSURE: 87 MMHG

## 2024-08-28 DIAGNOSIS — R89.5 POSITIVE CULTURE FINDINGS IN WOUND: ICD-10-CM

## 2024-08-28 DIAGNOSIS — L89.154 SACRAL DECUBITUS ULCER, STAGE IV: Primary | ICD-10-CM

## 2024-08-28 DIAGNOSIS — G82.20 PARAPLEGIA: ICD-10-CM

## 2024-08-28 PROCEDURE — 11042 DBRDMT SUBQ TIS 1ST 20SQCM/<: CPT | Mod: HCNC | Performed by: FAMILY MEDICINE

## 2024-08-28 PROCEDURE — 11042 DBRDMT SUBQ TIS 1ST 20SQCM/<: CPT | Mod: HCNC,,, | Performed by: FAMILY MEDICINE

## 2024-08-28 PROCEDURE — 99214 OFFICE O/P EST MOD 30 MIN: CPT | Mod: 25,HCNC,, | Performed by: FAMILY MEDICINE

## 2024-08-28 NOTE — PROGRESS NOTES
"[facility]                Progress Note    Subjective:       Patient ID: Dale Pantoja is a 39 y.o. male.    Chief Complaint: Wound Care and Dressing Change    Follow Up wound care visit. Patient rolled to exam room via wheelchair to [facility]. He then self transfers to exam table with nurse standing by. Patient denies fever, chills, nausea, vomiting or diarrhea at present. Patient denies pain or discomfort to wound bed at present. Patient reports not having any issues with dressing since last visit. Patient reports home health came out except for Monday 08/26. Asked patient if he was called or contact by  when nurse did not come, he states "no", advised to contact HH in future and speak with his . He verbalized understanding. He states he cleaned wound on Monday and then reapply dressing as ordered except for collagen "I wasn't able to put it into the wound." Dressing appears intact with strikethrough drainage at lower aspect of Mextra. Dressing removed & wound cleaned by nurse. Right Lower Buttock wound is measuring smaller in length and there are some pockets of less depth noted.     Changes made to dressing order (Gentian Violet hugging wound bed); applied per MD order. Patient will return to [facility] in 4 weeks.  MD recommends patient apply Gentian Violet to periwound as he has sports play upcoming, patient verbalized understanding. Patient declined AVS, has MyChart.       This is an established patient in wound care.   Patient presents in the office today for evaluation of the chronic wound.  Updated HPI is noted below.    The periwound appears non-erythematous, no maceration noted, non-edematous    The wound appears wound healing    Patient denies fever, chills    Patients reports wound pain    Lymphedema status is contributory to wound status    Pain at the site of the wound is n/a    Contributing factors to current wound state include numerous comorbid " conditions       Review of Systems   Constitutional:  Negative for activity change, appetite change and fever.   HENT:  Negative for congestion.    Respiratory:  Negative for shortness of breath and wheezing.    Cardiovascular:  Negative for chest pain and leg swelling.   Gastrointestinal:  Negative for abdominal pain, nausea and vomiting.   Skin:  Positive for wound.   Neurological:  Negative for dizziness and headaches.   Psychiatric/Behavioral:  The patient is not nervous/anxious.          Objective:        Physical Exam  Vitals and nursing note reviewed.   Constitutional:       Appearance: He is well-developed.   HENT:      Head: Normocephalic and atraumatic.   Eyes:      Conjunctiva/sclera: Conjunctivae normal.   Pulmonary:      Effort: Pulmonary effort is normal.   Abdominal:      General: There is no distension.      Palpations: Abdomen is soft.   Musculoskeletal:         General: Normal range of motion.      Cervical back: Normal range of motion and neck supple.   Skin:     Comments: See wound description   Neurological:      Mental Status: He is alert and oriented to person, place, and time.   Psychiatric:         Behavior: Behavior normal.         Vitals:    08/28/24 0950   BP: (!) 140/87   Pulse: 77   Resp: 14   Temp: 97.5 °F (36.4 °C)       Assessment:           ICD-10-CM ICD-9-CM   1. Sacral decubitus ulcer, stage IV  L89.154 707.03     707.24   2. Positive culture findings in wound  R89.5 795.39   3. Paraplegia  G82.20 344.1            Altered Skin Integrity 09/28/22 1136 Right lower Buttocks (Active)   09/28/22 1136   Altered Skin Integrity Present on Admission - Did Patient arrive to the hospital with altered skin?: yes   Side: Right   Orientation: lower   Location: Buttocks   Wound Number:    Is this injury device related?:    Primary Wound Type:    Description of Altered Skin Integrity:    Ankle-Brachial Index:    Pulses:    Removal Indication and Assessment:    Wound Outcome:    (Retired) Wound  Length (cm):    (Retired) Wound Width (cm):    (Retired) Depth (cm):    Wound Description (Comments):    Removal Indications:    Wound Image    08/28/24 1000   Dressing Appearance Intact;Moist drainage;Area marked 08/28/24 1000   Drainage Amount Moderate 08/28/24 1000   Drainage Characteristics/Odor Serosanguineous;No odor 08/28/24 1000   Appearance Pink;Red;Moist 08/28/24 1000   Tissue loss description Full thickness 08/28/24 1000   Black (%), Wound Tissue Color 0 % 08/28/24 1000   Red (%), Wound Tissue Color 100 % 08/28/24 1000   Yellow (%), Wound Tissue Color 0 % 08/28/24 1000   Periwound Area Pale white;Moist;Macerated 08/28/24 1000   Wound Edges Open 08/28/24 1000   Wound Length (cm) 1 cm 08/28/24 1000   Wound Width (cm) 2 cm 08/28/24 1000   Wound Depth (cm) 2 cm 08/28/24 1000   Wound Volume (cm^3) 4 cm^3 08/28/24 1000   Wound Surface Area (cm^2) 2 cm^2 08/28/24 1000   Tunneling (depth (cm)/location) 0 08/28/24 1000   Undermining (depth (cm)/location) 0 08/28/24 1000   Care Cleansed with:;Antimicrobial agent;Sterile normal saline;Debrided;Wound cleanser 08/28/24 1000   Dressing Changed 08/28/24 1000   Periwound Care Moisture barrier applied;Absorptive dressing applied;Skin barrier film applied 08/28/24 1000   Dressing Change Due 09/25/24 08/28/24 1000           Debridement    Date/Time: 8/28/2024 9:28 AM    Performed by: Ofelia Lizama MD  Authorized by: Ofelia Lizama MD    Consent Done?:  Yes (Verbal)  Local anesthesia used?: Yes    Local anesthetic:  Topical anesthetic    Wound Details:    Location:  Right buttock    Type of Debridement:  Excisional       Length (cm):  1       Area (sq cm):  2       Width (cm):  2       Percent Debrided (%):  100       Depth (cm):  2       Total Area Debrided (sq cm):  2    Depth of debridement:  Subcutaneous tissue    Tissue debrided:  Subcutaneous    Devitalized tissue debrided:  Slough and Fibrin    Instruments:  Curette  Bleeding:  Minimal  Hemostasis Achieved: Yes  Method  "Used:  Pressure  Patient tolerance:  Patient tolerated the procedure well with no immediate complications      Plan:            Debridement needed and Done today.   Recommend off-loading   Increase protein   Paint around wound if Patient starts any physical activity  Monitor moisture   Keep dressing clean and intact  Discussed increase protein intake   Discussed wound expectations and plan   Continue with wound care orders and plan as noted in orders.   Continue to follow current medication regimen as per pcp   Call for any questions / concerns.          Tissue pathology and/or culture taken     [] Yes      [x] No  Sharp debridement performed                   [x] Yes       [] No  Labs ordered     [] Yes       [x] No  Imaging ordered    [] Yes      [x] No    Orders Placed This Encounter   Procedures    Change dressing     Wound Dressing Orders     Dressing change frequency three times a week (Home Health will change Monday, Wednesday, Friday between visits)   Remove old dressing   Cleanse or irrigate with: Normal Saline   Protect periwound with:  Gentian Violet to maceration and periwound, Cavilon to area that will be taped   Primary dressing: WOUND BASE: ABX powder (1 scoop, Tobramycin/Colistimethate) then Promogran (2 used) packed into wound bed then Aquacel AG (folded twice and laid against wound bed)    Secondary dressing: Mextra 5" x 5" secured with Medipore Tape (in window-pane fashion)     Return to clinic in 4 weeks    SUBSEQUENT HOME HEALTH ORDERS     Home Health for Wound Care. Visit on Monday, Wednesday, Friday when patient is not seen in clinic. Patient will return to clinic on 09/25/24. Okay to adjust days during week of Labor Day to accommodate for staffing and holiday, pt aware.     Wound Dressing Orders    Dressing change frequency three times a week (Home Health will change Monday, Wednesday, Friday between visits)  Remove old dressing  Cleanse or irrigate with: Normal Saline  Protect periwound " "with:  Cavilon to periwound and area that will be taped, Gentian Violet hugging wound bed and areas of maceration  Primary dressing: WOUND BASE: ABX powder (1 scoop, Tobramycin/Colistimethate) then Promogran (2 used) packed into wound bed then Aquacel AG    Secondary dressing: Mextra: size used: 5" x 5" secured with Medipore Tape (in window-pane fashion)    1. Remove old dressing  2. Cleanse with Vashe, Normal Saline or Wound Cleanser  3. Pat Dry with gauze  4. Cavilon to area that will be taped, Gentian Violet (if available) hugging wound bed and areas of maceration  5. ABX powder (1 scoop, pt's own Tobramycin/Colistimethate)  6. Promogran (or collagen equivalent) x 2 packed into wound bed  7. Then Aquacel AG laid outside wound bed (DO NOT PACK INTO WOUND BED)  8. Outer dressing: Mextra: size used: 5"x5" secured with Medipore Tape (in window-pane fashion)  **Leave patient with extra supplies for excessive drainage between visits.    Evaluate for acute changes (purulence, increased redness/swelling, increased drainage, malodor, increased pain, pallor, necrosis) please contact physician on any acute changes. If after clinic hours or over the weekend, send patient to the ER.     Call clinic at [phone] if any changes, substitutions or questions about orders.     DO NOT DEVIATE FROM ORDER. PLEASE ORDER SUPPLIES NEEDED TO APPLY TO PATIENT'S WOUNDS.     Order Specific Question:   What Home Health Agency is the patient currently using?     Answer:   [facility]        Follow up in about 4 weeks (around 9/25/2024) for Wound Care.       "

## 2024-08-31 ENCOUNTER — EXTERNAL CHRONIC CARE MANAGEMENT (OUTPATIENT)
Dept: PRIMARY CARE CLINIC | Facility: CLINIC | Age: 39
End: 2024-08-31
Payer: MEDICARE

## 2024-08-31 PROCEDURE — 99490 CHRNC CARE MGMT STAFF 1ST 20: CPT | Mod: S$GLB,,, | Performed by: INTERNAL MEDICINE

## 2024-09-18 PROCEDURE — G0179 MD RECERTIFICATION HHA PT: HCPCS | Mod: ,,, | Performed by: FAMILY MEDICINE

## 2024-09-19 ENCOUNTER — DOCUMENT SCAN (OUTPATIENT)
Dept: HOME HEALTH SERVICES | Facility: HOSPITAL | Age: 39
End: 2024-09-19
Payer: MEDICARE

## 2024-09-25 ENCOUNTER — HOSPITAL ENCOUNTER (OUTPATIENT)
Dept: WOUND CARE | Facility: HOSPITAL | Age: 39
Discharge: HOME | End: 2024-09-25
Attending: FAMILY MEDICINE
Payer: MEDICARE

## 2024-09-25 VITALS
TEMPERATURE: 98 F | HEART RATE: 92 BPM | RESPIRATION RATE: 14 BRPM | SYSTOLIC BLOOD PRESSURE: 168 MMHG | DIASTOLIC BLOOD PRESSURE: 88 MMHG

## 2024-09-25 DIAGNOSIS — R89.5 POSITIVE CULTURE FINDINGS IN WOUND: ICD-10-CM

## 2024-09-25 DIAGNOSIS — G82.20 PARAPLEGIA: ICD-10-CM

## 2024-09-25 DIAGNOSIS — L89.154 SACRAL DECUBITUS ULCER, STAGE IV: Primary | ICD-10-CM

## 2024-09-25 PROCEDURE — 11042 DBRDMT SUBQ TIS 1ST 20SQCM/<: CPT | Mod: HCNC,,, | Performed by: FAMILY MEDICINE

## 2024-09-25 PROCEDURE — 99214 OFFICE O/P EST MOD 30 MIN: CPT | Mod: 25,HCNC,, | Performed by: FAMILY MEDICINE

## 2024-09-25 PROCEDURE — 11042 DBRDMT SUBQ TIS 1ST 20SQCM/<: CPT | Mod: HCNC | Performed by: FAMILY MEDICINE

## 2024-09-25 NOTE — PROGRESS NOTES
"[facility]                Progress Note    Subjective:       Patient ID: Dale Pantoja is a 39 y.o. male.    Chief Complaint: Wound Care and Dressing Change    Follow Up wound care visit. Patient rolled to exam room in wheelchair to [facility]. Patient then self transfer to exam table with nurse standing by. Patient denies fever, chills, nausea, vomiting or diarrhea at present. Patient denies pain or discomfort to wound bed at present. Patient reports not having any issues with dressing since last visit. Patient reports home health came out as ordered but c/o "they have problems putting the collagen in sometimes". Dressing appears intact without strikethrough drainage. Dressing removed & wound cleaned by nurse. Right Lower Buttock wound appears red and moist.     Changes made to dressing order (reduced Promogran to 1 pack); applied per MD order. Patient will return to [facility] in 4 weeks. Patient declined AVS, has MyChart.       This is an established patient in wound care.   Patient presents in the office today for evaluation of the chronic wound.  Updated HPI is noted below.    The periwound appears non-erythematous, macerated, non-edematous    The wound appears to have decreased in size. Fibrin and slough in wound bed. No odor. Serous drainage.     Patient denies fever, chills    Patients reports wound pain    Lymphedema status is contributory to wound status    Pain at the site of the wound is aching and throbbing    Contributing factors to current wound state include numerous comorbid conditions, off-loading limitations     Review of Systems   Constitutional:  Negative for activity change, appetite change and fever.   HENT:  Negative for congestion.    Respiratory:  Negative for shortness of breath and wheezing.    Cardiovascular:  Negative for chest pain and leg swelling.   Gastrointestinal:  Negative for abdominal pain, nausea and vomiting.   Skin:  Positive for wound. "   Neurological:  Negative for dizziness and headaches.   Psychiatric/Behavioral:  The patient is not nervous/anxious.          Objective:        Physical Exam  Vitals and nursing note reviewed.   Constitutional:       Appearance: He is well-developed.   HENT:      Head: Normocephalic and atraumatic.   Eyes:      Conjunctiva/sclera: Conjunctivae normal.   Pulmonary:      Effort: Pulmonary effort is normal.   Abdominal:      General: There is no distension.      Palpations: Abdomen is soft.   Musculoskeletal:      Cervical back: Normal range of motion and neck supple.   Skin:     Comments: See wound description   Neurological:      Mental Status: He is alert and oriented to person, place, and time.   Psychiatric:         Behavior: Behavior normal.         Vitals:    09/25/24 0945   BP: (!) 168/88   Pulse: 92   Resp: 14   Temp: 97.8 °F (36.6 °C)       Assessment:           ICD-10-CM ICD-9-CM   1. Sacral decubitus ulcer, stage IV  L89.154 707.03     707.24   2. Positive culture findings in wound  R89.5 795.39   3. Paraplegia  G82.20 344.1            Altered Skin Integrity 09/28/22 1136 Right lower Buttocks (Active)   09/28/22 1136   Altered Skin Integrity Present on Admission - Did Patient arrive to the hospital with altered skin?: yes   Side: Right   Orientation: lower   Location: Buttocks   Wound Number:    Is this injury device related?:    Primary Wound Type:    Description of Altered Skin Integrity:    Ankle-Brachial Index:    Pulses:    Removal Indication and Assessment:    Wound Outcome:    (Retired) Wound Length (cm):    (Retired) Wound Width (cm):    (Retired) Depth (cm):    Wound Description (Comments):    Removal Indications:    Wound Image    09/25/24 0945   Dressing Appearance Intact;Moist drainage 09/25/24 0945   Drainage Amount Moderate 09/25/24 0945   Drainage Characteristics/Odor Serosanguineous;No odor 09/25/24 0945   Appearance Red;Moist 09/25/24 0945   Tissue loss description Full thickness 09/25/24  0945   Black (%), Wound Tissue Color 0 % 09/25/24 0945   Red (%), Wound Tissue Color 100 % 09/25/24 0945   Yellow (%), Wound Tissue Color 0 % 09/25/24 0945   Periwound Area Pale white;Moist;Macerated 09/25/24 0945   Wound Edges Open;Other (see comments) 09/25/24 0945   Wound Length (cm) 0.6 cm 09/25/24 0945   Wound Width (cm) 1.7 cm 09/25/24 0945   Wound Depth (cm) 1.5 cm 09/25/24 0945   Wound Volume (cm^3) 1.53 cm^3 09/25/24 0945   Wound Surface Area (cm^2) 1.02 cm^2 09/25/24 0945   Tunneling (depth (cm)/location) 0 09/25/24 0945   Undermining (depth (cm)/location) 0 09/25/24 0945   Care Cleansed with:;Antimicrobial agent;Sterile normal saline;Debrided 09/25/24 0945   Dressing Changed 09/25/24 0945   Periwound Care Skin barrier film applied;Absorptive dressing applied 09/25/24 0945   Dressing Change Due 10/23/24 09/25/24 0945           Debridement    Date/Time: 9/25/2024 9:32 AM    Performed by: Ofelia Lizama MD  Authorized by: Ofelia Lizama MD    Consent Done?:  Yes (Verbal)  Local anesthesia used?: No      Wound Details:    Location:  Right buttock    Type of Debridement:  Excisional       Length (cm):  0.6       Area (sq cm):  1.02       Width (cm):  1.7       Percent Debrided (%):  100       Depth (cm):  1.5       Total Area Debrided (sq cm):  1.02    Depth of debridement:  Subcutaneous tissue    Tissue debrided:  Subcutaneous    Devitalized tissue debrided:  Slough and Fibrin    Instruments:  Curette  Bleeding:  Minimal  Hemostasis Achieved: Yes  Method Used:  Pressure  Patient tolerance:  Patient tolerated the procedure well with no immediate complications        Plan:          Debridement needed and Done today.   Recommend off-loading   Continue making diet changes and increase protein   Keep dressing clean and intact  Discussed increase protein intake   Discussed wound expectations and plan   Continue with wound care orders and plan as noted in orders.   Continue to follow current medication regimen as per  "pcp   Call for any questions / concerns.          Tissue pathology and/or culture taken     [] Yes      [x] No  Sharp debridement performed                   [x] Yes       [] No  Labs ordered     [] Yes       [x] No  Imaging ordered    [] Yes      [x] No    Orders Placed This Encounter   Procedures    Change dressing     Wound Dressing Orders    Dressing change frequency three times a week (Home Health will change Monday, Wednesday, Friday between visits)  Remove old dressing  Cleanse or irrigate with: Normal Saline  Protect periwound with:  Gentian Violet to maceration and periwound, Cavilon to area that will be taped  Primary dressing: WOUND BASE: ABX powder (1 scoop, Tobramycin/Colistimethate) then Promogran (1 used) packed into wound bed then Aquacel AG (folded twice and laid against wound bed)    Secondary dressing: Mextra 5" x 5" secured with Medipore Tape (in window-pane fashion)    Return to clinic in 4 weeks    SUBSEQUENT HOME HEALTH ORDERS     Home Health for Wound Care. Visit on Monday, Wednesday, Friday when patient is not seen in clinic. Patient will return to clinic on 10/23/24.     Wound Dressing Orders    Dressing change frequency three times a week (Home Health will change Monday, Wednesday, Friday between visits)  Remove old dressing  Cleanse or irrigate with: Normal Saline  Protect periwound with:  Cavilon to periwound and area that will be taped, Gentian Violet hugging wound bed and areas of maceration  Primary dressing: WOUND BASE: ABX powder (1 scoop, Tobramycin/Colistimethate) then Promogran (1 used) packed into wound bed then Aquacel AG    Secondary dressing: Mextra: size used: 5" x 5" secured with Medipore Tape (in window-pane fashion)    1. Remove old dressing  2. Cleanse with Vashe, Normal Saline or Wound Cleanser  3. Pat Dry with gauze  4. Cavilon to area that will be taped, Gentian Violet (if available) hugging wound bed and areas of maceration  5. ABX powder (1 scoop, pt's own " "Tobramycin/Colistimethate)  6. Promogran (or collagen equivalent) x 1 packed into wound bed okay to shred to fit into wound bed  7. Then Aquacel AG laid outside wound bed (DO NOT PACK INTO WOUND BED)  8. Outer dressing: Mextra: size used: 5"x5" secured with Medipore Tape (in window-pane fashion)  **Leave patient with extra supplies for excessive drainage between visits.    Evaluate for acute changes (purulence, increased redness/swelling, increased drainage, malodor, increased pain, pallor, necrosis) please contact physician on any acute changes. If after clinic hours or over the weekend, send patient to the ER.     Call clinic at [phone] if any changes, substitutions or questions about orders.     DO NOT DEVIATE FROM ORDER. PLEASE ORDER SUPPLIES NEEDED TO APPLY TO PATIENT'S WOUNDS.     Order Specific Question:   What Home Health Agency is the patient currently using?     Answer:   [facility]        Follow up in about 4 weeks (around 10/23/2024) for Wound Care.       "

## 2024-10-04 ENCOUNTER — EXTERNAL HOME HEALTH (OUTPATIENT)
Dept: HOME HEALTH SERVICES | Facility: HOSPITAL | Age: 39
End: 2024-10-04
Payer: MEDICARE

## 2024-10-10 ENCOUNTER — DOCUMENT SCAN (OUTPATIENT)
Dept: HOME HEALTH SERVICES | Facility: HOSPITAL | Age: 39
End: 2024-10-10
Payer: MEDICARE

## 2024-10-23 ENCOUNTER — HOSPITAL ENCOUNTER (OUTPATIENT)
Dept: WOUND CARE | Facility: HOSPITAL | Age: 39
Discharge: HOME | End: 2024-10-23
Attending: FAMILY MEDICINE
Payer: MEDICARE

## 2024-10-23 VITALS
DIASTOLIC BLOOD PRESSURE: 101 MMHG | SYSTOLIC BLOOD PRESSURE: 173 MMHG | HEART RATE: 83 BPM | RESPIRATION RATE: 18 BRPM | TEMPERATURE: 97 F

## 2024-10-23 DIAGNOSIS — L89.154 SACRAL DECUBITUS ULCER, STAGE IV: Primary | ICD-10-CM

## 2024-10-23 PROCEDURE — 11042 DBRDMT SUBQ TIS 1ST 20SQCM/<: CPT | Mod: HCNC,,, | Performed by: FAMILY MEDICINE

## 2024-10-23 PROCEDURE — 99214 OFFICE O/P EST MOD 30 MIN: CPT | Mod: 25,HCNC,, | Performed by: FAMILY MEDICINE

## 2024-10-23 PROCEDURE — 11042 DBRDMT SUBQ TIS 1ST 20SQCM/<: CPT | Mod: HCNC | Performed by: FAMILY MEDICINE

## 2024-10-23 PROCEDURE — 99213 OFFICE O/P EST LOW 20 MIN: CPT | Mod: HCNC | Performed by: FAMILY MEDICINE

## 2024-10-23 NOTE — PROGRESS NOTES
[facility]                Progress Note    Subjective:       Patient ID: Dale Pantoja is a 39 y.o. male.    Chief Complaint: Wound Check, Wound Care, and Dressing Change    Follow Up wound care visit. Patient rolled to exam room in self-propelled wheelchair to [facility]. Patient then self transfers to exam bed with nurse standing by (wheelchair wheels locked). Patient denies fever, chills, nausea, vomiting or diarrhea at present. Patient denies pain or discomfort to wound bed at present. Patient reports not having any issues with dressing since last visit. Patient reports home health came out as ordered without complaint. Dressing appears intact without strikethrough drainage. Dressing removed & wound cleaned by nurse.  Dressing discarded.  Right Lower Buttock wound appears pink/red, moist with maceration noted around wound bed. Patient reports he will be doing BMX sports in lightweight wheelchair, he states he will have protective gear on.     No change to dressing order; applied per MD order. Patient will return to [facility] in 4 weeks. Patient declined AVS, has MyChart.       This is an established patient in wound care.   Patient presents in the office today for evaluation of the chronic wound.  Updated HPI is noted below.    The periwound appears non-erythematous, no maceration noted, non-edematous    The wound appears wound healing; fat layer exposed. Fibrin and slough in wound bed.     Patient denies fever, chills    Patients reports wound pain    Lymphedema status is contributory to wound status    Pain at the site of the wound is n/a     Contributing factors to current wound state include numerous comorbid conditions     Review of Systems   Constitutional:  Negative for activity change, appetite change and fever.   HENT:  Negative for congestion.    Respiratory:  Negative for shortness of breath and wheezing.    Cardiovascular:  Negative for chest pain and leg swelling.    Gastrointestinal:  Negative for abdominal pain, nausea and vomiting.   Skin:  Positive for wound.   Neurological:  Negative for dizziness and headaches.   Psychiatric/Behavioral:  The patient is not nervous/anxious.          Objective:        Physical Exam  Vitals and nursing note reviewed.   Constitutional:       Appearance: He is well-developed.   HENT:      Head: Normocephalic and atraumatic.   Eyes:      Conjunctiva/sclera: Conjunctivae normal.   Pulmonary:      Effort: Pulmonary effort is normal.   Abdominal:      General: There is no distension.      Palpations: Abdomen is soft.   Musculoskeletal:      Cervical back: Normal range of motion and neck supple.   Skin:     Comments: See wound description   Neurological:      Mental Status: He is alert and oriented to person, place, and time.   Psychiatric:         Behavior: Behavior normal.         Vitals:    10/23/24 0932   BP: (!) 173/101   Pulse: 83   Resp: 18   Temp: 97 °F (36.1 °C)       Assessment:           ICD-10-CM ICD-9-CM   1. Sacral decubitus ulcer, stage IV  L89.154 707.03     707.24            Altered Skin Integrity 09/28/22 1136 Right lower Buttocks (Active)   09/28/22 1136   Altered Skin Integrity Present on Admission - Did Patient arrive to the hospital with altered skin?: yes   Side: Right   Orientation: lower   Location: Buttocks   Wound Number:    Is this injury device related?:    Primary Wound Type:    Description of Altered Skin Integrity:    Ankle-Brachial Index:    Pulses:    Removal Indication and Assessment:    Wound Outcome:    (Retired) Wound Length (cm):    (Retired) Wound Width (cm):    (Retired) Depth (cm):    Wound Description (Comments):    Removal Indications:    Wound Image   10/23/24 1008   Dressing Appearance Intact;Moist drainage 10/23/24 1008   Drainage Amount Moderate 10/23/24 1008   Drainage Characteristics/Odor Serosanguineous 10/23/24 1008   Appearance Pink;Red;Moist 10/23/24 1008   Tissue loss description Full  thickness 10/23/24 1008   Black (%), Wound Tissue Color 0 % 10/23/24 1008   Red (%), Wound Tissue Color 100 % 10/23/24 1008   Yellow (%), Wound Tissue Color 0 % 10/23/24 1008   Periwound Area Pale white;Macerated 10/23/24 1008   Wound Edges Defined;Open 10/23/24 1008   Wound Length (cm) 0.5 cm 10/23/24 1008   Wound Width (cm) 1.6 cm 10/23/24 1008   Wound Depth (cm) 1.2 cm 10/23/24 1008   Wound Volume (cm^3) 0.96 cm^3 10/23/24 1008   Wound Surface Area (cm^2) 0.8 cm^2 10/23/24 1008   Tunneling (depth (cm)/location) 0 10/23/24 1008   Undermining (depth (cm)/location) 0 10/23/24 1008   Care Cleansed with:;Antimicrobial agent;Sterile normal saline;Wound cleanser 10/23/24 1008   Dressing Changed;Collagen;Calcium alginate;Absorptive Pad 10/23/24 1008   Periwound Care Moisture barrier applied;Absorptive dressing applied 10/23/24 1008   Dressing Change Due 11/20/24 10/23/24 1008           Debridement    Date/Time: 10/23/2024 10:00 AM    Performed by: Ofelia Lizama MD  Authorized by: Ofelia Lizama MD    Consent Done?:  Yes (Verbal)  Local anesthesia used?: Yes    Local anesthetic:  Topical anesthetic    Wound Details:    Location:  Right buttock    Type of Debridement:  Excisional       Length (cm):  0.5       Area (sq cm):  0.8       Width (cm):  1.6       Percent Debrided (%):  100       Depth (cm):  1.2       Total Area Debrided (sq cm):  0.8    Depth of debridement:  Subcutaneous tissue    Tissue debrided:  Subcutaneous    Devitalized tissue debrided:  Slough and Fibrin    Instruments:  Curette  Bleeding:  Minimal  Hemostasis Achieved: Yes  Method Used:  Pressure      Plan:            Debridement needed and Done today.   Careful precaution discussed with patient.   Continue work outs   Good exercise and diet recommended for sports related activities   Keep dressing clean and intact  Discussed increase protein intake   Discussed wound expectations and plan   Continue with wound care orders and plan as noted in orders.  "  Continue to follow current medication regimen as per pcp   Call for any questions / concerns.        Tissue pathology and/or culture taken     [] Yes      [x] No  Sharp debridement performed                   [x] Yes       [] No  Labs ordered     [] Yes       [x] No  Imaging ordered    [] Yes      [x] No    Orders Placed This Encounter   Procedures    Change dressing     Wound Dressing Orders    Dressing change frequency three times a week (Home Health will change Monday, Wednesday, Friday between visits)  Remove old dressing  Cleanse or irrigate with: Normal Saline  Protect periwound with:  Gentian Violet to maceration and periwound, Cavilon to area that will be taped  Primary dressing: WOUND BASE: ABX powder (1 scoop, Tobramycin/Colistimethate) then Promogran (1 used) packed into wound bed then Aquacel AG (folded twice and laid against wound bed)    Secondary dressing: Mextra 5" x 5" secured with Medipore Tape (in window-pane fashion)    Return to clinic in 4 weeks    SUBSEQUENT HOME HEALTH ORDERS     Home Health for Wound Care. Visit on Monday, Wednesday, Friday when patient is not seen in clinic. Patient will return to clinic on 11/20/24.    Please order smaller Mextra in size 3" x 3" if available    Wound Dressing Orders    Dressing change frequency three times a week (Home Health will change Monday, Wednesday, Friday between visits)  Remove old dressing  Cleanse or irrigate with: Normal Saline  Protect periwound with:  Cavilon to periwound and area that will be taped, Gentian Violet hugging wound bed and areas of maceration  Primary dressing: WOUND BASE: ABX powder (1 scoop, Tobramycin/Colistimethate) then Promogran (1 used) packed into wound bed then Aquacel AG    Secondary dressing: Mextra: size used: 3" x 3" secured with Medipore Tape (in window-pane fashion)    1. Remove old dressing  2. Cleanse with Vashe, Normal Saline or Wound Cleanser  3. Pat Dry with gauze  4. Cavilon to area that will be taped, " "Gentian Violet (if available) hugging wound bed and areas of maceration  5. ABX powder (1 scoop, pt's own Tobramycin/Colistimethate)  6. Promogran (or collagen equivalent) x 1 packed into wound bed okay to shred to fit into wound bed  7. Then Aquacel AG laid outside wound bed (DO NOT PACK INTO WOUND BED)  8. Outer dressing: Mextra: size used: 3" x 3" secured with Medipore Tape (in window-pane fashion)  **Leave patient with extra supplies for excessive drainage between visits.    Evaluate for acute changes (purulence, increased redness/swelling, increased drainage, malodor, increased pain, pallor, necrosis) please contact physician on any acute changes. If after clinic hours or over the weekend, send patient to the ER.     Call clinic at [phone] if any changes, substitutions or questions about orders.     DO NOT DEVIATE FROM ORDER. PLEASE ORDER SUPPLIES NEEDED TO APPLY TO PATIENT'S WOUNDS.     Order Specific Question:   What Home Health Agency is the patient currently using?     Answer:   [facility]        Follow up in about 4 weeks (around 11/20/2024) for Wound Care.       "

## 2024-10-29 ENCOUNTER — TELEPHONE (OUTPATIENT)
Dept: INTERNAL MEDICINE | Facility: CLINIC | Age: 39
End: 2024-10-29
Payer: MEDICARE

## 2024-11-12 ENCOUNTER — HOSPITAL ENCOUNTER (OUTPATIENT)
Dept: RADIOLOGY | Facility: HOSPITAL | Age: 39
Discharge: HOME | End: 2024-11-12
Attending: UROLOGY
Payer: MEDICARE

## 2024-11-12 DIAGNOSIS — N31.9 NEUROGENIC BLADDER: ICD-10-CM

## 2024-11-12 PROCEDURE — 76770 US EXAM ABDO BACK WALL COMP: CPT | Mod: TC,HCNC

## 2024-11-12 PROCEDURE — 76770 US EXAM ABDO BACK WALL COMP: CPT | Mod: 26,HCNC,, | Performed by: RADIOLOGY

## 2024-11-20 ENCOUNTER — HOSPITAL ENCOUNTER (OUTPATIENT)
Dept: WOUND CARE | Facility: HOSPITAL | Age: 39
Discharge: HOME | End: 2024-11-20
Attending: FAMILY MEDICINE
Payer: MEDICARE

## 2024-11-20 VITALS
TEMPERATURE: 98 F | HEART RATE: 80 BPM | DIASTOLIC BLOOD PRESSURE: 76 MMHG | SYSTOLIC BLOOD PRESSURE: 158 MMHG | RESPIRATION RATE: 12 BRPM

## 2024-11-20 DIAGNOSIS — R89.5 POSITIVE CULTURE FINDINGS IN WOUND: ICD-10-CM

## 2024-11-20 DIAGNOSIS — L89.154 SACRAL DECUBITUS ULCER, STAGE IV: Primary | ICD-10-CM

## 2024-11-20 DIAGNOSIS — G82.20 PARAPLEGIA: ICD-10-CM

## 2024-11-20 PROCEDURE — 11042 DBRDMT SUBQ TIS 1ST 20SQCM/<: CPT | Mod: HCNC,,, | Performed by: FAMILY MEDICINE

## 2024-11-20 PROCEDURE — 11042 DBRDMT SUBQ TIS 1ST 20SQCM/<: CPT | Mod: HCNC | Performed by: FAMILY MEDICINE

## 2024-11-20 NOTE — PROGRESS NOTES
"[facility]                Progress Note    Subjective:       Patient ID: Dale Pantoja is a 39 y.o. male.    Chief Complaint: Wound Care and Dressing Change    Follow Up wound care visit. Patient rolled to exam room in wheelchair per self to [facility] with nurse at his side. He then self transfers with nurse at side to exam bed. Patient denies fever, chills, nausea, vomiting or diarrhea at present. Patient denies pain or discomfort to wound bed at present. Patient reports not having any issues with dressing since last visit. Patient reports home health came out as ordered but c/o nurses "changing and not applying the dressings correctly." He reports the last SN applied "Mextra blue to skin instead of white pad to skin" causing "2 hours of leaking". Will send request per MD to have HH change on Monday or Wednesday, leave additional supplies and patient will change in-between. Dressing appears intact without strikethrough drainage. Patient applied most recent dressing. Dressing removed & wound cleaned by nurse. Dressing discarded. Right Lower Buttock wound is measuring smaller in depth.     No change to dressing order; applied per MD order. Patient transferred back to wheelchair (wheels locked) per self, with nurse standing by. Patient will return to [facility] in 4 weeks. Patient declined AVS, has MyChart.     This is an established patient in wound care.   Patient presents in the office today for evaluation of the chronic wound.  Updated HPI is noted below.    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   HENT:  Negative for congestion.    Respiratory:  Negative for shortness of breath and wheezing.    Cardiovascular:  Negative for chest pain and leg swelling.   Gastrointestinal:  Negative for abdominal pain, nausea and vomiting.   Skin:  Positive for wound.   Neurological:  Negative for dizziness and headaches.   Psychiatric/Behavioral:  The patient is not nervous/anxious.          Objective:        Physical Exam    Vitals:    11/20/24 0935   BP: (!) 158/76   Pulse: 80   Resp: 12   Temp: 97.5 °F (36.4 °C)       Assessment:           ICD-10-CM ICD-9-CM   1. Sacral decubitus ulcer, stage IV  L89.154 707.03     707.24   2. Positive culture findings in wound  R89.5 795.39   3. Paraplegia  G82.20 344.1            Altered Skin Integrity 09/28/22 1136 Right lower Buttocks (Active)   09/28/22 1136   Altered Skin Integrity Present on Admission - Did Patient arrive to the hospital with altered skin?: yes   Side: Right   Orientation: lower   Location: Buttocks   Wound Number:    Is this injury device related?:    Primary Wound Type:    Description of Altered Skin Integrity:    Ankle-Brachial Index:    Pulses:    Removal Indication and Assessment:    Wound Outcome:    (Retired) Wound Length (cm):    (Retired) Wound Width (cm):    (Retired) Depth (cm):    Wound Description (Comments):    Removal Indications:    Wound Image    11/20/24 1015   Dressing Appearance Intact;Moist drainage 11/20/24 1015   Drainage Amount Small 11/20/24 1015   Drainage Characteristics/Odor Serosanguineous;No odor 11/20/24 1015   Appearance Pink;Moist 11/20/24 1015   Tissue loss description Full thickness 11/20/24 1015   Black (%), Wound Tissue Color 0 % 11/20/24 1015   Red (%), Wound Tissue Color 100 % 11/20/24 1015   Yellow (%), Wound Tissue Color 0 % 11/20/24 1015   Periwound Area Macerated;Moist;Pale white 11/20/24 1015   Wound Edges Defined;Open 11/20/24 1015    Wound Length (cm) 0.5 cm 11/20/24 1015   Wound Width (cm) 1.4 cm 11/20/24 1015   Wound Depth (cm) 0.9 cm 11/20/24 1015   Wound Volume (cm^3) 0.63 cm^3 11/20/24 1015   Wound Surface Area (cm^2) 0.7 cm^2 11/20/24 1015   Tunneling (depth (cm)/location) 0 11/20/24 1015   Undermining (depth (cm)/location) 0 11/20/24 1015   Care Cleansed with:;Antimicrobial agent;Sterile normal saline 11/20/24 1015   Dressing Changed;Collagen;Calcium alginate;Absorptive Pad 11/20/24 1015   Periwound Care Moisture barrier applied;Absorptive dressing applied 11/20/24 1015   Dressing Change Due 12/18/24 11/20/24 1015           Debridement    Date/Time: 11/20/2024 10:00 AM    Performed by: Ofelia Lizama MD  Authorized by: Ofelia Lizama MD    Consent Done?:  Yes (Verbal)  Local anesthesia used?: No      Wound Details:    Location:  Right buttock    Type of Debridement:  Excisional       Length (cm):  0.5       Width (cm):  1.4       Depth (cm):  0.9       Area (sq cm):  0.7       Percent Debrided (%):  100       Total Area Debrided (sq cm):  0.7    Depth of debridement:  Subcutaneous tissue    Tissue debrided:  Cartilage and Dermis    Devitalized tissue debrided:  Fibrin and Slough    Instruments:  Curette  Bleeding:  Minimal  Hemostasis Achieved: Yes  Method Used:  Pressure  Patient tolerance:  Patient tolerated the procedure well with no immediate complications       Plan:            Debridement needed and Done today.   Change home health to once weekly  Return to clinic in 4 weeks   Keep dressing clean and intact  Discussed increase protein intake   Discussed wound expectations and plan   Continue with wound care orders and plan as noted in orders.   Continue to follow current medication regimen as per pcp   Call for any questions / concerns.          Tissue pathology and/or culture taken     [] Yes      [x] No  Sharp debridement performed                   [x] Yes       [] No  Labs ordered     [] Yes       [x] No  Imaging ordered    []  "Yes      [x] No    Orders Placed This Encounter   Procedures    Change dressing     Wound Dressing Orders    Dressing change frequency three times a week (Home Health will change Monday or Wednesday patient will change on other days between visits)  Remove old dressing  Cleanse or irrigate with: Normal Saline  Protect periwound with:  Gentian Violet to maceration and periwound, Cavilon to area that will be taped  Primary dressing: WOUND BASE: ABX powder (1 scoop, Tobramycin/Colistimethate) then Caron (1 used) packed into wound bed then Aquacel AG (folded twice and laid against wound bed)    Secondary dressing: Mextra 5" x 5" secured with Medipore Tape (in window-pane fashion)    Return to clinic in 4 weeks    SUBSEQUENT HOME HEALTH ORDERS     Home Health for Wound Care. Visit on Monday or Wednesday and prn when patient is not seen in clinic. Reduce to once a week visits and prn. Patient will return to clinic on 12/18/24.     Please order smaller Mextra in size 3" x 3" if available and leave supplies behind for patient's own changes twice a week.     Wound Dressing Orders     Dressing change frequency three times a week (Pt will change dressing on days not seen by home health)   Remove old dressing   Cleanse or irrigate with: Normal Saline   Protect periwound with:  Cavilon to periwound and area that will be taped, Gentian Violet hugging wound bed and areas of maceration   Primary dressing: WOUND BASE: ABX powder (1 scoop, Tobramycin/Colistimethate) then Promogran (1 used) packed into wound bed then Aquacel AG    Secondary dressing: Mextra: size used: 3" x 3" secured with Medipore Tape (in window-pane fashion)     1. Remove old dressing   2. Cleanse with Vashe, Normal Saline or Wound Cleanser   3. Pat Dry with gauze   4. Cavilon to area that will be taped, Gentian Violet (if available) hugging wound bed and areas of maceration   5. ABX powder (1 scoop, pt's own Tobramycin/Colistimethate)   6. Promogran (or collagen " "equivalent) x 1 packed into wound bed okay to shred to fit into wound bed   7. Then Aquacel AG laid outside wound bed (DO NOT PACK INTO WOUND BED)   8. Outer dressing: Mextra: size used: 3" x 3" secured with Medipore Tape (in window-pane fashion)   **Leave patient with extra supplies for excessive drainage between visits.     Evaluate for acute changes (purulence, increased redness/swelling, increased drainage, malodor, increased pain, pallor, necrosis) please contact physician on any acute changes. If after clinic hours or over the weekend, send patient to the ER.      Call clinic at [phone] if any changes, substitutions or questions about orders.      DO NOT DEVIATE FROM ORDER. PLEASE ORDER SUPPLIES NEEDED TO APPLY TO PATIENT'S WOUNDS.     Order Specific Question:   What Home Health Agency is the patient currently using?     Answer:   [facility]        Follow up in about 4 weeks (around 12/18/2024) for Wound Care.       "

## 2024-11-27 ENCOUNTER — TELEPHONE (OUTPATIENT)
Dept: INTERNAL MEDICINE | Facility: CLINIC | Age: 39
End: 2024-11-27
Payer: MEDICARE

## 2024-11-27 NOTE — TELEPHONE ENCOUNTER
----- Message from Fausto sent at 11/27/2024 12:07 PM CST -----  Contact: [phone]  [email or web address]     Home Health is calling for Medical Advice regarding: had a fall least week, has a scratch on left elbow    Patient wants a call back or thru [facility]: call if needed    Comments:    Please advise patient replies from provider may take up to 48 hours.

## 2024-11-29 ENCOUNTER — LAB VISIT (OUTPATIENT)
Dept: LAB | Facility: HOSPITAL | Age: 39
End: 2024-11-29
Attending: INTERNAL MEDICINE
Payer: MEDICARE

## 2024-11-29 DIAGNOSIS — E78.5 DYSLIPIDEMIA: ICD-10-CM

## 2024-11-29 DIAGNOSIS — E11.69 TYPE 2 DIABETES MELLITUS WITH OTHER SPECIFIED COMPLICATION, WITHOUT LONG-TERM CURRENT USE OF INSULIN: ICD-10-CM

## 2024-11-29 LAB
ALBUMIN SERPL BCP-MCNC: 3.5 G/DL (ref 3.5–5.2)
ALP SERPL-CCNC: 132 U/L (ref 40–150)
ALT SERPL W/O P-5'-P-CCNC: 24 U/L (ref 10–44)
ANION GAP SERPL CALC-SCNC: 12 MMOL/L (ref 8–16)
AST SERPL-CCNC: 13 U/L (ref 10–40)
BILIRUB SERPL-MCNC: 0.6 MG/DL (ref 0.1–1)
BUN SERPL-MCNC: 17 MG/DL (ref 6–20)
CALCIUM SERPL-MCNC: 10 MG/DL (ref 8.7–10.5)
CHLORIDE SERPL-SCNC: 101 MMOL/L (ref 95–110)
CHOLEST SERPL-MCNC: 141 MG/DL (ref 120–199)
CHOLEST/HDLC SERPL: 4.4 {RATIO} (ref 2–5)
CO2 SERPL-SCNC: 26 MMOL/L (ref 23–29)
CREAT SERPL-MCNC: 1.1 MG/DL (ref 0.5–1.4)
EST. GFR  (NO RACE VARIABLE): >60 ML/MIN/1.73 M^2
ESTIMATED AVG GLUCOSE: 226 MG/DL (ref 68–131)
GLUCOSE SERPL-MCNC: 287 MG/DL (ref 70–110)
HBA1C MFR BLD: 9.5 % (ref 4–5.6)
HDLC SERPL-MCNC: 32 MG/DL (ref 40–75)
HDLC SERPL: 22.7 % (ref 20–50)
LDLC SERPL CALC-MCNC: 98.6 MG/DL (ref 63–159)
NONHDLC SERPL-MCNC: 109 MG/DL
POTASSIUM SERPL-SCNC: 3.7 MMOL/L (ref 3.5–5.1)
PROT SERPL-MCNC: 7.8 G/DL (ref 6–8.4)
SODIUM SERPL-SCNC: 139 MMOL/L (ref 136–145)
TRIGL SERPL-MCNC: 52 MG/DL (ref 30–150)

## 2024-11-29 PROCEDURE — 36415 COLL VENOUS BLD VENIPUNCTURE: CPT | Mod: HCNC | Performed by: INTERNAL MEDICINE

## 2024-11-29 PROCEDURE — 80053 COMPREHEN METABOLIC PANEL: CPT | Mod: HCNC | Performed by: INTERNAL MEDICINE

## 2024-11-29 PROCEDURE — 83036 HEMOGLOBIN GLYCOSYLATED A1C: CPT | Mod: HCNC | Performed by: INTERNAL MEDICINE

## 2024-11-29 PROCEDURE — 80061 LIPID PANEL: CPT | Mod: HCNC | Performed by: INTERNAL MEDICINE

## 2024-12-11 ENCOUNTER — OFFICE VISIT (OUTPATIENT)
Dept: UROLOGY | Facility: CLINIC | Age: 39
End: 2024-12-11
Payer: MEDICARE

## 2024-12-11 VITALS
WEIGHT: 156.06 LBS | HEART RATE: 76 BPM | DIASTOLIC BLOOD PRESSURE: 97 MMHG | BODY MASS INDEX: 27.65 KG/M2 | SYSTOLIC BLOOD PRESSURE: 155 MMHG

## 2024-12-11 DIAGNOSIS — N31.9 NEUROGENIC BLADDER: Primary | ICD-10-CM

## 2024-12-11 PROCEDURE — 99999 PR PBB SHADOW E&M-EST. PATIENT-LVL III: CPT | Mod: PBBFAC,,, | Performed by: UROLOGY

## 2024-12-11 PROCEDURE — 3061F NEG MICROALBUMINURIA REV: CPT | Mod: CPTII,S$GLB,, | Performed by: UROLOGY

## 2024-12-11 PROCEDURE — 99214 OFFICE O/P EST MOD 30 MIN: CPT | Mod: S$GLB,,, | Performed by: UROLOGY

## 2024-12-11 PROCEDURE — 3046F HEMOGLOBIN A1C LEVEL >9.0%: CPT | Mod: CPTII,S$GLB,, | Performed by: UROLOGY

## 2024-12-11 PROCEDURE — 3066F NEPHROPATHY DOC TX: CPT | Mod: CPTII,S$GLB,, | Performed by: UROLOGY

## 2024-12-11 PROCEDURE — 3077F SYST BP >= 140 MM HG: CPT | Mod: CPTII,S$GLB,, | Performed by: UROLOGY

## 2024-12-11 PROCEDURE — 3008F BODY MASS INDEX DOCD: CPT | Mod: CPTII,S$GLB,, | Performed by: UROLOGY

## 2024-12-11 PROCEDURE — 1159F MED LIST DOCD IN RCRD: CPT | Mod: CPTII,S$GLB,, | Performed by: UROLOGY

## 2024-12-11 PROCEDURE — 4010F ACE/ARB THERAPY RXD/TAKEN: CPT | Mod: CPTII,S$GLB,, | Performed by: UROLOGY

## 2024-12-11 PROCEDURE — 3080F DIAST BP >= 90 MM HG: CPT | Mod: CPTII,S$GLB,, | Performed by: UROLOGY

## 2024-12-11 NOTE — PROGRESS NOTES
[facility]      Return Neurogenic Bladder Note    2024    Referred by:  No ref. provider found    History of Present Illness    CHIEF COMPLAINT:  Patient presents for his annual visit and follow-up for neurogenic bladder and chronic urinary retention.    HPI:  Patient is a 39-year-old male with a history of neurogenic bladder and chronic urinary retention due to spina bifida. He performs clean intermittent catheterization 5-6 times daily, including once nocturnally. Patient reports no incontinence between catheterizations since initiating DITROPAN XL 15 mg in , which he has since discontinued. He continues to remain continent without DITROPAN. Patient reports occasional sensation indicating the need for catheterization and adheres to his regular catheterization schedule without complications. Patient's twin brother recently  from a rare bladder cancer, prompting concerns about his own bladder health.    MEDICATIONS:  Patient was on DITROPAN XL 15 mg, which was initiated in  for neurogenic bladder and chronic urinary retention. This medication has been discontinued.    MEDICAL HISTORY:  Patient has a history of neurogenic bladder. He also has chronic urinary retention attributable to spina bifida. He has simple right renal cysts, which are considered stable medical renal disease.    FAMILY HISTORY:  Family history is significant for the patient's twin brother who  due to rare bladder cancer and complications, where fluid traveled to the heart. His twin brother also had unilateral renal agenesis.    TEST RESULTS:  Patient underwent a urodynamic evaluation in the past. This evaluation demonstrated low compliance, although the final filling pressure was not particularly elevated.    IMAGING:  A renal ultrasound was performed 1 year ago in 2023. The ultrasound showed no evidence of hydronephrosis, only revealing simple right renal cysts that were considered stable  medical renal disease.          A review of 10+ systems was conducted with pertinent positive and negative findings documented in HPI with all other systems reviewed and negative.    Past medical, family, surgical and social history reviewed as documented in chart with pertinent positive medical, family, surgical and social history detailed in HPI.      Exam Findings:    Physical Exam    General: No acute distress. Nontoxic appearing.  HENT: Normocephalic. Atraumatic.  Respiratory: Normal respiratory effort. No conversational dyspnea. No audible wheezing.  Abdomen: No obvious distension.  Skin: No visible abnormalities.  Extremities: No edema upper extremities. No edema lower extremities.  Neurological: Alert and oriented x3. Normal speech.  Psychiatric: Normal mood. Normal affect. No evidence of SI.          Assessment/Plan:    Assessment & Plan    - Reviewed patient's history of neurogenic bladder and chronic urinary retention due to spina bifida  - Noted patient's twin brother passed away from rare bladder cancer  - Considering cystoscopy to evaluate bladder due to family history, despite bladder cancer generally not being inherited  - Acknowledged patient's discontinuation of Ditropan XL without adverse effects  - Planning to investigate twin's pathology report for potential increased risk factors    PLAN SUMMARY:  - Cystoscopy scheduled for next month  - Discontinued Ditropan XL 15 mg  - Annual renal ultrasound ordered  - Continue clean intermittent catheterization 5-6 times daily  - Follow-up in 1 month for cystoscopy  - Follow-up in 1 year for annual visit    UNINHIBITED NEUROPATHIC BLADDER:  - Patient to continue clean intermittent catheterization 5-6 times daily.  - Discontinued Ditropan XL 15 mg.  - Cystoscopy planned for next month.    SPINA BIFIDA:  - Annual renal ultrasound ordered.    FAMILY HISTORY OF CONGENITAL ABNORMALITIES:  - Explained that bladder cancer is generally not inherited, but some  predispositions can be.  - Discussed rarity of bladder cancer in young patients.    FOLLOW-UP:  - Follow up in 1 month for cystoscopy.  - Follow up in 1 year for annual visit.

## 2024-12-12 ENCOUNTER — EXTERNAL HOME HEALTH (OUTPATIENT)
Dept: HOME HEALTH SERVICES | Facility: HOSPITAL | Age: 39
End: 2024-12-12
Payer: MEDICARE

## 2024-12-18 ENCOUNTER — HOSPITAL ENCOUNTER (OUTPATIENT)
Dept: WOUND CARE | Facility: HOSPITAL | Age: 39
Discharge: HOME | End: 2024-12-18
Attending: FAMILY MEDICINE
Payer: MEDICARE

## 2024-12-18 VITALS
HEART RATE: 89 BPM | SYSTOLIC BLOOD PRESSURE: 143 MMHG | TEMPERATURE: 97 F | RESPIRATION RATE: 16 BRPM | DIASTOLIC BLOOD PRESSURE: 97 MMHG

## 2024-12-18 DIAGNOSIS — R89.5 POSITIVE CULTURE FINDINGS IN WOUND: ICD-10-CM

## 2024-12-18 DIAGNOSIS — L89.154 SACRAL DECUBITUS ULCER, STAGE IV: Primary | ICD-10-CM

## 2024-12-18 DIAGNOSIS — G82.20 PARAPLEGIA: ICD-10-CM

## 2024-12-18 PROCEDURE — 11042 DBRDMT SUBQ TIS 1ST 20SQCM/<: CPT | Mod: HCNC,,, | Performed by: FAMILY MEDICINE

## 2024-12-18 PROCEDURE — 11042 DBRDMT SUBQ TIS 1ST 20SQCM/<: CPT | Mod: HCNC | Performed by: FAMILY MEDICINE

## 2024-12-18 PROCEDURE — 99214 OFFICE O/P EST MOD 30 MIN: CPT | Mod: 25,HCNC,, | Performed by: FAMILY MEDICINE

## 2024-12-18 NOTE — PROGRESS NOTES
[facility]                Progress Note    Subjective:       Patient ID: Dale Pantoja is a 39 y.o. male.    Chief Complaint: Wound Care and Dressing Change    Follow Up wound care visit. Patient rolled to exam room in wheelchair per self to [facility] with nurse at his side. He then self transfers with nurse at side to exam bed (wheelchair wheels locked). Patient denies fever, chills, nausea, vomiting or diarrhea at present. Patient denies pain or discomfort to wound bed at present. Patient reports not having any issues with dressing since last visit. Patient reports home health came out as ordered but c/o concern about collagen being used Puracol. Dressing appears intact without strikethrough drainage. Patient applied dressing this morning. Dressing removed & wound cleaned by nurse. Dressing discarded. Right Lower Buttock wound is measuring smaller overall.      No change to dressing order; applied per MD order. Patient transferred back to wheelchair (wheels locked) per self, with nurse standing by. Patient will return to [facility] in 4 weeks. Patient declined AVS, has MyChart.       This is an established patient in wound care.   Patient presents in the office today for evaluation of the chronic wound.  Updated HPI is noted below.    The periwound appears non-erythematous, macerated, non-edematous    The wound appears to have decreased in size. Some fibrin in wound bed. Periwound maceration.     Patient denies fever, chills    Patients reports wound pain    Lymphedema status is contributory to wound status    Pain at the site of the wound is aching and throbbing    Contributing factors to current wound state include numerous comorbid conditions       Review of Systems   Constitutional:  Negative for activity change, appetite change and fever.   HENT:  Negative for congestion.    Respiratory:  Negative for shortness of breath and wheezing.    Cardiovascular:  Negative for chest  pain and leg swelling.   Gastrointestinal:  Negative for abdominal pain, nausea and vomiting.   Skin:  Positive for wound.   Neurological:  Negative for dizziness and headaches.   Psychiatric/Behavioral:  The patient is not nervous/anxious.          Objective:        Physical Exam  Vitals and nursing note reviewed.   Constitutional:       Appearance: He is well-developed.   HENT:      Head: Normocephalic and atraumatic.   Eyes:      Conjunctiva/sclera: Conjunctivae normal.   Pulmonary:      Effort: Pulmonary effort is normal.   Abdominal:      General: There is no distension.      Palpations: Abdomen is soft.   Musculoskeletal:      Cervical back: Normal range of motion and neck supple.   Skin:     Comments: See wound description   Neurological:      Mental Status: He is alert and oriented to person, place, and time.   Psychiatric:         Behavior: Behavior normal.         Vitals:    12/18/24 1000   BP: (!) 143/97   Pulse: 89   Resp: 16   Temp: 97.4 °F (36.3 °C)       Assessment:           ICD-10-CM ICD-9-CM   1. Sacral decubitus ulcer, stage IV  L89.154 707.03     707.24   2. Positive culture findings in wound  R89.5 795.39   3. Paraplegia  G82.20 344.1            Altered Skin Integrity 09/28/22 1136 Right lower Buttocks (Active)   09/28/22 1136   Altered Skin Integrity Present on Admission - Did Patient arrive to the hospital with altered skin?: yes   Side: Right   Orientation: lower   Location: Buttocks   Wound Number:    Is this injury device related?:    Primary Wound Type:    Description of Altered Skin Integrity:    Ankle-Brachial Index:    Pulses:    Removal Indication and Assessment:    Wound Outcome:    (Retired) Wound Length (cm):    (Retired) Wound Width (cm):    (Retired) Depth (cm):    Wound Description (Comments):    Removal Indications:    Wound Image   12/18/24 1014   Dressing Appearance Intact;Moist drainage 12/18/24 1014   Drainage Amount Small 12/18/24 1014   Drainage Characteristics/Odor  Serosanguineous;No odor 12/18/24 1014   Appearance Pink;Moist 12/18/24 1014   Tissue loss description Full thickness 12/18/24 1014   Black (%), Wound Tissue Color 0 % 12/18/24 1014   Red (%), Wound Tissue Color 100 % 12/18/24 1014   Yellow (%), Wound Tissue Color 0 % 12/18/24 1014   Periwound Area Pale white;Macerated 12/18/24 1014   Wound Edges Defined;Open 12/18/24 1014   Wound Length (cm) 0.3 cm 12/18/24 1014   Wound Width (cm) 1.3 cm 12/18/24 1014   Wound Depth (cm) 0.7 cm 12/18/24 1014   Wound Volume (cm^3) 0.273 cm^3 12/18/24 1014   Wound Surface Area (cm^2) 0.39 cm^2 12/18/24 1014   Tunneling (depth (cm)/location) 0 12/18/24 1014   Undermining (depth (cm)/location) 0 12/18/24 1014   Care Cleansed with:;Antimicrobial agent;Sterile normal saline;Wound cleanser 12/18/24 1014   Dressing Changed 12/18/24 1014   Periwound Care Moisture barrier applied;Absorptive dressing applied 12/18/24 1014   Dressing Change Due 12/25/24 12/18/24 1014         Debridement    Date/Time: 12/18/2024 10:00 AM    Performed by: Ofelia Lizama MD  Authorized by: Ofelia Lizama MD    Consent Done?:  Yes (Verbal)  Local anesthesia used?: No      Wound Details:    Location:  Right buttock    Type of Debridement:  Excisional       Length (cm):  0.3       Width (cm):  1.3       Depth (cm):  0.7       Area (sq cm):  0.39       Percent Debrided (%):  100       Total Area Debrided (sq cm):  0.39    Depth of debridement:  Subcutaneous tissue    Tissue debrided:  Subcutaneous    Devitalized tissue debrided:  Slough, Fibrin and Clots    Instruments:  Curette  Bleeding:  Minimal  Hemostasis Achieved: Yes  Method Used:  Pressure  Patient tolerance:  Patient tolerated the procedure well with no immediate complications        Plan:            Debridement needed and Done today.   Continue off-loading  Keep dressing clean and intact  Discussed increase protein intake   Discussed wound expectations and plan   Continue with wound care orders and plan as  "noted in orders.   Continue to follow current medication regimen as per pcp   Call for any questions / concerns.        Tissue pathology and/or culture taken     [] Yes      [x] No  Sharp debridement performed                   [x] Yes       [] No  Labs ordered     [] Yes       [x] No  Imaging ordered    [] Yes      [x] No    Orders Placed This Encounter   Procedures    Change dressing     Wound Dressing Orders    Dressing change frequency three times a week (Home Health will change Monday or Wednesday patient will change on other days between visits)  Remove old dressing  Cleanse or irrigate with: Normal Saline  Protect periwound with:  Gentian Violet to maceration and periwound, Cavilon to area that will be taped  Primary dressing: WOUND BASE: ABX powder (1 scoop, Tobramycin/Colistimethate) then Caron (1 used) packed into wound bed then Aquacel AG (folded twice and laid against wound bed)    Secondary dressing: Mextra 5" x 5" secured with Medipore Tape (in window-pane fashion)    Return to clinic in 4 weeks    SUBSEQUENT HOME HEALTH ORDERS     Home Health for Wound Care. Visit on Monday or Wednesday and prn when patient is not seen in clinic. Reduce to once a week visits and prn. Patient will return to clinic on 01/15/24.    Please order Promogran (collagen) if available and leave supplies behind for patient's own changes twice a week.    Wound Dressing Orders    Dressing change frequency three times a week (Pt will change dressing on days not seen by home health)  Remove old dressing  Cleanse or irrigate with: Normal Saline  Protect periwound with:  Cavilon to periwound and area that will be taped, Gentian Violet hugging wound bed and areas of maceration (PATIENT SUPPLY, PLEASE USE TO DECREASE MACERATION)  Primary dressing: WOUND BASE: ABX powder (1 scoop, Tobramycin/Colistimethate) then Promogran (1 used) packed into wound bed then Aquacel AG    Secondary dressing: Mextra: size used: 3" x 3" secured with " "Medipore Tape (in window-pane fashion)    1. Remove old dressing  2. Cleanse with Vashe, Normal Saline or Wound Cleanser  3. Pat Dry with gauze  4. Cavilon to area that will be taped, Gentian Violet (patient supply) hugging wound bed and areas of maceration  5. ABX powder (1 scoop, pt's own Tobramycin/Colistimethate)  6. Promogran (or collagen equivalent) x 1 packed into wound bed okay to shred to fit into wound bed  7. Then Aquacel AG laid outside wound bed (DO NOT PACK INTO WOUND BED)  8. Outer dressing: Mextra: size used: 3" x 3" secured with Medipore Tape (in window-pane fashion)  **Leave patient with extra supplies for excessive drainage between visits.    Evaluate for acute changes (purulence, increased redness/swelling, increased drainage, malodor, increased pain, pallor, necrosis) please contact physician on any acute changes. If after clinic hours or over the weekend, send patient to the ER.     Call clinic at [phone] if any changes, substitutions or questions about orders.     DO NOT DEVIATE FROM ORDER. PLEASE ORDER SUPPLIES NEEDED TO APPLY TO PATIENT'S WOUNDS.     Order Specific Question:   What Home Health Agency is the patient currently using?     Answer:   [facility]        Follow up in about 4 weeks (around 1/15/2025) for Wound Care.       "

## 2024-12-24 ENCOUNTER — OFFICE VISIT (OUTPATIENT)
Dept: INTERNAL MEDICINE | Facility: CLINIC | Age: 39
End: 2024-12-24
Payer: MEDICARE

## 2024-12-24 VITALS
BODY MASS INDEX: 27.65 KG/M2 | SYSTOLIC BLOOD PRESSURE: 156 MMHG | HEIGHT: 63 IN | DIASTOLIC BLOOD PRESSURE: 94 MMHG | HEART RATE: 87 BPM | OXYGEN SATURATION: 98 %

## 2024-12-24 DIAGNOSIS — I10 HTN (HYPERTENSION), BENIGN: ICD-10-CM

## 2024-12-24 DIAGNOSIS — E78.5 DYSLIPIDEMIA: Primary | ICD-10-CM

## 2024-12-24 DIAGNOSIS — E11.69 TYPE 2 DIABETES MELLITUS WITH OTHER SPECIFIED COMPLICATION, WITHOUT LONG-TERM CURRENT USE OF INSULIN: ICD-10-CM

## 2024-12-24 DIAGNOSIS — Q05.2 SPINA BIFIDA OF LUMBAR REGION WITH HYDROCEPHALUS: ICD-10-CM

## 2024-12-24 PROCEDURE — 3061F NEG MICROALBUMINURIA REV: CPT | Mod: HCNC,CPTII,S$GLB, | Performed by: INTERNAL MEDICINE

## 2024-12-24 PROCEDURE — G2211 COMPLEX E/M VISIT ADD ON: HCPCS | Mod: HCNC,S$GLB,, | Performed by: INTERNAL MEDICINE

## 2024-12-24 PROCEDURE — 99214 OFFICE O/P EST MOD 30 MIN: CPT | Mod: HCNC,S$GLB,, | Performed by: INTERNAL MEDICINE

## 2024-12-24 PROCEDURE — 3080F DIAST BP >= 90 MM HG: CPT | Mod: HCNC,CPTII,S$GLB, | Performed by: INTERNAL MEDICINE

## 2024-12-24 PROCEDURE — 4010F ACE/ARB THERAPY RXD/TAKEN: CPT | Mod: HCNC,CPTII,S$GLB, | Performed by: INTERNAL MEDICINE

## 2024-12-24 PROCEDURE — 3008F BODY MASS INDEX DOCD: CPT | Mod: HCNC,CPTII,S$GLB, | Performed by: INTERNAL MEDICINE

## 2024-12-24 PROCEDURE — 3077F SYST BP >= 140 MM HG: CPT | Mod: HCNC,CPTII,S$GLB, | Performed by: INTERNAL MEDICINE

## 2024-12-24 PROCEDURE — 3046F HEMOGLOBIN A1C LEVEL >9.0%: CPT | Mod: HCNC,CPTII,S$GLB, | Performed by: INTERNAL MEDICINE

## 2024-12-24 PROCEDURE — 1159F MED LIST DOCD IN RCRD: CPT | Mod: HCNC,CPTII,S$GLB, | Performed by: INTERNAL MEDICINE

## 2024-12-24 PROCEDURE — 3066F NEPHROPATHY DOC TX: CPT | Mod: HCNC,CPTII,S$GLB, | Performed by: INTERNAL MEDICINE

## 2024-12-24 PROCEDURE — 99999 PR PBB SHADOW E&M-EST. PATIENT-LVL III: CPT | Mod: PBBFAC,HCNC,, | Performed by: INTERNAL MEDICINE

## 2024-12-24 RX ORDER — HYDROCHLOROTHIAZIDE 25 MG/1
25 TABLET ORAL DAILY
Qty: 90 TABLET | Refills: 3 | Status: SHIPPED | OUTPATIENT
Start: 2024-12-24 | End: 2024-12-24 | Stop reason: SDUPTHER

## 2024-12-24 RX ORDER — ATORVASTATIN CALCIUM 80 MG/1
80 TABLET, FILM COATED ORAL DAILY
Qty: 90 TABLET | Refills: 3 | Status: SHIPPED | OUTPATIENT
Start: 2024-12-24

## 2024-12-24 RX ORDER — METFORMIN HYDROCHLORIDE 500 MG/1
500 TABLET ORAL
Qty: 270 TABLET | Refills: 3 | Status: SHIPPED | OUTPATIENT
Start: 2024-12-24 | End: 2024-12-24 | Stop reason: SDUPTHER

## 2024-12-24 RX ORDER — VALSARTAN 320 MG/1
320 TABLET ORAL DAILY
Qty: 90 TABLET | Refills: 3 | Status: SHIPPED | OUTPATIENT
Start: 2024-12-24 | End: 2024-12-24 | Stop reason: SDUPTHER

## 2024-12-24 RX ORDER — ATORVASTATIN CALCIUM 80 MG/1
80 TABLET, FILM COATED ORAL DAILY
Qty: 90 TABLET | Refills: 3 | Status: SHIPPED | OUTPATIENT
Start: 2024-12-24 | End: 2024-12-24

## 2024-12-24 RX ORDER — PIOGLITAZONEHYDROCHLORIDE 30 MG/1
30 TABLET ORAL DAILY
Qty: 90 TABLET | Refills: 3 | Status: SHIPPED | OUTPATIENT
Start: 2024-12-24 | End: 2024-12-24 | Stop reason: SDUPTHER

## 2024-12-24 RX ORDER — HYDROCHLOROTHIAZIDE 25 MG/1
25 TABLET ORAL DAILY
Qty: 90 TABLET | Refills: 3 | Status: SHIPPED | OUTPATIENT
Start: 2024-12-24 | End: 2024-12-24

## 2024-12-24 RX ORDER — HYDROCHLOROTHIAZIDE 25 MG/1
25 TABLET ORAL DAILY
Qty: 90 TABLET | Refills: 3 | Status: SHIPPED | OUTPATIENT
Start: 2024-12-24

## 2024-12-24 RX ORDER — AMLODIPINE BESYLATE 5 MG/1
5 TABLET ORAL DAILY
Qty: 90 TABLET | Refills: 3 | Status: SHIPPED | OUTPATIENT
Start: 2024-12-24 | End: 2024-12-24

## 2024-12-24 RX ORDER — DAPAGLIFLOZIN 10 MG/1
10 TABLET, FILM COATED ORAL DAILY
Qty: 90 TABLET | Refills: 3 | Status: SHIPPED | OUTPATIENT
Start: 2024-12-24 | End: 2024-12-24 | Stop reason: SDUPTHER

## 2024-12-24 RX ORDER — ATORVASTATIN CALCIUM 80 MG/1
80 TABLET, FILM COATED ORAL DAILY
Qty: 90 TABLET | Refills: 3 | Status: SHIPPED | OUTPATIENT
Start: 2024-12-24 | End: 2024-12-24 | Stop reason: SDUPTHER

## 2024-12-24 RX ORDER — DAPAGLIFLOZIN 10 MG/1
10 TABLET, FILM COATED ORAL DAILY
Qty: 90 TABLET | Refills: 3 | Status: SHIPPED | OUTPATIENT
Start: 2024-12-24 | End: 2024-12-24

## 2024-12-24 RX ORDER — VALSARTAN 320 MG/1
320 TABLET ORAL DAILY
Qty: 90 TABLET | Refills: 3 | Status: SHIPPED | OUTPATIENT
Start: 2024-12-24

## 2024-12-24 RX ORDER — PIOGLITAZONEHYDROCHLORIDE 30 MG/1
30 TABLET ORAL DAILY
Qty: 90 TABLET | Refills: 3 | Status: SHIPPED | OUTPATIENT
Start: 2024-12-24 | End: 2024-12-24

## 2024-12-24 RX ORDER — VALSARTAN 320 MG/1
320 TABLET ORAL DAILY
Qty: 90 TABLET | Refills: 3 | Status: SHIPPED | OUTPATIENT
Start: 2024-12-24 | End: 2024-12-24

## 2024-12-24 RX ORDER — AMLODIPINE BESYLATE 5 MG/1
5 TABLET ORAL DAILY
Qty: 90 TABLET | Refills: 3 | Status: SHIPPED | OUTPATIENT
Start: 2024-12-24

## 2024-12-24 RX ORDER — METFORMIN HYDROCHLORIDE 500 MG/1
500 TABLET ORAL
Qty: 270 TABLET | Refills: 3 | Status: SHIPPED | OUTPATIENT
Start: 2024-12-24 | End: 2024-12-24

## 2024-12-24 RX ORDER — AMLODIPINE BESYLATE 5 MG/1
5 TABLET ORAL DAILY
Qty: 90 TABLET | Refills: 3 | Status: SHIPPED | OUTPATIENT
Start: 2024-12-24 | End: 2024-12-24 | Stop reason: SDUPTHER

## 2024-12-24 RX ORDER — DAPAGLIFLOZIN 10 MG/1
10 TABLET, FILM COATED ORAL DAILY
Qty: 90 TABLET | Refills: 3 | Status: SHIPPED | OUTPATIENT
Start: 2024-12-24

## 2024-12-24 RX ORDER — PIOGLITAZONEHYDROCHLORIDE 30 MG/1
30 TABLET ORAL DAILY
Qty: 90 TABLET | Refills: 3 | Status: SHIPPED | OUTPATIENT
Start: 2024-12-24

## 2024-12-24 RX ORDER — METFORMIN HYDROCHLORIDE 500 MG/1
500 TABLET ORAL
Qty: 270 TABLET | Refills: 3 | Status: SHIPPED | OUTPATIENT
Start: 2024-12-24

## 2024-12-24 NOTE — PROGRESS NOTES
"  This note was generated with [employer] voice recognition software. I apologize for any possible typographical errors.  MMvictor m seems to have trouble with pronouns so I apologize if I Mis pronoun anyone         He brings his meds in with him today.  His is her for uncontrolled DM and uncontroeld bp -- His meds are hctz- 25 -Atorvastatin 80 mg a day  Valsartan 320 mg a day   Actos 30 mg a day   Metformin 500 mg tid.       Missing:  farxiga 10 mg a day--      Htn-    Recent, hgb A1c  :  9.5 --  Faxiga 10 mg  a day--(out for 1.5 months)    on actos 30 and metformin and januvia--   He does not remember these.      Diabetes-- just got off cruise-  Hgb A1c 9.5.  last visit it was 7.4.  Been out of faxiga for about 6 weeks.          BP (!) 156/94 (BP Location: Right arm, Patient Position: Sitting)   Pulse 87   Ht 5' 3" (1.6 m)   SpO2 98%   BMI 27.65 kg/m²   150/90   Physical exam.  He is well-appearing gentleman wheelchair.  Brings in all his medicine today  Head: Normocephalic, without obvious abnormality, atraumatic  Throat: lips, mucosa, and tongue normal; teeth and gums normal  Neck: no adenopathy, no carotid bruit, no JVD, supple, symmetrical, trachea midline, and thyroid not enlarged, symmetric, no tenderness/mass/nodules  Back: symmetric, no curvature. ROM normal. No CVA tenderness.  Lungs: clear to auscultation bilaterally  Chest wall: no tenderness  Abdomen: soft, non-tender; bowel sounds normal; no masses,  no organomegaly   Sitting in  wheelchair today        This is a gentleman with poorly controlled diabetes and poorly controlled blood pressure..  He has not taking his medicine appropriate.  Assessment and plan we are going to restart the Farxiga and will also restart amlodipine --  looks like he was doing better last visit.  Discussed the end of medicare gap- will send to his pharmacy         Dyslipidemia  -     Lipid Panel; Future; Expected date: 12/24/2024  -     atorvastatin (LIPITOR) 80 MG tablet; Take " 1 tablet (80 mg total) by mouth once daily.  Dispense: 90 tablet; Refill: 3    HTN (hypertension), benign  -     valsartan (DIOVAN) 320 MG tablet; Take 1 tablet (320 mg total) by mouth once daily.  Dispense: 90 tablet; Refill: 3  -     hydroCHLOROthiazide (HYDRODIURIL) 25 MG tablet; Take 1 tablet (25 mg total) by mouth once daily.  Dispense: 90 tablet; Refill: 3  -     amLODIPine (NORVASC) 5 MG tablet; Take 1 tablet (5 mg total) by mouth once daily.  Dispense: 90 tablet; Refill: 3    Type 2 diabetes mellitus with other specified complication, without long-term current use of insulin  -     Comprehensive Metabolic Panel; Future; Expected date: 12/24/2024  -     Hemoglobin A1C; Future; Expected date: 12/24/2024  -     CBC Auto Differential; Future; Expected date: 12/24/2024  -     pioglitazone (ACTOS) 30 MG tablet; Take 1 tablet (30 mg total) by mouth once daily.  Dispense: 90 tablet; Refill: 3  -     metFORMIN (GLUCOPHAGE) 500 MG tablet; Take 1 tablet (500 mg total) by mouth 3 (three) times daily with meals.  Dispense: 270 tablet; Refill: 3  -     dapagliflozin propanediol (FARXIGA) 10 mg tablet; Take 1 tablet (10 mg total) by mouth once daily.  Dispense: 90 tablet; Refill: 3    Spina bifida of lumbar region with hydrocephalus         Our office providers are the focal point for all needed health care services that are part of ongoing care related to a patient's single serious condition or the patient's complex conditions.

## 2025-01-06 ENCOUNTER — HOSPITAL ENCOUNTER (OUTPATIENT)
Dept: RADIOLOGY | Facility: HOSPITAL | Age: 40
Discharge: HOME | End: 2025-01-06
Attending: UROLOGY
Payer: MEDICARE

## 2025-01-06 ENCOUNTER — PATIENT MESSAGE (OUTPATIENT)
Dept: UROLOGY | Facility: CLINIC | Age: 40
End: 2025-01-06
Payer: MEDICARE

## 2025-01-06 DIAGNOSIS — N31.9 NEUROGENIC BLADDER: ICD-10-CM

## 2025-01-06 PROCEDURE — 76770 US EXAM ABDO BACK WALL COMP: CPT | Mod: TC

## 2025-01-06 PROCEDURE — 76770 US EXAM ABDO BACK WALL COMP: CPT | Mod: 26,,, | Performed by: RADIOLOGY

## 2025-01-15 ENCOUNTER — HOSPITAL ENCOUNTER (OUTPATIENT)
Dept: WOUND CARE | Facility: HOSPITAL | Age: 40
Discharge: HOME | End: 2025-01-15
Attending: FAMILY MEDICINE
Payer: MEDICARE

## 2025-01-15 VITALS
HEART RATE: 115 BPM | RESPIRATION RATE: 14 BRPM | DIASTOLIC BLOOD PRESSURE: 72 MMHG | TEMPERATURE: 97 F | SYSTOLIC BLOOD PRESSURE: 128 MMHG

## 2025-01-15 DIAGNOSIS — G82.20 PARAPLEGIA: ICD-10-CM

## 2025-01-15 DIAGNOSIS — Q05.2 SPINA BIFIDA OF LUMBAR REGION WITH HYDROCEPHALUS: ICD-10-CM

## 2025-01-15 DIAGNOSIS — E11.622 CONTROLLED TYPE 2 DIABETES MELLITUS WITH OTHER SKIN ULCER, WITHOUT LONG-TERM CURRENT USE OF INSULIN: ICD-10-CM

## 2025-01-15 DIAGNOSIS — L89.154 SACRAL DECUBITUS ULCER, STAGE IV: Primary | ICD-10-CM

## 2025-01-15 PROCEDURE — 11042 DBRDMT SUBQ TIS 1ST 20SQCM/<: CPT | Mod: HCNC | Performed by: FAMILY MEDICINE

## 2025-01-15 PROCEDURE — 99215 OFFICE O/P EST HI 40 MIN: CPT | Mod: 25,HCNC,, | Performed by: FAMILY MEDICINE

## 2025-01-15 PROCEDURE — 11042 DBRDMT SUBQ TIS 1ST 20SQCM/<: CPT | Mod: HCNC,,, | Performed by: FAMILY MEDICINE

## 2025-01-15 NOTE — PROGRESS NOTES
[facility]                Progress Note    Subjective:       Patient ID: Dale Pantoja is a 40 y.o. male.    Chief Complaint: Wound Care    Follow Up wound care visit. Patient rolled to exam room in wheelchair to [facility]. He then locks wheels on his wheelchair, then self transfers to exam bed with nurse standing by. Patient denies fever, chills, nausea, vomiting or diarrhea at present. Patient denies pain or discomfort to wound bed at present. Patient reports not having any issues with dressing since last visit. Dressing appears intact without strikethrough drainage. Dressing removed & wound cleaned by nurse.  Dressing discarded. Right Lower Buttock wound is measuring approximately the same as last visit.     No change to dressing order; applied per MD order. Patient will return to [facility] in 4 weeks. MD will submit referral for Epifix with plan to apply at next visit if approved by insurance and then every 2 weeks thereafter. Patient declined AVS, has MyChart.       This is an established patient in wound care.   Patient presents in the office today for evaluation of the chronic wound.  Updated HPI is noted below.    The periwound appears non-erythematous, periwound maceration noted, non-edematous    The wound appears stable; fat layer exposed. Minimal fibrin and slough in wound bed.     Patient denies fever, chills    Patients reports wound pain    Lymphedema status is contributory to wound status    Pain at the site of the wound is aching    Contributing factors to current wound state include spina bifida, wheelchair bound, diabetes     Review of Systems   Constitutional:  Negative for activity change, appetite change and fever.   HENT:  Negative for congestion.    Respiratory:  Negative for shortness of breath and wheezing.    Cardiovascular:  Negative for chest pain and leg swelling.   Gastrointestinal:  Negative for abdominal pain, nausea and vomiting.   Skin:  Positive  for wound.   Neurological:  Negative for dizziness and headaches.   Psychiatric/Behavioral:  The patient is not nervous/anxious.          Objective:        Physical Exam  Vitals and nursing note reviewed.   Constitutional:       Appearance: He is well-developed.   HENT:      Head: Normocephalic and atraumatic.   Eyes:      Conjunctiva/sclera: Conjunctivae normal.   Pulmonary:      Effort: Pulmonary effort is normal.   Abdominal:      General: There is no distension.      Palpations: Abdomen is soft.   Musculoskeletal:         General: Normal range of motion.      Cervical back: Normal range of motion and neck supple.   Skin:     Comments: See wound description   Neurological:      Mental Status: He is alert and oriented to person, place, and time.   Psychiatric:         Behavior: Behavior normal.         Vitals:    01/15/25 1000   BP: 128/72   Pulse: (!) 115   Resp: 14   Temp: 97.3 °F (36.3 °C)       Assessment:           ICD-10-CM ICD-9-CM   1. Sacral decubitus ulcer, stage IV  L89.154 707.03     707.24   2. Paraplegia  G82.20 344.1            Altered Skin Integrity 09/28/22 1136 Right lower Buttocks (Active)   09/28/22 1136   Altered Skin Integrity Present on Admission - Did Patient arrive to the hospital with altered skin?: yes   Side: Right   Orientation: lower   Location: Buttocks   Wound Number:    Is this injury device related?:    Primary Wound Type:    Description of Altered Skin Integrity:    Ankle-Brachial Index:    Pulses:    Removal Indication and Assessment:    Wound Outcome:    (Retired) Wound Length (cm):    (Retired) Wound Width (cm):    (Retired) Depth (cm):    Wound Description (Comments):    Removal Indications:    Wound Image    01/15/25 1001   Dressing Appearance Intact;Moist drainage 01/15/25 1001   Drainage Amount Moderate 01/15/25 1001   Drainage Characteristics/Odor Serosanguineous;No odor 01/15/25 1001   Appearance Pink;Moist 01/15/25 1001   Tissue loss description Full thickness 01/15/25  1001   Black (%), Wound Tissue Color 0 % 01/15/25 1001   Red (%), Wound Tissue Color 100 % 01/15/25 1001   Yellow (%), Wound Tissue Color 0 % 01/15/25 1001   Periwound Area Pale white 01/15/25 1001   Wound Edges Defined;Open 01/15/25 1001   Wound Length (cm) 0.4 cm 01/15/25 1001   Wound Width (cm) 1.2 cm 01/15/25 1001   Wound Depth (cm) 0.6 cm 01/15/25 1001   Wound Volume (cm^3) 0.288 cm^3 01/15/25 1001   Wound Surface Area (cm^2) 0.48 cm^2 01/15/25 1001   Tunneling (depth (cm)/location) 0 01/15/25 1001   Undermining (depth (cm)/location) 0 01/15/25 1001   Care Cleansed with:;Antimicrobial agent;Sterile normal saline;Debrided;Wound cleanser 01/15/25 1001   Dressing Changed 01/15/25 1001   Periwound Care Moisture barrier applied;Absorptive dressing applied 01/15/25 1001   Dressing Change Due 02/12/25 01/15/25 1001           Debridement    Date/Time: 1/15/2025 10:00 AM    Performed by: Ofelia Lizama MD  Authorized by: Ofelia Lizama MD    Consent Done?:  Yes (Verbal)    Preparation: Patient was prepped and draped with clean technique    Local anesthesia used?: No      Wound Details:    Location:  Right buttock    Type of Debridement:  Excisional       Length (cm):  0.4       Width (cm):  1.2       Depth (cm):  0.6       Area (sq cm):  0.48       Percent Debrided (%):  100       Total Area Debrided (sq cm):  0.48    Depth of debridement:  Subcutaneous tissue    Tissue debrided:  Subcutaneous    Devitalized tissue debrided:  Slough and Fibrin    Instruments:  Curette  Bleeding:  Minimal  Hemostasis Achieved: Yes  Method Used:  Pressure  Patient tolerance:  Patient tolerated the procedure well with no immediate complications       Plan:            Debridement needed and Done today.   Discussed continued off-loading.   Patient continues to follow-up with PCP to work on diabetes and blood pressure management.   Patient has had surgical debridement, which initially improved wound, however, now wound has been unchanged.  "  Patient has weekly dressing changes through home health   Patient is debrided by me on his visits in office.   Positive wound culture with topical antibiotic which he continues to use.   Patient does have spina bifida. He is dependent in wheelchair and does try to off-load and changes his position frequently to off-load wound.   EPIFIX consideration: Patient would benefit from Epifix therapy. Patient has not had resolution of wound despite greater than 30 days of standard wound care.   Keep dressing clean and intact  Discussed increase protein intake   Discussed wound expectations and plan   Continue with wound care orders and plan as noted in orders.   Continue to follow current medication regimen as per pcp   Call for any questions / concerns.        Tissue pathology and/or culture taken     [] Yes      [x] No  Sharp debridement performed                   [x] Yes       [] No  Labs ordered     [] Yes       [x] No  Imaging ordered    [] Yes      [x] No    Orders Placed This Encounter   Procedures    Change dressing     Wound Dressing Orders     Dressing change frequency three times a week (Home Health will change Monday or Wednesday patient will change on other days between visits)   Remove old dressing   Cleanse or irrigate with: Normal Saline   Protect periwound with:  Gentian Violet to maceration and periwound, Cavilon to area that will be taped   Primary dressing: WOUND BASE: ABX powder (1 scoop, Tobramycin/Colistimethate) then Caron (1 used) packed into wound bed then Aquacel AG (folded twice and laid against wound bed)    Secondary dressing: Mextra 5" x 5" secured with Medipore Tape (in window-pane fashion)     Return to clinic in 4 weeks    SUBSEQUENT HOME HEALTH ORDERS     Home Health for Wound Care. Visit on Monday or Wednesday and prn when patient is not seen in clinic. Reduce to once a week visits and prn. Patient will return to clinic on 02/12/25.    Please order Promogran (collagen) if available and " "leave supplies behind for patient's own changes twice a week.    Wound Dressing Orders    Dressing change frequency three times a week (Pt will change dressing on days not seen by home health)  Remove old dressing  Cleanse or irrigate with: Normal Saline  Protect periwound with:  Cavilon to periwound and area that will be taped, Gentian Violet hugging wound bed and areas of maceration (PATIENT SUPPLY, PLEASE USE TO DECREASE MACERATION)  Primary dressing: WOUND BASE: ABX powder (1 scoop, Tobramycin/Colistimethate) then Promogran (1 used) packed into wound bed then Aquacel AG    Secondary dressing: Mextra: size used: 3" x 3" secured with Medipore Tape (in window-pane fashion)    1. Remove old dressing  2. Cleanse with Vashe, Normal Saline or Wound Cleanser  3. Pat Dry with gauze  4. Cavilon to area that will be taped, Gentian Violet (patient supply) hugging wound bed and areas of maceration  5. ABX powder (1 scoop, pt's own Tobramycin/Colistimethate)  6. Promogran (or collagen equivalent) x 1 packed into wound bed okay to shred to fit into wound bed  7. Then Aquacel AG laid outside wound bed (DO NOT PACK INTO WOUND BED)  8. Outer dressing: Mextra: size used: 3" x 3" secured with Medipore Tape (in window-pane fashion)  **Leave patient with extra supplies for excessive drainage between visits.    Evaluate for acute changes (purulence, increased redness/swelling, increased drainage, malodor, increased pain, pallor, necrosis) please contact physician on any acute changes. If after clinic hours or over the weekend, send patient to the ER.     Call clinic at [phone] if any changes, substitutions or questions about orders.     DO NOT DEVIATE FROM ORDER. PLEASE ORDER SUPPLIES NEEDED TO APPLY TO PATIENT'S WOUNDS.     Order Specific Question:   What Home Health Agency is the patient currently using?     Answer:   [facility]        Follow up in about 4 weeks (around 2/12/2025) for Wound Care.       "

## 2025-01-16 PROCEDURE — G0179 MD RECERTIFICATION HHA PT: HCPCS | Mod: ,,, | Performed by: FAMILY MEDICINE

## 2025-01-28 ENCOUNTER — TELEPHONE (OUTPATIENT)
Facility: CLINIC | Age: 40
End: 2025-01-28
Payer: MEDICARE

## 2025-01-29 ENCOUNTER — PROCEDURE VISIT (OUTPATIENT)
Facility: CLINIC | Age: 40
End: 2025-01-29
Payer: MEDICARE

## 2025-01-29 VITALS
TEMPERATURE: 98 F | RESPIRATION RATE: 16 BRPM | HEART RATE: 92 BPM | SYSTOLIC BLOOD PRESSURE: 165 MMHG | DIASTOLIC BLOOD PRESSURE: 96 MMHG

## 2025-01-29 DIAGNOSIS — N31.9 NEUROGENIC BLADDER: ICD-10-CM

## 2025-01-29 PROCEDURE — 52000 CYSTOURETHROSCOPY: CPT | Mod: S$GLB,,, | Performed by: UROLOGY

## 2025-01-29 RX ORDER — LIDOCAINE HYDROCHLORIDE 20 MG/ML
JELLY TOPICAL
Status: COMPLETED | OUTPATIENT
Start: 2025-01-29 | End: 2025-01-29

## 2025-01-29 RX ADMIN — LIDOCAINE HYDROCHLORIDE: 20 JELLY TOPICAL at 12:01

## 2025-01-29 NOTE — PROCEDURES
Office Cystourethroscopy Note    Date: 1/29/2025   Referring Provider: Jens Abad MD     Reason for Cystoscopy: Neurogenic Bladder    Upper Tract Evaluation:   Renal U/S: Normal upper urinary tract    Procedure Details: Informed consent was obtained and he was sterily prepped and 1% lidocaine jelly was injected per urethra. A flexible cystoscope was inserted into the bladder via the urethra. There was no evidence of stricture, stenosis, lesions, other obstruction, or diverticulum. Significant findings included a normal unobstructed urethra only. Cystoscopic examination of the bladder revealed orthotopically positioned, normal bilateral ureteral orifices with clear yellow urine effluxing from each orifice. All mucosal surfaces were examined with no apparent stones, tumors, foreign bodies, erythema, trabeculation, diverticula, or ulcers. The procedure was concluded without complications. The patient was not administered a post-procedure antibiotic.     Specimens: No Specimens    Findings: Normal bladder and urethra    Other Findings:  None    Pelvic Exam:  No Pelvic Exam Repeated Today     Assesment and Plan:   No evidence of primary bladder pathology.

## 2025-01-29 NOTE — PATIENT INSTRUCTIONS
What to Expect After a Cystoscopy  For the next 24-48 hours, you may feel a mild burning when you urinate. This burning is normal and expected. Drink plenty of water to dilute the urine to help relieve the burning sensation. You may also see a small amount of blood in your urine after the procedure.    Unless you are already taking antibiotics, you may be given an antibiotic after the test to prevent infection.    Signs and Symptoms to Report  Call the [facility] at [phone] if you develop any of the following:  Fever of 101 degrees or higher  Chills or persistent bleeding  Inability to urinate

## 2025-02-12 ENCOUNTER — HOSPITAL ENCOUNTER (OUTPATIENT)
Dept: WOUND CARE | Facility: HOSPITAL | Age: 40
Discharge: HOME | End: 2025-02-12
Attending: FAMILY MEDICINE
Payer: MEDICARE

## 2025-02-12 VITALS
HEART RATE: 87 BPM | TEMPERATURE: 98 F | SYSTOLIC BLOOD PRESSURE: 142 MMHG | RESPIRATION RATE: 17 BRPM | DIASTOLIC BLOOD PRESSURE: 80 MMHG

## 2025-02-12 DIAGNOSIS — L89.154 SACRAL DECUBITUS ULCER, STAGE IV: Primary | ICD-10-CM

## 2025-02-12 DIAGNOSIS — E11.622 CONTROLLED TYPE 2 DIABETES MELLITUS WITH OTHER SKIN ULCER, WITHOUT LONG-TERM CURRENT USE OF INSULIN: ICD-10-CM

## 2025-02-12 DIAGNOSIS — Q05.2 SPINA BIFIDA OF LUMBAR REGION WITH HYDROCEPHALUS: ICD-10-CM

## 2025-02-12 DIAGNOSIS — G82.20 PARAPLEGIA: ICD-10-CM

## 2025-02-12 PROCEDURE — 11042 DBRDMT SUBQ TIS 1ST 20SQCM/<: CPT | Mod: HCNC | Performed by: FAMILY MEDICINE

## 2025-02-12 PROCEDURE — 99214 OFFICE O/P EST MOD 30 MIN: CPT | Mod: 25,HCNC,, | Performed by: FAMILY MEDICINE

## 2025-02-12 PROCEDURE — 11042 DBRDMT SUBQ TIS 1ST 20SQCM/<: CPT | Mod: HCNC,,, | Performed by: FAMILY MEDICINE

## 2025-02-12 NOTE — PROGRESS NOTES
[facility]                Progress Note    Subjective:       Patient ID: Dale Pantoja is a 40 y.o. male.    Chief Complaint: Wound Care and Dressing Change    Follow Up wound care visit. PPatient rolled to exam room in wheelchair to [facility]. He then locks wheels on his wheelchair, then self transfers to exam bed with nurse standing by. Patient denies fever, chills, nausea, vomiting or diarrhea at present. Patient denies pain or discomfort to wound bed at present. Patient reports not having any issues with dressing since last visit. Patient reports home health came out as ordered without complaint. Dressing appears intact without strikethrough drainage. Dressing removed & wound cleaned by nurse. Dressing discarded. Right Lower Buttock wound is measuring slightly larger in width and depth. MD put in referral for skin sub Grafix Prime PL with plan to apply at next visit pending insurance approval.     No change to dressing order; applied per MD order. Patient will return to [facility] in 4 weeks. Patient declined AVS, has MyChart.       This is an established patient in wound care.   Patient presents in the office today for evaluation of the chronic wound.  Updated HPI is noted below.    The periwound appears non-erythematous, macerated, non-edematous    The wound appears wound healing    Patient denies fever, chills    Patients reports wound pain    Lymphedema status is noncontributory to wound status    Pain at the site of the wound is aching    Contributing factors to current wound state include numerous comorbid conditions     Review of Systems   Constitutional:  Negative for activity change, appetite change and fever.   HENT:  Negative for congestion.    Respiratory:  Negative for shortness of breath and wheezing.    Cardiovascular:  Negative for chest pain and leg swelling.   Gastrointestinal:  Negative for abdominal pain, nausea and vomiting.   Skin:  Positive for wound.    Neurological:  Negative for dizziness and headaches.   Psychiatric/Behavioral:  The patient is not nervous/anxious.          Objective:        Physical Exam  Vitals and nursing note reviewed.   Constitutional:       Appearance: He is well-developed.   HENT:      Head: Normocephalic and atraumatic.   Eyes:      Conjunctiva/sclera: Conjunctivae normal.   Pulmonary:      Effort: Pulmonary effort is normal.   Abdominal:      General: There is no distension.      Palpations: Abdomen is soft.   Musculoskeletal:         General: Normal range of motion.      Cervical back: Normal range of motion and neck supple.   Skin:     Comments: See wound description   Neurological:      Mental Status: He is alert and oriented to person, place, and time.   Psychiatric:         Behavior: Behavior normal.         Vitals:    02/12/25 1000   BP: (!) 142/80   Pulse: 87   Resp: 17   Temp: 97.7 °F (36.5 °C)       Assessment:           ICD-10-CM ICD-9-CM   1. Sacral decubitus ulcer, stage IV  L89.154 707.03     707.24   2. Paraplegia  G82.20 344.1   3. Spina bifida of lumbar region with hydrocephalus  Q05.2 741.03   4. Controlled type 2 diabetes mellitus with other skin ulcer, without long-term current use of insulin  E11.622 250.80            Altered Skin Integrity 09/28/22 1136 Right lower Buttocks (Active)   09/28/22 1136   Altered Skin Integrity Present on Admission - Did Patient arrive to the hospital with altered skin?: yes   Side: Right   Orientation: lower   Location: Buttocks   Wound Number:    Is this injury device related?:    Primary Wound Type:    Description of Altered Skin Integrity:    Ankle-Brachial Index:    Pulses:    Removal Indication and Assessment:    Wound Outcome:    (Retired) Wound Length (cm):    (Retired) Wound Width (cm):    (Retired) Depth (cm):    Wound Description (Comments):    Removal Indications:    Wound Image    02/12/25 1021   Dressing Appearance Intact;Moist drainage 02/12/25 1021   Drainage Amount  Moderate 02/12/25 1021   Drainage Characteristics/Odor Serosanguineous 02/12/25 1021   Appearance Pink;Moist 02/12/25 1021   Tissue loss description Full thickness 02/12/25 1021   Black (%), Wound Tissue Color 0 % 02/12/25 1021   Red (%), Wound Tissue Color 100 % 02/12/25 1021   Yellow (%), Wound Tissue Color 0 % 02/12/25 1021   Periwound Area Pale white;Macerated;Excoriated 02/12/25 1021   Wound Edges Other (see comments);Open 02/12/25 1021   Wound Length (cm) 0.4 cm 02/12/25 1021   Wound Width (cm) 1.6 cm 02/12/25 1021   Wound Depth (cm) 1.2 cm 02/12/25 1021   Wound Volume (cm^3) 0.768 cm^3 02/12/25 1021   Wound Surface Area (cm^2) 0.64 cm^2 02/12/25 1021   Tunneling (depth (cm)/location) 0 02/12/25 1021   Undermining (depth (cm)/location) 0 02/12/25 1021   Care Cleansed with:;Antimicrobial agent;Sterile normal saline;Debrided;Wound cleanser 02/12/25 1021   Dressing Changed 02/12/25 1021   Periwound Care Moisture barrier applied;Absorptive dressing applied 02/12/25 1021   Dressing Change Due 02/14/25 02/12/25 1021         Debridement    Date/Time: 2/12/2025 10:00 AM    Performed by: Ofelia Lizama MD  Authorized by: Ofelia Lizama MD    Consent Done?:  Yes (Verbal)  Local anesthesia used?: No      Wound Details:    Location:  Right buttock    Type of Debridement:  Excisional       Length (cm):  0.4       Width (cm):  1.6       Depth (cm):  1.2       Area (sq cm):  0.64       Percent Debrided (%):  100       Total Area Debrided (sq cm):  0.64    Depth of debridement:  Subcutaneous tissue    Tissue debrided:  Subcutaneous    Devitalized tissue debrided:  Slough and Fibrin    Instruments:  Curette  Bleeding:  Minimal  Hemostasis Achieved: Yes  Method Used:  Pressure  Patient tolerance:  Patient tolerated the procedure well with no immediate complications      Plan:            Debridement needed and Done today.   Keep dressing clean and intact  Patient continues to follow-up with PCP to work on diabetes and blood  "pressure management.   Patient has had surgical debridement, which initially improved wound, however, now wound has been unchanged.   Patient has weekly dressing changes through home health   Patient is debrided by me on his visits in office.   Positive wound culture with topical antibiotic which he continues to use.   Patient does have spina bifida. He is dependent in wheelchair and does try to off-load and changes his position frequently to off-load wound.   Grafix Prime  consideration: Patient would benefit from Epifix therapy. Patient has not had resolution of wound despite greater than 30 days of standard wound care.   Discussed increase protein intake   Discussed wound expectations and plan   Continue with wound care orders and plan as noted in orders.   Continue to follow current medication regimen as per pcp   Call for any questions / concerns.          Tissue pathology and/or culture taken     [] Yes      [x] No  Sharp debridement performed                   [x] Yes       [] No  Labs ordered     [] Yes       [x] No  Imaging ordered    [] Yes      [x] No    Orders Placed This Encounter   Procedures    Debridement     This order was created via procedure documentation     Standing Status:   Standing     Number of Occurrences:   1    Change dressing     Wound Dressing Orders    Dressing change frequency three times a week (Home Health will change Monday or Wednesday patient will change on other days between visits)  Remove old dressing  Cleanse or irrigate with: Normal Saline  Protect periwound with:  Gentian Violet to maceration and periwound, Cavilon to area that will be taped  Primary dressing: WOUND BASE: ABX powder (1 scoop, Tobramycin/Colistimethate) then Caron (1 used) packed into wound bed then Aquacel AG (folded twice and laid against wound bed)    Secondary dressing: Mextra 5" x 5" secured with Medipore Tape (in window-pane fashion)    Return to clinic in 4 weeks    Please contact Wound Care " "Clinic on any acute changes.  If after clinic hours or over the weekend, please go to the nearest ER for evaluation.    SUBSEQUENT HOME HEALTH ORDERS     Home Health for Wound Care. Visit on Monday or Wednesday and prn when patient is not seen in clinic. Reduce to once a week visits and prn. Patient will return to clinic on 03/12/25.     Please order Promogran (collagen) if available and leave supplies behind for patient's own changes twice a week.     Wound Dressing Orders     Dressing change frequency three times a week (Pt will change dressing on days not seen by home health)   Remove old dressing   Cleanse or irrigate with: Normal Saline   Protect periwound with:  Cavilon to periwound and area that will be taped, Gentian Violet hugging wound bed and areas of maceration (PATIENT SUPPLY, PLEASE USE TO DECREASE MACERATION)   Primary dressing: WOUND BASE: ABX powder (1 scoop, Tobramycin/Colistimethate) then Promogran (1 used) packed into wound bed then Aquacel AG    Secondary dressing: Mextra: size used: 3" x 3" secured with Medipore Tape (in window-pane fashion)     1. Remove old dressing   2. Cleanse with Vashe, Normal Saline or Wound Cleanser   3. Pat Dry with gauze   4. Cavilon to area that will be taped, Gentian Violet (patient supply) hugging wound bed and areas of maceration   5. ABX powder (1 scoop, pt's own Tobramycin/Colistimethate)   6. Promogran (or collagen equivalent) x 1 packed into wound bed okay to shred to fit into wound bed   7. Then Aquacel AG laid outside wound bed (DO NOT PACK INTO WOUND BED)   8. Outer dressing: Mextra: size used: 3" x 3" secured with Medipore Tape (in window-pane fashion)   **Leave patient with extra supplies for excessive drainage between visits.     Evaluate for acute changes (purulence, increased redness/swelling, increased drainage, malodor, increased pain, pallor, necrosis) please contact physician on any acute changes. If after clinic hours or over the weekend, send " patient to the ER.      Call clinic at [phone] if any changes, substitutions or questions about orders.      DO NOT DEVIATE FROM ORDER. PLEASE ORDER SUPPLIES NEEDED TO APPLY TO PATIENT'S WOUNDS.     Order Specific Question:   What Home Health Agency is the patient currently using?     Answer:   [facility]    Skin Substitute Authorization     Grafix Prime  for sacral wound stage 4     Order Specific Question:   What substitution needs authrorization?     Answer:   Grafix Prime -(bone, tendon)        Follow up in about 4 weeks (around 3/12/2025) for Wound Care.

## 2025-02-21 DIAGNOSIS — Z00.00 ENCOUNTER FOR MEDICARE ANNUAL WELLNESS EXAM: ICD-10-CM

## 2025-03-06 ENCOUNTER — EXTERNAL HOME HEALTH (OUTPATIENT)
Dept: HOME HEALTH SERVICES | Facility: HOSPITAL | Age: 40
End: 2025-03-06
Payer: MEDICARE

## 2025-03-12 ENCOUNTER — HOSPITAL ENCOUNTER (OUTPATIENT)
Dept: WOUND CARE | Facility: HOSPITAL | Age: 40
Discharge: HOME | End: 2025-03-12
Attending: FAMILY MEDICINE
Payer: MEDICARE

## 2025-03-12 VITALS — TEMPERATURE: 98 F | HEART RATE: 80 BPM | SYSTOLIC BLOOD PRESSURE: 173 MMHG | DIASTOLIC BLOOD PRESSURE: 111 MMHG

## 2025-03-12 DIAGNOSIS — G82.20 PARAPLEGIA: ICD-10-CM

## 2025-03-12 DIAGNOSIS — E11.622 CONTROLLED TYPE 2 DIABETES MELLITUS WITH OTHER SKIN ULCER, WITHOUT LONG-TERM CURRENT USE OF INSULIN: ICD-10-CM

## 2025-03-12 DIAGNOSIS — Q05.2 SPINA BIFIDA OF LUMBAR REGION WITH HYDROCEPHALUS: ICD-10-CM

## 2025-03-12 DIAGNOSIS — L89.154 SACRAL DECUBITUS ULCER, STAGE IV: Primary | ICD-10-CM

## 2025-03-12 PROBLEM — E11.9 DIABETES MELLITUS TYPE II, CONTROLLED: Status: ACTIVE | Noted: 2025-03-12

## 2025-03-12 PROCEDURE — 11042 DBRDMT SUBQ TIS 1ST 20SQCM/<: CPT | Mod: HCNC,,, | Performed by: FAMILY MEDICINE

## 2025-03-12 PROCEDURE — 99214 OFFICE O/P EST MOD 30 MIN: CPT | Mod: 25,HCNC,, | Performed by: FAMILY MEDICINE

## 2025-03-12 PROCEDURE — 11042 DBRDMT SUBQ TIS 1ST 20SQCM/<: CPT | Mod: HCNC | Performed by: FAMILY MEDICINE

## 2025-03-12 NOTE — PROGRESS NOTES
"[facility]                Progress Note    Subjective:       Patient ID: Dale Pantoja is a 40 y.o. male.    Chief Complaint: Wound Care and Dressing Change    Follow Up wound care visit. PPatient rolled to exam room in wheelchair to [facility]. He then locks wheels on his wheelchair, then self transfers to exam bed with nurse standing by. Patient denies fever, chills, nausea, vomiting or diarrhea at present. Patient denies pain or discomfort to wound bed at present. Patient reports not having any issues with dressing since last visit. Patient reports home health came out as ordered, but he did not receive his last supply order d/t moving. He states HH is working on order to track where it has been delivered vs reorder. Dressing appears intact without strikethrough, but has a large amount of drainage. Patient reports "forgetting" to change dressing on Monday per self. Dressing removed & wound cleaned by nurse. Dressing discarded. Right Lower Buttock wound is measuring smaller. Grafix Prime PL was not approved by insurance.      No change to dressing order; applied per MD order. Patient will return to [facility] in 4 weeks. Patient declined AVS, has MyChart.         This is an established patient in wound care.   Patient presents in the office today for evaluation of the chronic wound.  Updated HPI is noted below.    The periwound appears non-erythematous, no maceration noted, non-edematous    The wound appears to have decreased in size. Epibole noted. No odor. Serous drainage.     Patient denies fever, chills    Patients reports wound pain    Lymphedema status is contributory to wound status    Pain at the site of the wound is n/a     Contributing factors to current wound state include numerous comorbid conditions       Review of Systems   Constitutional:  Negative for activity change, appetite change and fever.   HENT:  Negative for congestion.    Respiratory:  Negative for " shortness of breath and wheezing.    Cardiovascular:  Negative for chest pain and leg swelling.   Gastrointestinal:  Negative for abdominal pain, nausea and vomiting.   Skin:  Positive for wound.   Neurological:  Negative for dizziness and headaches.   Psychiatric/Behavioral:  The patient is not nervous/anxious.          Objective:        Physical Exam  Vitals and nursing note reviewed.   Constitutional:       Appearance: He is well-developed.   HENT:      Head: Normocephalic and atraumatic.   Eyes:      Conjunctiva/sclera: Conjunctivae normal.   Pulmonary:      Effort: Pulmonary effort is normal.   Abdominal:      General: There is no distension.      Palpations: Abdomen is soft.   Musculoskeletal:      Cervical back: Normal range of motion and neck supple.   Skin:     Comments: See wound description   Neurological:      Mental Status: He is alert and oriented to person, place, and time.   Psychiatric:         Behavior: Behavior normal.         Vitals:    03/12/25 1010   BP: (!) 173/111   Pulse: 80   Temp: 97.5 °F (36.4 °C)       Assessment:           ICD-10-CM ICD-9-CM   1. Sacral decubitus ulcer, stage IV  L89.154 707.03     707.24   2. Paraplegia  G82.20 344.1   3. Spina bifida of lumbar region with hydrocephalus  Q05.2 741.03   4. Controlled type 2 diabetes mellitus with other skin ulcer, without long-term current use of insulin  E11.622 250.80            Altered Skin Integrity 09/28/22 1136 Right lower Buttocks (Active)   09/28/22 1136   Altered Skin Integrity Present on Admission - Did Patient arrive to the hospital with altered skin?: yes   Side: Right   Orientation: lower   Location: Buttocks   Wound Number:    Is this injury device related?:    Primary Wound Type:    Description of Altered Skin Integrity:    Ankle-Brachial Index:    Pulses:    Removal Indication and Assessment:    Wound Outcome:    (Retired) Wound Length (cm):    (Retired) Wound Width (cm):    (Retired) Depth (cm):    Wound Description  (Comments):    Removal Indications:    Wound Image    03/12/25 1043   Dressing Appearance Intact;Moist drainage 03/12/25 1043   Drainage Amount Large 03/12/25 1043   Drainage Characteristics/Odor Serosanguineous 03/12/25 1043   Appearance Red;Moist 03/12/25 1043   Tissue loss description Full thickness 03/12/25 1043   Black (%), Wound Tissue Color 0 % 03/12/25 1043   Red (%), Wound Tissue Color 100 % 03/12/25 1043   Yellow (%), Wound Tissue Color 0 % 03/12/25 1043   Periwound Area Pale white;Macerated 03/12/25 1043   Wound Edges Defined;Open 03/12/25 1043   Wound Length (cm) 0.5 cm 03/12/25 1043   Wound Width (cm) 1.7 cm 03/12/25 1043   Wound Depth (cm) 1 cm 03/12/25 1043   Wound Volume (cm^3) 0.445 cm^3 03/12/25 1043   Wound Surface Area (cm^2) 0.67 cm^2 03/12/25 1043   Tunneling (depth (cm)/location) 0 03/12/25 1043   Undermining (depth (cm)/location) 0 03/12/25 1043   Care Cleansed with:;Antimicrobial agent;Sterile normal saline;Debrided;Wound cleanser 03/12/25 1043   Dressing Changed 03/12/25 1043   Periwound Care Moisture barrier applied;Skin barrier film applied;Absorptive dressing applied 03/12/25 1043   Dressing Change Due 03/14/25 03/12/25 1043           Debridement    Date/Time: 3/12/2025 10:00 AM    Performed by: Ofelia Lizama MD  Authorized by: Ofelia Lizama MD    Consent Done?:  Yes (Verbal)  Local anesthesia used?: No      Wound Details:    Location:  Right buttock    Type of Debridement:  Excisional       Length (cm):  0.5       Width (cm):  1.7       Depth (cm):  1       Area (sq cm):  0.67       Percent Debrided (%):  100       Total Area Debrided (sq cm):  0.67    Depth of debridement:  Subcutaneous tissue    Tissue debrided:  Subcutaneous    Devitalized tissue debrided:  Slough and Fibrin    Instruments:  Curette  Bleeding:  Minimal  Hemostasis Achieved: Yes  Method Used:  Pressure  Patient tolerance:  Patient tolerated the procedure well with no immediate complications      Plan:         "    Debridement needed and Done today.   Monitor BP at home   Continue to off-load   Keep dressing clean and intact  Discussed increase protein intake   Discussed wound expectations and plan   Continue with wound care orders and plan as noted in orders.   Continue to follow current medication regimen as per pcp   Call for any questions / concerns.          Tissue pathology and/or culture taken     [] Yes      [x] No  Sharp debridement performed                   [x] Yes       [] No  Labs ordered     [] Yes       [x] No  Imaging ordered    [] Yes      [x] No    Orders Placed This Encounter   Procedures    Change dressing     Wound Dressing Orders    Dressing change frequency three times a week (Home Health will change Monday or Wednesday patient will change on other days between visits)  Remove old dressing  Cleanse or irrigate with: Normal Saline  Protect periwound with:  Gentian Violet to maceration and periwound, Cavilon to area that will be taped  Primary dressing: WOUND BASE: ABX powder (1/2 scoop, Tobramycin/Colistimethate) then Caron (1/2 used, remainder given to patient) packed into wound bed then Aquacel AG (folded twice and laid against wound bed)    Secondary dressing: Mextra 5" x 5" secured with Medipore Tape (in window-pane fashion)    Return to clinic in 4 weeks    Please contact Wound Care Clinic on any acute changes.  If after clinic hours or over the weekend, please go to the nearest ER for evaluation.    HOME HEALTH ORDERS     Home Health for Wound Care. Visit on Monday or Wednesday and prn when patient is not seen in clinic. Reduce to once a week visits and prn. Patient will return to clinic on 04/09/25.    Please order Promogran (collagen) if available and leave supplies behind for patient's own changes twice a week.    Wound Dressing Orders    Dressing change frequency three times a week (Pt will change dressing on days not seen by home health)  Remove old dressing  Cleanse or irrigate with: " "Normal Saline  Protect periwound with:  Cavilon to periwound and area that will be taped, Gentian Violet hugging wound bed and areas of maceration (PATIENT SUPPLY, PLEASE USE TO DECREASE MACERATION)  Primary dressing: WOUND BASE: ABX powder (1/2 scoop, Tobramycin/Colistimethate) then Promogran (1/2 used) packed into wound bed then Aquacel AG    Secondary dressing: Mextra: size used: 3" x 3" secured with Medipore Tape (in window-pane fashion)    1. Remove old dressing  2. Cleanse with Vashe, Normal Saline or Wound Cleanser  3. Pat Dry with gauze  4. Cavilon to area that will be taped, Gentian Violet (patient supply) hugging wound bed and areas of maceration  5. ABX powder (1 scoop, pt's own Tobramycin/Colistimethate)  6. Promogran (or collagen equivalent) x 1 packed into wound bed okay to shred to fit into wound bed  7. Then Aquacel AG laid outside wound bed (DO NOT PACK INTO WOUND BED)  8. Outer dressing: Mextra: size used: 3" x 3" secured with Medipore Tape (in window-pane fashion)  **Leave patient with extra supplies for excessive drainage between visits.    Evaluate for acute changes (purulence, increased redness/swelling, increased drainage, malodor, increased pain, pallor, necrosis) please contact physician on any acute changes. If after clinic hours or over the weekend, send patient to the ER.     Call clinic at [phone] if any changes, substitutions or questions about orders.     DO NOT DEVIATE FROM ORDER. PLEASE ORDER SUPPLIES NEEDED TO APPLY TO PATIENT'S WOUNDS.     What Home Health Agency is the patient currently using?:   [facility]        Follow up in about 4 weeks (around 4/9/2025) for Wound Care.       "

## 2025-03-20 ENCOUNTER — LAB VISIT (OUTPATIENT)
Dept: LAB | Facility: HOSPITAL | Age: 40
End: 2025-03-20
Attending: INTERNAL MEDICINE
Payer: MEDICARE

## 2025-03-20 DIAGNOSIS — E11.69 TYPE 2 DIABETES MELLITUS WITH OTHER SPECIFIED COMPLICATION, WITHOUT LONG-TERM CURRENT USE OF INSULIN: ICD-10-CM

## 2025-03-20 DIAGNOSIS — E78.5 DYSLIPIDEMIA: ICD-10-CM

## 2025-03-20 LAB
ALBUMIN SERPL BCP-MCNC: 3.7 G/DL (ref 3.5–5.2)
ALP SERPL-CCNC: 138 U/L (ref 40–150)
ALT SERPL W/O P-5'-P-CCNC: 18 U/L (ref 10–44)
ANION GAP SERPL CALC-SCNC: 12 MMOL/L (ref 8–16)
AST SERPL-CCNC: 12 U/L (ref 10–40)
BASOPHILS # BLD AUTO: 0.05 K/UL (ref 0–0.2)
BASOPHILS NFR BLD: 0.8 % (ref 0–1.9)
BILIRUB SERPL-MCNC: 0.8 MG/DL (ref 0.1–1)
BUN SERPL-MCNC: 14 MG/DL (ref 6–20)
CALCIUM SERPL-MCNC: 9.2 MG/DL (ref 8.7–10.5)
CHLORIDE SERPL-SCNC: 99 MMOL/L (ref 95–110)
CHOLEST SERPL-MCNC: 155 MG/DL (ref 120–199)
CHOLEST/HDLC SERPL: 4.6 {RATIO} (ref 2–5)
CO2 SERPL-SCNC: 27 MMOL/L (ref 23–29)
CREAT SERPL-MCNC: 0.9 MG/DL (ref 0.5–1.4)
DIFFERENTIAL METHOD BLD: ABNORMAL
EOSINOPHIL # BLD AUTO: 0.3 K/UL (ref 0–0.5)
EOSINOPHIL NFR BLD: 4.6 % (ref 0–8)
ERYTHROCYTE [DISTWIDTH] IN BLOOD BY AUTOMATED COUNT: 15.7 % (ref 11.5–14.5)
EST. GFR  (NO RACE VARIABLE): >60 ML/MIN/1.73 M^2
ESTIMATED AVG GLUCOSE: 309 MG/DL (ref 68–131)
GLUCOSE SERPL-MCNC: 162 MG/DL (ref 70–110)
HBA1C MFR BLD: 12.4 % (ref 4–5.6)
HCT VFR BLD AUTO: 47.6 % (ref 40–54)
HDLC SERPL-MCNC: 34 MG/DL (ref 40–75)
HDLC SERPL: 21.9 % (ref 20–50)
HGB BLD-MCNC: 14.6 G/DL (ref 14–18)
IMM GRANULOCYTES # BLD AUTO: 0.01 K/UL (ref 0–0.04)
IMM GRANULOCYTES NFR BLD AUTO: 0.2 % (ref 0–0.5)
LDLC SERPL CALC-MCNC: 105.8 MG/DL (ref 63–159)
LYMPHOCYTES # BLD AUTO: 2.1 K/UL (ref 1–4.8)
LYMPHOCYTES NFR BLD: 33.7 % (ref 18–48)
MCH RBC QN AUTO: 24.6 PG (ref 27–31)
MCHC RBC AUTO-ENTMCNC: 30.7 G/DL (ref 32–36)
MCV RBC AUTO: 80 FL (ref 82–98)
MONOCYTES # BLD AUTO: 0.5 K/UL (ref 0.3–1)
MONOCYTES NFR BLD: 7.9 % (ref 4–15)
NEUTROPHILS # BLD AUTO: 3.4 K/UL (ref 1.8–7.7)
NEUTROPHILS NFR BLD: 52.8 % (ref 38–73)
NONHDLC SERPL-MCNC: 121 MG/DL
NRBC BLD-RTO: 0 /100 WBC
PLATELET # BLD AUTO: 296 K/UL (ref 150–450)
PMV BLD AUTO: 10.7 FL (ref 9.2–12.9)
POTASSIUM SERPL-SCNC: 3.5 MMOL/L (ref 3.5–5.1)
PROT SERPL-MCNC: 8.2 G/DL (ref 6–8.4)
RBC # BLD AUTO: 5.93 M/UL (ref 4.6–6.2)
SODIUM SERPL-SCNC: 138 MMOL/L (ref 136–145)
TRIGL SERPL-MCNC: 76 MG/DL (ref 30–150)
WBC # BLD AUTO: 6.35 K/UL (ref 3.9–12.7)

## 2025-03-20 PROCEDURE — 83036 HEMOGLOBIN GLYCOSYLATED A1C: CPT | Mod: HCNC | Performed by: INTERNAL MEDICINE

## 2025-03-20 PROCEDURE — 85025 COMPLETE CBC W/AUTO DIFF WBC: CPT | Mod: HCNC | Performed by: INTERNAL MEDICINE

## 2025-03-20 PROCEDURE — 80053 COMPREHEN METABOLIC PANEL: CPT | Mod: HCNC | Performed by: INTERNAL MEDICINE

## 2025-03-20 PROCEDURE — 36415 COLL VENOUS BLD VENIPUNCTURE: CPT | Mod: HCNC,PO | Performed by: INTERNAL MEDICINE

## 2025-03-20 PROCEDURE — 80061 LIPID PANEL: CPT | Mod: HCNC | Performed by: INTERNAL MEDICINE

## 2025-03-27 ENCOUNTER — OFFICE VISIT (OUTPATIENT)
Dept: INTERNAL MEDICINE | Facility: CLINIC | Age: 40
End: 2025-03-27
Payer: MEDICARE

## 2025-03-27 VITALS
BODY MASS INDEX: 27.65 KG/M2 | DIASTOLIC BLOOD PRESSURE: 76 MMHG | HEIGHT: 63 IN | HEART RATE: 63 BPM | SYSTOLIC BLOOD PRESSURE: 132 MMHG | OXYGEN SATURATION: 96 %

## 2025-03-27 DIAGNOSIS — N31.9 NEUROGENIC BLADDER: ICD-10-CM

## 2025-03-27 DIAGNOSIS — E11.9 CONTROLLED TYPE 2 DIABETES MELLITUS WITHOUT COMPLICATION, WITHOUT LONG-TERM CURRENT USE OF INSULIN: Primary | ICD-10-CM

## 2025-03-27 DIAGNOSIS — Q05.2 SPINA BIFIDA OF LUMBAR REGION WITH HYDROCEPHALUS: ICD-10-CM

## 2025-03-27 DIAGNOSIS — I10 HTN (HYPERTENSION), BENIGN: ICD-10-CM

## 2025-03-27 DIAGNOSIS — G82.20 PARAPLEGIA: ICD-10-CM

## 2025-03-27 DIAGNOSIS — E78.5 DYSLIPIDEMIA: ICD-10-CM

## 2025-03-27 DIAGNOSIS — E11.69 TYPE 2 DIABETES MELLITUS WITH OTHER SPECIFIED COMPLICATION, WITHOUT LONG-TERM CURRENT USE OF INSULIN: ICD-10-CM

## 2025-03-27 PROCEDURE — G2211 COMPLEX E/M VISIT ADD ON: HCPCS | Mod: HCNC,S$GLB,, | Performed by: INTERNAL MEDICINE

## 2025-03-27 PROCEDURE — 99999 PR PBB SHADOW E&M-EST. PATIENT-LVL III: CPT | Mod: PBBFAC,HCNC,, | Performed by: INTERNAL MEDICINE

## 2025-03-27 PROCEDURE — 3078F DIAST BP <80 MM HG: CPT | Mod: HCNC,CPTII,S$GLB, | Performed by: INTERNAL MEDICINE

## 2025-03-27 PROCEDURE — 4010F ACE/ARB THERAPY RXD/TAKEN: CPT | Mod: HCNC,CPTII,S$GLB, | Performed by: INTERNAL MEDICINE

## 2025-03-27 PROCEDURE — 1159F MED LIST DOCD IN RCRD: CPT | Mod: HCNC,CPTII,S$GLB, | Performed by: INTERNAL MEDICINE

## 2025-03-27 PROCEDURE — 3075F SYST BP GE 130 - 139MM HG: CPT | Mod: HCNC,CPTII,S$GLB, | Performed by: INTERNAL MEDICINE

## 2025-03-27 PROCEDURE — 99214 OFFICE O/P EST MOD 30 MIN: CPT | Mod: HCNC,S$GLB,, | Performed by: INTERNAL MEDICINE

## 2025-03-27 PROCEDURE — 3046F HEMOGLOBIN A1C LEVEL >9.0%: CPT | Mod: HCNC,CPTII,S$GLB, | Performed by: INTERNAL MEDICINE

## 2025-03-27 PROCEDURE — 3008F BODY MASS INDEX DOCD: CPT | Mod: HCNC,CPTII,S$GLB, | Performed by: INTERNAL MEDICINE

## 2025-03-27 RX ORDER — PEN NEEDLE, DIABETIC 30 GX3/16"
NEEDLE, DISPOSABLE MISCELLANEOUS
Qty: 100 EACH | Refills: 9 | Status: SHIPPED | OUTPATIENT
Start: 2025-03-27

## 2025-03-27 RX ORDER — INSULIN GLARGINE 100 [IU]/ML
10 INJECTION, SOLUTION SUBCUTANEOUS DAILY
Qty: 2 EACH | Refills: 2 | Status: SHIPPED | OUTPATIENT
Start: 2025-03-27

## 2025-03-27 NOTE — PROGRESS NOTES
"  This note was generated with TabbedOut voice recognition software. I apologize for any possible typographical errors.  MMvictor m seems to have trouble with pronouns so I apologize if I Mis pronoun anyone     Since last visit he has had to sell his house due to mortage going up.  Also he had car issues.  Living with his mother--  looking for apartment.  And he has had the flu since last visit.  He has not been able to exercise and has been eating more.          He brings his meds in with him today.  His is her for uncontrolled DM and uncontroeld bp -- he has fallen off medsd before-- he brings his meds in .  His meds are hctz- 25 -Atorvastatin 80 mg a day  Valsartan 320 mg a day   Actos 30 mg a day   Metformin 500 mg tid.     farxiga 10 mg a day--  Amlodipne 5 mg a day -- bp is 132/76.          Htn-    Recent, hgb A1c  : 12.4  -much worse than the 9.5.  -  Faxiga 10 mg  a day--    on actos 30 and metformin and januvia--           /76 (BP Location: Right arm, Patient Position: Sitting)   Pulse 63   Ht 5' 3" (1.6 m)   SpO2 96%   BMI 27.65 kg/m²        Physical exam.  He is well-appearing gentleman wheelchair.  Brings in all his medicine today  Head: Normocephalic, without obvious abnormality, atraumatic  Throat: lips, mucosa, and tongue normal; teeth and gums normal  Neck: no adenopathy, no carotid bruit, no JVD, supple, symmetrical, trachea midline, and thyroid not enlarged, symmetric, no tenderness/mass/nodules  Back: symmetric, no curvature. ROM normal. No CVA tenderness.  Lungs: clear to auscultation bilaterally  Chest wall: no tenderness  Abdomen: soft, non-tender; bowel sounds normal; no masses,  no organomegaly   Sitting in  wheelchair today        This is a gentleman with poorly controlled diabetes and poorly controlled blood pressure..  He has not taking his medicine appropriate in past but is on his bp meds now-- glucose is way out of control-- victor m be getting into basket ball soon- that will help.  " Will add lantus 15 units a day          Dyslipidemia  -      ldl 105.8  -     atorvastatin (LIPITOR) 80 MG tablet     HTN (hypertension), benign  -     better controled on his meds.       Type 2 diabetes mellitus with other specified complication, without long-term current use of insulin  -      continue current meds-  start lantus  10 units a day    -     pioglitazone (ACTOS) 30 MG tablet;    -     metFORMIN (GLUCOPHAGE) 500 MG tablet;   -     dapagliflozin propanediol (FARXIGA) 10 mg tablet;       Spina bifida of lumbar region with hydrocephalus- in wheel chair.             Our office providers are the focal point for all needed health care services that are part of ongoing care related to a patient's single serious condition or the patient's complex conditions.

## 2025-04-09 ENCOUNTER — HOSPITAL ENCOUNTER (OUTPATIENT)
Dept: WOUND CARE | Facility: HOSPITAL | Age: 40
Discharge: HOME | End: 2025-04-09
Attending: FAMILY MEDICINE
Payer: MEDICARE

## 2025-04-09 VITALS
RESPIRATION RATE: 16 BRPM | DIASTOLIC BLOOD PRESSURE: 86 MMHG | HEART RATE: 80 BPM | TEMPERATURE: 98 F | SYSTOLIC BLOOD PRESSURE: 129 MMHG

## 2025-04-09 DIAGNOSIS — R89.5 POSITIVE CULTURE FINDINGS IN WOUND: ICD-10-CM

## 2025-04-09 DIAGNOSIS — L89.154 SACRAL DECUBITUS ULCER, STAGE IV: Primary | ICD-10-CM

## 2025-04-09 DIAGNOSIS — G82.20 PARAPLEGIA: ICD-10-CM

## 2025-04-09 DIAGNOSIS — Q05.2 SPINA BIFIDA OF LUMBAR REGION WITH HYDROCEPHALUS: ICD-10-CM

## 2025-04-09 DIAGNOSIS — E11.622 CONTROLLED TYPE 2 DIABETES MELLITUS WITH OTHER SKIN ULCER, WITHOUT LONG-TERM CURRENT USE OF INSULIN: ICD-10-CM

## 2025-04-09 PROCEDURE — 11042 DBRDMT SUBQ TIS 1ST 20SQCM/<: CPT | Mod: HCNC | Performed by: FAMILY MEDICINE

## 2025-04-09 PROCEDURE — 11042 DBRDMT SUBQ TIS 1ST 20SQCM/<: CPT | Mod: HCNC,,, | Performed by: FAMILY MEDICINE

## 2025-04-09 PROCEDURE — 99214 OFFICE O/P EST MOD 30 MIN: CPT | Mod: 25,HCNC,, | Performed by: FAMILY MEDICINE

## 2025-04-09 NOTE — PROGRESS NOTES
[facility]                Progress Note    Subjective:       Patient ID: Dale Pantoja is a 40 y.o. male.    Chief Complaint: Wound Care and Dressing Change    Follow Up wound care visit. Patient rolled to exam room in wheelchair to [facility]. He then locks wheels on his wheelchair, then self transfers to exam bed (at lowest position for patient transfer) with nurse standing by. Patient denies fever, chills, nausea, vomiting or diarrhea at present. Patient denies pain or discomfort to wound bed at present. Patient reports not having any issues with dressing since last visit. Patient reports home health came but c/o no supplies being delivered and they did not come out for 2 weeks without cause. Patient agreeable to change dressing per self, will send DME order per MD approval. Dressing appears intact without strikethrough drainage. Dressing removed & wound cleaned by nurse.  Dressing discarded. Right Lower Buttock wound is measuring smaller in length and width, edges still roll into wound bed (depth assessed after debridement).     No change to dressing order; applied per MD order. DME order sent to [facility]. Patient will return to [facility] in 4 weeks. Patient declined AVS, has MyChart.     This is an established patient in wound care.   Patient presents in the office today for evaluation of the chronic wound.  Updated HPI is noted below.    The periwound appears non-erythematous, macerated, non-edematous    The wound appears stable; fat layer exposed. No odor. Serous drainage.     Patient denies fever, chills    Patients reports that home health nurse has not been coming     Lymphedema status is noncontributory to wound status    Pain at the site of the wound is aching    Contributing factors to current wound state include numerous comorbid conditions, off-loading limitations       Review of Systems   Constitutional:  Negative for activity change, appetite change and  fever.   HENT:  Negative for congestion.    Respiratory:  Negative for shortness of breath and wheezing.    Cardiovascular:  Negative for chest pain and leg swelling.   Gastrointestinal:  Negative for abdominal pain, nausea and vomiting.   Skin:  Positive for wound.   Neurological:  Negative for dizziness and headaches.   Psychiatric/Behavioral:  The patient is not nervous/anxious.          Objective:        Physical Exam  Vitals and nursing note reviewed.   Constitutional:       Appearance: He is well-developed.   HENT:      Head: Normocephalic and atraumatic.   Eyes:      Conjunctiva/sclera: Conjunctivae normal.   Pulmonary:      Effort: Pulmonary effort is normal.   Abdominal:      General: There is no distension.      Palpations: Abdomen is soft.   Musculoskeletal:      Cervical back: Normal range of motion and neck supple.   Skin:     Comments: See wound description   Neurological:      Mental Status: He is alert and oriented to person, place, and time.   Psychiatric:         Behavior: Behavior normal.         Vitals:    04/09/25 1005   BP: 129/86   Pulse: 80   Resp: 16   Temp: 97.6 °F (36.4 °C)       Assessment:           ICD-10-CM ICD-9-CM   1. Sacral decubitus ulcer, stage IV  L89.154 707.03     707.24   2. Paraplegia  G82.20 344.1   3. Spina bifida of lumbar region with hydrocephalus  Q05.2 741.03   4. Controlled type 2 diabetes mellitus with other skin ulcer, without long-term current use of insulin  E11.622 250.80   5. Positive culture findings in wound  R89.5 795.39            Altered Skin Integrity 09/28/22 1136 Right lower Buttocks (Active)   09/28/22 1136   Altered Skin Integrity Present on Admission - Did Patient arrive to the hospital with altered skin?: yes   Side: Right   Orientation: lower   Location: Buttocks   Wound Number:    Is this injury device related?:    Primary Wound Type:    Description of Altered Skin Integrity:    Ankle-Brachial Index:    Pulses:    Removal Indication and Assessment:     Wound Outcome:    (Retired) Wound Length (cm):    (Retired) Wound Width (cm):    (Retired) Depth (cm):    Wound Description (Comments):    Removal Indications:    Wound Image    04/09/25 1017   Dressing Appearance Intact;Moist drainage 04/09/25 1017   Drainage Amount Moderate 04/09/25 1017   Drainage Characteristics/Odor Yellow 04/09/25 1017   Appearance Red;Moist 04/09/25 1017   Tissue loss description Full thickness 04/09/25 1017   Black (%), Wound Tissue Color 0 % 04/09/25 1017   Red (%), Wound Tissue Color 100 % 04/09/25 1017   Yellow (%), Wound Tissue Color 0 % 04/09/25 1017   Periwound Area Pink;Pale white;Moist 04/09/25 1017   Wound Edges Open;Other (see comments) 04/09/25 1017   Wound Length (cm) 0.4 cm 04/09/25 1017   Wound Width (cm) 1.5 cm 04/09/25 1017   Wound Depth (cm) 1 cm 04/09/25 1017   Wound Volume (cm^3) 0.314 cm^3 04/09/25 1017   Wound Surface Area (cm^2) 0.47 cm^2 04/09/25 1017   Tunneling (depth (cm)/location) 0 04/09/25 1017   Undermining (depth (cm)/location) 0 04/09/25 1017   Care Cleansed with:;Antimicrobial agent;Sterile normal saline;Debrided;Wound cleanser 04/09/25 1017   Dressing Changed 04/09/25 1017   Periwound Care Moisture barrier applied;Skin barrier film applied;Absorptive dressing applied 04/09/25 1017   Dressing Change Due 04/11/25 04/09/25 1017           Debridement    Date/Time: 4/9/2025 10:00 AM    Performed by: Ofelia Lizama MD  Authorized by: Ofelia Lizama MD    Consent Done?:  Yes (Verbal)  Local anesthesia used?: No      Wound Details:    Location:  Right buttock    Type of Debridement:  Excisional       Length (cm):  4       Width (cm):  1.5       Depth (cm):  1       Area (sq cm):  4.71       Percent Debrided (%):  100       Total Area Debrided (sq cm):  4.71    Depth of debridement:  Subcutaneous tissue    Tissue debrided:  Subcutaneous    Devitalized tissue debrided:  Slough and Fibrin    Instruments:  Curette  Bleeding:  Minimal  Hemostasis Achieved: Yes  Method  "Used:  Pressure  Patient tolerance:  Patient tolerated the procedure well with no immediate complications       Plan:            Debridement needed and Done today.   Recommend off-loading   Increase protein   Keep dressing clean and intact  Discussed increase protein intake   Discussed wound expectations and plan   Continue with wound care orders and plan as noted in orders.   Continue to follow current medication regimen as per pcp   Call for any questions / concerns.          Tissue pathology and/or culture taken     [] Yes      [x] No  Sharp debridement performed                   [x] Yes       [] No  Labs ordered     [] Yes       [x] No  Imaging ordered    [] Yes      [x] No    Orders Placed This Encounter   Procedures    WOUND SUPPLIES FOR HOME USE     Wound Dressing Orders    Dressing change frequency three times a week and prn Nurse Visits  Remove old dressing  Cleanse or irrigate with: Vashe (preferred) or Wound Cleanser  Protect periwound with:  Cavilon to areas that will be taped, Gentian Violet hugging wound bed  Primary dressing: WOUND BASE: ABX powder (1/2 scoop, Tobramycin/Colistimethate) then Caron (1/2 used, remainder given to patient) packed into wound bed then Aquacel AG (folded twice and laid against wound bed)    Secondary dressing: Mextra 3" x 3" or 4" x 4" secured with Medipore Tape (in window-pane fashion)     Height::   5' 3"     Weight::   70.8 kg     Length of need (1-99 months)::   3    Change dressing     Wound Dressing Orders     Dressing change frequency three times a week (pt will change dressing when not in clinic, prn nurse visit if needed)   Remove old dressing   Cleanse or irrigate with: Normal Saline   Protect periwound with:  Gentian Violet to maceration and periwound, Cavilon to area that will be taped   Primary dressing: WOUND BASE: ABX powder (1/2 scoop, Tobramycin/Colistimethate) then Caron (1/2 used, remainder given to patient) packed into wound bed then Aquacel AG (folded " "twice and laid against wound bed)    Secondary dressing: Mextra 5" x 5" secured with Medipore Tape (in window-pane fashion)     Patient will change dressing 3 times a week as per order above. Return to clinic in 4 weeks     Please contact Wound Care Clinic on any acute changes.   If after clinic hours or over the weekend, please go to the nearest ER for evaluation.    HOME HEALTH ORDERS     Home Health for wound care, patient can be discharged as of today.     What Home Health Agency is the patient currently using?:   [facility]        Follow up in about 4 weeks (around 5/7/2025) for Wound Care.       "

## 2025-04-15 ENCOUNTER — TELEPHONE (OUTPATIENT)
Dept: WOUND CARE | Facility: HOSPITAL | Age: 40
End: 2025-04-15
Payer: MEDICARE

## 2025-04-15 ENCOUNTER — PATIENT MESSAGE (OUTPATIENT)
Dept: FAMILY MEDICINE | Facility: CLINIC | Age: 40
End: 2025-04-15
Payer: MEDICARE

## 2025-04-15 NOTE — TELEPHONE ENCOUNTER
Patient called in stating Home Health has not discharged him from care although order was faxed last week.    Re faxed order and left message with [facility] .

## 2025-05-14 ENCOUNTER — TELEPHONE (OUTPATIENT)
Dept: WOUND CARE | Facility: HOSPITAL | Age: 40
End: 2025-05-14
Payer: MEDICARE

## 2025-05-14 ENCOUNTER — HOSPITAL ENCOUNTER (OUTPATIENT)
Dept: WOUND CARE | Facility: HOSPITAL | Age: 40
Discharge: HOME | End: 2025-05-14
Attending: FAMILY MEDICINE
Payer: MEDICARE

## 2025-05-14 VITALS — HEART RATE: 77 BPM | DIASTOLIC BLOOD PRESSURE: 87 MMHG | TEMPERATURE: 98 F | SYSTOLIC BLOOD PRESSURE: 137 MMHG

## 2025-05-14 DIAGNOSIS — Q05.2 SPINA BIFIDA OF LUMBAR REGION WITH HYDROCEPHALUS: ICD-10-CM

## 2025-05-14 DIAGNOSIS — R89.5 POSITIVE CULTURE FINDINGS IN WOUND: ICD-10-CM

## 2025-05-14 DIAGNOSIS — G82.20 PARAPLEGIA: ICD-10-CM

## 2025-05-14 DIAGNOSIS — L89.154 SACRAL DECUBITUS ULCER, STAGE IV: Primary | ICD-10-CM

## 2025-05-14 DIAGNOSIS — E11.622 CONTROLLED TYPE 2 DIABETES MELLITUS WITH OTHER SKIN ULCER, WITHOUT LONG-TERM CURRENT USE OF INSULIN: ICD-10-CM

## 2025-05-14 PROCEDURE — 11042 DBRDMT SUBQ TIS 1ST 20SQCM/<: CPT | Mod: HCNC | Performed by: FAMILY MEDICINE

## 2025-05-14 PROCEDURE — 99214 OFFICE O/P EST MOD 30 MIN: CPT | Mod: 25,HCNC,, | Performed by: FAMILY MEDICINE

## 2025-05-14 PROCEDURE — 11042 DBRDMT SUBQ TIS 1ST 20SQCM/<: CPT | Mod: HCNC,,, | Performed by: FAMILY MEDICINE

## 2025-05-14 NOTE — PROGRESS NOTES
[facility]                Progress Note    Subjective:       Patient ID: Dale Pantoja is a 40 y.o. male.    Chief Complaint: Wound Care and Dressing Change    Follow Up wound care visit. Patient rolled to exam room in wheelchair to [facility] with mother at their side. Patient locks wheels on wheelchair and then self transfers to exam bed with nurse at side. Patient denies fever, chills, nausea, vomiting or diarrhea at present. Patient denies pain or discomfort to wound bed at present. Patient reports not having any issues with dressing change, but received a superabsorbent with silicone border that is too small. Patient applied his own Mepore dressing instead. Will send new order to [facility]. Dressing appears intact without strikethrough drainage. Dressing removed & wound cleaned by nurse.  Dressing discarded. Right Lower Buttock wound is measuring smaller in depth.    No change to dressing order; applied per MD order. Patient will return to [facility] in 4 weeks. Patient declined AVS, has MyChart.       This is an established patient in wound care.   Patient presents in the office today for evaluation of the chronic wound.  Updated HPI is noted below.    The periwound appears non-erythematous, no maceration noted, non-edematous    The wound appears to have an epibole. Some slough in wound bed. Serous drainage. No odor     Patient denies fever, chills    Lymphedema status is noncontributory to wound status    Pain at the site of the wound is n/a    Contributing factors to current wound state include numerous comorbid conditions       Review of Systems   Constitutional:  Negative for activity change, appetite change and fever.   HENT:  Negative for congestion.    Respiratory:  Negative for shortness of breath and wheezing.    Cardiovascular:  Negative for chest pain and leg swelling.   Gastrointestinal:  Negative for abdominal pain, nausea and vomiting.   Skin:   Positive for wound.   Neurological:  Negative for dizziness and headaches.   Psychiatric/Behavioral:  The patient is not nervous/anxious.          Objective:        Physical Exam  Vitals and nursing note reviewed.   Constitutional:       Appearance: He is well-developed.   HENT:      Head: Normocephalic and atraumatic.   Eyes:      Conjunctiva/sclera: Conjunctivae normal.   Pulmonary:      Effort: Pulmonary effort is normal.   Abdominal:      General: There is no distension.      Palpations: Abdomen is soft.   Musculoskeletal:         General: Normal range of motion.      Cervical back: Normal range of motion and neck supple.   Skin:     Comments: See wound description   Neurological:      Mental Status: He is alert and oriented to person, place, and time.   Psychiatric:         Behavior: Behavior normal.         Vitals:    05/14/25 0940   BP: 137/87   Pulse: 77   Temp: 97.7 °F (36.5 °C)       Assessment:           ICD-10-CM ICD-9-CM   1. Sacral decubitus ulcer, stage IV  L89.154 707.03     707.24   2. Spina bifida of lumbar region with hydrocephalus  Q05.2 741.03   3. Paraplegia  G82.20 344.1   4. Controlled type 2 diabetes mellitus with other skin ulcer, without long-term current use of insulin  E11.622 250.80   5. Positive culture findings in wound  R89.5 795.39            Altered Skin Integrity 09/28/22 1136 Right lower Buttocks (Active)   09/28/22 1136   Altered Skin Integrity Present on Admission - Did Patient arrive to the hospital with altered skin?: yes   Side: Right   Orientation: lower   Location: Buttocks   Wound Number:    Is this injury device related?:    Primary Wound Type:    Description of Altered Skin Integrity:    Ankle-Brachial Index:    Pulses:    Removal Indication and Assessment:    Wound Outcome:    (Retired) Wound Length (cm):    (Retired) Wound Width (cm):    (Retired) Depth (cm):    Wound Description (Comments):    Removal Indications:    Wound Image    05/14/25 1002   Dressing Appearance  Intact;Moist drainage 05/14/25 1002   Drainage Amount Moderate 05/14/25 1002   Drainage Characteristics/Odor Serosanguineous 05/14/25 1002   Appearance Red;Moist 05/14/25 1002   Tissue loss description Full thickness 05/14/25 1002   Black (%), Wound Tissue Color 0 % 05/14/25 1002   Red (%), Wound Tissue Color 100 % 05/14/25 1002   Yellow (%), Wound Tissue Color 0 % 05/14/25 1002   Periwound Area Pale white;Macerated 05/14/25 1002   Wound Edges Open;Other (see comments) 05/14/25 1002   Wound Length (cm) 0.4 cm 05/14/25 1002   Wound Width (cm) 1.7 cm 05/14/25 1002   Wound Depth (cm) 0.8 cm 05/14/25 1002   Wound Volume (cm^3) 0.285 cm^3 05/14/25 1002   Wound Surface Area (cm^2) 0.53 cm^2 05/14/25 1002   Tunneling (depth (cm)/location) 0 05/14/25 1002   Undermining (depth (cm)/location) 0 05/14/25 1002   Care Cleansed with:;Antimicrobial agent;Sterile normal saline;Wound cleanser;Debrided 05/14/25 1002   Dressing Changed 05/14/25 1002   Periwound Care Moisture barrier applied;Absorptive dressing applied 05/14/25 1002   Dressing Change Due 05/16/25 05/14/25 1002           Debridement    Date/Time: 5/14/2025 10:00 AM    Performed by: Ofelia Lizama MD  Authorized by: Ofelia Lizama MD    Consent Done?:  Yes (Verbal)  Local anesthesia used?: No      Wound Details:    Location:  Right buttock    Type of Debridement:  Excisional       Length (cm):  0.4       Width (cm):  1.7       Depth (cm):  0.8       Area (sq cm):  0.53       Percent Debrided (%):  100       Total Area Debrided (sq cm):  0.53    Depth of debridement:  Subcutaneous tissue    Tissue debrided:  Subcutaneous, Epidermis and Dermis    Devitalized tissue debrided:  Slough and Fibrin    Instruments:  Curette  Bleeding:  Minimal  Hemostasis Achieved: Yes  Method Used:  Pressure  Patient tolerance:  Patient tolerated the procedure well with no immediate complications       Plan:            Debridement needed and Done today.   Continue dressing changes at home  "  Continue off-loading   Use gentian violet in periwound to prevent maceration   Keep dressing clean and intact  Discussed increase protein intake   Discussed wound expectations and plan   Continue with wound care orders and plan as noted in orders.   Continue to follow current medication regimen as per pcp   Call for any questions / concerns.          Tissue pathology and/or culture taken     [] Yes      [x] No  Sharp debridement performed                   [x] Yes       [] No  Labs ordered     [] Yes       [x] No  Imaging ordered    [] Yes      [x] No    Orders Placed This Encounter   Procedures    WOUND SUPPLIES FOR HOME USE     Wound Dressing Orders    Dressing change frequency three times a week (pt will change dressing when not in clinic, prn nurse visit if needed)  Remove old dressing  Cleanse or irrigate with: Normal Saline  Protect periwound with:  Gentian Violet to maceration and periwound, Cavilon to area that will be taped  Primary dressing: WOUND BASE: ABX powder (1/2 scoop, Tobramycin/Colistimethate) then Caron (1/2 used, remainder given to patient) packed into wound bed then Aquacel AG (folded twice and laid against wound bed)    Secondary dressing: Mextra 5" x 5" (NO SILICONE BORDER) secured with Medipore Tape (in window-pane fashion)    Patient will change dressing 3 times a week as per order above. Return to clinic in 4 weeks    Please contact Wound Care Clinic on any acute changes.  If after clinic hours or over the weekend, please go to the nearest ER for evaluation.     Height::   5' 3"     Weight::   70.8 kg     Length of need (1-99 months)::   3    Change dressing     Wound Dressing Orders    Dressing change frequency three times a week (pt will change dressing when not in clinic, prn nurse visit if needed)  Remove old dressing  Cleanse or irrigate with: Normal Saline  Protect periwound with:  Gentian Violet to maceration and periwound, Cavilon to area that will be taped  Primary dressing: " "WOUND BASE: ABX powder (1/2 scoop, Tobramycin/Colistimethate) then Caron (1/2 used, remainder given to patient) packed into wound bed then Aquacel AG (folded twice and laid against wound bed)    Secondary dressing: Mextra 5" x 5" secured with Medipore Tape (in window-pane fashion)    Patient will change dressing 3 times a week as per order above. Return to clinic in 4 weeks    Please contact Wound Care Clinic on any acute changes.  If after clinic hours or over the weekend, please go to the nearest ER for evaluation.        Follow up in about 4 weeks (around 6/11/2025) for Wound Care.       "

## 2025-05-14 NOTE — TELEPHONE ENCOUNTER
Order for DME supplies faxed to [facility] with patient demographics, last clinic note, procedure note (if applicable) and MD signed order.     With request to send super absorbent without border.

## 2025-05-27 ENCOUNTER — EXTERNAL HOME HEALTH (OUTPATIENT)
Dept: HOME HEALTH SERVICES | Facility: HOSPITAL | Age: 40
End: 2025-05-27
Payer: MEDICARE

## 2025-06-05 ENCOUNTER — OFFICE VISIT (OUTPATIENT)
Dept: FAMILY MEDICINE | Facility: CLINIC | Age: 40
End: 2025-06-05
Payer: MEDICARE

## 2025-06-05 VITALS
BODY MASS INDEX: 27.65 KG/M2 | DIASTOLIC BLOOD PRESSURE: 70 MMHG | HEART RATE: 89 BPM | HEIGHT: 63 IN | OXYGEN SATURATION: 97 % | TEMPERATURE: 98 F | SYSTOLIC BLOOD PRESSURE: 130 MMHG

## 2025-06-05 DIAGNOSIS — E11.69 TYPE 2 DIABETES MELLITUS WITH OTHER SPECIFIED COMPLICATION, WITHOUT LONG-TERM CURRENT USE OF INSULIN: ICD-10-CM

## 2025-06-05 DIAGNOSIS — Z00.00 ENCOUNTER FOR MEDICARE ANNUAL WELLNESS EXAM: ICD-10-CM

## 2025-06-05 DIAGNOSIS — L89.154 SACRAL DECUBITUS ULCER, STAGE IV: ICD-10-CM

## 2025-06-05 DIAGNOSIS — Z99.3 DEPENDENCE ON WHEELCHAIR: ICD-10-CM

## 2025-06-05 DIAGNOSIS — I10 HTN (HYPERTENSION), BENIGN: Primary | ICD-10-CM

## 2025-06-05 PROCEDURE — 99999 PR PBB SHADOW E&M-EST. PATIENT-LVL IV: CPT | Mod: PBBFAC,HCNC,, | Performed by: NURSE PRACTITIONER

## 2025-06-11 ENCOUNTER — HOSPITAL ENCOUNTER (OUTPATIENT)
Dept: WOUND CARE | Facility: HOSPITAL | Age: 40
Discharge: HOME | End: 2025-06-11
Attending: FAMILY MEDICINE
Payer: MEDICARE

## 2025-06-11 ENCOUNTER — DOCUMENT SCAN (OUTPATIENT)
Dept: HOME HEALTH SERVICES | Facility: HOSPITAL | Age: 40
End: 2025-06-11
Payer: MEDICARE

## 2025-06-11 VITALS — TEMPERATURE: 98 F | DIASTOLIC BLOOD PRESSURE: 7 MMHG | HEART RATE: 91 BPM | SYSTOLIC BLOOD PRESSURE: 129 MMHG

## 2025-06-11 DIAGNOSIS — E11.69 TYPE 2 DIABETES MELLITUS WITH OTHER SPECIFIED COMPLICATION, WITHOUT LONG-TERM CURRENT USE OF INSULIN: ICD-10-CM

## 2025-06-11 DIAGNOSIS — L89.154 SACRAL DECUBITUS ULCER, STAGE IV: ICD-10-CM

## 2025-06-11 DIAGNOSIS — G82.20 PARAPLEGIA: ICD-10-CM

## 2025-06-11 DIAGNOSIS — Q05.9 SPINA BIFIDA: Primary | ICD-10-CM

## 2025-06-11 PROCEDURE — 11042 DBRDMT SUBQ TIS 1ST 20SQCM/<: CPT | Mod: HCNC,,, | Performed by: FAMILY MEDICINE

## 2025-06-11 PROCEDURE — 11042 DBRDMT SUBQ TIS 1ST 20SQCM/<: CPT | Mod: HCNC | Performed by: FAMILY MEDICINE

## 2025-06-11 PROCEDURE — 99214 OFFICE O/P EST MOD 30 MIN: CPT | Mod: 25,HCNC,, | Performed by: FAMILY MEDICINE

## 2025-06-11 NOTE — PROGRESS NOTES
[facility]                Progress Note    Subjective:       Patient ID: Dale Pantoja is a 40 y.o. male.    Chief Complaint: Wound Care and Dressing Change    Follow Up wound care visit. Patient rolled to exam room in wheelchair to [facility] with mother at their side. Patient locks wheels on wheelchair and then self transfers to exam bed with nurse at side. Patient denies fever, chills, nausea, vomiting or diarrhea at present. Patient denies pain or discomfort to wound bed at present. Patient reports not having any issues with dressing since last visit. Dressing appears intact without strikethrough drainage. Patient reports no issues with self dressing changes or receiving DME supplies as ordered. Dressing removed & wound cleaned by nurse.  Dressing discarded. Right Lower Buttock wound is measuring smaller in width and depth (post-debridement).      No change to dressing order; applied per MD order. Patient will return to [facility] in 4 weeks. Patient declined AVS, has MyChart.       This is an established patient in wound care.   Patient presents in the office today for evaluation of the chronic wound.  Updated HPI is noted below.    The periwound appears non-erythematous, macerated, non-edematous    The wound appears to be improving. Fat layer exposed. Some fibrin and slough. No odor. Wound epibole noted.     Patient denies fever, chills    Patients reports no pain; serous drainage.     Lymphedema status is noncontributory to wound status    Contributing factors to current wound state include numerous comorbid conditions     Review of Systems   Constitutional:  Negative for activity change, appetite change and fever.   HENT:  Negative for congestion.    Respiratory:  Negative for shortness of breath and wheezing.    Cardiovascular:  Negative for chest pain and leg swelling.   Gastrointestinal:  Negative for abdominal pain, nausea and vomiting.   Skin:  Positive for wound.    Neurological:  Negative for dizziness and headaches.   Psychiatric/Behavioral:  The patient is not nervous/anxious.          Objective:        Physical Exam  Vitals and nursing note reviewed.   Constitutional:       Appearance: He is well-developed.   HENT:      Head: Normocephalic and atraumatic.   Eyes:      Conjunctiva/sclera: Conjunctivae normal.   Pulmonary:      Effort: Pulmonary effort is normal.   Abdominal:      General: There is no distension.      Palpations: Abdomen is soft.   Musculoskeletal:         General: Normal range of motion.      Cervical back: Normal range of motion and neck supple.   Skin:     Comments: See wound description   Neurological:      Mental Status: He is alert and oriented to person, place, and time.   Psychiatric:         Behavior: Behavior normal.         Vitals:    06/11/25 0947   BP: (!) 129/7   Pulse: 91   Temp: 97.9 °F (36.6 °C)       Assessment:           ICD-10-CM ICD-9-CM   1. Spina bifida  Q05.9 741.90   2. Sacral decubitus ulcer, stage IV  L89.154 707.03     707.24   3. Paraplegia  G82.20 344.1   4. Type 2 diabetes mellitus with other specified complication, without long-term current use of insulin  E11.69 250.80            Altered Skin Integrity 09/28/22 1136 Right lower Buttocks (Active)   09/28/22 1136   Altered Skin Integrity Present on Admission - Did Patient arrive to the hospital with altered skin?: yes   Side: Right   Orientation: lower   Location: Buttocks   Wound Number:    Is this injury device related?:    Primary Wound Type:    Description of Altered Skin Integrity:    Ankle-Brachial Index:    Pulses:    Removal Indication and Assessment:    Wound Outcome:    (Retired) Wound Length (cm):    (Retired) Wound Width (cm):    (Retired) Depth (cm):    Wound Description (Comments):    Removal Indications:    Wound Image   06/11/25 1016   Dressing Appearance Intact;Moist drainage 06/11/25 0953   Drainage Amount Small 06/11/25 0953   Drainage Characteristics/Odor  Serosanguineous 06/11/25 0953   Appearance Red;Moist 06/11/25 0953   Tissue loss description Full thickness 06/11/25 0953   Black (%), Wound Tissue Color 0 % 06/11/25 0953   Red (%), Wound Tissue Color 100 % 06/11/25 0953   Yellow (%), Wound Tissue Color 0 % 06/11/25 0953   Periwound Area Pale white 06/11/25 0953   Wound Edges Defined;Open 06/11/25 0953   Wound Length (cm) 0.4 cm 06/11/25 1016   Wound Width (cm) 1.2 cm 06/11/25 1016   Wound Depth (cm) 0.9 cm 06/11/25 1016   Wound Volume (cm^3) 0.226 cm^3 06/11/25 1016   Wound Surface Area (cm^2) 0.38 cm^2 06/11/25 1016   Tunneling (depth (cm)/location) 0 06/11/25 0953   Undermining (depth (cm)/location) 0 06/11/25 0953   Care Debrided 06/11/25 1016   Dressing Changed 06/11/25 1016   Periwound Care Moisture barrier applied;Absorptive dressing applied;Skin barrier film applied 06/11/25 1016   Dressing Change Due 06/13/25 06/11/25 1016           Debridement    Date/Time: 6/11/2025 10:00 AM    Performed by: Ofelia Lizama MD  Authorized by: Ofelia Lizama MD    Consent Done?:  Yes (Verbal)  Local anesthesia used?: No      Wound Details:    Location:  Right buttock    Type of Debridement:  Excisional       Length (cm):  0.4       Width (cm):  1.2       Depth (cm):  0.9       Area (sq cm):  0.38       Percent Debrided (%):  100       Total Area Debrided (sq cm):  0.38    Depth of debridement:  Subcutaneous tissue    Tissue debrided:  Subcutaneous    Devitalized tissue debrided:  Slough and Fibrin    Instruments:  Curette  Bleeding:  Minimal  Hemostasis Achieved: Yes  Method Used:  Pressure  Patient tolerance:  Patient tolerated the procedure well with no immediate complications       Plan:            Debridement needed and Done today.   Continue off-loading   Monitor for drainage. Avoid scooting or shearing pressure. Patient is active in sports activities   Keep dressing clean and intact  Discussed increase protein intake   Discussed wound expectations and plan   Continue  with wound care orders and plan as noted in orders.   Continue to follow current medication regimen as per pcp   Call for any questions / concerns.          Tissue pathology and/or culture taken     [] Yes      [x] No  Sharp debridement performed                   [x] Yes       [] No  Labs ordered     [] Yes       [x] No  Imaging ordered    [] Yes      [x] No    Orders Placed This Encounter   Procedures    Change dressing     Wound Dressing Orders     Dressing change frequency three times a week (pt will change dressing when not in clinic, prn nurse visit if needed)   Remove old dressing   Cleanse or irrigate with: Normal Saline   Protect periwound with:  Gentian Violet to maceration and periwound, Cavilon to area that will be taped   Primary dressing: WOUND BASE: ABX powder (1/2 scoop, Tobramycin/Colistimethate) then Caron (1/2 used, remainder given to patient) packed into wound bed then Aquacel AG (folded twice and laid against wound bed)    Secondary dressing: Mepore to cover    Patient will change dressing 3 times a week as per order above. Return to clinic in 4 weeks     Please contact Wound Care Clinic on any acute changes.   If after clinic hours or over the weekend, please go to the nearest ER for evaluation.        Follow up in about 4 weeks (around 7/9/2025) for Wound Care.

## 2025-06-20 ENCOUNTER — LAB VISIT (OUTPATIENT)
Dept: LAB | Facility: HOSPITAL | Age: 40
End: 2025-06-20
Attending: INTERNAL MEDICINE
Payer: MEDICARE

## 2025-06-20 DIAGNOSIS — E11.9 CONTROLLED TYPE 2 DIABETES MELLITUS WITHOUT COMPLICATION, WITHOUT LONG-TERM CURRENT USE OF INSULIN: ICD-10-CM

## 2025-06-20 LAB
ALBUMIN SERPL BCP-MCNC: 4 G/DL (ref 3.5–5.2)
ALP SERPL-CCNC: 120 UNIT/L (ref 40–150)
ALT SERPL W/O P-5'-P-CCNC: 51 UNIT/L (ref 10–44)
ANION GAP (OHS): 11 MMOL/L (ref 8–16)
AST SERPL-CCNC: 33 UNIT/L (ref 11–45)
BILIRUB SERPL-MCNC: 0.5 MG/DL (ref 0.1–1)
BUN SERPL-MCNC: 24 MG/DL (ref 6–20)
CALCIUM SERPL-MCNC: 8.9 MG/DL (ref 8.7–10.5)
CHLORIDE SERPL-SCNC: 98 MMOL/L (ref 95–110)
CO2 SERPL-SCNC: 27 MMOL/L (ref 23–29)
CREAT SERPL-MCNC: 1 MG/DL (ref 0.5–1.4)
EAG (OHS): 160 MG/DL (ref 68–131)
GFR SERPLBLD CREATININE-BSD FMLA CKD-EPI: >60 ML/MIN/1.73/M2
GLUCOSE SERPL-MCNC: 89 MG/DL (ref 70–110)
HBA1C MFR BLD: 7.2 % (ref 4–5.6)
POTASSIUM SERPL-SCNC: 3.5 MMOL/L (ref 3.5–5.1)
PROT SERPL-MCNC: 8 GM/DL (ref 6–8.4)
SODIUM SERPL-SCNC: 136 MMOL/L (ref 136–145)

## 2025-06-20 PROCEDURE — 36415 COLL VENOUS BLD VENIPUNCTURE: CPT | Mod: HCNC,PO

## 2025-06-20 PROCEDURE — 83036 HEMOGLOBIN GLYCOSYLATED A1C: CPT | Mod: HCNC

## 2025-06-20 PROCEDURE — 80053 COMPREHEN METABOLIC PANEL: CPT | Mod: HCNC

## 2025-06-27 ENCOUNTER — OFFICE VISIT (OUTPATIENT)
Dept: INTERNAL MEDICINE | Facility: CLINIC | Age: 40
End: 2025-06-27
Payer: MEDICARE

## 2025-06-27 VITALS
BODY MASS INDEX: 27.65 KG/M2 | HEIGHT: 63 IN | OXYGEN SATURATION: 97 % | HEART RATE: 76 BPM | DIASTOLIC BLOOD PRESSURE: 78 MMHG | SYSTOLIC BLOOD PRESSURE: 124 MMHG

## 2025-06-27 DIAGNOSIS — E78.5 DYSLIPIDEMIA: ICD-10-CM

## 2025-06-27 DIAGNOSIS — Z99.3 DEPENDENT ON WHEELCHAIR: Primary | ICD-10-CM

## 2025-06-27 DIAGNOSIS — E11.69 TYPE 2 DIABETES MELLITUS WITH OTHER SPECIFIED COMPLICATION, WITHOUT LONG-TERM CURRENT USE OF INSULIN: ICD-10-CM

## 2025-06-27 DIAGNOSIS — I10 HTN (HYPERTENSION), BENIGN: ICD-10-CM

## 2025-06-27 PROCEDURE — 99999 PR PBB SHADOW E&M-EST. PATIENT-LVL III: CPT | Mod: PBBFAC,HCNC,, | Performed by: INTERNAL MEDICINE

## 2025-06-27 NOTE — PROGRESS NOTES
"  This note was generated with [employer] voice recognition software. I apologize for any possible typographical errors.  Yoni seems to have trouble with pronouns so I apologize if I Mis pronoun anyone      He is having  car issues.  Living with his mother--  looking for apartment.  And he has had the flu since last visit.  He has not been able to exercise and has been eating more.          He brings his meds in with him today.  His is her for uncontrolled DM and uncontroeld bp -- he has fallen off medsd before-- he brings his meds in .  His meds are hctz- 25 -Atorvastatin 80 mg a day  Valsartan 320 mg a day   Actos 30 mg a day   Metformin 500 mg tid.     farxiga 10 mg a day--  Amlodipne 5 mg a day -- bp is 124/78        Htn-    Recent, hgb A1c  : 7.2--  Last visit it was 12.4  - -  Faxiga 10 mg  a day--    on actos 30 and metformin and januvia--   lantus 10 units a day-- not taking insulin very often. .  He is also exercising a lot more - wheelchair BB with kids.          /78 (BP Location: Right arm, Patient Position: Sitting)   Pulse 76   Ht 5' 3" (1.6 m)   SpO2 97%   BMI 27.65 kg/m²        Physical exam.  He is well-appearing gentleman wheelchair.  Brings in all his medicine today  Head: Normocephalic, without obvious abnormality, atraumatic  Throat: lips, mucosa, and tongue normal; teeth and gums normal  Neck: no adenopathy, no carotid bruit, no JVD, supple, symmetrical, trachea midline, and thyroid not enlarged, symmetric, no tenderness/mass/nodules  Back: symmetric, no curvature. ROM normal. No CVA tenderness.  Lungs: clear to auscultation bilaterally  Chest wall: no tenderness  Abdomen: soft, non-tender; bowel sounds normal; no masses,  no organomegaly   Sitting in  wheelchair today        This is a gentleman with poorly controlled diabetes and poorly controlled blood pressure..  He has not taking his medicine appropriate in past but is on his bp meds now-- glucose is way out of control-- victor m be " getting into basket ball soon- that will help.           Dyslipidemia  -      ldl 105.8   -     atorvastatin (LIPITOR) 80 MG tablet      HTN (hypertension), benign  -     better controled on his meds.       Type 2 diabetes mellitus with other specified complication, without long-term current use of insulin  -      continue current meds-  can take  lantus  prn elevated glu   -     pioglitazone (ACTOS) 30 MG tablet;    -     metFORMIN (GLUCOPHAGE) 500 MG tablet;   -     dapagliflozin propanediol (FARXIGA) 10 mg tablet;       Spina bifida of lumbar region with hydrocephalus- in wheel chair.       Will follow up in 4 months          Our office providers are the focal point for all needed health care services that are part of ongoing care related to a patient's single serious condition or the patient's complex conditions.

## 2025-07-09 ENCOUNTER — HOSPITAL ENCOUNTER (OUTPATIENT)
Dept: WOUND CARE | Facility: HOSPITAL | Age: 40
Discharge: HOME | End: 2025-07-09
Attending: FAMILY MEDICINE
Payer: MEDICARE

## 2025-07-09 VITALS
SYSTOLIC BLOOD PRESSURE: 135 MMHG | TEMPERATURE: 98 F | RESPIRATION RATE: 15 BRPM | HEART RATE: 87 BPM | DIASTOLIC BLOOD PRESSURE: 80 MMHG

## 2025-07-09 DIAGNOSIS — G82.20 PARAPLEGIA: ICD-10-CM

## 2025-07-09 DIAGNOSIS — Q05.9 SPINA BIFIDA: Primary | ICD-10-CM

## 2025-07-09 DIAGNOSIS — L89.154 SACRAL DECUBITUS ULCER, STAGE IV: ICD-10-CM

## 2025-07-09 DIAGNOSIS — E11.69 TYPE 2 DIABETES MELLITUS WITH OTHER SPECIFIED COMPLICATION, WITHOUT LONG-TERM CURRENT USE OF INSULIN: ICD-10-CM

## 2025-07-09 PROCEDURE — 11042 DBRDMT SUBQ TIS 1ST 20SQCM/<: CPT | Mod: HCNC | Performed by: FAMILY MEDICINE

## 2025-07-09 NOTE — PROGRESS NOTES
[facility]                Progress Note    Subjective:       Patient ID: Dale Pantoja is a 40 y.o. male.    Chief Complaint: Wound Care and Dressing Change    Follow Up wound care visit. Patient rolled to exam room in wheelchair to [facility] with nurse at their side. Patient locks wheels on wheelchair and then self transfers to exam bed with nurse at side.  Patient denies fever, chills, nausea, vomiting or diarrhea at present. Patient denies pain or discomfort to wound bed at present. Patient reports not having any issues with dressing changes since last visit. He does report price increase from insurance per DME to $80 per delivery, he will check [employer] to see if he can purchase products for less. Dressing appears intact without strikethrough drainage. Dressing removed & wound cleaned by nurse.  Dressing discarded. Right Lateral Hip wound is measuring smaller if measured below maceration rolled into wound bed.      No change to dressing order; applied per MD order. Patient will return to [facility] in 4 weeks. Patient declined AVS, has MyChart.          Review of Systems      Objective:        Physical Exam    Vitals:    07/09/25 1000   BP: 135/80   Pulse: 87   Resp: 15   Temp: 97.8 °F (36.6 °C)       Assessment:           ICD-10-CM ICD-9-CM   1. Spina bifida  Q05.9 741.90   2. Sacral decubitus ulcer, stage IV  L89.154 707.03     707.24   3. Paraplegia  G82.20 344.1   4. Type 2 diabetes mellitus with other specified complication, without long-term current use of insulin  E11.69 250.80            Altered Skin Integrity 09/28/22 1136 Right lower Buttocks (Active)   09/28/22 1136   Altered Skin Integrity Present on Admission - Did Patient arrive to the hospital with altered skin?: yes   Side: Right   Orientation: lower   Location: Buttocks   Wound Number:    Is this injury device related?:    Primary Wound Type:    Description of Altered Skin Integrity:    Ankle-Brachial Index:     Pulses:    Removal Indication and Assessment:    Wound Outcome:    (Retired) Wound Length (cm):    (Retired) Wound Width (cm):    (Retired) Depth (cm):    Wound Description (Comments):    Removal Indications:    Wound Image   07/09/25 1039   Description of Altered Skin Integrity Full thickness tissue loss. Subcutaneous fat may be visible but bone, tendon or muscle are not exposed 07/09/25 1011   Dressing Appearance Intact;Moist drainage 07/09/25 1011   Drainage Amount Moderate 07/09/25 1011   Drainage Characteristics/Odor Alcantar 07/09/25 1011   Appearance Red;Moist 07/09/25 1011   Tissue loss description Full thickness 07/09/25 1011   Black (%), Wound Tissue Color 0 % 07/09/25 1011   Red (%), Wound Tissue Color 100 % 07/09/25 1011   Yellow (%), Wound Tissue Color 0 % 07/09/25 1011   Periwound Area Pale white;Macerated 07/09/25 1011   Wound Edges Open;Other (see comments) 07/09/25 1011   Wound Length (cm) 0.3 cm 07/09/25 1039   Wound Width (cm) 1.1 cm 07/09/25 1039   Wound Depth (cm) 0.8 cm 07/09/25 1039   Wound Volume (cm^3) 0.138 cm^3 07/09/25 1039   Wound Surface Area (cm^2) 0.26 cm^2 07/09/25 1039   Tunneling (depth (cm)/location) 0 07/09/25 1039   Undermining (depth (cm)/location) 0 07/09/25 1039   Care Debrided 07/09/25 1039   Dressing Changed 07/09/25 1039   Periwound Care Moisture barrier applied;Absorptive dressing applied 07/09/25 1039   Dressing Change Due 07/11/25 07/09/25 1039           Plan:              Tissue pathology and/or culture taken     [] Yes      [x] No  Sharp debridement performed                   [x] Yes       [] No  Labs ordered     [] Yes       [x] No  Imaging ordered    [] Yes      [x] No    Orders Placed This Encounter   Procedures    Change dressing     Wound Dressing Orders     Dressing change frequency three times a week (pt will change dressing when not in clinic, prn nurse visit if needed)   Remove old dressing   Cleanse or irrigate with: Normal Saline   Protect periwound with:   Gentian Violet to maceration and periwound, Cavilon to area that will be taped   Primary dressing: WOUND BASE: ABX powder (1/2 scoop, Tobramycin/Colistimethate) then Caron (1/2 used, remainder given to patient) packed into wound bed then Aquacel AG (folded twice and laid against wound bed)    Secondary dressing: Mepore to cover     Patient will change dressing 3 times a week as per order above. Return to clinic in 4 weeks     Please contact Wound Care Clinic on any acute changes.   If after clinic hours or over the weekend, please go to the nearest ER for evaluation.        Follow up in about 4 weeks (around 8/6/2025) for Wound Care.

## 2025-08-06 ENCOUNTER — HOSPITAL ENCOUNTER (OUTPATIENT)
Dept: WOUND CARE | Facility: HOSPITAL | Age: 40
Discharge: HOME | End: 2025-08-06
Attending: FAMILY MEDICINE
Payer: MEDICARE

## 2025-08-06 VITALS
DIASTOLIC BLOOD PRESSURE: 82 MMHG | HEART RATE: 83 BPM | TEMPERATURE: 97 F | RESPIRATION RATE: 14 BRPM | SYSTOLIC BLOOD PRESSURE: 135 MMHG

## 2025-08-06 DIAGNOSIS — E11.69 TYPE 2 DIABETES MELLITUS WITH OTHER SPECIFIED COMPLICATION, WITHOUT LONG-TERM CURRENT USE OF INSULIN: ICD-10-CM

## 2025-08-06 DIAGNOSIS — L89.154 SACRAL DECUBITUS ULCER, STAGE IV: ICD-10-CM

## 2025-08-06 DIAGNOSIS — Q05.9 SPINA BIFIDA: Primary | ICD-10-CM

## 2025-08-06 DIAGNOSIS — E11.9 TYPE 2 DIABETES MELLITUS WITHOUT COMPLICATION: ICD-10-CM

## 2025-08-06 DIAGNOSIS — G82.20 PARAPLEGIA: ICD-10-CM

## 2025-08-06 PROCEDURE — 11042 DBRDMT SUBQ TIS 1ST 20SQCM/<: CPT | Mod: HCNC | Performed by: FAMILY MEDICINE

## 2025-08-06 PROCEDURE — 11042 DBRDMT SUBQ TIS 1ST 20SQCM/<: CPT | Mod: HCNC,,, | Performed by: FAMILY MEDICINE

## 2025-08-06 PROCEDURE — 99214 OFFICE O/P EST MOD 30 MIN: CPT | Mod: 25,HCNC,, | Performed by: FAMILY MEDICINE

## 2025-08-06 NOTE — PROGRESS NOTES
"[facility]                Progress Note    Subjective:       Patient ID: Dale Pantoja is a 40 y.o. male.    Chief Complaint: Wound Care and Dressing Change    Follow Up wound care visit. Patient rolled to exam room in wheelchair to [facility] with nurse at their side. Patient locks wheels on wheelchair and then self transfers to exam bed with nurse at side. Patient denies fever, chills, nausea, vomiting or diarrhea at present. Patient denies pain or discomfort to wound bed at present. Patient reports not having any issues with dressing or every other day changes since last visit. He reports receiving supplies through [employer] "is a better deal" and reports it is the same items as prescribed. Dressing appears intact without strikethrough drainage. Dressing removed & wound cleaned by nurse.  Dressing discarded. Right Lower Buttock wound measures smaller after MD debrided.     No change to dressing order; applied per MD order. Patient will return to [facility] in 4 weeks. Patient declined AVS, has MyChart.     This is an established patient in wound care.   Patient presents in the office today for evaluation of the chronic wound.  Updated HPI is noted below.    The periwound appears non-erythematous, no maceration noted, non-edematous    The wound appears stable; fat layer exposed. Fibrin and slough in wound bed. No odor.     Patient denies fever, chills    Patients reports fever, chills    Lymphedema status is noncontributory to wound status    Pain at the site of the wound is aching    Contributing factors to current wound state include numerous comorbid conditions       Review of Systems   Constitutional:  Negative for activity change, appetite change and fever.   HENT:  Negative for congestion.    Respiratory:  Negative for shortness of breath and wheezing.    Cardiovascular:  Negative for chest pain and leg swelling.   Gastrointestinal:  Negative for abdominal pain, nausea and " vomiting.   Skin:  Positive for wound.   Neurological:  Negative for dizziness and headaches.   Psychiatric/Behavioral:  The patient is not nervous/anxious.          Objective:        Physical Exam  Vitals and nursing note reviewed.   Constitutional:       Appearance: He is well-developed.   HENT:      Head: Normocephalic and atraumatic.   Eyes:      Conjunctiva/sclera: Conjunctivae normal.   Pulmonary:      Effort: Pulmonary effort is normal.   Abdominal:      General: There is no distension.      Palpations: Abdomen is soft.   Musculoskeletal:         General: Normal range of motion.      Cervical back: Normal range of motion and neck supple.   Skin:     Comments: See wound description   Neurological:      Mental Status: He is alert and oriented to person, place, and time.   Psychiatric:         Behavior: Behavior normal.         Vitals:    08/06/25 0945   BP: 135/82   Pulse: 83   Resp: 14   Temp: 97.2 °F (36.2 °C)       Assessment:           ICD-10-CM ICD-9-CM   1. Spina bifida  Q05.9 741.90   2. Sacral decubitus ulcer, stage IV  L89.154 707.03     707.24   3. Paraplegia  G82.20 344.1   4. Type 2 diabetes mellitus with other specified complication, without long-term current use of insulin  E11.69 250.80            Altered Skin Integrity 09/28/22 1136 Right lower Buttocks (Active)   09/28/22 1136   Altered Skin Integrity Present on Admission - Did Patient arrive to the hospital with altered skin?: yes   Side: Right   Orientation: lower   Location: Buttocks   Wound Number:    Is this injury device related?:    Primary Wound Type:    Description of Altered Skin Integrity:    Ankle-Brachial Index:    Pulses:    Removal Indication and Assessment:    Wound Outcome:    (Retired) Wound Length (cm):    (Retired) Wound Width (cm):    (Retired) Depth (cm):    Wound Description (Comments):    Removal Indications:    Wound Image   08/06/25 1024   Description of Altered Skin Integrity Full thickness tissue loss. Subcutaneous  fat may be visible but bone, tendon or muscle are not exposed 08/06/25 0956   Dressing Appearance Intact;Moist drainage 08/06/25 0956   Drainage Amount Moderate 08/06/25 0956   Drainage Characteristics/Odor Serosanguineous 08/06/25 0956   Appearance Red;Moist 08/06/25 0956   Tissue loss description Full thickness 08/06/25 0956   Black (%), Wound Tissue Color 0 % 08/06/25 0956   Red (%), Wound Tissue Color 100 % 08/06/25 0956   Yellow (%), Wound Tissue Color 0 % 08/06/25 0956   Periwound Area Pale white 08/06/25 0956   Wound Edges Open;Other (see comments) 08/06/25 0956   Wound Length (cm) 0.3 cm 08/06/25 1024   Wound Width (cm) 1 cm 08/06/25 1024   Wound Depth (cm) 0.7 cm 08/06/25 1024   Wound Volume (cm^3) 0.11 cm^3 08/06/25 1024   Wound Surface Area (cm^2) 0.24 cm^2 08/06/25 1024   Tunneling (depth (cm)/location) 0 08/06/25 1024   Undermining (depth (cm)/location) 0 08/06/25 1024   Care Debrided;Wound cleanser 08/06/25 1024   Dressing Applied;Collagen;Other (comment);Calcium alginate;Silver;Island/border 08/06/25 1024   Periwound Care Moisture barrier applied;Absorptive dressing applied 08/06/25 1024   Dressing Change Due 08/08/25 08/06/25 1024         Debridement    Date/Time: 8/6/2025 10:00 AM    Performed by: Ofelia Lizama MD  Authorized by: Ofelia Lizama MD    Local anesthesia used?: Yes    Local anesthetic:  Topical anesthetic    Wound Details:    Location:  Right buttock    Type of Debridement:  Excisional       Length (cm):  0.3       Width (cm):  1       Depth (cm):  0.7       Area (sq cm):  0.24       Percent Debrided (%):  100       Total Area Debrided (sq cm):  0.24    Depth of debridement:  Subcutaneous tissue    Tissue debrided:  Subcutaneous    Devitalized tissue debrided:  Slough, Fibrin and Callus    Instruments:  Curette  Bleeding:  Minimal  Hemostasis Achieved: Yes  Method Used:  Pressure  Patient tolerance:  Patient tolerated the procedure well with no immediate complications         Plan:             Debridement needed and Done today.   Recommend off-loading   Keep dressing clean and intact  Discussed increase protein intake   Discussed wound expectations and plan   Continue with wound care orders and plan as noted in orders.   Continue to follow current medication regimen as per pcp   Call for any questions / concerns.        Tissue pathology and/or culture taken     [] Yes      [x] No  Sharp debridement performed                   [x] Yes       [] No  Labs ordered     [] Yes       [x] No  Imaging ordered    [] Yes      [x] No    Orders Placed This Encounter   Procedures    Change dressing     Wound Dressing Orders    Dressing change frequency three times a week (pt will change dressing when not in clinic, prn nurse visit if needed)  Remove old dressing  Cleanse or irrigate with: Normal Saline  Protect periwound with:  Gentian Violet to maceration and periwound, Cavilon to area that will be taped  Primary dressing: WOUND BASE: ABX powder (1/2 scoop, Tobramycin/Colistimethate) then Caron (1/2 used, remainder given to patient) packed into wound bed then Aquacel AG (folded twice and laid against wound bed)    Secondary dressing: Mepore to cover    Patient will change dressing 3 times a week as per order above. Return to clinic in 4 weeks    Please contact Wound Care Clinic on any acute changes.  If after clinic hours or over the weekend, please go to the nearest ER for evaluation.        Follow up in about 4 weeks (around 9/3/2025) for Wound Care.

## 2025-08-27 DIAGNOSIS — E11.9 TYPE 2 DIABETES MELLITUS WITHOUT COMPLICATION, UNSPECIFIED WHETHER LONG TERM INSULIN USE: ICD-10-CM

## 2025-09-03 ENCOUNTER — HOSPITAL ENCOUNTER (OUTPATIENT)
Dept: WOUND CARE | Facility: HOSPITAL | Age: 40
Discharge: HOME | End: 2025-09-03
Attending: FAMILY MEDICINE
Payer: MEDICARE

## 2025-09-03 VITALS
SYSTOLIC BLOOD PRESSURE: 140 MMHG | DIASTOLIC BLOOD PRESSURE: 84 MMHG | HEART RATE: 89 BPM | TEMPERATURE: 97 F | RESPIRATION RATE: 14 BRPM

## 2025-09-03 DIAGNOSIS — G82.20 PARAPLEGIA: ICD-10-CM

## 2025-09-03 DIAGNOSIS — L89.154 SACRAL DECUBITUS ULCER, STAGE IV: ICD-10-CM

## 2025-09-03 DIAGNOSIS — Q05.9 SPINA BIFIDA: Primary | ICD-10-CM

## 2025-09-03 PROCEDURE — 11042 DBRDMT SUBQ TIS 1ST 20SQCM/<: CPT | Mod: HCNC | Performed by: FAMILY MEDICINE

## 2025-09-03 PROCEDURE — 99214 OFFICE O/P EST MOD 30 MIN: CPT | Mod: 25,HCNC,, | Performed by: FAMILY MEDICINE

## 2025-09-03 PROCEDURE — 11042 DBRDMT SUBQ TIS 1ST 20SQCM/<: CPT | Mod: HCNC,,, | Performed by: FAMILY MEDICINE

## (undated) DEVICE — BANDAGE ROLL COTTN 4.5INX4.1YD

## (undated) DEVICE — PACK ENDOSCOPY GENERAL

## (undated) DEVICE — TOWEL OR DISP STRL BLUE 4/PK

## (undated) DEVICE — BLANKET UPPER BODY 78.7X29.9IN

## (undated) DEVICE — GOWN NONREINF SET-IN SLV XL

## (undated) DEVICE — COVER OVERHEAD SURG LT BLUE

## (undated) DEVICE — SOL IRR SOD CHL .9% POUR

## (undated) DEVICE — SPONGE GAUZE 4X4 12 PLY STRL

## (undated) DEVICE — Device

## (undated) DEVICE — ELECTRODE REM PLYHSV RETURN 9

## (undated) DEVICE — APPLICATOR CHLORAPREP ORN 26ML

## (undated) DEVICE — GLOVE BIOGEL 7.0

## (undated) DEVICE — SUPPORT ULNA NERVE PROTECTOR